# Patient Record
Sex: MALE | Race: WHITE | NOT HISPANIC OR LATINO | Employment: OTHER | URBAN - METROPOLITAN AREA
[De-identification: names, ages, dates, MRNs, and addresses within clinical notes are randomized per-mention and may not be internally consistent; named-entity substitution may affect disease eponyms.]

---

## 2020-12-01 ENCOUNTER — APPOINTMENT (OUTPATIENT)
Dept: MRI IMAGING | Facility: HOSPITAL | Age: 80
End: 2020-12-01

## 2020-12-01 ENCOUNTER — APPOINTMENT (OUTPATIENT)
Dept: GENERAL RADIOLOGY | Facility: HOSPITAL | Age: 80
End: 2020-12-01

## 2020-12-01 ENCOUNTER — APPOINTMENT (OUTPATIENT)
Dept: CT IMAGING | Facility: HOSPITAL | Age: 80
End: 2020-12-01

## 2020-12-01 ENCOUNTER — HOSPITAL ENCOUNTER (INPATIENT)
Facility: HOSPITAL | Age: 80
LOS: 8 days | Discharge: NURSING, REHAB OR LONG-TERM CARE | End: 2020-12-09
Attending: EMERGENCY MEDICINE | Admitting: INTERNAL MEDICINE

## 2020-12-01 DIAGNOSIS — R41.841 COGNITIVE COMMUNICATION DEFICIT: Primary | ICD-10-CM

## 2020-12-01 DIAGNOSIS — I63.9 ACUTE CVA (CEREBROVASCULAR ACCIDENT) (HCC): ICD-10-CM

## 2020-12-01 LAB
BASE EXCESS BLDA CALC-SCNC: -2 MMOL/L (ref -5–5)
BASOPHILS # BLD AUTO: 0.11 10*3/MM3 (ref 0–0.2)
BASOPHILS NFR BLD AUTO: 1.2 % (ref 0–1.5)
CA-I BLDA-SCNC: 1.24 MMOL/L (ref 1.2–1.32)
CO2 BLDA-SCNC: 25 MMOL/L (ref 24–29)
DEPRECATED RDW RBC AUTO: 49.3 FL (ref 37–54)
EOSINOPHIL # BLD AUTO: 0.47 10*3/MM3 (ref 0–0.4)
EOSINOPHIL NFR BLD AUTO: 5.2 % (ref 0.3–6.2)
ERYTHROCYTE [DISTWIDTH] IN BLOOD BY AUTOMATED COUNT: 12.9 % (ref 12.3–15.4)
GLUCOSE BLDC GLUCOMTR-MCNC: 146 MG/DL (ref 70–130)
HCO3 BLDA-SCNC: 23.9 MMOL/L (ref 22–26)
HCT VFR BLD AUTO: 38.2 % (ref 37.5–51)
HCT VFR BLDA CALC: 37 % (ref 38–51)
HGB BLD-MCNC: 11.9 G/DL (ref 13–17.7)
HGB BLDA-MCNC: 12.6 G/DL (ref 12–17)
HOLD SPECIMEN: NORMAL
HOLD SPECIMEN: NORMAL
IMM GRANULOCYTES # BLD AUTO: 0.03 10*3/MM3 (ref 0–0.05)
IMM GRANULOCYTES NFR BLD AUTO: 0.3 % (ref 0–0.5)
INR PPP: 1 (ref 0.8–1.2)
IRON 24H UR-MRATE: 53 MCG/DL (ref 59–158)
IRON SATN MFR SERPL: 13 % (ref 20–50)
LYMPHOCYTES # BLD AUTO: 1.21 10*3/MM3 (ref 0.7–3.1)
LYMPHOCYTES NFR BLD AUTO: 13.4 % (ref 19.6–45.3)
MCH RBC QN AUTO: 32.2 PG (ref 26.6–33)
MCHC RBC AUTO-ENTMCNC: 31.2 G/DL (ref 31.5–35.7)
MCV RBC AUTO: 103.5 FL (ref 79–97)
MONOCYTES # BLD AUTO: 0.83 10*3/MM3 (ref 0.1–0.9)
MONOCYTES NFR BLD AUTO: 9.2 % (ref 5–12)
NEUTROPHILS NFR BLD AUTO: 6.35 10*3/MM3 (ref 1.7–7)
NEUTROPHILS NFR BLD AUTO: 70.7 % (ref 42.7–76)
NRBC BLD AUTO-RTO: 0 /100 WBC (ref 0–0.2)
PCO2 BLDA: 46.9 MM HG (ref 35–45)
PH BLDA: 7.32 PH UNITS (ref 7.35–7.6)
PLATELET # BLD AUTO: 183 10*3/MM3 (ref 140–450)
PMV BLD AUTO: 10.3 FL (ref 6–12)
PO2 BLDA: 25 MMHG (ref 80–105)
POTASSIUM BLDA-SCNC: 5 MMOL/L (ref 3.5–4.9)
PROTHROMBIN TIME: 11.8 SECONDS (ref 12.8–15.2)
QT INTERVAL: 466 MS
QTC INTERVAL: 445 MS
RBC # BLD AUTO: 3.69 10*6/MM3 (ref 4.14–5.8)
SAO2 % BLDA: 40 % (ref 95–98)
SARS-COV-2 RNA RESP QL NAA+PROBE: NOT DETECTED
SODIUM BLD-SCNC: 138 MMOL/L (ref 138–146)
TIBC SERPL-MCNC: 417 MCG/DL (ref 298–536)
TRANSFERRIN SERPL-MCNC: 280 MG/DL (ref 200–360)
WBC # BLD AUTO: 9 10*3/MM3 (ref 3.4–10.8)
WHOLE BLOOD HOLD SPECIMEN: NORMAL
WHOLE BLOOD HOLD SPECIMEN: NORMAL

## 2020-12-01 PROCEDURE — 83540 ASSAY OF IRON: CPT | Performed by: INTERNAL MEDICINE

## 2020-12-01 PROCEDURE — 84295 ASSAY OF SERUM SODIUM: CPT

## 2020-12-01 PROCEDURE — 85025 COMPLETE CBC W/AUTO DIFF WBC: CPT | Performed by: EMERGENCY MEDICINE

## 2020-12-01 PROCEDURE — 84466 ASSAY OF TRANSFERRIN: CPT | Performed by: INTERNAL MEDICINE

## 2020-12-01 PROCEDURE — 87635 SARS-COV-2 COVID-19 AMP PRB: CPT | Performed by: EMERGENCY MEDICINE

## 2020-12-01 PROCEDURE — 71045 X-RAY EXAM CHEST 1 VIEW: CPT

## 2020-12-01 PROCEDURE — 70496 CT ANGIOGRAPHY HEAD: CPT

## 2020-12-01 PROCEDURE — 99223 1ST HOSP IP/OBS HIGH 75: CPT | Performed by: CLINICAL NURSE SPECIALIST

## 2020-12-01 PROCEDURE — 99223 1ST HOSP IP/OBS HIGH 75: CPT | Performed by: INTERNAL MEDICINE

## 2020-12-01 PROCEDURE — 0042T HC CT CEREBRAL PERFUSION W/WO CONTRAST: CPT

## 2020-12-01 PROCEDURE — 82947 ASSAY GLUCOSE BLOOD QUANT: CPT

## 2020-12-01 PROCEDURE — 70498 CT ANGIOGRAPHY NECK: CPT

## 2020-12-01 PROCEDURE — 70450 CT HEAD/BRAIN W/O DYE: CPT

## 2020-12-01 PROCEDURE — 85610 PROTHROMBIN TIME: CPT

## 2020-12-01 PROCEDURE — 82330 ASSAY OF CALCIUM: CPT

## 2020-12-01 PROCEDURE — 99285 EMERGENCY DEPT VISIT HI MDM: CPT

## 2020-12-01 PROCEDURE — 70551 MRI BRAIN STEM W/O DYE: CPT

## 2020-12-01 PROCEDURE — 85014 HEMATOCRIT: CPT

## 2020-12-01 PROCEDURE — 82803 BLOOD GASES ANY COMBINATION: CPT

## 2020-12-01 PROCEDURE — 84132 ASSAY OF SERUM POTASSIUM: CPT

## 2020-12-01 PROCEDURE — 0 IOPAMIDOL PER 1 ML: Performed by: EMERGENCY MEDICINE

## 2020-12-01 PROCEDURE — 82565 ASSAY OF CREATININE: CPT

## 2020-12-01 PROCEDURE — 93005 ELECTROCARDIOGRAM TRACING: CPT | Performed by: EMERGENCY MEDICINE

## 2020-12-01 RX ORDER — ATORVASTATIN CALCIUM 40 MG/1
80 TABLET, FILM COATED ORAL NIGHTLY
Status: DISCONTINUED | OUTPATIENT
Start: 2020-12-01 | End: 2020-12-09 | Stop reason: HOSPADM

## 2020-12-01 RX ORDER — SODIUM CHLORIDE 0.9 % (FLUSH) 0.9 %
10 SYRINGE (ML) INJECTION AS NEEDED
Status: DISCONTINUED | OUTPATIENT
Start: 2020-12-01 | End: 2020-12-09 | Stop reason: HOSPADM

## 2020-12-01 RX ORDER — ASPIRIN 81 MG/1
81 TABLET ORAL DAILY
COMMUNITY
End: 2021-09-29

## 2020-12-01 RX ORDER — DEXTROSE MONOHYDRATE 25 G/50ML
25 INJECTION, SOLUTION INTRAVENOUS
Status: DISCONTINUED | OUTPATIENT
Start: 2020-12-01 | End: 2020-12-08

## 2020-12-01 RX ORDER — METOPROLOL TARTRATE 50 MG/1
50 TABLET, FILM COATED ORAL EVERY 12 HOURS SCHEDULED
Status: DISCONTINUED | OUTPATIENT
Start: 2020-12-02 | End: 2020-12-09 | Stop reason: HOSPADM

## 2020-12-01 RX ORDER — ASPIRIN 300 MG/1
300 SUPPOSITORY RECTAL DAILY
Status: DISCONTINUED | OUTPATIENT
Start: 2020-12-01 | End: 2020-12-04

## 2020-12-01 RX ORDER — ATORVASTATIN CALCIUM 10 MG/1
10 TABLET, FILM COATED ORAL DAILY
COMMUNITY
End: 2020-12-09 | Stop reason: HOSPADM

## 2020-12-01 RX ORDER — NICOTINE POLACRILEX 4 MG
15 LOZENGE BUCCAL
Status: DISCONTINUED | OUTPATIENT
Start: 2020-12-01 | End: 2020-12-08

## 2020-12-01 RX ORDER — ACETAMINOPHEN 325 MG/1
650 TABLET ORAL EVERY 6 HOURS PRN
Status: DISCONTINUED | OUTPATIENT
Start: 2020-12-01 | End: 2020-12-09 | Stop reason: HOSPADM

## 2020-12-01 RX ORDER — SODIUM CHLORIDE 0.9 % (FLUSH) 0.9 %
10 SYRINGE (ML) INJECTION EVERY 12 HOURS SCHEDULED
Status: DISCONTINUED | OUTPATIENT
Start: 2020-12-01 | End: 2020-12-09 | Stop reason: HOSPADM

## 2020-12-01 RX ORDER — ACETAMINOPHEN 325 MG/1
650 TABLET ORAL ONCE
Status: COMPLETED | OUTPATIENT
Start: 2020-12-01 | End: 2020-12-01

## 2020-12-01 RX ORDER — ASPIRIN 81 MG/1
81 TABLET, CHEWABLE ORAL DAILY
Status: DISCONTINUED | OUTPATIENT
Start: 2020-12-01 | End: 2020-12-07

## 2020-12-01 RX ORDER — SODIUM CHLORIDE 0.9 % (FLUSH) 0.9 %
10 SYRINGE (ML) INJECTION AS NEEDED
Status: DISCONTINUED | OUTPATIENT
Start: 2020-12-01 | End: 2020-12-08

## 2020-12-01 RX ORDER — ASPIRIN 81 MG/1
81 TABLET ORAL DAILY
Status: DISCONTINUED | OUTPATIENT
Start: 2020-12-01 | End: 2020-12-01 | Stop reason: SDUPTHER

## 2020-12-01 RX ORDER — FUROSEMIDE 20 MG/1
20 TABLET ORAL 2 TIMES WEEKLY
Status: ON HOLD | COMMUNITY
End: 2021-03-02 | Stop reason: SDUPTHER

## 2020-12-01 RX ADMIN — ACETAMINOPHEN 650 MG: 325 TABLET, FILM COATED ORAL at 14:59

## 2020-12-01 RX ADMIN — ACETAMINOPHEN 650 MG: 325 TABLET, FILM COATED ORAL at 21:30

## 2020-12-01 RX ADMIN — SODIUM CHLORIDE, PRESERVATIVE FREE 10 ML: 5 INJECTION INTRAVENOUS at 21:31

## 2020-12-01 RX ADMIN — ASPIRIN 81 MG CHEWABLE TABLET 81 MG: 81 TABLET CHEWABLE at 21:16

## 2020-12-01 RX ADMIN — ATORVASTATIN CALCIUM 80 MG: 40 TABLET, FILM COATED ORAL at 21:16

## 2020-12-01 RX ADMIN — IOPAMIDOL 120 ML: 755 INJECTION, SOLUTION INTRAVENOUS at 11:47

## 2020-12-01 NOTE — ED PROVIDER NOTES
EMERGENCY DEPARTMENT ENCOUNTER    Room Number:  29/29  Date of encounter:  12/1/2020  PCP: Neema Jimenes MD  Historian: Patient, spouse, paramedics      HPI:  Chief Complaint: Left-sided weakness        Context: Dirk Miles is a 80 y.o. male who presents to the ED c/o left-sided weakness which is felt to start between 730 and 8 AM today.  The patient's wife states that the patient crumpled toward the floor between 730 and 8 AM.  At that point she realized that he was quite weak and called 911.  She believes that he was normal prior to that time but cannot completely guarantee this.  Patient has a difficult time describing when his symptoms start.  He knows he was able to take the trash out this morning sometime around 730.  No prior CVAs noted.  Patient takes an aspirin only as far as anticoagulation.      PAST MEDICAL HISTORY  Active Ambulatory Problems     Diagnosis Date Noted   • No Active Ambulatory Problems     Resolved Ambulatory Problems     Diagnosis Date Noted   • No Resolved Ambulatory Problems     Past Medical History:   Diagnosis Date   • A-fib (CMS/[facility])    • Diabetes mellitus (CMS/[facility])    • Hypertension          PAST SURGICAL HISTORY  Past Surgical History:   Procedure Laterality Date   • CATARACT EXTRACTION, BILATERAL     • HERNIA REPAIR           FAMILY HISTORY  History reviewed. No pertinent family history.      SOCIAL HISTORY  Social History     Socioeconomic History   • Marital status:      Spouse name: Not on file   • Number of children: Not on file   • Years of education: Not on file   • Highest education level: Not on file   Tobacco Use   • Smoking status: Former Smoker   • Smokeless tobacco: Never Used   • Tobacco comment: QUIT 25YRS AGO    Substance and Sexual Activity   • Alcohol use: Never     Frequency: Never   • Drug use: Never   • Sexual activity: Defer         ALLERGIES  Patient has no known allergies.        REVIEW OF SYSTEMS  Review of Systems     All systems  reviewed and negative except for those discussed in HPI.       PHYSICAL EXAM    I have reviewed the triage vital signs and nursing notes.    ED Triage Vitals [12/01/20 1118]   Temp Heart Rate Resp BP SpO2   98.7 °F (37.1 °C) 65 16 152/74 --      Temp src Heart Rate Source Patient Position BP Location FiO2 (%)   Oral Monitor Lying Right arm --       Physical Exam  GENERAL:   Appears somewhat dazed and confused but nontoxic.  HENT: Nares patent.  EYES: No scleral icterus.  Complete left hemianopsia  CV: Regular rhythm, regular rate.  No murmurs gallops rubs.  No audible carotid bruits.  RESPIRATORY: Normal effort.  No audible wheezes, rales or rhonchi.  Clear to auscultation.  ABDOMEN: Soft, nontender.  MUSCULOSKELETAL: No deformities.   NEURO: Left hemianopsia.  Left arm drift, left leg drift, decreased fine touch sensation throughout left arm and leg.  See stroke navigator [facility] for details..  SKIN: Warm, dry, no rash visualized.        LAB RESULTS  Recent Results (from the past 24 hour(s))   POC Surgery Labs    Collection Time: 12/01/20 11:25 AM    Specimen: Blood   Result Value Ref Range    Ionized Calcium 1.24 1.20 - 1.32 mmol/L    POC Potassium 5.0 (H) 3.5 - 4.9 mmol/L    Sodium 138 138 - 146 mmol/L    Total CO2 25 24 - 29 mmol/L    Hemoglobin 12.6 12.0 - 17.0 g/dL    Hematocrit 37 (L) 38 - 51 %    pCO2, Arterial 46.9 (H) 35 - 45 mm Hg    pO2, Arterial 25 (L) 80 - 105 mmHg    Base Excess -2.0000 -5 - 5 mmol/L    O2 Saturation, Arterial 40 (L) 95 - 98 %    pH, Arterial 7.32 (L) 7.35 - 7.6 pH units    HCO3, Arterial 23.9 22 - 26 mmol/L    Glucose 146 (H) 70 - 130 mg/dL   POC Protime / INR    Collection Time: 12/01/20 11:29 AM    Specimen: Blood   Result Value Ref Range    Protime 11.8 (L) 12.8 - 15.2 seconds    INR 1.0 0.8 - 1.2   CBC Auto Differential    Collection Time: 12/01/20 11:39 AM    Specimen: Blood   Result Value Ref Range    WBC 9.00 3.40 - 10.80 10*3/mm3    RBC 3.69 (L) 4.14 - 5.80 10*6/mm3    Hemoglobin  11.9 (L) 13.0 - 17.7 g/dL    Hematocrit 38.2 37.5 - 51.0 %    .5 (H) 79.0 - 97.0 fL    MCH 32.2 26.6 - 33.0 pg    MCHC 31.2 (L) 31.5 - 35.7 g/dL    RDW 12.9 12.3 - 15.4 %    RDW-SD 49.3 37.0 - 54.0 fl    MPV 10.3 6.0 - 12.0 fL    Platelets 183 140 - 450 10*3/mm3    Neutrophil % 70.7 42.7 - 76.0 %    Lymphocyte % 13.4 (L) 19.6 - 45.3 %    Monocyte % 9.2 5.0 - 12.0 %    Eosinophil % 5.2 0.3 - 6.2 %    Basophil % 1.2 0.0 - 1.5 %    Immature Grans % 0.3 0.0 - 0.5 %    Neutrophils, Absolute 6.35 1.70 - 7.00 10*3/mm3    Lymphocytes, Absolute 1.21 0.70 - 3.10 10*3/mm3    Monocytes, Absolute 0.83 0.10 - 0.90 10*3/mm3    Eosinophils, Absolute 0.47 (H) 0.00 - 0.40 10*3/mm3    Basophils, Absolute 0.11 0.00 - 0.20 10*3/mm3    Immature Grans, Absolute 0.03 0.00 - 0.05 10*3/mm3    nRBC 0.0 0.0 - 0.2 /100 WBC   ECG 12 Lead    Collection Time: 12/01/20 12:22 PM   Result Value Ref Range    QT Interval 466 ms    QTC Interval 445 ms       If labs were ordered, I independently reviewed the results.        RADIOLOGY  Ct Angiogram Head    Result Date: 12/1/2020  EXAMINATION: CT ANGIOGRAM HEAD-, CT ANGIOGRAM NECK-  INDICATION: stroke  TECHNIQUE: Axial IV arterial phase contrast-enhanced CT angiogram of the head and neck. Three-dimensional reconstructions were postprocessed.  The radiation dose reduction device was turned on for each scan per the ALARA (As Low as Reasonably Achievable) protocol.  COMPARISON: NONE  FINDINGS: The lung apices are clear. Evaluation of the neck soft tissues demonstrates no pathologic cervical adenopathy or unexpected soft tissue neck mass. The parotid and submandibular glands are unremarkable and there is no obvious aerodigestive tract lesion. Evaluation of osseous structures demonstrates no evidence of acute fracture or malalignment.  Patent atherosclerotic aortic arch with patent great vessel origins. On the right, there is mild calcific atherosclerotic disease, not producing significant luminal  narrowing, 0% by NASA criteria. On the left, there is focal severe narrowing of the left ICA just distal to its origin with a prominent component of noncalcified, soft atherosclerotic plaque. Narrowing on the left is around 80% by NASCET criteria. Bilateral vertebral arteries are normal in course and caliber.  Intracranially, there is atherosclerosis of bilateral cavernous siphons, with mild atherosclerotic narrowing of the supraclinoid segments. Patent bilateral anterior cerebral arteries. The right middle cerebral artery is patent, normal in course and caliber. The left middle cerebral artery is also unremarkable without evidence of flow-limiting stenosis or aneurysmal dilatation. The vertebrobasilar system is unremarkable. There is abrupt nonopacification of the right P2 segment PCA compatible with thrombotic occlusion. The left posterior cerebral artery is patent, normal in course and caliber.      Abrupt nonopacification compatible with likely acute thrombotic occlusion of the right P2 segment PCA. There is otherwise intracranial atherosclerotic disease with mild narrowing of the supraclinoid segment internal carotid arteries bilaterally. No further evidence of high-grade flow limiting stenosis, large vessel occlusion or aneurysmal dilatation.  Focal severe, 80-90% narrowing of the left proximal ICA, with prominent component of soft plaque noted. No significant atherosclerotic narrowing of the right ICA origin.  Findings discussed with the stroke team by Jose Narayanan via phone at 11:52 AM 12/1/2020   This report was finalized on 12/1/2020 12:06 PM by Jose Narayanan.      Ct Angiogram Neck    Result Date: 12/1/2020  EXAMINATION: CT ANGIOGRAM HEAD-, CT ANGIOGRAM NECK-  INDICATION: stroke  TECHNIQUE: Axial IV arterial phase contrast-enhanced CT angiogram of the head and neck. Three-dimensional reconstructions were postprocessed.  The radiation dose reduction device was turned on for each scan per the ALARA  (As Low as Reasonably Achievable) protocol.  COMPARISON: NONE  FINDINGS: The lung apices are clear. Evaluation of the neck soft tissues demonstrates no pathologic cervical adenopathy or unexpected soft tissue neck mass. The parotid and submandibular glands are unremarkable and there is no obvious aerodigestive tract lesion. Evaluation of osseous structures demonstrates no evidence of acute fracture or malalignment.  Patent atherosclerotic aortic arch with patent great vessel origins. On the right, there is mild calcific atherosclerotic disease, not producing significant luminal narrowing, 0% by NASA criteria. On the left, there is focal severe narrowing of the left ICA just distal to its origin with a prominent component of noncalcified, soft atherosclerotic plaque. Narrowing on the left is around 80% by NASCET criteria. Bilateral vertebral arteries are normal in course and caliber.  Intracranially, there is atherosclerosis of bilateral cavernous siphons, with mild atherosclerotic narrowing of the supraclinoid segments. Patent bilateral anterior cerebral arteries. The right middle cerebral artery is patent, normal in course and caliber. The left middle cerebral artery is also unremarkable without evidence of flow-limiting stenosis or aneurysmal dilatation. The vertebrobasilar system is unremarkable. There is abrupt nonopacification of the right P2 segment PCA compatible with thrombotic occlusion. The left posterior cerebral artery is patent, normal in course and caliber.      Abrupt nonopacification compatible with likely acute thrombotic occlusion of the right P2 segment PCA. There is otherwise intracranial atherosclerotic disease with mild narrowing of the supraclinoid segment internal carotid arteries bilaterally. No further evidence of high-grade flow limiting stenosis, large vessel occlusion or aneurysmal dilatation.  Focal severe, 80-90% narrowing of the left proximal ICA, with prominent component of soft  plaque noted. No significant atherosclerotic narrowing of the right ICA origin.  Findings discussed with the stroke team by Jose Narayanan via phone at 11:52 AM 12/1/2020   This report was finalized on 12/1/2020 12:06 PM by Jose Narayanan.      Ct Head Without Contrast Stroke Protocol    Result Date: 12/1/2020  EXAMINATION: CT HEAD WO CONTRAST STROKE PROTOCOL-  INDICATION: Stroke, follow up  TECHNIQUE: Axial noncontrast CT of the head with multiplanar reconstruction  The radiation dose reduction device was turned on for each scan per the ALARA (As Low as Reasonably Achievable) protocol.  COMPARISON: NONE  FINDINGS: There is no evidence of intracranial hemorrhage. Advanced periventricular white matter changes of chronic small vessel ischemia are evident in addition to some left frontal lobe encephalomalacia, likely from chronic infarct. There is transcortical hypoattenuation involving the right occipital lobe, extending towards the posterior temporal lobe, concerning for PCA territory acute ischemia. There is otherwise no evidence of mass or mass effect. The ventricles are normal in size and configuration. The orbits are normal and the paranasal sinuses are grossly clear.      No acute intracranial hemorrhage. Transcortical hypoattenuation in the right PCA territory concerning for area of acute infarct.  Scan performed at 11:17 AM 12/1/2020. Findings discussed with the stroke team in person by Jose Narayanan at time of scan acquisition.   This report was finalized on 12/1/2020 11:53 AM by Jose Narayanan.      Ct Cerebral Perfusion With & Without Contrast    Result Date: 12/1/2020  EXAMINATION: CT CEREBRAL PERFUSION W WO CONTRAST-  INDICATION: stroke  TECHNIQUE: Cerebral perfusion analysis was performed using computed tomography with contrast administration, including post processing of parametric maps with determination of cerebral blood flow, cerebral blood volume, and mean transit time.  The radiation dose  reduction device was turned on for each scan per the ALARA (As Low as Reasonably Achievable) protocol.  COMPARISON: NONE.  FINDINGS: There is an area of relative diminished cerebral blood volume in the right PCA territory, encompassing occipital and posterior temporal regions, concerning for area of core infarct. There is a fairly matched area of corresponding prolonged mean transit time in a similar distribution, without evidence of larger surrounding area of ischemic tissue at risk.      Right PCA territory core infarct with fairly matched area of diminished cerebral blood flow, without significant surrounding ischemic tissue at risk.   This report was finalized on 12/1/2020 11:59 AM by Jose Narayanan.        I ordered and reviewed the above noted radiographic studies.    I viewed images of head CT which showed no acute hemorrhage per my independent interpretation.  See radiologist's dictation for official interpretation.        PROCEDURES    Critical Care  Performed by: Erick Hood MD  Authorized by: Erick Hood MD     Critical care provider statement:     Critical care time (minutes):  35    Critical care time was exclusive of:  Separately billable procedures and treating other patients    Critical care was necessary to treat or prevent imminent or life-threatening deterioration of the following conditions:  CNS failure or compromise    Critical care was time spent personally by me on the following activities:  Ordering and performing treatments and interventions, ordering and review of laboratory studies, ordering and review of radiographic studies, pulse oximetry, re-evaluation of patient's condition, review of old charts, obtaining history from patient or surrogate, examination of patient, evaluation of patient's response to treatment, discussions with consultants and development of treatment plan with patient or surrogate  Comments:      I attended to the patient immediately upon arrival under  stroke protocol.  I examined him in the CT scanner.  Initially the patient appeared that he would be within the window for IV TPA however changes on plain CT scan indicated the time of onset was likely greater than 4.5 hours.  CTA head and neck as well as CT perfusion help confirm that IV TPA would be of greater risk than benefit.  The patient did not meet criteria for Cath Lab intervention.        ECG 12 Lead   Final Result   Test Reason : Stroke Protocol (Onset > 12 Hrs)   Blood Pressure : **/** mmHG   Vent. Rate : 055 BPM     Atrial Rate : 082 BPM      P-R Int : 000 ms          QRS Dur : 102 ms       QT Int : 466 ms       P-R-T Axes : 000 060 051 degrees      QTc Int : 445 ms      Junctional rhythm   Otherwise normal ECG   No previous ECGs available   Confirmed by DANDRE MELÉNDEZ MD ([id number]) on 12/1/2020 12:25:18 PM      Referred By:             Confirmed By:DANDRE MELÉNDEZ MD          MEDICATIONS GIVEN IN ER    Medications   sodium chloride 0.9 % flush 10 mL (has no administration in time range)   iopamidol (ISOVUE-370) 76 % injection 150 mL (120 mL Intravenous Given 12/1/20 1147)         PROGRESS, DATA ANALYSIS, CONSULTS, AND MEDICAL DECISION MAKING    All labs have been independently reviewed by me.  All radiology studies have been reviewed by me and the radiologist dictating the report.   EKG's have been independently viewed and interpreted by me.      Differential diagnoses: CVA acute versus subacute      ED Course as of Dec 01 1230   Tue Dec 01, 2020   1225 I have paged the hospitalist for admission.    [MS]   1228 I spoke with Dr. Ramos who will admit.    [MS]      ED Course User Index  [MS] Dandre Meléndez MD             AS OF 12:30 EST VITALS:    BP - 149/78  HR - 60  TEMP - 98.7 °F (37.1 °C) (Oral)  O2 SATS - 94%        DIAGNOSIS  Final diagnoses:   Acute CVA (cerebrovascular accident) (CMS/[facility])         DISPOSITION  Admission           Dandre Meléndez MD  12/02/20 5414

## 2020-12-01 NOTE — CONSULTS
Stroke Consult Note    Patient Name: Dirk Miles   MRN: [id number]  Age: 80 y.o.  Sex: male  : 1940    Primary Care Physician: Neema Jimenes MD  Referring Physician:  No ref. provider found        Handedness: Right  Race:     Chief Complaint/Reason for Consultation: Left-sided weakness, neglect, right gaze preference, left hemianopsia    Subjective .  HPI: 80-year-old right-handed white male with known medical diagnoses of diabetes and hypertension who presents to the emergency department via EMS after wife found him to have problems with gait, complaint of headache, and high blood pressure.  Per EMS patient's last known well was approximately 0800 when the patient was having a hard time walking.  Wife checked his blood pressure and noticed his systolic blood pressure was 212 and gave him a dose of lisinopril.  She then called her son who came to check on him and when they could not ambulate him they called EMS.  Upon arrival to the ED patient's blood pressure is 152/74 in the right arm.  He has obvious left-sided neglect, right-sided gaze preference, left sided drift, decreased sensation and a complete left hemianopsia.  His head CT did not show any acute intracranial hemorrhage but does show a hypoattenuation in the right PCA territory.  ED physician, Dr. Hood, spoke to the wife on the phone to get a better idea of last known well.  It appears it was between 730 and 8 this a.m.,  after further discussion with vascular neurologist, Dr. Jerry, it was decided IV TPA not be given due to the risk of hemorrhage in the posterior circulation.    CT perfusion and CTA head and neck were performed that did showe a right PCA infarct with fairly matched area of diminished cerebral blood flow, as well as a distal right P2 segment occlusion.        Last Known Normal Date/Time: ~0730 2020 EST     Review of Systems   Respiratory: Positive for shortness of breath.    Cardiovascular: Negative.     Gastrointestinal: Negative.    Genitourinary: Negative.    Musculoskeletal: Positive for arthralgias.   Skin: Negative.    Neurological: Positive for weakness, numbness and headaches.   Psychiatric/Behavioral: Negative.       Past Medical History:   Diagnosis Date   • A-fib (CMS/[facility])    • Diabetes mellitus (CMS/[facility])    • Hypertension      Past Surgical History:   Procedure Laterality Date   • CATARACT EXTRACTION, BILATERAL     • HERNIA REPAIR       History reviewed. No pertinent family history.  Social History     Socioeconomic History   • Marital status:      Spouse name: Not on file   • Number of children: Not on file   • Years of education: Not on file   • Highest education level: Not on file   Tobacco Use   • Smoking status: Former Smoker   • Smokeless tobacco: Never Used   • Tobacco comment: QUIT 25YRS AGO    Substance and Sexual Activity   • Alcohol use: Never     Frequency: Never   • Drug use: Never   • Sexual activity: Defer     No Known Allergies  Prior to Admission medications    Medication Sig Start Date End Date Taking? Authorizing Provider   aspirin 81 MG EC tablet Take 81 mg by mouth Daily.   Yes Contreras Stein MD   atorvastatin (LIPITOR) 10 MG tablet Take 10 mg by mouth Daily.   Yes Contreras Stein MD   furosemide (LASIX) 20 MG tablet Take 20 mg by mouth 2 (Two) Times a Week. ON TUES AND SAT   Yes Contreras Stein MD   metFORMIN (GLUCOPHAGE) 500 MG tablet Take 500 mg by mouth 2 (Two) Times a Day With Meals.   Yes Contreras Stein MD   METOPROLOL TARTRATE PO Take 100 mg by mouth Daily.   Yes Contreras Stein MD             Objective     Temp:  [98.7 °F (37.1 °C)] 98.7 °F (37.1 °C)  Heart Rate:  [54-65] 54  Resp:  [16] 16  BP: (144-152)/(74) 144/74  Neurological Exam  Mental Status  Awake and alert. Oriented only to place and situation. Orientation: Thinks the month is February. Speech is normal. Language is fluent with no aphasia.    Cranial Nerves  CN II: Left  homonymous hemianopsia.  CN III, IV, VI: Extraocular movements intact bilaterally.  CN V:  Left: Abnormal facial sensation:  CN VII: Full and symmetric facial movement.  CN VIII: Hearing appears intact.  CN XI: Shoulder shrug strength is normal.  CN XII: Tongue midline without atrophy or fasciculations.    Motor    Drift noted in left upper and lower extremity.    Sensory  Left hemisensory loss.     Coordination  Finger-to-nose, rapid alternating movements and heel-to-shin normal bilaterally without dysmetria.    Gait  Did not observe.      Physical Exam  Vitals signs reviewed.   Constitutional:       General: He is awake.      Appearance: Normal appearance.   HENT:      Head: Normocephalic.      Mouth/Throat:      Mouth: Mucous membranes are moist.   Eyes:      Extraocular Movements: Extraocular movements intact.   Cardiovascular:      Rate and Rhythm: Normal rate.   Pulmonary:      Effort: Pulmonary effort is normal.   Skin:     General: Skin is warm and dry.   Neurological:      Mental Status: He is alert.      Sensory: Sensory deficit present.      Motor: Weakness present.      Coordination: Coordination is intact.   Psychiatric:         Mood and Affect: Mood normal.         Speech: Speech normal.         Acute Stroke Data    Alteplase (tPA) Inclusion / Exclusion Criteria    Time: 12:15 EST  Person Administering Scale: MARGARITA Blanchard    Inclusion Criteria  [x]   18 years of age or greater   [x]   Onset of symptoms < 4.5 hours before beginning treatment (stroke onset = time patient was last seen well or without symptoms).   []   Diagnosis of acute ischemic stroke causing measurable disabling deficit (Complete Hemianopia, Any Aphasia, Visual or Sensory Extinction, Any weakness limiting sustained effort against gravity)   []   Any remaining deficit considered potentially disabling in view of patient and practitioner   Exclusion criteria (Do not proceed with Alteplase if any are checked under exclusion  criteria)  []   Onset unknown or GREATER than 4.5 hours   []   ICH on CT/MRI   []   CT demonstrates hypodensity representing acute or subacute infarct   []   Significant head trauma or prior stroke in the previous 3 months   []   Symptoms suggestive of subarachnoid hemorrhage   []   History of un-ruptured intracranial aneurysm GREATER than 10 mm   []   Recent intracranial or intraspinal surgery within the last 3 months   []   Arterial puncture at a non-compressible site in the previous 7 days   []   Active internal bleeding   []   Acute bleeding tendency   []   Platelet count LESS than 100,000 for known hematological diseases such as leukemia, thrombocytopenia or chronic cirrhosis   []   Current use of anticoagulant with INR GREATER than 1.7 or PT GREATER than 15 seconds, aPTT GREATER than 40 seconds   []   Heparin received within 48 hours, resulting in abnormally elevated aPTT GREATER than upper limit of normal   []   Current use of direct thrombin inhibitors or direct factor Xa inhibitors in the past 48 hours   []   Elevated blood pressure refractory to treatment (systolic GREATER than 185 mm/Hg or diastolic  GREATER than 110 mm/Hg   []   Suspected infective endocarditis and aortic arch dissection   []   Current use of therapeutic treatment dose of low-molecular-weight heparin (LMWH) within the previous 24 hours   []   Structural GI malignancy or bleed   Relative exclusion for all patients  []   Only minor non-disabling symptoms   []   Pregnancy   []   Seizure at onset with postictal residual neurological impairments   []   Major surgery or previous trauma within past 14 days   []   History of previous spontaneous ICH, intracranial neoplasm, or AV malformation   []   Postpartum (within previous 14 days)   []   Recent GI or urinary tract hemorrhage (within previous 21 days)   []   Recent acute MI (within previous 3 months)   []   History of un-ruptured intracranial aneurysm LESS than 10 mm   []   History of  ruptured intracranial aneurysm   []   Blood glucose LESS than 50 mg/dL (2.7 mmol/L)   []   Dural puncture within the last 7 days   []   Known GREATER than 10 cerebral microbleeds   Additional exclusions for patients with symptoms onset between 3 and 4.5 hours.  []   Age > 80.   []   On any anticoagulants regardless of INR  >>> Warfarin (Coumadin), Heparin, Enoxaparin (Lovenox), fondaparinux (Arixtra), bivalirudin (Angiomax), Argatroban, dabigatran (Pradaxa), rivaroxaban (Xarelto), or apixaban (Eliquis)   []   Severe stroke (NIHSS > 25).   []   History of BOTH diabetes and previous ischemic stroke.   []   The risks and benefits have been discussed with the patient or family related to the administration of IV Alteplase for stroke symptoms.   []   I have discussed and reviewed the patient's case and imaging with the attending prior to IV Alteplase.   n/a Time Alteplase administered       Hospital Meds:  Scheduled-    Infusions-     PRNs- •  sodium chloride    Functional Status Prior to Current Stroke/H[place] Score: 0    NIH Stroke Scale  Time: 12:15 EST  Person Administering Scale: MARGARITA Blanchard    1a  Level of consciousness: 0=alert; keenly responsive   1b. LOC questions:  1=Performs one task correctly   1c. LOC commands: 0=Performs both tasks correctly   2.  Best Gaze: 1=partial gaze palsy   3.  Visual: 2=Complete hemianopia   4. Facial Palsy: 0=Normal symmetric movement   5a.  Motor left arm: 1=Drift, limb holds 90 (or 45) degrees but drifts down before full 10 seconds: does not hit bed   5b.  Motor right arm: 0=No drift, limb holds 90 (or 45) degrees for full 10 seconds   6a. motor left le=Drift, limb holds 90 (or 45) degrees but drifts down before full 10 seconds: does not hit bed   6b  Motor right le=No drift, limb holds 90 (or 45) degrees for full 10 seconds   7. Limb Ataxia: 0=Absent   8.  Sensory: 2=Severe to total sensory loss; patient is not aware of being touched in face, arm, leg   9. Best  Language:  0=No aphasia, normal   10. Dysarthria: 0=Normal   11. Extinction and Inattention: 2=Profound ben-inattention or ben-inattention to more than one modality. Does not recognize own hand or orients only to one side of space    Total:   10       Results Reviewed:  I have personally reviewed current lab, radiology, and data and agree with results.     Head CT no acute intracranial hemorrhage, transcortical hypoattenuation in the right PCA territory  CT perfusion right PCA core infarct with fairly matched area of diminished cerebral blood flow without significant surrounding ischemic tissue risk  CTA head and neck abrupt nonopacification compatible with likely acute thrombotic occlusion of the right P2 segment PCA.  Focal severe, 80 1090% narrowing of the left proximal ICA.  Significant left subclavian atherosclerotic disease.          Assessment/Plan:        80-year-old male with known stroke risk factors of hypertension, diabetes, and age who presents with acute onset of left-sided weakness, left-sided neglect, right gaze preference, left hemianopsia.  As patient's last known well was somewhat ambiguous that put him possibly right at 4 to 4-1/2 hours, the decision was made to not give TPA due to hypoattenuation already seen in the right PCA territory and could likely bleed.  Patient was not taken to Cath Lab as the occlusion appeared to be a distal P2 segment and there was no significant penumbra to save.      1.  Right PCA stroke-patient will be medically managed.  Will initiate stroke order set, TTE with bubble, will also get carotid duplex to take a better look at the carotid arteries.  Aspirin daily for now.  2.  Hypertension-may allow for permissive hypertension this evening, check blood pressures in the right arm due to significant left subclavian atherosclerosis that may cause inaccurate readings.  3.  Hyperlipidemia-we will start high-dose statin this evening, check fasting lipid profile in the a.m.  "and adjust dose as needed.  4.  Diabetes-patient claims to be \"borderline\" diabetic, he does take Metformin at home.  Will check hemoglobin A1c in the a.m..  Diabetes education is needed.  5.  Activity-patient to remain on bedrest this evening, PT/OT to evaluate and treat in the a.m.  6.  Diet-patient should remain n.p.o. until he is able to pass a swallow evaluation.  Then may have heart healthy diet.        Greater than 60 minutes was spent in evaluating the patient in the emergency room, obtaining and reviewing images, discussing the case with the ED physician, neuro interventionalist, vascular neurologist, nursing staff, patient and wife at the bedside.  Stroke neurology will continue to follow patient.  Thank you for allowing us to participate in this patient's care.    Huong Julian, APRN  December 1, 2020  12:15 EST    This is a patient stroke consultation note please refer to my full neurosurgical note for consultation        "

## 2020-12-01 NOTE — PLAN OF CARE
Goal Outcome Evaluation:  Plan of Care Reviewed With: patient  Progress: no change  Outcome Summary: rerceived to room, oriented to unit and POC. vss, SR on tele. denies pain. ordered strict bedrest, pt verbalized understanding

## 2020-12-01 NOTE — PROGRESS NOTES
Discharge Planning Assessment  [facility]     Patient Name: Dirk Miles  MRN: [id number]  Today's Date: 12/1/2020    Admit Date: 12/1/2020    Discharge Needs Assessment     Row Name 12/01/20 6400       Living Environment    Lives With  spouse    Name(s) of Who Lives With Patient  Sarai Miles - Relation: Spouse - Mobile: [phone]    Current Living Arrangements  home/apartment/condo    Primary Care Provided by  self    Provides Primary Care For  no one    Family Caregiver if Needed  spouse    Family Caregiver Names  Sarai Miles - Relation: Spouse - Mobile: [phone]    Quality of Family Relationships  helpful;involved;supportive       Resource/Environmental Concerns    Resource/Environmental Concerns  none    Transportation Concerns  car, none       Transition Planning    Patient/Family Anticipates Transition to  home with family    Patient/Family Anticipated Services at Transition  rehabilitation services;    Transportation Anticipated  family or friend will provide       Discharge Needs Assessment    Readmission Within the Last 30 Days  no previous admission in last 30 days    Equipment Currently Used at Home  none    Concerns to be Addressed  denies needs/concerns at this time    Anticipated Changes Related to Illness  none        Discharge Plan     Row Name 12/01/20 3273       Plan    Plan  Initial    Plan Comments  CM spoke with patient and spouse at bedside. Patient resides in [facility] with his spouse. Patient is independent with ADL's. Patient denies any DME. Patient denies any current HH or OP services. Patient denies any discharge planning needs at this time. CM following.    Final Discharge Disposition Code  30 - still a patient        Continued Care and Services - Admitted Since 12/1/2020    Coordination has not been started for this encounter.         Demographic Summary     Row Name 12/01/20 4263       General Information    Admission Type  inpatient    Arrived From  home     Referral Source  emergency department    Reason for Consult  discharge planning    Preferred Language  English     Used During This Interaction  no    General Information Comments  PCP: Tia Pratt       Contact Information    Contact Information Comments  JdSarai - Relation: Spouse - Mobile: [phone]        Functional Status     Row Name 12/01/20 1352       Functional Status    Usual Activity Tolerance  good    Current Activity Tolerance  moderate       Functional Status, IADL    Medications  independent    Meal Preparation  independent    Housekeeping  independent    Laundry  independent    Shopping  independent       Mental Status    General Appearance WDL  WDL       Mental Status Summary    Recent Changes in Mental Status/Cognitive Functioning  mental status       Employment/    Employment Status  retired                Brandy Vogel RN

## 2020-12-02 ENCOUNTER — APPOINTMENT (OUTPATIENT)
Dept: CARDIOLOGY | Facility: HOSPITAL | Age: 80
End: 2020-12-02

## 2020-12-02 ENCOUNTER — APPOINTMENT (OUTPATIENT)
Dept: CT IMAGING | Facility: HOSPITAL | Age: 80
End: 2020-12-02

## 2020-12-02 ENCOUNTER — APPOINTMENT (OUTPATIENT)
Dept: GENERAL RADIOLOGY | Facility: HOSPITAL | Age: 80
End: 2020-12-02

## 2020-12-02 LAB
ARTERIAL PATENCY WRIST A: POSITIVE
ASCENDING AORTA: 3.5 CM
ATMOSPHERIC PRESS: ABNORMAL MM[HG]
BASE EXCESS BLDA CALC-SCNC: -0.1 MMOL/L (ref 0–2)
BDY SITE: ABNORMAL
BH CV ECHO LEFT VENTRICLE MID CAVITARY GRADIENT: 8 MMHG
BH CV ECHO MEAS - AO MAX PG (FULL): 2.8 MMHG
BH CV ECHO MEAS - AO MAX PG: 6 MMHG
BH CV ECHO MEAS - AO MEAN PG (FULL): 2.5 MMHG
BH CV ECHO MEAS - AO MEAN PG: 4 MMHG
BH CV ECHO MEAS - AO ROOT AREA (BSA CORRECTED): 1.6
BH CV ECHO MEAS - AO ROOT AREA: 8 CM^2
BH CV ECHO MEAS - AO ROOT DIAM: 3.2 CM
BH CV ECHO MEAS - AO V2 MAX: 126 CM/SEC
BH CV ECHO MEAS - AO V2 MEAN: 88.5 CM/SEC
BH CV ECHO MEAS - AO V2 VTI: 26.1 CM
BH CV ECHO MEAS - ASC AORTA: 3.5 CM
BH CV ECHO MEAS - AVA(I,A): 2.1 CM^2
BH CV ECHO MEAS - AVA(I,D): 2.1 CM^2
BH CV ECHO MEAS - AVA(V,A): 2.2 CM^2
BH CV ECHO MEAS - AVA(V,D): 2.2 CM^2
BH CV ECHO MEAS - BSA(HAYCOCK): 2 M^2
BH CV ECHO MEAS - BSA(HAYCOCK): 2 M^2
BH CV ECHO MEAS - BSA: 2 M^2
BH CV ECHO MEAS - BSA: 2 M^2
BH CV ECHO MEAS - BZI_BMI: 27.3 KILOGRAMS/M^2
BH CV ECHO MEAS - BZI_BMI: 27.3 KILOGRAMS/M^2
BH CV ECHO MEAS - BZI_METRIC_HEIGHT: 175.3 CM
BH CV ECHO MEAS - BZI_METRIC_HEIGHT: 175.3 CM
BH CV ECHO MEAS - BZI_METRIC_WEIGHT: 83.9 KG
BH CV ECHO MEAS - BZI_METRIC_WEIGHT: 83.9 KG
BH CV ECHO MEAS - EDV(CUBED): 132.7 ML
BH CV ECHO MEAS - EDV(MOD-SP2): 49.4 ML
BH CV ECHO MEAS - EDV(MOD-SP4): 52.3 ML
BH CV ECHO MEAS - EDV(TEICH): 123.8 ML
BH CV ECHO MEAS - EF(CUBED): 70.4 %
BH CV ECHO MEAS - EF(MOD-BP): 62.5 %
BH CV ECHO MEAS - EF(MOD-SP2): 62.3 %
BH CV ECHO MEAS - EF(MOD-SP4): 60.4 %
BH CV ECHO MEAS - EF(TEICH): 61.7 %
BH CV ECHO MEAS - ESV(CUBED): 39.3 ML
BH CV ECHO MEAS - ESV(MOD-SP2): 18.6 ML
BH CV ECHO MEAS - ESV(MOD-SP4): 20.7 ML
BH CV ECHO MEAS - ESV(TEICH): 47.4 ML
BH CV ECHO MEAS - FS: 33.3 %
BH CV ECHO MEAS - IVS/LVPW: 1
BH CV ECHO MEAS - IVSD: 1.1 CM
BH CV ECHO MEAS - LA DIMENSION: 4.7 CM
BH CV ECHO MEAS - LA/AO: 1.5
BH CV ECHO MEAS - LAD MAJOR: 7.3 CM
BH CV ECHO MEAS - LAT PEAK E' VEL: 13.5 CM/SEC
BH CV ECHO MEAS - LATERAL E/E' RATIO: 7.2
BH CV ECHO MEAS - LV DIASTOLIC VOL/BSA (35-75): 26.2 ML/M^2
BH CV ECHO MEAS - LV IVRT: 0.14 SEC
BH CV ECHO MEAS - LV MASS(C)D: 213.9 GRAMS
BH CV ECHO MEAS - LV MASS(C)DI: 107 GRAMS/M^2
BH CV ECHO MEAS - LV MAX PG: 3.2 MMHG
BH CV ECHO MEAS - LV MEAN PG: 1.5 MMHG
BH CV ECHO MEAS - LV SYSTOLIC VOL/BSA (12-30): 10.4 ML/M^2
BH CV ECHO MEAS - LV V1 MAX: 88.1 CM/SEC
BH CV ECHO MEAS - LV V1 MEAN: 59 CM/SEC
BH CV ECHO MEAS - LV V1 VTI: 17.1 CM
BH CV ECHO MEAS - LVIDD: 5.1 CM
BH CV ECHO MEAS - LVIDS: 3.4 CM
BH CV ECHO MEAS - LVLD AP2: 6.4 CM
BH CV ECHO MEAS - LVLD AP4: 7.3 CM
BH CV ECHO MEAS - LVLS AP2: 5.8 CM
BH CV ECHO MEAS - LVLS AP4: 6.2 CM
BH CV ECHO MEAS - LVOT AREA (M): 3.1 CM^2
BH CV ECHO MEAS - LVOT AREA: 3.1 CM^2
BH CV ECHO MEAS - LVOT DIAM: 2 CM
BH CV ECHO MEAS - LVPWD: 1.1 CM
BH CV ECHO MEAS - MED PEAK E' VEL: 6.7 CM/SEC
BH CV ECHO MEAS - MEDIAL E/E' RATIO: 14.5
BH CV ECHO MEAS - MV DEC SLOPE: 372 CM/SEC^2
BH CV ECHO MEAS - MV DEC TIME: 0.27 SEC
BH CV ECHO MEAS - MV E MAX VEL: 97.7 CM/SEC
BH CV ECHO MEAS - MV MAX PG: 4.4 MMHG
BH CV ECHO MEAS - MV MEAN PG: 2 MMHG
BH CV ECHO MEAS - MV P1/2T MAX VEL: 110 CM/SEC
BH CV ECHO MEAS - MV P1/2T: 86.6 MSEC
BH CV ECHO MEAS - MV V2 MAX: 105 CM/SEC
BH CV ECHO MEAS - MV V2 MEAN: 55.3 CM/SEC
BH CV ECHO MEAS - MV V2 VTI: 21 CM
BH CV ECHO MEAS - MVA P1/2T LCG: 2 CM^2
BH CV ECHO MEAS - MVA(P1/2T): 2.5 CM^2
BH CV ECHO MEAS - MVA(VTI): 2.6 CM^2
BH CV ECHO MEAS - PA ACC TIME: 0.1 SEC
BH CV ECHO MEAS - PA MAX PG: 2.7 MMHG
BH CV ECHO MEAS - PA PR(ACCEL): 33.1 MMHG
BH CV ECHO MEAS - PA V2 MAX: 82.7 CM/SEC
BH CV ECHO MEAS - RAP SYSTOLE: 8 MMHG
BH CV ECHO MEAS - RVSP: 45.2 MMHG
BH CV ECHO MEAS - SI(AO): 105 ML/M^2
BH CV ECHO MEAS - SI(CUBED): 46.7 ML/M^2
BH CV ECHO MEAS - SI(LVOT): 26.9 ML/M^2
BH CV ECHO MEAS - SI(MOD-SP2): 15.4 ML/M^2
BH CV ECHO MEAS - SI(MOD-SP4): 15.8 ML/M^2
BH CV ECHO MEAS - SI(TEICH): 38.2 ML/M^2
BH CV ECHO MEAS - SV(AO): 209.9 ML
BH CV ECHO MEAS - SV(CUBED): 93.3 ML
BH CV ECHO MEAS - SV(LVOT): 53.7 ML
BH CV ECHO MEAS - SV(MOD-SP2): 30.8 ML
BH CV ECHO MEAS - SV(MOD-SP4): 31.6 ML
BH CV ECHO MEAS - SV(TEICH): 76.4 ML
BH CV ECHO MEAS - TAPSE (>1.6): 1.2 CM
BH CV ECHO MEAS - TR MAX PG: 37.2 MMHG
BH CV ECHO MEAS - TR MAX VEL: 305 CM/SEC
BH CV ECHO MEASUREMENTS AVERAGE E/E' RATIO: 9.67
BH CV VAS BP LEFT ARM: NORMAL MMHG
BH CV XLRA - RV BASE: 3.8 CM
BH CV XLRA - RV LENGTH: 6.9 CM
BH CV XLRA - RV MID: 2.8 CM
BH CV XLRA - TDI S': 11.2 CM/SEC
BH CV XLRA MEAS LEFT DIST CCA EDV: 25.2 CM/SEC
BH CV XLRA MEAS LEFT DIST CCA PSV: 79.9 CM/SEC
BH CV XLRA MEAS LEFT DIST ICA EDV: 11.7 CM/SEC
BH CV XLRA MEAS LEFT DIST ICA PSV: 62 CM/SEC
BH CV XLRA MEAS LEFT ICA/CCA RATIO: 3.41
BH CV XLRA MEAS LEFT MID CCA EDV: 16.8 CM/SEC
BH CV XLRA MEAS LEFT MID CCA PSV: 91.8 CM/SEC
BH CV XLRA MEAS LEFT MID ICA EDV: 14.6 CM/SEC
BH CV XLRA MEAS LEFT MID ICA PSV: 134 CM/SEC
BH CV XLRA MEAS LEFT PROX CCA EDV: 16.5 CM/SEC
BH CV XLRA MEAS LEFT PROX CCA PSV: 152 CM/SEC
BH CV XLRA MEAS LEFT PROX ECA EDV: 10.4 CM/SEC
BH CV XLRA MEAS LEFT PROX ECA PSV: 75.7 CM/SEC
BH CV XLRA MEAS LEFT PROX ICA EDV: 61.7 CM/SEC
BH CV XLRA MEAS LEFT PROX ICA PSV: 313 CM/SEC
BH CV XLRA MEAS LEFT PROX SCLA EDV: 15.7 CM/SEC
BH CV XLRA MEAS LEFT PROX SCLA PSV: 152 CM/SEC
BH CV XLRA MEAS LEFT VERTEBRAL A EDV: 5.4 CM/SEC
BH CV XLRA MEAS LEFT VERTEBRAL A PSV: 17.2 CM/SEC
BH CV XLRA MEAS RIGHT DIST CCA EDV: 16.5 CM/SEC
BH CV XLRA MEAS RIGHT DIST CCA PSV: 98 CM/SEC
BH CV XLRA MEAS RIGHT DIST ICA EDV: 29.1 CM/SEC
BH CV XLRA MEAS RIGHT DIST ICA PSV: 129 CM/SEC
BH CV XLRA MEAS RIGHT ICA/CCA RATIO: 1.24
BH CV XLRA MEAS RIGHT MID CCA EDV: 15.7 CM/SEC
BH CV XLRA MEAS RIGHT MID CCA PSV: 84.7 CM/SEC
BH CV XLRA MEAS RIGHT MID ICA EDV: 23.3 CM/SEC
BH CV XLRA MEAS RIGHT MID ICA PSV: 105 CM/SEC
BH CV XLRA MEAS RIGHT PROX CCA EDV: 17.4 CM/SEC
BH CV XLRA MEAS RIGHT PROX CCA PSV: 133 CM/SEC
BH CV XLRA MEAS RIGHT PROX ECA EDV: 15.5 CM/SEC
BH CV XLRA MEAS RIGHT PROX ECA PSV: 128 CM/SEC
BH CV XLRA MEAS RIGHT PROX ICA EDV: 13.7 CM/SEC
BH CV XLRA MEAS RIGHT PROX ICA PSV: 54.3 CM/SEC
BH CV XLRA MEAS RIGHT PROX SCLA EDV: 12.2 CM/SEC
BH CV XLRA MEAS RIGHT PROX SCLA PSV: 183 CM/SEC
BH CV XLRA MEAS RIGHT VERTEBRAL A EDV: 16.5 CM/SEC
BH CV XLRA MEAS RIGHT VERTEBRAL A PSV: 143 CM/SEC
BODY TEMPERATURE: 37 C
CHOLEST SERPL-MCNC: 146 MG/DL (ref 0–200)
CO2 BLDA-SCNC: 26.3 MMOL/L (ref 22–33)
COHGB MFR BLD: 0.4 % (ref 0–2)
EPAP: 0
FOLATE SERPL-MCNC: 10.7 NG/ML (ref 4.78–24.2)
GLUCOSE BLDC GLUCOMTR-MCNC: 128 MG/DL (ref 70–130)
GLUCOSE BLDC GLUCOMTR-MCNC: 128 MG/DL (ref 70–130)
GLUCOSE BLDC GLUCOMTR-MCNC: 136 MG/DL (ref 70–130)
GLUCOSE BLDC GLUCOMTR-MCNC: 139 MG/DL (ref 70–130)
GLUCOSE BLDC GLUCOMTR-MCNC: 141 MG/DL (ref 70–130)
HBA1C MFR BLD: 6.4 % (ref 4.8–5.6)
HCO3 BLDA-SCNC: 25.1 MMOL/L (ref 20–26)
HCT VFR BLD CALC: 36.1 %
HDLC SERPL-MCNC: 27 MG/DL (ref 40–60)
HGB BLDA-MCNC: 11.8 G/DL (ref 13.5–17.5)
INHALED O2 CONCENTRATION: 26 %
IPAP: 0
IVRT: 144 MSEC
LDLC SERPL CALC-MCNC: 75 MG/DL (ref 0–100)
LDLC/HDLC SERPL: 2.41 {RATIO}
LEFT ARM BP: NORMAL MMHG
LEFT ATRIUM VOLUME INDEX: 46.6 ML/M^2
LEFT ATRIUM VOLUME: 93.1 ML
MAXIMAL PREDICTED HEART RATE: 140 BPM
METHGB BLD QL: 0.2 % (ref 0–1.5)
MODALITY: ABNORMAL
NOTE: ABNORMAL
OXYHGB MFR BLDV: 94.7 % (ref 94–99)
PAW @ PEAK INSP FLOW SETTING VENT: 0 CMH2O
PCO2 BLDA: 41.9 MM HG (ref 35–45)
PCO2 TEMP ADJ BLD: 41.9 MM HG (ref 35–48)
PH BLDA: 7.38 PH UNITS (ref 7.35–7.45)
PH, TEMP CORRECTED: 7.38 PH UNITS
PO2 BLDA: 79.6 MM HG (ref 83–108)
PO2 TEMP ADJ BLD: 79.6 MM HG (ref 83–108)
STRESS TARGET HR: 119 BPM
TOTAL RATE: 0 BREATHS/MINUTE
TRIGL SERPL-MCNC: 269 MG/DL (ref 0–150)
VENTILATOR MODE: ABNORMAL
VIT B12 BLD-MCNC: 207 PG/ML (ref 211–946)
VLDLC SERPL-MCNC: 44 MG/DL (ref 5–40)

## 2020-12-02 PROCEDURE — 73090 X-RAY EXAM OF FOREARM: CPT

## 2020-12-02 PROCEDURE — 80061 LIPID PANEL: CPT | Performed by: CLINICAL NURSE SPECIALIST

## 2020-12-02 PROCEDURE — 36600 WITHDRAWAL OF ARTERIAL BLOOD: CPT

## 2020-12-02 PROCEDURE — 97530 THERAPEUTIC ACTIVITIES: CPT

## 2020-12-02 PROCEDURE — 97166 OT EVAL MOD COMPLEX 45 MIN: CPT

## 2020-12-02 PROCEDURE — 93880 EXTRACRANIAL BILAT STUDY: CPT

## 2020-12-02 PROCEDURE — 93005 ELECTROCARDIOGRAM TRACING: CPT | Performed by: INTERNAL MEDICINE

## 2020-12-02 PROCEDURE — 92523 SPEECH SOUND LANG COMPREHEN: CPT

## 2020-12-02 PROCEDURE — 83036 HEMOGLOBIN GLYCOSYLATED A1C: CPT | Performed by: CLINICAL NURSE SPECIALIST

## 2020-12-02 PROCEDURE — 82746 ASSAY OF FOLIC ACID SERUM: CPT | Performed by: INTERNAL MEDICINE

## 2020-12-02 PROCEDURE — 82607 VITAMIN B-12: CPT | Performed by: INTERNAL MEDICINE

## 2020-12-02 PROCEDURE — 99233 SBSQ HOSP IP/OBS HIGH 50: CPT | Performed by: PSYCHIATRY & NEUROLOGY

## 2020-12-02 PROCEDURE — 93880 EXTRACRANIAL BILAT STUDY: CPT | Performed by: INTERNAL MEDICINE

## 2020-12-02 PROCEDURE — 25010000002 CYANOCOBALAMIN PER 1000 MCG: Performed by: FAMILY MEDICINE

## 2020-12-02 PROCEDURE — 93010 ELECTROCARDIOGRAM REPORT: CPT | Performed by: INTERNAL MEDICINE

## 2020-12-02 PROCEDURE — 73070 X-RAY EXAM OF ELBOW: CPT

## 2020-12-02 PROCEDURE — G0108 DIAB MANAGE TRN  PER INDIV: HCPCS | Performed by: REGISTERED NURSE

## 2020-12-02 PROCEDURE — 93306 TTE W/DOPPLER COMPLETE: CPT

## 2020-12-02 PROCEDURE — 82805 BLOOD GASES W/O2 SATURATION: CPT

## 2020-12-02 PROCEDURE — 97161 PT EVAL LOW COMPLEX 20 MIN: CPT

## 2020-12-02 PROCEDURE — 93306 TTE W/DOPPLER COMPLETE: CPT | Performed by: INTERNAL MEDICINE

## 2020-12-02 PROCEDURE — 70450 CT HEAD/BRAIN W/O DYE: CPT

## 2020-12-02 PROCEDURE — 82962 GLUCOSE BLOOD TEST: CPT

## 2020-12-02 PROCEDURE — 99232 SBSQ HOSP IP/OBS MODERATE 35: CPT | Performed by: FAMILY MEDICINE

## 2020-12-02 RX ORDER — CYANOCOBALAMIN 1000 UG/ML
1000 INJECTION, SOLUTION INTRAMUSCULAR; SUBCUTANEOUS DAILY
Status: DISCONTINUED | OUTPATIENT
Start: 2020-12-02 | End: 2020-12-08

## 2020-12-02 RX ORDER — LISINOPRIL 20 MG/1
20 TABLET ORAL
Status: DISCONTINUED | OUTPATIENT
Start: 2020-12-02 | End: 2020-12-09 | Stop reason: HOSPADM

## 2020-12-02 RX ADMIN — SODIUM CHLORIDE, PRESERVATIVE FREE 10 ML: 5 INJECTION INTRAVENOUS at 09:22

## 2020-12-02 RX ADMIN — METOPROLOL TARTRATE 50 MG: 50 TABLET, FILM COATED ORAL at 09:22

## 2020-12-02 RX ADMIN — ASPIRIN 81 MG CHEWABLE TABLET 81 MG: 81 TABLET CHEWABLE at 09:22

## 2020-12-02 RX ADMIN — CYANOCOBALAMIN 1000 MCG: 1000 INJECTION, SOLUTION INTRAMUSCULAR; SUBCUTANEOUS at 15:34

## 2020-12-02 RX ADMIN — SODIUM CHLORIDE, PRESERVATIVE FREE 10 ML: 5 INJECTION INTRAVENOUS at 21:14

## 2020-12-02 RX ADMIN — ATORVASTATIN CALCIUM 80 MG: 40 TABLET, FILM COATED ORAL at 21:14

## 2020-12-02 RX ADMIN — METOPROLOL TARTRATE 50 MG: 50 TABLET, FILM COATED ORAL at 21:14

## 2020-12-02 RX ADMIN — ACETAMINOPHEN 650 MG: 325 TABLET, FILM COATED ORAL at 13:47

## 2020-12-02 RX ADMIN — LISINOPRIL 20 MG: 20 TABLET ORAL at 15:35

## 2020-12-02 NOTE — PLAN OF CARE
Goal Outcome Evaluation:  Plan of Care Reviewed With: patient, spouse  Progress: no change     SLP evaluation completed. Will address cognitive communication in tx. Please see note for further details and recommendations.

## 2020-12-02 NOTE — CONSULTS
Patient was seen for type 2 diabetes. Discussed and taught patient about type 2 diabetes self-management, risk factors, and importance of blood glucose control to reduce complications. Patient does not check blood sugars at home. Wife says it is only checked at the dr's office. A1c was 6.4%. He is taking Metformin BID without any problems. Target blood glucose readings and A1c goals per ADA were reviewed. Reviewed with patient current A1c and discussed its significance. Signs, symptoms and treatment of hyperglycemia and hypoglycemia were discussed. Lifestyle changes such as physical activity with MD approval and healthy eating were encouraged. Patient planning to go to [facility] at discharge.    Patient has been scheduled for outpatient diabetes education follow up visit on December 17, 2020 at 245 pm by phone. Outpatient staff will provide reminder call prior to appointment. Patient was given reminder card with date and time of appointment and our contact information.     Patient's wife wants to attend outpatient class if  is not able. She provided her phone number  or .

## 2020-12-02 NOTE — PLAN OF CARE
Problem: Adult Inpatient Plan of Care  Goal: Plan of Care Review  Recent Flowsheet Documentation  Taken 12/2/2020 1100 by Dirk Clifford OT  Plan of Care Reviewed With:   patient   spouse  Outcome Summary:   VSS   Pt presents with fxl decline from PLOF, deficits in ADL performance, fxl mobility, occupational endurance. Pt limited by left side weakness, left hemianopsia/neglect, decreased balance. Pt required Mod A to roll to left then Max A sidelying to sit at EOB, required Max A progressing to Min A for static sitting balance, Max A to don robe using left ben-technique, STS from EOB with Max A/gait belt/BUE support, Max A for stand pivot transfer to chair with gait belt/BUE support, able to wash face with set up and VCs to attend to left side of face. Spouse instructed to interact with pt from left side to promote increased attention to left side. Will benefit from skilled OT services to address deficits, facilitate increased fxl I. Recommend IRF at discharge.   Goal Outcome Evaluation:  Plan of Care Reviewed With: patient, spouse  Progress: no change  Outcome Summary: VSS; Pt presents with fxl decline from PLOF, deficits in ADL performance, fxl mobility, occupational endurance. Pt limited by left side weakness, left hemianopsia/neglect, decreased balance. Pt required Mod A to roll to left then Max A sidelying to sit at EOB, required Max A progressing to Min A for static sitting balance, Max A to don robe using left ben-technique, STS from EOB with Max A/gait belt/BUE support, Max A for stand pivot transfer to chair with gait belt/BUE support, able to wash face with set up and VCs to attend to left side of face. Spouse instructed to interact with pt from left side to promote increased attention to left side. Will benefit from skilled OT services to address deficits, facilitate increased fxl I. Recommend IRF at discharge.

## 2020-12-02 NOTE — THERAPY EVALUATION
Patient Name: Dirk Miles  : 1940    MRN: [id number]                              Today's Date: 2020       Admit Date: 2020    Visit Dx:     ICD-10-CM ICD-9-CM   1. Acute CVA (cerebrovascular accident) (CMS/HCC)  I63.9 434.91     Patient Active Problem List   Diagnosis   • Acute CVA (cerebrovascular accident) (CMS/HCC)     Past Medical History:   Diagnosis Date   • A-fib (CMS/[facility])    • Diabetes mellitus (CMS/[facility])    • Hypertension      Past Surgical History:   Procedure Laterality Date   • CATARACT EXTRACTION, BILATERAL     • HERNIA REPAIR       General Information     Row Name 20 1100          OT Time and Intention    Document Type  evaluation  -TA     Mode of Treatment  occupational therapy  -TA     Row Name 20 1100          General Information    Patient Profile Reviewed  yes  -TA     Prior Level of Function  independent:;all household mobility;community mobility;gait;transfer;bed mobility;ADL's;shopping;driving  -TA     Existing Precautions/Restrictions  fall Left neglect, hemianopsia; Left side weakness  -TA     Barriers to Rehab  medically complex  -TA     Row Name 20 1100          Occupational Profile    Reason for Services/Referral (Occupational Profile)  fxl decline from PLOF  -TA     Patient Goals (Occupational Profile)  Go to rehab  -TA     Row Name 20 1100          Living Environment    Lives With  spouse  -TA     Row Name 20 1100          Home Main Entrance    Number of Stairs, Main Entrance  none  -TA     Row Name 20 1100          Stairs Within Home, Primary    Number of Stairs, Within Home, Primary  none  -TA     Row Name 20 1100          Cognition    Orientation Status (Cognition)  oriented x 4  -TA     Row Name 20 1100          Safety Issues, Functional Mobility    Safety Issues Affecting Function (Mobility)  insight into deficits/self-awareness;safety precaution awareness;safety precautions follow-through/compliance;sequencing  abilities  -TA     Impairments Affecting Function (Mobility)  balance;endurance/activity tolerance;grasp;postural/trunk control;sensation/sensory awareness;strength;visual/perceptual  -TA       User Key  (r) = Recorded By, (t) = Taken By, (c) = Cosigned By    Initials Name Provider Type    TA Dirk Clifford OT Occupational Therapist        Mobility/ADL's     Row Name 12/02/20 1100          Bed Mobility    Bed Mobility  supine-sit  -TA     Supine-Sit [place] (Bed Mobility)  maximum assist (25% patient effort);verbal cues;nonverbal cues (demo/gesture);1 person assist  -TA     Bed Mobility, Safety Issues  decreased use of arms for pushing/pulling;decreased use of legs for bridging/pushing;impaired trunk control for bed mobility  -TA     Assistive Device (Bed Mobility)  head of bed elevated  -TA     Row Name 12/02/20 1100          Transfers    Transfers  bed-chair transfer  -TA     Bed-Chair [place] (Transfers)  maximum assist (25% patient effort);1 person assist;verbal cues;nonverbal cues (demo/gesture) stand pivot transfer  -TA     Assistive Device (Bed-Chair Transfers)  -- gait belt, BUE support  -TA     Row Name 12/02/20 1100          Functional Mobility    Functional Mobility- Comment  Defer to PT  -TA     Row Name 12/02/20 1100          Activities of Daily Living    BADL Assessment/Intervention  upper body dressing;grooming  -TA     Row Name 12/02/20 1100          Upper Body Dressing Assessment/Training    [place] Level (Upper Body Dressing)  don;pajama/robe;maximum assist (25% patient effort);verbal cues Left ben-technique, Max >Min A to maintain sitting balance  -TA     Position (Upper Body Dressing)  edge of bed sitting  -TA     Row Name 12/02/20 1100          Grooming Assessment/Training    [place] Level (Grooming)  wash face, hands;set up;verbal cues  -TA     Position (Grooming)  supported sitting  -TA       User Key  (r) = Recorded By, (t) = Taken By, (c) = Cosigned By    Initials  Name Provider Type    Dirk Benjamin OT Occupational Therapist        Obj/Interventions     Row Name 12/02/20 1100          Sensory Assessment (Somatosensory)    Sensory Assessment (Somatosensory)  right UE;sensation intact;left UE  -TA     Left UE Sensory Assessment  light touch awareness;impaired  -TA     Row Name 12/02/20 1100          Vision Assessment/Intervention    Visual Impairment/Limitations  corrective lenses full-time  -TA     Visual Field Deficit  homonymous hemianopsia, left  -TA     Visual Motor Impairment  visual tracking, left  -TA     Visual Processing Deficit  ben-inattention/neglect, left;visual attention, left  -TA     Row Name 12/02/20 1100          Range of Motion Comprehensive    General Range of Motion  bilateral upper extremity ROM WFL  -TA     Row Name 12/02/20 1100          Strength Comprehensive (MMT)    General Manual Muscle Testing (MMT) Assessment  upper extremity strength deficits identified  -TA     Comment, General Manual Muscle Testing (MMT) Assessment  RUE 5/5; LUE 4-/5  -TA     Row Name 12/02/20 1100          Balance    Balance Assessment  sitting static balance;standing static balance  -TA     Static Sitting Balance  moderate impairment;sitting, edge of bed  -TA     Static Standing Balance  severe impairment;supported  -TA     Balance Interventions  sit to stand;occupation based/functional task;supported;weight shifting activity  -TA       User Key  (r) = Recorded By, (t) = Taken By, (c) = Cosigned By    Initials Name Provider Type    Dirk Benjamin OT Occupational Therapist        Goals/Plan     Row Name 12/02/20 1100          Bed Mobility Goal 1 (OT)    Activity/Assistive Device (Bed Mobility Goal 1, OT)  supine to sit  -TA     [place] Level/Cues Needed (Bed Mobility Goal 1, OT)  minimum assist (75% or more patient effort)  -TA     Time Frame (Bed Mobility Goal 1, OT)  by discharge  -TA     Progress/Outcomes (Bed Mobility Goal 1, OT)  goal ongoing  -TA      Row Name 12/02/20 1100          Transfer Goal 1 (OT)    Activity/Assistive Device (Transfer Goal 1, OT)  sit-to-stand/stand-to-sit;bed-to-chair/chair-to-bed;toilet;commode, bedside without drop arms  -TA     [place] Level/Cues Needed (Transfer Goal 1, OT)  moderate assist (50-74% patient effort)  -TA     Time Frame (Transfer Goal 1, OT)  by discharge  -TA     Progress/Outcome (Transfer Goal 1, OT)  goal ongoing  -TA     Row Name 12/02/20 1100          Dressing Goal 1 (OT)    Activity/Device (Dressing Goal 1, OT)  upper body dressing  -TA     [place]/Cues Needed (Dressing Goal 1, OT)  minimum assist (75% or more patient effort);verbal cues required;tactile cues required  -TA     Time Frame (Dressing Goal 1, OT)  by discharge  -TA     Progress/Outcome (Dressing Goal 1, OT)  goal ongoing  -TA     Row Name 12/02/20 1100          Toileting Goal 1 (OT)    Activity/Device (Toileting Goal 1, OT)  adjust/manage clothing;perform perineal hygiene  -TA     [place] Level/Cues Needed (Toileting Goal 1, OT)  moderate assist (50-74% patient effort)  -TA     Time Frame (Toileting Goal 1, OT)  by discharge  -TA     Progress/Outcome (Toileting Goal 1, OT)  goal ongoing  -TA     Row Name 12/02/20 1100          Strength Goal 1 (OT)    Strength Goal 1 (OT)  Pt will increase LUE MMS by 1/2 MMG to support fxl I with ADLs  -TA     Time Frame (Strength Goal 1, OT)  by discharge  -TA     Progress/Outcome (Strength Goal 1, OT)  goal ongoing  -TA     Row Name 12/02/20 1100          Therapy Assessment/Plan (OT)    Planned Therapy Interventions (OT)  activity tolerance training;BADL retraining;functional balance retraining;occupation/activity based interventions;patient/caregiver education/training;ROM/therapeutic exercise;strengthening exercise;transfer/mobility retraining  -TA       User Key  (r) = Recorded By, (t) = Taken By, (c) = Cosigned By    Initials Name Provider Type    Dirk Benjamin, OT Occupational  Therapist        Clinical Impression     Row Name 12/02/20 1100          Pain Assessment    Additional Documentation  Pain Scale: Numbers Pre/Post-Treatment (Group)  -TA     Row Name 12/02/20 1100          Pain Scale: Numbers Pre/Post-Treatment    Pretreatment Pain Rating  0/10 - no pain  -TA     Posttreatment Pain Rating  0/10 - no pain  -TA     Pre/Posttreatment Pain Comment  Pt denied pain, tolerated  -TA     Pain Intervention(s)  Ambulation/increased activity;Repositioned  -TA     Row Name 12/02/20 1100          Plan of Care Review    Plan of Care Reviewed With  patient;spouse  -TA     Outcome Summary  VSS; Pt presents with fxl decline from PLOF, deficits in ADL performance, fxl mobility, occupational endurance. Pt limited by left side weakness, left hemianopsia/neglect, decreased balance. Pt required Mod A to roll to left then Max A sidelying to sit at EOB, required Max A progressing to Min A for static sitting balance, Max A to don robe using left ben-technique, STS from EOB with Max A/gait belt/BUE support, Max A for stand pivot transfer to chair with gait belt/BUE support, able to wash face with set up and VCs to attend to left side of face. Spouse instructed to interact with pt from left side to promote increased attention to left side. Will benefit from skilled OT services to address deficits, facilitate increased fxl I. Recommend IRF at discharge.  -TA     Row Name 12/02/20 1100          Therapy Assessment/Plan (OT)    Patient/Family Therapy Goal Statement (OT)  Go to rehab  -TA     Rehab Potential (OT)  good, to achieve stated therapy goals  -TA     Criteria for Skilled Therapeutic Interventions Met (OT)  yes;skilled treatment is necessary  -TA     Therapy Frequency (OT)  daily  -TA     Predicted Duration of Therapy Intervention (OT)  1 week  -TA     Row Name 12/02/20 1100          Therapy Plan Review/Discharge Plan (OT)    Anticipated Discharge Disposition (OT)  inpatient rehabilitation facility  -TA      Row Name 12/02/20 1100          Vital Signs    Pre Systolic BP Rehab  -- VSS; RN cleared pt for tx  -TA     O2 Delivery Pre Treatment  room air  -TA     O2 Delivery Post Treatment  room air  -TA     Pre Patient Position  Supine  -TA     Intra Patient Position  Standing  -TA     Post Patient Position  Sitting  -TA     Row Name 12/02/20 1100          Positioning and Restraints    Pre-Treatment Position  in bed  -TA     Post Treatment Position  chair  -TA     In Chair  notified nsg;reclined;call light within reach;encouraged to call for assist;exit alarm on;with family/caregiver;waffle cushion;on mechanical lift sling;legs elevated  -TA       User Key  (r) = Recorded By, (t) = Taken By, (c) = Cosigned By    Initials Name Provider Type    Dirk Benjamin OT Occupational Therapist        Outcome Measures     Row Name 12/02/20 1100          How much help from another is currently needed...    Putting on and taking off regular lower body clothing?  1  -TA     Bathing (including washing, rinsing, and drying)  2  -TA     Toileting (which includes using toilet bed pan or urinal)  2  -TA     Putting on and taking off regular upper body clothing  2  -TA     Taking care of personal grooming (such as brushing teeth)  3  -TA     Eating meals  3  -TA     AM-PAC 6 Clicks Score (OT)  13  -TA     Row Name 12/02/20 1100          Modified [place] Scale    Pre-Stroke Modified [place] Scale  0 - No Symptoms at all.  -TA     Modified [place] Scale  4 - Moderately severe disability.  Unable to walk without assistance, and unable to attend to own bodily needs without assistance.  -TA     Row Name 12/02/20 1100          Functional Assessment    Outcome Measure Options  AM-PAC 6 Clicks Daily Activity (OT);Modified [place]  -TA       User Key  (r) = Recorded By, (t) = Taken By, (c) = Cosigned By    Initials Name Provider Type    Dirk Benjamin OT Occupational Therapist        Occupational Therapy Education                 Title:  PT OT SLP Therapies (Done)     Topic: Occupational Therapy (Done)     Point: ADL training (Done)     Description:   Instruct learner(s) on proper safety adaptation and remediation techniques during self care or transfers.   Instruct in proper use of assistive devices.              Learning Progress Summary           Patient Acceptance, E,D, VU,NR by TA at 12/2/2020 1142   Significant Other Acceptance, E,D, VU,NR by TA at 12/2/2020 1142                   Point: Home exercise program (Done)     Description:   Instruct learner(s) on appropriate technique for monitoring, assisting and/or progressing therapeutic exercises/activities.              Learning Progress Summary           Patient Acceptance, E,D, VU,NR by TA at 12/2/2020 1142   Significant Other Acceptance, E,D, VU,NR by TA at 12/2/2020 1142                   Point: Precautions (Done)     Description:   Instruct learner(s) on prescribed precautions during self-care and functional transfers.              Learning Progress Summary           Patient Acceptance, E,D, VU,NR by TA at 12/2/2020 1142   Significant Other Acceptance, E,D, VU,NR by TA at 12/2/2020 1142                   Point: Body mechanics (Done)     Description:   Instruct learner(s) on proper positioning and spine alignment during self-care, functional mobility activities and/or exercises.              Learning Progress Summary           Patient Acceptance, E,D, VU,NR by TA at 12/2/2020 1142   Significant Other Acceptance, E,D, VU,NR by TA at 12/2/2020 1142                               User Key     Initials Effective Dates Name Provider Type Discipline    TA 03/14/16 -  Dirk Clifford OT Occupational Therapist OT              OT Recommendation and Plan  Planned Therapy Interventions (OT): activity tolerance training, BADL retraining, functional balance retraining, occupation/activity based interventions, patient/caregiver education/training, ROM/therapeutic exercise, strengthening exercise,  transfer/mobility retraining  Therapy Frequency (OT): daily  Plan of Care Review  Plan of Care Reviewed With: patient, spouse  Outcome Summary: VSS; Pt presents with fxl decline from PLOF, deficits in ADL performance, fxl mobility, occupational endurance. Pt limited by left side weakness, left hemianopsia/neglect, decreased balance. Pt required Mod A to roll to left then Max A sidelying to sit at EOB, required Max A progressing to Min A for static sitting balance, Max A to don robe using left ben-technique, STS from EOB with Max A/gait belt/BUE support, Max A for stand pivot transfer to chair with gait belt/BUE support, able to wash face with set up and VCs to attend to left side of face. Spouse instructed to interact with pt from left side to promote increased attention to left side. Will benefit from skilled OT services to address deficits, facilitate increased fxl I. Recommend IRF at discharge.     Time Calculation:   Time Calculation- OT     Row Name 12/02/20 1100             Time Calculation- OT    OT Start Time  1100 ttc 8 minutes  -TA      Total Timed Code Minutes- OT  8 minute(s)  -TA      OT Received On  12/02/20  -TA      OT Goal Re-Cert Due Date  12/12/20  -TA        User Key  (r) = Recorded By, (t) = Taken By, (c) = Cosigned By    Initials Name Provider Type    Dirk Benjamin OT Occupational Therapist        Therapy Charges for Today     Code Description Service Date Service Provider Modifiers Qty    [id number]  OT EVAL MOD COMPLEXITY 4 12/2/2020 Dirk Clifford OT GO 1    [id number]  OT THERAPEUTIC ACT EA 15 MIN 12/2/2020 Dirk Clifford OT GO 1               Dirk Clifford OT  12/2/2020

## 2020-12-02 NOTE — PROGRESS NOTES
[facility] Medicine Services  PROGRESS NOTE    Patient Name: Dirk Miles  : 1940  MRN: [id number]    Date of Admission: 2020  Primary Care Physician: Tia Pratt MD    Subjective   Subjective     CC:  F/U CVA    HPI:  Pt seen and examined. RN notes reviewed. No acute events overnight. Pt doing well. Still with LUE weakness. Still with visual field deficits.     Review of Systems  Gen- No fevers, chills  CV- No chest pain, palpitations  Resp- No cough, dyspnea  GI- No N/V/D, abd pain    Objective   Objective     Vital Signs:   Temp:  [98.2 °F (36.8 °C)-98.6 °F (37 °C)] 98.5 °F (36.9 °C)  Heart Rate:  [51-74] 63  Resp:  [16-18] 16  BP: (104-162)/(68-98) 161/98  Total (NIH Stroke Scale): 9     Physical Exam:  Constitutional: No acute distress, awake, alert  HENT: NCAT, mucous membranes moist  Respiratory: Clear to auscultation bilaterally, respiratory effort normal   Cardiovascular: RRR, no murmurs, rubs, or gallops, palpable pedal pulses bilaterally  Gastrointestinal: Positive bowel sounds, soft, nontender, nondistended  Musculoskeletal: No bilateral ankle edema  Psychiatric: Appropriate affect, cooperative  Neurologic: Oriented x 3, +LUE weakness, able to lift but immediately falls to bed, +visual field deficit   Skin: No rashes    Results Reviewed:  Results from last 7 days   Lab Units 20  1139 20  1129 20  1125   WBC 10*3/mm3 9.00  --   --    HEMOGLOBIN g/dL 11.9*  --   --    HEMOGLOBIN, POC g/dL  --   --  12.6   HEMATOCRIT % 38.2  --   --    HEMATOCRIT POC %  --   --  37*   PLATELETS 10*3/mm3 183  --   --    INR   --  1.0  --            Invalid input(s):  ALKPHOS, BILI  CrCl cannot be calculated (No successful lab value found.).    Microbiology Results Abnormal     Procedure Component Value - Date/Time    COVID PRE-OP / PRE-PROCEDURE SCREENING ORDER (NO ISOLATION) - Swab, Nasopharynx [[id number]]  (Normal) Collected: 20 1408    Lab  Status: Final result Specimen: Swab from Nasopharynx Updated: 12/01/20 1409    Narrative:      The following orders were created for panel order COVID PRE-OP / PRE-PROCEDURE SCREENING ORDER (NO ISOLATION) - Swab, Nasopharynx.  Procedure                               Abnormality         Status                     ---------                               -----------         ------                     COVID-19,CEPHEID,MICHALE IN-...[[id number]]  Normal              Final result                 Please view results for these tests on the individual orders.    COVID-19,CEPHEID,MICHAEL IN-HOUSE(OR EMERGENT/ADD-ON),NP SWAB IN TRANSPORT MEDIA 3-4 HR TAT - Swab, Nasopharynx [[id number]]  (Normal) Collected: 12/01/20 1408    Lab Status: Final result Specimen: Swab from Nasopharynx Updated: 12/01/20 1409     COVID19 Not Detected    Narrative:      Fact sheet for providers: https://www.fda.gov/media/018490/download     Fact sheet for patients: https://www.fda.gov/media/670578/download          Imaging Results (Last 24 Hours)     Procedure Component Value Units Date/Time    MRI Brain Without Contrast [[id number]] Collected: 12/01/20 1617     Updated: 12/01/20 1624    Narrative:      EXAMINATION: MRI BRAIN WO CONTRAST-      INDICATION: Stroke, follow up; I63.9-Cerebral infarction, unspecified     TECHNIQUE: Multiplanar multisequence MRI of the brain performed without  IV contrast     COMPARISON: CT earlier same day     FINDINGS: Areas of restricted diffusion are present throughout the right  PCA territory including the right occipital, posterior temporal lobes  and small portion of the right thalamus compatible with areas of acute  infarct. There is no associated hemorrhage. There is mild local edema,  sulcal effacement, without global mass effect. There is superimposed  relatively severe T2 hyperintense periventricular white matter changes  which are most likely related to chronic small vessel ischemia. There is  otherwise no evidence of  intracranial mass or mass effect. The  ventricles are normal in size and configuration. The orbits are normal  and the paranasal sinuses are grossly clear, with a small right  maxillary sinus mucus retention cyst noted.       Impression:      Right PCA territory acute infarct encompassing the  occipital, posterior temporal lobes and right thalamus, without evidence  of significant hemorrhage, global mass effect or midline shift.        This report was finalized on 12/1/2020 4:21 PM by Jose Narayanan.                  I have reviewed the medications:  Scheduled Meds:aspirin, 81 mg, Oral, Daily    Or  aspirin, 300 mg, Rectal, Daily  atorvastatin, 80 mg, Oral, Nightly  insulin lispro, 0-7 Units, Subcutaneous, TID AC  metoprolol tartrate, 50 mg, Oral, Q12H  sodium chloride, 10 mL, Intravenous, Q12H      Continuous Infusions:niCARdipine, 5-15 mg/hr      PRN Meds:.•  acetaminophen  •  dextrose  •  dextrose  •  glucagon (human recombinant)  •  sodium chloride  •  sodium chloride    Assessment/Plan   Assessment & Plan     Active Hospital Problems    Diagnosis  POA   • Acute CVA (cerebrovascular accident) (CMS/[facility]) [I63.9]  Yes      Resolved Hospital Problems   No resolved problems to display.        Brief Hospital Course to date:  Dirk Miles is a 80 y.o. male with history of atrial fibrillation on ASA 81mg, also mild diabetes and HTN who presented to ED after developing sudden onset dizziness and left side weakness at approx 0730 PTA.  His wife was with him and called EMS.        R PCA Stroke  -Likely cardioembolic.   -Pt with history of PAF but not on AC  -Continue ASA/Statin  -Neuro plans to repeat CT head on Friday, if no hemorrhage, plan to start on AC  -Echo report pending  -PT/OT to see, patient and wife planning on [facility]     LICA 80-90% stenotic by CTA  - asa, statin  - Carotid Duplex pending to look at velocity  - Pt need treatment as outpatient     Afib, bradycardic on metoprolol   - Metoprolol decreased to  50mg BID   - CHADS-Vasc 7, plan for AC as above     HTN  -Normalize BP per Neuro  -Continue BB  -Add Lisinopril 20mg daily     DM2  -Hga1c 6.4  -LDSSI     Anemia, macrocytic  -B12 low, will start daily IM injections x 1 week, weekly x 1 month, then monthly     DVT Prophylaxis:  Mechanical for now, planning to start AC on Friday per Neuro      Disposition: I expect the patient to be discharged TBD. Will need rehab.     CODE STATUS:   Code Status and Medical Interventions:   Ordered at: 12/01/20 1805     Level Of Support Discussed With:    Patient     Code Status:    CPR     Medical Interventions (Level of Support Prior to Arrest):    Full       Maryse Rebolledo, DO  12/02/20

## 2020-12-02 NOTE — PROGRESS NOTES
Continued Stay Note  [facility]     Patient Name: Dirk Miles  MRN: [id number]  Today's Date: 12/2/2020    Admit Date: 12/1/2020    Discharge Plan     Row Name 12/02/20 1616       Plan    Plan  [facility]    Plan Comments  I spoke with patient's wife who is at his bedside. She expressed a desire for [facility] referral which I will make on his behalf.    Final Discharge Disposition Code  62 - inpatient rehab facility        Discharge Codes    No documentation.       Expected Discharge Date and Time     Expected Discharge Date Expected Discharge Time    Dec 5, 2020             Shilpi Zarate RN

## 2020-12-02 NOTE — SIGNIFICANT NOTE
"This RN was doing an IV for another nurse and student RN had this RN's phone. Patient's bed exit alarm sounded and patient was found by student RN with his face on the linen cart and legs still in the bed, hanging by SCDs. Student nurse placed pillow  Under patient's head and called for help. Charge nurse responded with PCT. Patient's skin on the left arm was open and bleeding. Patient was safely put back to bed, patient was oriented x3, and denied c/o pain. Vitals were taken, MD notified, STAT CT and left arm X-ray ordered. Patient said \"I was trying to go to the bathroom.\" Patient educated on call light safety and importance of asking for help for toileting. Patient stated, \"I'm 80 years old. I don't care.\" Patient moved to room closer to nurse's station. All fall-risk related information and precautions in place. Bed alarms x2, 17 on fall risk score, call light and personal items in reach along with urinal. Patient further educated, will monitor for complications.   "

## 2020-12-02 NOTE — PROGRESS NOTES
Stroke Progress Note       Chief Complaint: Left-sided weakness    Subjective    Subjective     Subjective:  No acute issues overnight.  Patient is doing very better compared to yesterday when he was admitted to hospital.    Review of Systems   HENT: Negative.    Eyes: Positive for visual disturbance.   Respiratory: Negative.    Cardiovascular: Negative.    Gastrointestinal: Negative.    Endocrine: Negative.    Genitourinary: Negative.    Musculoskeletal: Negative.    Skin: Negative.    Neurological: Positive for facial asymmetry, speech difficulty and weakness.   Hematological: Negative.    Psychiatric/Behavioral: Negative.             Objective    Objective      Temp:  [98.2 °F (36.8 °C)-98.6 °F (37 °C)] 98.5 °F (36.9 °C)  Heart Rate:  [51-74] 63  Resp:  [16-18] 16  BP: (104-162)/(68-98) 161/98        Neurological Exam  Mental Status  Awake, alert and oriented to person, place and time.Alert. Recent and remote memory are intact. Mild dysarthria present. Language is fluent with no aphasia. Attention and concentration are normal. Fund of knowledge is appropriate for level of education.  He did not have any neglect today, which is improved compared to yesterday.    Cranial Nerves  CN II: Right normal visual field. Left homonymous hemianopsia.  CN III, IV, VI: Extraocular movements intact bilaterally. Pupils equal round and reactive to light bilaterally.  CN V: Facial sensation is normal.  CN VII:  Right: There is no facial weakness.  Left: There is central facial weakness.  CN VIII: Equal hearing bilaterally.  CN IX, X: Palate elevates symmetrically  CN XI: Shoulder shrug strength is normal.  CN XII: Tongue midline without atrophy or fasciculations.    Motor  Normal muscle bulk throughout. No fasciculations present. Normal muscle tone.  Left upper/left lower extremities 4/5, right upper/right lower extremities 5/5.    Sensory  Light touch is normal in upper and lower extremities.     Reflexes  Deep tendon reflexes  are 2+ and symmetric in all four extremities with downgoing toes bilaterally.    Coordination  Finger-to-nose, rapid alternating movements and heel-to-shin normal bilaterally without dysmetria.    Gait  Not assessed.      Physical Exam  Vitals signs and nursing note reviewed.   Constitutional:       Appearance: Normal appearance.   HENT:      Head: Normocephalic and atraumatic.      Mouth/Throat:      Mouth: Mucous membranes are moist.      Pharynx: Oropharynx is clear.   Eyes:      Extraocular Movements: Extraocular movements intact.      Conjunctiva/sclera: Conjunctivae normal.      Pupils: Pupils are equal, round, and reactive to light.   Neck:      Musculoskeletal: Normal range of motion and neck supple.   Cardiovascular:      Rate and Rhythm: Normal rate. Rhythm irregular.   Pulmonary:      Effort: Pulmonary effort is normal. No respiratory distress.   Neurological:      Mental Status: He is alert.      Cranial Nerves: Dysarthria present.      Coordination: Coordination is intact.      Deep Tendon Reflexes: Reflexes are normal and symmetric.   Psychiatric:         Mood and Affect: Mood normal.         Behavior: Behavior normal.         Results Review:    I reviewed the patient's new clinical results.  MRI brain shows large right PCA stroke, old left MCA stroke in frontal lobe, moderate to severe white matter disease, no hemorrhage.  GRE does show thrombus in right PCA region.  CT angiogram shows left ICA stenosis with thrombosis, asymptomatic, and right P2 occlusion.  Rest of the vessels are okay.  CT head shows early right PCA stroke  CT perfusion shows moderate size core with a small penumbra  A1c is 6.4,LDL 75, total cholesterol 146, triglyceride 269               Assessment/Plan     Assessment/Plan:  80-year-old right-handed white male with known diagnosis of hypertension, diabetes, paroxysmal atrial fibrillation not on anticoagulation, who was brought by the family after they noted him to have gait  imbalance, and his blood pressure to be over 200s.  On arrival to the emergency room, patient was noted to have left-sided weakness, left homonymous hemianopia and right gaze preference.  TPA was being considered to be given, but CT head showed early changes of ischemic stroke in right PCA territory, for which TPA was not given.      1. 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  With right P2 occlusion.  Patient has history of paroxysmal atrial fibrillation, but has never been placed on anticoagulation.  We have started him on aspirin and statins for now, and plan to repeat CT head on Friday, and if no hemorrhage is noted, will start him on anticoagulation.  Will get 2D echocardiogram.  2. Left ICA stenosis.  Patient has been noted to have moderate to severe left ICA stenosis, which is asymptomatic.  Will get carotid ultrasound to look at the velocity.  He may need treatment as outpatient.  3. Essential hypertension.  Okay to start normalizing his blood pressure, no need to have permissive hypertension.  His blood pressures have been ranging in 120s to 140s while he is in hospital.  4. Diabetes mellitus type 2 controlled with out hyperglycemia.  Continue his home medications.  His A1c is 6.4.  5. Paroxysmal atrial fibrillation.  Patient has diagnosis of A. fib, but has never been placed on anticoagulation.  He will likely need to be on anticoagulation lifelong.  Rate and rhythm controlled as per the hospitalist.  6. Activity.  PT/OT can work with him.  He will likely need acute rehabilitation.  7. Healthy heart/diabetic diet, once cleared swallow evaluation.    Case was discussed with patient, his wife, nursing and the hospitalist.  Thank you for the consult.          Surjit Jerry MD  12/02/20  13:06 EST

## 2020-12-02 NOTE — THERAPY EVALUATION
Patient Name: Dirk Miles  : 1940    MRN: [id number]                              Today's Date: 2020       Admit Date: 2020    Visit Dx:     ICD-10-CM ICD-9-CM   1. Acute CVA (cerebrovascular accident) (CMS/[facility])  I63.9 434.91   2. Cognitive communication deficit  R41.841 799.52     Patient Active Problem List   Diagnosis   • Acute CVA (cerebrovascular accident) (CMS/[facility])     Past Medical History:   Diagnosis Date   • A-fib (CMS/[facility])    • Diabetes mellitus (CMS/[facility])    • Hypertension      Past Surgical History:   Procedure Laterality Date   • CATARACT EXTRACTION, BILATERAL     • HERNIA REPAIR       General Information     Row Name 20          Physical Therapy Time and Intention    Document Type  evaluation  -LR     Mode of Treatment  physical therapy;individual therapy  -LR     Row Name 20 140          General Information    Patient Profile Reviewed  yes  -LR     Prior Level of Function  independent:;all household mobility;community mobility;gait;transfer;bed mobility;ADL's;home management;cooking;cleaning;driving;shopping;using stairs  -LR     Existing Precautions/Restrictions  fall;other (see comments) impulsive, L neglect, R gaze preference, L hemianopsia, Signifcantly impaired coordination on L side  -LR     Barriers to Rehab  medically complex  -LR     Row Name 20 140          Living Environment    Lives With  spouse can assist at all times upon d/c home  -LR     Row Name 20 140          Home Main Entrance    Number of Stairs, Main Entrance  one  -LR     Stair Railings, Main Entrance  none  -LR     Row Name 20 140          Stairs Within Home, Primary    Number of Stairs, Within Home, Primary  none  -LR     Stairs Comment, Within Home, Primary  lives main floor of home  -LR     Row Name 20 140          Cognition    Orientation Status (Cognition)  oriented x 4  -LR     Row Name 20 140          Safety Issues, Functional Mobility    Safety  Issues Affecting Function (Mobility)  (S) ability to follow commands;at risk behavior observed;awareness of need for assistance;impulsivity;insight into deficits/self-awareness;judgment;safety precaution awareness;safety precautions follow-through/compliance;sequencing abilities;problem-solving  -LR     Impairments Affecting Function (Mobility)  balance;coordination;endurance/activity tolerance;motor control;motor planning;postural/trunk control;strength  -LR       User Key  (r) = Recorded By, (t) = Taken By, (c) = Cosigned By    Initials Name Provider Type    LR Marizol Vargas, PT Physical Therapist        Mobility     Row Name 12/02/20 7525          Bed Mobility    Bed Mobility  supine-sit;sit-supine  -LR     Supine-Sit [place] (Bed Mobility)  verbal cues;minimum assist (75% patient effort)  -LR     Sit-Supine [place] (Bed Mobility)  verbal cues;minimum assist (75% patient effort)  -LR     Assistive Device (Bed Mobility)  head of bed elevated;bed rails  -LR     Comment (Bed Mobility)  Verbal cues to move LEs towards EOB and to push up from bed to raise trunk into sitting. Patient unsteady at trunk upon sitting up, losing balance posteriorly and to the left. Required repeated cues and assist to maintain upright sitting. Verbal cues to lay back down into supine. Min assist at trunk to raise into sitting, min assist to lift LEs into bed. Denied dizziness upon sitting up.  -     Row Name 12/02/20 0435          Transfers    Comment (Transfers)  Verbal cues to push up from bed to stand and to reach back for bed to sit. Patient attempted to impulsively stand from EOB multiple times. Cues for taking side steps to the left to get higher up in bed. Patient scissored R foot over left foot and had to return to sitting d/t significantly impaired balance. Stood once more to get higher up and further back into bed.  -     Row Name 12/02/20 3525          Bed-Chair Transfer    Bed-Chair [place]  (Transfers)  not tested patient had just returned to supine in bed.  -     Row Name 12/02/20 1405          Sit-Stand Transfer    Sit-Stand [place] (Transfers)  verbal cues;moderate assist (50% patient effort);2 person assist  -LR     Assistive Device (Sit-Stand Transfers)  other (see comments) B UE support, support at gait belt  -     Row Name 12/02/20 1405          Gait/Stairs (Locomotion)    [place] Level (Gait)  unable to assess  -LR     Comment (Gait/Stairs)  Attempted to take steps but patient scissored R foot over L foot and had to return to sitting.  -       User Key  (r) = Recorded By, (t) = Taken By, (c) = Cosigned By    Initials Name Provider Type    LR Marizol Vargas, PT Physical Therapist        Obj/Interventions     Row Name 12/02/20 1405          Range of Motion Comprehensive    General Range of Motion  no range of motion deficits identified  -     Row Name 12/02/20 1405          Strength Comprehensive (MMT)    General Manual Muscle Testing (MMT) Assessment  lower extremity strength deficits identified  -     Row Name 12/02/20 1405          Motor Skills    Motor Skills  coordination;motor control/coordination interventions  -     Coordination  gross motor deficit;left;upper extremity;lower extremity;severe impairment  -     Row Name 12/02/20 1405          Balance    Balance Assessment  sitting static balance;sitting dynamic balance;standing static balance;standing dynamic balance  -     Static Sitting Balance  moderate impairment;unsupported;sitting, edge of bed  -     Dynamic Sitting Balance  moderate impairment;unsupported;sitting, edge of bed  -     Static Standing Balance  severe impairment;supported;standing  -LR     Dynamic Standing Balance  severe impairment;supported;standing  -     Row Name 12/02/20 1405          Sensory Assessment (Somatosensory)    Sensory Assessment (Somatosensory)  sensation intact;other (see comments) patient denies  numbness/tingling;light touch equal and intact per patient report upon assessment  -LR     Row Name 12/02/20 1405          Lower Extremity (Manual Muscle Testing)    Comment, MMT: Lower Extremity  R hip flexors 5/5, L hip flexors: 4+/5, B knee flexors: 5/5, B knee extensors: 5/5, B ankle DFs: 5/5  -LR       User Key  (r) = Recorded By, (t) = Taken By, (c) = Cosigned By    Initials Name Provider Type    LR Marizol Vargas, PT Physical Therapist        Goals/Plan     Row Name 12/02/20 1408          Bed Mobility Goal 1 (PT)    Activity/Assistive Device (Bed Mobility Goal 1, PT)  sit to supine/supine to sit  -LR     [place] Level/Cues Needed (Bed Mobility Goal 1, PT)  modified independence  -LR     Time Frame (Bed Mobility Goal 1, PT)  long term goal (LTG);5 days  -LR     Progress/Outcomes (Bed Mobility Goal 1, PT)  goal ongoing  -LR     Row Name 12/02/20 5625          Transfer Goal 1 (PT)    Activity/Assistive Device (Transfer Goal 1, PT)  sit-to-stand/stand-to-sit;walker, rolling  -LR     [place] Level/Cues Needed (Transfer Goal 1, PT)  contact guard assist;2 person assist  -LR     Time Frame (Transfer Goal 1, PT)  long term goal (LTG);5 days  -LR     Progress/Outcome (Transfer Goal 1, PT)  goal ongoing  -LR     Row Name 12/02/20 1405          Gait Training Goal 1 (PT)    Activity/Assistive Device (Gait Training Goal 1, PT)  gait (walking locomotion);walker, rolling  -LR     [place] Level (Gait Training Goal 1, PT)  minimum assist (75% or more patient effort);2 person assist  -LR     Distance (Gait Training Goal 1, PT)  50 feet  -LR     Time Frame (Gait Training Goal 1, PT)  long term goal (LTG);5 days  -LR     Progress/Outcome (Gait Training Goal 1, PT)  goal ongoing  -LR       User Key  (r) = Recorded By, (t) = Taken By, (c) = Cosigned By    Initials Name Provider Type    Marizol Sanford, PT Physical Therapist        Clinical Impression     Row Name 12/02/20 1407          Pain     Additional Documentation  Pain Scale: Numbers Pre/Post-Treatment (Group)  -LR     Row Name 12/02/20 2969          Pain Scale: Numbers Pre/Post-Treatment    Pretreatment Pain Rating  0/10 - no pain  -LR     Posttreatment Pain Rating  0/10 - no pain  -LR     Pain Intervention(s)  Ambulation/increased activity;Repositioned  -LR     Row Name 12/02/20 1401          Plan of Care Review    Plan of Care Reviewed With  patient;spouse  -LR     Progress  improving  -LR     Outcome Summary  Patient able to stand at EOB with mod assist x2, unable to take steps at EOB d/t scissoring and significantly impaired coordination on L side. Min assist for supine<->sit t/f. Demonstrates impaired awareness of L side, L neglect but did look to the left a few times during eval. Strong LEs, primary impairment is impaired coordination on L side. Recommend IRF at d/c. Will continue to progress as able.  -LR     Row Name 12/02/20 140          Therapy Assessment/Plan (PT)    Patient/Family Therapy Goals Statement (PT)  improve mobility  -LR     Rehab Potential (PT)  fair, will monitor progress closely  -LR     Criteria for Skilled Interventions Met (PT)  yes;meets criteria;skilled treatment is necessary  -LR     Row Name 12/02/20 1406          Vital Signs    Pre Systolic BP Rehab  136  -LR     Pre Treatment Diastolic BP  79  -LR     Post Systolic BP Rehab  140  -LR     Post Treatment Diastolic BP  80  -LR     Pretreatment Heart Rate (beats/min)  79  -LR     Posttreatment Heart Rate (beats/min)  66  -LR     Pre Patient Position  Supine  -LR     Post Patient Position  Supine  -LR     Row Name 12/02/20 1406          Positioning and Restraints    Pre-Treatment Position  in bed  -LR     Post Treatment Position  bed  -LR     In Bed  notified nsg;supine;call light within reach;encouraged to call for assist;exit alarm on;with family/caregiver;side rails up x2;SCD pump applied  -LR       User Key  (r) = Recorded By, (t) = Taken By, (c) = Cosigned By     Initials Name Provider Type    Marizol Sanford, PT Physical Therapist        Outcome Measures     Row Name 12/02/20 1405          How much help from another person do you currently need...    Turning from your back to your side while in flat bed without using bedrails?  3  -LR     Moving from lying on back to sitting on the side of a flat bed without bedrails?  3  -LR     Moving to and from a bed to a chair (including a wheelchair)?  2  -LR     Standing up from a chair using your arms (e.g., wheelchair, bedside chair)?  2  -LR     Climbing 3-5 steps with a railing?  1  -LR     To walk in hospital room?  1  -LR     AM-PAC 6 Clicks Score (PT)  12  -LR     Row Name 12/02/20 1404          Modified Peter Scale    Pre-Stroke Modified Peter Scale  0 - No Symptoms at all.  -LR     Modified [place] Scale  4 - Moderately severe disability.  Unable to walk without assistance, and unable to attend to own bodily needs without assistance.  -LR     Row Name 12/02/20 1402          Functional Assessment    Outcome Measure Options  AM-PAC 6 Clicks Basic Mobility (PT);Modified [place]  -LR       User Key  (r) = Recorded By, (t) = Taken By, (c) = Cosigned By    Initials Name Provider Type    Marizol Sanford, PT Physical Therapist        Physical Therapy Education                 Title: PT OT SLP Therapies (Done)     Topic: Physical Therapy (Done)     Point: Mobility training (Done)     Learning Progress Summary           Patient Acceptance, E,D, VU,NR by LR at 12/2/2020 1405    Comment: Educated on precautions, benefits of mobility, safety with mobility, correct supine<->sit t/f technique, correct sit<->stand t/f technique, and progression of POC.   Significant Other Acceptance, E,D, VU,NR by  at 12/2/2020 1405    Comment: Educated on precautions, benefits of mobility, safety with mobility, correct supine<->sit t/f technique, correct sit<->stand t/f technique, and progression of POC.                   Point: Home  exercise program (Done)     Learning Progress Summary           Patient Acceptance, E,D, VU,NR by LR at 12/2/2020 1405    Comment: Educated on precautions, benefits of mobility, safety with mobility, correct supine<->sit t/f technique, correct sit<->stand t/f technique, and progression of POC.   Significant Other Acceptance, E,D, VU,NR by LR at 12/2/2020 1405    Comment: Educated on precautions, benefits of mobility, safety with mobility, correct supine<->sit t/f technique, correct sit<->stand t/f technique, and progression of POC.                   Point: Body mechanics (Done)     Learning Progress Summary           Patient Acceptance, E,D, VU,NR by LR at 12/2/2020 1405    Comment: Educated on precautions, benefits of mobility, safety with mobility, correct supine<->sit t/f technique, correct sit<->stand t/f technique, and progression of POC.   Significant Other Acceptance, E,D, VU,NR by LR at 12/2/2020 1405    Comment: Educated on precautions, benefits of mobility, safety with mobility, correct supine<->sit t/f technique, correct sit<->stand t/f technique, and progression of POC.                   Point: Precautions (Done)     Learning Progress Summary           Patient Acceptance, E,D, VU,NR by LR at 12/2/2020 1405    Comment: Educated on precautions, benefits of mobility, safety with mobility, correct supine<->sit t/f technique, correct sit<->stand t/f technique, and progression of POC.   Significant Other Acceptance, E,D, VU,NR by LR at 12/2/2020 1405    Comment: Educated on precautions, benefits of mobility, safety with mobility, correct supine<->sit t/f technique, correct sit<->stand t/f technique, and progression of POC.                               User Key     Initials Effective Dates Name Provider Type Discipline    LR 06/19/15 -  Marizol Vargas, PT Physical Therapist PT              PT Recommendation and Plan  Planned Therapy Interventions (PT): balance training, bed mobility training, gait  training, home exercise program, patient/family education, ROM (range of motion), strengthening, transfer training  Plan of Care Reviewed With: patient, spouse  Progress: improving  Outcome Summary: Patient able to stand at EOB with mod assist x2, unable to take steps at EOB d/t scissoring and significantly impaired coordination on L side. Min assist for supine<->sit t/f. Demonstrates impaired awareness of L side, L neglect but did look to the left a few times during eval. Strong LEs, primary impairment is impaired coordination on L side. Recommend IRF at d/c. Will continue to progress as able.     Time Calculation:   PT Charges     Row Name 12/02/20 1405             Time Calculation    Start Time  1405  -LR      PT Received On  12/02/20  -LR      PT Goal Re-Cert Due Date  12/12/20  -LR        User Key  (r) = Recorded By, (t) = Taken By, (c) = Cosigned By    Initials Name Provider Type    Marizol Sanford, PT Physical Therapist        Therapy Charges for Today     Code Description Service Date Service Provider Modifiers Qty    69430545289 HC PT EVAL LOW COMPLEXITY 4 12/2/2020 Marizol Vargas, PT GP 1    27812830218 HC PT THER SUPP EA 15 MIN 12/2/2020 Marizol Vargas, PT GP 2          PT G-Codes  Outcome Measure Options: AM-PAC 6 Clicks Basic Mobility (PT), Modified [place]  AM-PAC 6 Clicks Score (PT): 12  AM-PAC 6 Clicks Score (OT): 13  Modified [place] Scale: 4 - Moderately severe disability.  Unable to walk without assistance, and unable to attend to own bodily needs without assistance.    Marizol Vargas, SIRENA  12/2/2020

## 2020-12-02 NOTE — PLAN OF CARE
Goal Outcome Evaluation:  Plan of Care Reviewed With: patient, spouse  Progress: improving  Outcome Summary: Patient able to stand at EOB with mod assist x2, unable to take steps at EOB d/t scissoring and significantly impaired coordination on L side. Min assist for supine<->sit t/f. Demonstrates impaired awareness of L side, L neglect but did look to the left a few times during eval. Strong LEs, primary impairment is impaired coordination on L side. Recommend IRF at d/c. Will continue to progress as able.

## 2020-12-02 NOTE — THERAPY EVALUATION
Acute Care - Speech Language Pathology Initial Evaluation  [facility]   Cognitive-Communication Evaluation       Patient Name: Dirk Miles  : 1940  MRN: [id number]  Today's Date: 2020               Admit Date: 2020     Visit Dx:    ICD-10-CM ICD-9-CM   1. Acute CVA (cerebrovascular accident) (CMS/[facility])  I63.9 434.91   2. Cognitive communication deficit  R41.841 799.52     Patient Active Problem List   Diagnosis   • Acute CVA (cerebrovascular accident) (CMS/[facility])     Past Medical History:   Diagnosis Date   • A-fib (CMS/[facility])    • Diabetes mellitus (CMS/[facility])    • Hypertension      Past Surgical History:   Procedure Laterality Date   • CATARACT EXTRACTION, BILATERAL     • HERNIA REPAIR          SLP EVALUATION (last 72 hours)      SLP SLC Evaluation     Row Name 20 1140                   Communication Assessment/Intervention    Document Type  evaluation  -        Subjective Information  no complaints  -        Patient Observations  alert;cooperative;agree to therapy  -        Patient/Family/Caregiver Comments/Observations  Spouse present  -        Care Plan Review  evaluation/treatment results reviewed;care plan/treatment goals reviewed;risks/benefits reviewed;current/potential barriers reviewed;patient/other agree to care plan  -        Care Plan Review, Other Participant(s)  spouse  -        Patient Effort  excellent  -           General Information    Patient Profile Reviewed  yes  -        Pertinent History Of Current Problem  R PCA occlusion, L weakness, neglect, heminopsia, R gaze. SLC evaluation completed per stroke protocol.   -        Precautions/Limitations, Vision  vision impairment, left  -        Precautions/Limitations, Hearing  WFL;for purposes of eval  -        Prior Level of Function-Communication  WFL  -        Plans/Goals Discussed with  patient;spouse/S.O.;agreed upon  -        Barriers to Rehab  none identified  -        Patient's Goals for  Discharge  return to home;return to all previous roles/activities  -        Family Goals for Discharge  patient able to return to previous activities/roles  -           Pain    Additional Documentation  Pain Scale: FACES Pre/Post-Treatment (Group);Pain Scale: Numbers Pre/Post-Treatment (Group)  -           Pain Scale: Numbers Pre/Post-Treatment    Pretreatment Pain Rating  0/10 - no pain  -CH        Posttreatment Pain Rating  0/10 - no pain  -           Pain Scale: FACES Pre/Post-Treatment    Pain: FACES Scale, Pretreatment  0-->no hurt  -CH        Posttreatment Pain Rating  0-->no hurt  -CH           Comprehension Assessment/Intervention    Comprehension Assessment/Intervention  Auditory Comprehension;Reading Comprehension  -           Auditory Comprehension Assessment/Intervention    Auditory Comprehension (Communication)  mild impairment  -        Able to Identify Objects/Pictures (Communication)  familiar objects;WFL  -        Answers Questions (Communication)  yes/no;wh questions;personal;simple;concrete;WFL;abstract;mild impairment  -CH        Able to Follow Commands (Communication)  2-step;3-step;mild impairment  -CH        Narrative Discourse  conversational level;mild impairment  -CH           Reading Comprehension Assessment/Intervention    Reading Comprehension (Communication)  moderate impairment  -CH        Single Word Level  moderate impairment  -CH           Expression Assessment/Intervention    Expression Assessment/Intervention  verbal expression  -           Verbal Expression Assessment/Intervention    Verbal Expression  mild impairment  -CH        Automatic Speech (Communication)  response to greeting;counting 1-20;WNL  -        Repetition  sentences;mild impairment  -CH        Phrase Completion  unpredictable;mild impairment  -CH        Responsive Naming  complex;mild impairment  -CH        Confrontational Naming  WFL  -        Spontaneous/Functional Words  simple;WFL  -CH         Sentence Formulation  complex;mild impairment  -CH           Oral Motor Structure and Function    Oral Motor Structure and Function  WNL  -CH           Motor Speech Assessment/Intervention    Motor Speech Function  WNL  -           Cursory Voice Assessment/Intervention    Quality and Resonance (Voice)  WNL  -           Cognitive Assessment Intervention- SLP    Cognitive Function (Cognition)  mild impairment  -CH        Orientation Status (Cognition)  awareness of basic personal information;person;place;time;situation;WFL  -CH        Memory (Cognitive)  simple;immediate;long-term;WFL;short-term;delayed;mild impairment  -CH        Attention (Cognitive)  selective;sustained;attention to detail;mild impairment  -CH        Thought Organization (Cognitive)  concrete divergent;abstract divergent;concrete convergent;abstract convergent;mental manipulation;mild impairment  -CH        Reasoning (Cognitive)  simple;WFL;deductive;mild impairment  -CH        Problem Solving (Cognitive)  simple;temporal;mild impairment  -CH        Functional Math (Cognitive)  simple;word problems;mild impairment  -CH        Right Hemisphere Function  left neglect;visuo-spatial;moderate impairment  -CH           SLP Clinical Impressions    SLP Diagnosis  Patient presents with a mild cognitive linguistic impairment as evidenced by difficulty with recall, attention, problem solving, word finding and thought organization  -        Rehab Potential/Prognosis  good  -        SLC Criteria for Skilled Therapy Interventions Met  yes  -CH        Functional Impact  functional impact in social situations;functional impact in ADLs;restrictions in personal and social life;difficulty in expressing complex messages;difficulty completing home management task  -           Recommendations    Therapy Frequency (SLP SLC)  5 days per week  -        Anticipated Discharge Disposition (SLP)  unknown;anticipate therapy at next level of care  -            SLP Discharge Summary    Discharge Destination  unknown;anticipated therapy at next level of care  -          User Key  (r) = Recorded By, (t) = Taken By, (c) = Cosigned By    Initials Name Effective Dates    Tami Apodaca, MS CCC-SLP 08/09/20 -              EDUCATION  The patient has been educated in the following areas:     Cognitive Impairment Communication Impairment.    SLP Recommendation and Plan  SLP Diagnosis: Patient presents with a mild cognitive linguistic impairment as evidenced by difficulty with recall, attention, problem solving, word finding and thought organization        SLC Criteria for Skilled Therapy Interventions Met: yes  Anticipated Discharge Disposition (SLP): unknown, anticipate therapy at next level of care                                   SLP GOALS     Row Name 12/02/20 1140             Follow Directions Goal 2 (SLP)    Improve Ability to Follow Directions Goal 1 (SLP)  3 step commands;80%;with minimal cues (75-90%)  -      Time Frame (Follow Directions Goal 1, SLP)  by discharge  -CH         Reading Comprehension at Word Level Goal 1 (SLP)    Improve Reading Comprehension at Word Level Goal 1 (SLP)  matching picture to word;90%;with minimal cues (75-90%)  -      Time Frame (Reading Comprehension at Word Level Goal 1, SLP)  by discharge  -CH         Comprehension at Phrase and Sentence Level Goal 1 (SLP)    Improve Reading Comprehension at Phrase and Sentence Level Goal 1 (SLP)  phrase;80%;with minimal cues (75-90%)  -      Time Frame (Reading Comprehension at Phrase and Sentence Level Goal 1, SLP)  by discharge  -         Word Retrieval Skills Goal 1 (SLP)    Improve Word Retrieval Skills By Goal 1 (SLP)  low frequency;supplying an antonym;supplying a synonym;completing a divergent task;completing a convergent task;80%;with minimal cues (75-90%)  -      Time Frame (Word Retrieval Goal 1, SLP)  by discharge  -         Memory Skills Goal 1 (SLP)    Improve Memory  Skills Through Goal 1 (SLP)  recalling related word lists immediately;recalling related word lists with an imposed delay;recalling unrelated word lists immediately;recalling unrelated word lists with an imposed delay;80%;with minimal cues (75-90%)  -      Time Frame (Memory Skills Goal 1, SLP)  by discharge  -         Organizational Skills Goal 1 (SLP)    Improve Thought Organization Through Goal 1 (SLP)  completing a divergent naming task;completing a convergent naming task;80%;with minimal cues (75-90%)  -      Time Frame (Thought Organization Skills Goal 1, SLP)  by discharge  -         Reasoning Goal 1 (SLP)    Improve Reasoning Through Goal 1 (SLP)  complete deductive reasoning task;complete mental flexibility task;80%;with minimal cues (75-90%)  -      Time Frame (Reasoning Goal 1, SLP)  by discharge  -         Functional Problem Solving Skills Goal 1 (SLP)    Improve Problem Solving Through Goal 1 (SLP)  determine multiple solutions to problems;tell similarity between items;80%;with minimal cues (75-90%)  -      Time Frame (Problem Solving Goal 1, SLP)  by discharge  -         Right Hemisphere Function Goal 1 (SLP)    Improve Right Hemisphere Function Through Goal 1 (SLP)  complete visuo-spatial activities (visual closure, trail making, mazes;80%;with minimal cues (75-90%)  -      Time Frame (Right Hemisphere Function Goal 1, SLP)  by discharge  -         Additional Goal 1 (SLP)    Additional Goal 1, SLP  LTG: Patient will improve cognitive linguistic skills to WFL without cues to improve safety and independence at the next level of care  -      Time Frame (Additional Goal 1, SLP)  by discharge  -        User Key  (r) = Recorded By, (t) = Taken By, (c) = Cosigned By    Initials Name Provider Type     Tami Calderon MS CCC-SLP Speech and Language Pathologist                  Time Calculation:     Time Calculation- SLP     Row Name 12/02/20 1807             Time Calculation- SLP     SLP Start Time  1140  -      SLP Received On  12/02/20  -        User Key  (r) = Recorded By, (t) = Taken By, (c) = Cosigned By    Initials Name Provider Type    Tami Apodaca MS CCC-SLP Speech and Language Pathologist          Therapy Charges for Today     Code Description Service Date Service Provider Modifiers Qty    20123573760 HC ST EVAL SPEECH AND PROD W LANG  4 12/2/2020 Tami Calderon MS CCC-SLP GN 1                     MS RUSSELL Rene  12/2/2020     Patient was not wearing a face mask and did not exhibit coughing during this therapy encounter.  Procedure performed was aerosolizing, involved close contact (within 6 feet for at least 15 minutes or longer), and did not involve contact with infectious secretions or specimens.  Therapist used appropriate personal protective equipment including gloves, standard procedure mask and eye protection.  Appropriate PPE was worn during the entire therapy session.  Hand hygiene was completed before and after therapy session.

## 2020-12-02 NOTE — PLAN OF CARE
Goal Outcome Evaluation:  Plan of Care Reviewed With: patient, spouse  Progress: no change  Outcome Summary: Pt VSS this shift. A&O to all but time. Wife at bedside this shift. AFib per monitor. NIH- 9. C/o pain x1 with relief from Tylenol. Will continue to monitor.

## 2020-12-03 LAB
ALBUMIN SERPL-MCNC: 4.2 G/DL (ref 3.5–5.2)
ALBUMIN/GLOB SERPL: 1.3 G/DL
ALP SERPL-CCNC: 79 U/L (ref 39–117)
ALT SERPL W P-5'-P-CCNC: 14 U/L (ref 1–41)
ANION GAP SERPL CALCULATED.3IONS-SCNC: 11 MMOL/L (ref 5–15)
AST SERPL-CCNC: 22 U/L (ref 1–40)
BILIRUB SERPL-MCNC: 0.6 MG/DL (ref 0–1.2)
BILIRUB UR QL STRIP: NEGATIVE
BUN SERPL-MCNC: 18 MG/DL (ref 8–23)
BUN/CREAT SERPL: 14.4 (ref 7–25)
CALCIUM SPEC-SCNC: 9.9 MG/DL (ref 8.6–10.5)
CHLORIDE SERPL-SCNC: 103 MMOL/L (ref 98–107)
CLARITY UR: CLEAR
CO2 SERPL-SCNC: 26 MMOL/L (ref 22–29)
COLOR UR: YELLOW
CREAT SERPL-MCNC: 1.25 MG/DL (ref 0.76–1.27)
DEPRECATED RDW RBC AUTO: 47.6 FL (ref 37–54)
ERYTHROCYTE [DISTWIDTH] IN BLOOD BY AUTOMATED COUNT: 12.9 % (ref 12.3–15.4)
GFR SERPL CREATININE-BSD FRML MDRD: 56 ML/MIN/1.73
GLOBULIN UR ELPH-MCNC: 3.2 GM/DL
GLUCOSE BLDC GLUCOMTR-MCNC: 123 MG/DL (ref 70–130)
GLUCOSE BLDC GLUCOMTR-MCNC: 125 MG/DL (ref 70–130)
GLUCOSE BLDC GLUCOMTR-MCNC: 136 MG/DL (ref 70–130)
GLUCOSE BLDC GLUCOMTR-MCNC: 140 MG/DL (ref 70–130)
GLUCOSE SERPL-MCNC: 130 MG/DL (ref 65–99)
GLUCOSE UR STRIP-MCNC: NEGATIVE MG/DL
HCT VFR BLD AUTO: 37.3 % (ref 37.5–51)
HGB BLD-MCNC: 12 G/DL (ref 13–17.7)
HGB UR QL STRIP.AUTO: NEGATIVE
KETONES UR QL STRIP: NEGATIVE
LEUKOCYTE ESTERASE UR QL STRIP.AUTO: NEGATIVE
MAGNESIUM SERPL-MCNC: 1.9 MG/DL (ref 1.6–2.4)
MCH RBC QN AUTO: 32.6 PG (ref 26.6–33)
MCHC RBC AUTO-ENTMCNC: 32.2 G/DL (ref 31.5–35.7)
MCV RBC AUTO: 101.4 FL (ref 79–97)
NITRITE UR QL STRIP: NEGATIVE
PH UR STRIP.AUTO: <=5 [PH] (ref 5–8)
PLATELET # BLD AUTO: 184 10*3/MM3 (ref 140–450)
PMV BLD AUTO: 10.4 FL (ref 6–12)
POTASSIUM SERPL-SCNC: 4.2 MMOL/L (ref 3.5–5.2)
PROT SERPL-MCNC: 7.4 G/DL (ref 6–8.5)
PROT UR QL STRIP: NEGATIVE
QT INTERVAL: 416 MS
QTC INTERVAL: 449 MS
RBC # BLD AUTO: 3.68 10*6/MM3 (ref 4.14–5.8)
SODIUM SERPL-SCNC: 140 MMOL/L (ref 136–145)
SP GR UR STRIP: 1.01 (ref 1–1.03)
UROBILINOGEN UR QL STRIP: NORMAL
WBC # BLD AUTO: 7.35 10*3/MM3 (ref 3.4–10.8)

## 2020-12-03 PROCEDURE — 82962 GLUCOSE BLOOD TEST: CPT

## 2020-12-03 PROCEDURE — 80053 COMPREHEN METABOLIC PANEL: CPT | Performed by: INTERNAL MEDICINE

## 2020-12-03 PROCEDURE — 92507 TX SP LANG VOICE COMM INDIV: CPT

## 2020-12-03 PROCEDURE — 97110 THERAPEUTIC EXERCISES: CPT

## 2020-12-03 PROCEDURE — 85027 COMPLETE CBC AUTOMATED: CPT | Performed by: INTERNAL MEDICINE

## 2020-12-03 PROCEDURE — 83735 ASSAY OF MAGNESIUM: CPT | Performed by: INTERNAL MEDICINE

## 2020-12-03 PROCEDURE — 97530 THERAPEUTIC ACTIVITIES: CPT

## 2020-12-03 PROCEDURE — 99233 SBSQ HOSP IP/OBS HIGH 50: CPT | Performed by: INTERNAL MEDICINE

## 2020-12-03 PROCEDURE — 99233 SBSQ HOSP IP/OBS HIGH 50: CPT | Performed by: NURSE PRACTITIONER

## 2020-12-03 PROCEDURE — 25010000002 CYANOCOBALAMIN PER 1000 MCG: Performed by: FAMILY MEDICINE

## 2020-12-03 PROCEDURE — 81003 URINALYSIS AUTO W/O SCOPE: CPT | Performed by: INTERNAL MEDICINE

## 2020-12-03 RX ORDER — MAGNESIUM SULFATE HEPTAHYDRATE 40 MG/ML
2 INJECTION, SOLUTION INTRAVENOUS AS NEEDED
Status: DISCONTINUED | OUTPATIENT
Start: 2020-12-03 | End: 2020-12-08

## 2020-12-03 RX ORDER — BISACODYL 10 MG
10 SUPPOSITORY, RECTAL RECTAL DAILY PRN
Status: DISCONTINUED | OUTPATIENT
Start: 2020-12-03 | End: 2020-12-08

## 2020-12-03 RX ORDER — MAGNESIUM SULFATE HEPTAHYDRATE 40 MG/ML
4 INJECTION, SOLUTION INTRAVENOUS AS NEEDED
Status: DISCONTINUED | OUTPATIENT
Start: 2020-12-03 | End: 2020-12-08

## 2020-12-03 RX ORDER — AMOXICILLIN 250 MG
2 CAPSULE ORAL 2 TIMES DAILY
Status: DISCONTINUED | OUTPATIENT
Start: 2020-12-03 | End: 2020-12-09 | Stop reason: HOSPADM

## 2020-12-03 RX ORDER — LANOLIN ALCOHOL/MO/W.PET/CERES
1000 CREAM (GRAM) TOPICAL DAILY
Status: DISCONTINUED | OUTPATIENT
Start: 2020-12-03 | End: 2020-12-03

## 2020-12-03 RX ORDER — MAGNESIUM SULFATE 1 G/100ML
1 INJECTION INTRAVENOUS AS NEEDED
Status: DISCONTINUED | OUTPATIENT
Start: 2020-12-03 | End: 2020-12-08

## 2020-12-03 RX ORDER — POLYETHYLENE GLYCOL 3350 17 G/17G
17 POWDER, FOR SOLUTION ORAL DAILY PRN
Status: DISCONTINUED | OUTPATIENT
Start: 2020-12-03 | End: 2020-12-08

## 2020-12-03 RX ADMIN — LISINOPRIL 20 MG: 20 TABLET ORAL at 09:09

## 2020-12-03 RX ADMIN — ACETAMINOPHEN 650 MG: 325 TABLET, FILM COATED ORAL at 15:57

## 2020-12-03 RX ADMIN — DOCUSATE SODIUM 50 MG AND SENNOSIDES 8.6 MG 2 TABLET: 8.6; 5 TABLET, FILM COATED ORAL at 21:54

## 2020-12-03 RX ADMIN — CYANOCOBALAMIN 1000 MCG: 1000 INJECTION, SOLUTION INTRAMUSCULAR; SUBCUTANEOUS at 09:09

## 2020-12-03 RX ADMIN — ATORVASTATIN CALCIUM 80 MG: 40 TABLET, FILM COATED ORAL at 21:54

## 2020-12-03 RX ADMIN — SODIUM CHLORIDE, PRESERVATIVE FREE 10 ML: 5 INJECTION INTRAVENOUS at 09:09

## 2020-12-03 RX ADMIN — ACETAMINOPHEN 650 MG: 325 TABLET, FILM COATED ORAL at 10:01

## 2020-12-03 RX ADMIN — METOPROLOL TARTRATE 50 MG: 50 TABLET, FILM COATED ORAL at 21:54

## 2020-12-03 RX ADMIN — SODIUM CHLORIDE, PRESERVATIVE FREE 10 ML: 5 INJECTION INTRAVENOUS at 21:54

## 2020-12-03 RX ADMIN — METOPROLOL TARTRATE 50 MG: 50 TABLET, FILM COATED ORAL at 09:09

## 2020-12-03 RX ADMIN — ASPIRIN 81 MG CHEWABLE TABLET 81 MG: 81 TABLET CHEWABLE at 09:08

## 2020-12-03 RX ADMIN — DOCUSATE SODIUM 50 MG AND SENNOSIDES 8.6 MG 2 TABLET: 8.6; 5 TABLET, FILM COATED ORAL at 13:40

## 2020-12-03 NOTE — THERAPY TREATMENT NOTE
Acute Care - Speech Language Pathology Treatment Note  [facility]     Patient Name: Dirk Miles  : 1940  MRN: [id number]  Today's Date: 12/3/2020               Admit Date: 2020     Visit Dx:    ICD-10-CM ICD-9-CM   1. Cognitive communication deficit  R41.841 799.52   2. Acute CVA (cerebrovascular accident) (CMS/Formerly [facility])  I63.9 434.91     Patient Active Problem List   Diagnosis   • Acute CVA (cerebrovascular accident) (CMS/Formerly [facility])     Past Medical History:   Diagnosis Date   • A-fib (CMS/Formerly [facility])    • Diabetes mellitus (CMS/Formerly [facility])    • Hypertension      Past Surgical History:   Procedure Laterality Date   • CATARACT EXTRACTION, BILATERAL     • HERNIA REPAIR          SLP EVALUATION (last 72 hours)      SLP SLC Evaluation     Row Name 20 1115 20 1140                Communication Assessment/Intervention    Document Type  therapy note (daily note)  -  evaluation  -       Subjective Information  no complaints  -  no complaints  -       Patient Observations  lethargic RN reported not much sleep last night  -  alert;cooperative;agree to therapy  -       Patient/Family/Caregiver Comments/Observations  Spouse present  -  Spouse present  -       Care Plan Review  care plan/treatment goals reviewed  -  evaluation/treatment results reviewed;care plan/treatment goals reviewed;risks/benefits reviewed;current/potential barriers reviewed;patient/other agree to care plan  -       Care Plan Review, Other Participant(s)  spouse  -  spouse  -       Patient Effort  adequate  -HG  excellent  -          General Information    Patient Profile Reviewed  --  yes  -CH       Pertinent History Of Current Problem  --  R PCA occlusion, L weakness, neglect, heminopsia, R gaze. SLC evaluation completed per stroke protocol.   -       Precautions/Limitations, Vision  --  vision impairment, left  -CH       Precautions/Limitations, Hearing  --  WFL;for purposes of eval  -       Prior Level of  Function-Communication  --  WFL  -       Plans/Goals Discussed with  --  patient;spouse/S.O.;agreed upon  -       Barriers to Rehab  --  none identified  -       Patient's Goals for Discharge  --  return to home;return to all previous roles/activities  -       Family Goals for Discharge  --  patient able to return to previous activities/roles  -          Pain    Additional Documentation  --  Pain Scale: FACES Pre/Post-Treatment (Group);Pain Scale: Numbers Pre/Post-Treatment (Group)  -          Pain Scale: Numbers Pre/Post-Treatment    Pretreatment Pain Rating  0/10 - no pain  -HG  0/10 - no pain  -CH       Posttreatment Pain Rating  --  0/10 - no pain  -          Pain Scale: FACES Pre/Post-Treatment    Pain: FACES Scale, Pretreatment  --  0-->no hurt  -CH       Posttreatment Pain Rating  --  0-->no hurt  -CH          Comprehension Assessment/Intervention    Comprehension Assessment/Intervention  --  Auditory Comprehension;Reading Comprehension  -          Auditory Comprehension Assessment/Intervention    Auditory Comprehension (Communication)  --  mild impairment  -       Able to Identify Objects/Pictures (Communication)  --  familiar objects;L  -       Answers Questions (Communication)  --  yes/no;wh questions;personal;simple;concrete;WFL;abstract;mild impairment  -       Able to Follow Commands (Communication)  --  2-step;3-step;mild impairment  -       Narrative Discourse  --  conversational level;mild impairment  -          Reading Comprehension Assessment/Intervention    Reading Comprehension (Communication)  --  moderate impairment  -       Single Word Level  --  moderate impairment  -          Expression Assessment/Intervention    Expression Assessment/Intervention  --  verbal expression  -          Verbal Expression Assessment/Intervention    Verbal Expression  --  mild impairment  -       Automatic Speech (Communication)  --  response to greeting;counting 1-20;L  -        Repetition  --  sentences;mild impairment  -CH       Phrase Completion  --  unpredictable;mild impairment  -CH       Responsive Naming  --  complex;mild impairment  -CH       Confrontational Naming  --  WFL  -       Spontaneous/Functional Words  --  simple;L  -       Sentence Formulation  --  complex;mild impairment  -CH          Oral Motor Structure and Function    Oral Motor Structure and Function  --  WNL  -          Motor Speech Assessment/Intervention    Motor Speech Function  --  WNL  -          Cursory Voice Assessment/Intervention    Quality and Resonance (Voice)  --  WN  -          Cognitive Assessment Intervention- SLP    Cognitive Function (Cognition)  --  mild impairment  -       Orientation Status (Cognition)  --  awareness of basic personal information;person;place;time;situation;L  -       Memory (Cognitive)  --  simple;immediate;long-term;WFL;short-term;delayed;mild impairment  -       Attention (Cognitive)  --  selective;sustained;attention to detail;mild impairment  -CH       Thought Organization (Cognitive)  --  concrete divergent;abstract divergent;concrete convergent;abstract convergent;mental manipulation;mild impairment  -       Reasoning (Cognitive)  --  simple;WFL;deductive;mild impairment  -CH       Problem Solving (Cognitive)  --  simple;temporal;mild impairment  -CH       Functional Math (Cognitive)  --  simple;word problems;mild impairment  -CH       Right Hemisphere Function  --  left neglect;visuo-spatial;moderate impairment  -CH          SLP Clinical Impressions    SLP Diagnosis  --  Patient presents with a mild cognitive linguistic impairment as evidenced by difficulty with recall, attention, problem solving, word finding and thought organization  -       Rehab Potential/Prognosis  --  good  -[facility] Criteria for Skilled Therapy Interventions Met  --  yes  -       Functional Impact  --  functional impact in social situations;functional impact in  ADLs;restrictions in personal and social life;difficulty in expressing complex messages;difficulty completing home management task  -       Plan for Continued Treatment (SLP)  Cont IP SLP services. Anticipate therapy at next level of care for cog/comm deficits.   -HG  --          Recommendations    Therapy Frequency (SLP SLC)  --  5 days per week  -CH       Anticipated Discharge Disposition (SLP)  --  unknown;anticipate therapy at next level of care  -          SLP Discharge Summary    Discharge Destination  --  unknown;anticipated therapy at next level of care  -         User Key  (r) = Recorded By, (t) = Taken By, (c) = Cosigned By    Initials Name Effective Dates     Faiza Ocasio, MS CCC-SLP 08/09/20 -     CH Tami Calderon, MS CCC-SLP 08/09/20 -              EDUCATION  The patient has been educated in the following areas:     Cognitive Impairment Communication Impairment.    SLP Recommendation and Plan                             Plan for Continued Treatment (SLP): Cont IP SLP services. Anticipate therapy at next level of care for cog/comm deficits.              Plan of Care Reviewed With: patient, spouse      SLP GOALS     Row Name 12/03/20 1115 12/02/20 1140          Follow Directions Goal 2 (SLP)    Improve Ability to Follow Directions Goal 1 (SLP)  3 step commands;80%;with minimal cues (75-90%)  -  3 step commands;80%;with minimal cues (75-90%)  -     Time Frame (Follow Directions Goal 1, SLP)  by discharge  -HG  by discharge  -     Progress (Ability to Follow Directions Goal 1, SLP)  80%;with minimal cues (75-90%)  -HG  --     Progress/Outcomes (Follow Directions Goal 1, SLP)  continuing progress toward goal  -HG  --     Comment (Follow Directions Goal 1, SLP)  repetition needed.   -HG  --        Reading Comprehension at Word Level Goal 1 (SLP)    Improve Reading Comprehension at Word Level Goal 1 (SLP)  matching picture to word;90%;with minimal cues (75-90%)  -  matching picture to  word;90%;with minimal cues (75-90%)  -CH     Time Frame (Reading Comprehension at Word Level Goal 1, SLP)  by discharge  -HG  by discharge  -CH     Progress (Reading Comprehension at Word Level Goal 1, SLP)  20%;with 1:1 supervision/constant cues  -HG  --     Progress/Outcomes (Reading Comprehension at Word Level Goal 1, SLP)  continuing progress toward goal  -HG  --     Comment (Reading Comprehension at Word Level Goal 1, SLP)  Unable to read words and match pics to words consistently. Pt struggled with reading short sentences due to L visual neglect/deficit.   -HG  --        Comprehension at Phrase and Sentence Level Goal 1 (SLP)    Improve Reading Comprehension at Phrase and Sentence Level Goal 1 (SLP)  phrase;80%;with minimal cues (75-90%)  -HG  phrase;80%;with minimal cues (75-90%)  -CH     Time Frame (Reading Comprehension at Phrase and Sentence Level Goal 1, SLP)  by discharge  -HG  by discharge  -CH     Progress (Reading Comprehension at Phrase and Sentence Level Goal 1, SLP)  90%;with minimal cues (75-90%)  -HG  --     Progress/Outcomes (Reading Comprehension at Phrase and Sentence Level Goal 1, SLP)  continuing progress toward goal  -HG  --        Word Retrieval Skills Goal 1 (SLP)    Improve Word Retrieval Skills By Goal 1 (SLP)  low frequency;supplying an antonym;supplying a synonym;completing a divergent task;completing a convergent task;80%;with minimal cues (75-90%)  -HG  low frequency;supplying an antonym;supplying a synonym;completing a divergent task;completing a convergent task;80%;with minimal cues (75-90%)  -CH     Time Frame (Word Retrieval Goal 1, SLP)  by discharge  -HG  by discharge  -CH     Progress (Word Retrieval Skills Goal 1, SLP)  70%;with moderate cues (50-74%)  -HG  --     Progress/Outcomes (Word Retrieval Goal 1, SLP)  continuing progress toward goal  -HG  --     Comment (Word Retrieval Goal 1, SLP)  Divergent naming  -  --        Memory Skills Goal 1 (SLP)    Improve Memory  Skills Through Goal 1 (SLP)  recalling related word lists immediately;recalling related word lists with an imposed delay;recalling unrelated word lists immediately;recalling unrelated word lists with an imposed delay;80%;with minimal cues (75-90%)  -  recalling related word lists immediately;recalling related word lists with an imposed delay;recalling unrelated word lists immediately;recalling unrelated word lists with an imposed delay;80%;with minimal cues (75-90%)  -     Time Frame (Memory Skills Goal 1, SLP)  by discharge  -  by discharge  -     Progress/Outcomes (Memory Skills Goal 1, SLP)  goal ongoing  -  --        Organizational Skills Goal 1 (SLP)    Improve Thought Organization Through Goal 1 (SLP)  completing a divergent naming task;completing a convergent naming task;80%;with minimal cues (75-90%)  -  completing a divergent naming task;completing a convergent naming task;80%;with minimal cues (75-90%)  -     Time Frame (Thought Organization Skills Goal 1, SLP)  by discharge  -HG  by discharge  -     Progress (Thought Organization Skills Goal 1, SLP)  70%;with moderate cues (50-74%)  -  --     Progress/Outcomes (Thought Organization Skills Goal 1, SLP)  continuing progress toward goal  -  --        Reasoning Goal 1 (SLP)    Improve Reasoning Through Goal 1 (SLP)  complete deductive reasoning task;complete mental flexibility task;80%;with minimal cues (75-90%)  -  complete deductive reasoning task;complete mental flexibility task;80%;with minimal cues (75-90%)  -     Time Frame (Reasoning Goal 1, SLP)  by discharge  -HG  by discharge  -     Progress/Outcomes (Reasoning Goal 1, SLP)  goal ongoing  -  --        Functional Problem Solving Skills Goal 1 (SLP)    Improve Problem Solving Through Goal 1 (SLP)  determine multiple solutions to problems;tell similarity between items;80%;with minimal cues (75-90%)  -HG  determine multiple solutions to problems;tell similarity between  items;80%;with minimal cues (75-90%)  -     Time Frame (Problem Solving Goal 1, SLP)  by discharge  -  by discharge  -CH     Progress/Outcomes (Problem Solving Goal 1, SLP)  goal ongoing  -  --        Right Hemisphere Function Goal 1 (SLP)    Improve Right Hemisphere Function Through Goal 1 (SLP)  complete visuo-spatial activities (visual closure, trail making, mazes;80%;with minimal cues (75-90%)  -HG  complete visuo-spatial activities (visual closure, trail making, mazes;80%;with minimal cues (75-90%)  -     Time Frame (Right Hemisphere Function Goal 1, SLP)  by discharge  -  by discharge  -CH     Progress/Outcomes (Right Hemisphere Function Goal 1, SLP)  goal ongoing  -  --        Additional Goal 1 (SLP)    Additional Goal 1, SLP  LTG: Patient will improve cognitive linguistic skills to WFL without cues to improve safety and independence at the next level of care  -  LTG: Patient will improve cognitive linguistic skills to WFL without cues to improve safety and independence at the next level of care  -     Time Frame (Additional Goal 1, SLP)  by discharge  -  by discharge  -       User Key  (r) = Recorded By, (t) = Taken By, (c) = Cosigned By    Initials Name Provider Type    Faiza Fournier MS CCC-SLP Speech and Language Pathologist     Tami Calderon MS CCC-SLP Speech and Language Pathologist                  Time Calculation:     Time Calculation- SLP     Row Name 12/03/20 1213             Time Calculation- SLP    SLP Start Time  1115  -HG      SLP Received On  12/03/20  -        User Key  (r) = Recorded By, (t) = Taken By, (c) = Cosigned By    Initials Name Provider Type    Faiza Fournier MS CCC-SLP Speech and Language Pathologist          Therapy Charges for Today     Code Description Service Date Service Provider Modifiers Qty    [id number]  ST TREATMENT SPEECH 4 12/3/2020 Faiza Ocasio MS CCC-SLP GN 1          Patient was not wearing a face mask and did not  exhibit coughing during this therapy encounter.  Procedure performed was not aerosolizing, involved close contact (within 6 feet for at least 15 minutes or longer), and did not involve contact with infectious secretions or specimens.  Therapist used appropriate personal protective equipment including gloves and standard procedure mask.  Appropriate PPE was worn during the entire therapy session.  Hand hygiene was completed before and after therapy session.           Faiza Ocasio, MS LO-SLP  12/3/2020

## 2020-12-03 NOTE — PROGRESS NOTES
Stroke Progress Note       Chief Complaint: Left-sided weakness    Subjective    Subjective     Subjective:  No acute issues overnight.  Patient continues to make improvement. Wife is at bedside and reports that he is able to focus on objects and participate in activities better today. He is able to drink his coffee on his own.     Review of Systems   HENT: Negative.    Eyes: Positive for visual disturbance.   Respiratory: Negative.    Cardiovascular: Negative.    Gastrointestinal: Negative.    Endocrine: Negative.    Genitourinary: Negative.    Musculoskeletal: Negative.    Skin: Negative.    Neurological: Positive for facial asymmetry, speech difficulty and weakness.   Hematological: Negative.    Psychiatric/Behavioral: Negative.             Objective    Objective      Temp:  [98 °F (36.7 °C)-98.5 °F (36.9 °C)] 98 °F (36.7 °C)  Heart Rate:  [51-81] 71  Resp:  [16-18] 16  BP: (135-161)/(73-98) 152/73        Neurological Exam  Mental Status  Alert. Oriented to person, place, time and situation. Recent and remote memory are intact. Mild dysarthria present. Language is fluent with no aphasia. Attention and concentration are normal. Fund of knowledge is appropriate for level of education.  His neglect is improved but not resolved.    Cranial Nerves  CN II: Right normal visual field. Left homonymous hemianopsia.  CN III, IV, VI: Pupils equal round and reactive to light bilaterally.  CN V: Facial sensation is normal.  CN VII:  Right: There is no facial weakness.  Left: There is central facial weakness.  CN VIII: Equal hearing bilaterally.  CN XI: Shoulder shrug strength is normal.  CN XII: Tongue midline without atrophy or fasciculations.    Motor  Normal muscle bulk throughout. No fasciculations present. Normal muscle tone.  Left upper/left lower extremities 4+/5, right upper/right lower extremities 5/5.    Sensory  Light touch is normal in upper and lower extremities.     Coordination  No obvious dysmetria  .    Gait  Not assessed.      Physical Exam  Vitals signs and nursing note reviewed.   Constitutional:       Appearance: Normal appearance.   HENT:      Head: Normocephalic and atraumatic.      Mouth/Throat:      Mouth: Mucous membranes are moist.      Pharynx: Oropharynx is clear.   Eyes:      Conjunctiva/sclera: Conjunctivae normal.      Pupils: Pupils are equal, round, and reactive to light.   Neck:      Musculoskeletal: Normal range of motion and neck supple.   Cardiovascular:      Rate and Rhythm: Normal rate. Rhythm irregular.   Pulmonary:      Effort: Pulmonary effort is normal.   Skin:     General: Skin is warm and dry.   Neurological:      Mental Status: He is alert.      Cranial Nerves: Dysarthria present.   Psychiatric:         Mood and Affect: Mood normal.         Behavior: Behavior normal.         Results Review:    I reviewed the patient's new clinical results.  MRI brain shows large right PCA stroke, old left MCA stroke in frontal lobe, moderate to severe white matter disease, no hemorrhage.  GRE does show thrombus in right PCA region.  CT angiogram shows left ICA stenosis with thrombosis, asymptomatic, and right P2 occlusion.  Rest of the vessels are okay.  CT head shows early right PCA stroke  CT perfusion shows moderate size core with a small penumbra  A1c is 6.4,LDL 75, total cholesterol 146, triglyceride 269  Echo: saline test negative, EF 61%.    Results for orders placed during the hospital encounter of 12/01/20   Adult Transthoracic Echo Complete W/ Cont if Necessary Per Protocol (With Agitated Saline)    Narrative · Left ventricular ejection fraction appears to be 61 - 65%. Left   ventricular systolic function is normal.  · Left atrial volume is moderately increased.  · Saline test results are negative.  · The right atrial cavity is mildly dilated.  · Moderate tricuspid valve regurgitation is present.  · Estimated right ventricular systolic pressure from tricuspid   regurgitation is  moderately elevated (45-55 mmHg).                Assessment/Plan     Assessment/Plan:  80-year-old right-handed white male with known diagnosis of hypertension, diabetes, paroxysmal atrial fibrillation not on anticoagulation, who was brought by the family after they noted him to have gait imbalance, and his blood pressure to be over 200s.  On arrival to the emergency room, patient was noted to have left-sided weakness, left homonymous hemianopia and right gaze preference.  TPA was being considered to be given, but CT head showed early changes of ischemic stroke in right PCA territory, for which TPA was not given.      1. 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  With right P2 occlusion.  Patient has history of paroxysmal atrial fibrillation, but has never been placed on anticoagulation.  We have started him on aspirin and statins for now. We plan to repeat CT head on Friday, and if no hemorrhage is noted, will start him on anticoagulation. Echo saline results are negative. EF 61%  2. Left ICA stenosis.  Patient has been noted to have moderate to severe left ICA stenosis, which is asymptomatic.  Ordered carotid ultrasound. He may need treatment as outpatient.  3. Essential hypertension.  Okay to start normalizing his blood pressure, no need to have permissive hypertension.  His blood pressures have been ranging in 120s to 140s while he is in hospital.  4. Diabetes mellitus type 2 controlled with out hyperglycemia.  Continue his home medications.  His A1c is 6.4.  5. Paroxysmal atrial fibrillation.  Patient has diagnosis of A. fib, but has never been placed on anticoagulation.  He will likely need to be on anticoagulation lifelong after repeat CT.  Rate and rhythm controlled as per the hospitalist.  6. Activity.  PT/OT can work with him.  He will likely need acute rehabilitation.  7. Healthy heart/diabetic diet    Case was discussed with patient, his wife, nursing and the hospitalist.  Stroke neurology will continue  to follow along.           Manda Richmond, APRN  12/03/20  11:18 EST

## 2020-12-03 NOTE — PLAN OF CARE
Goal Outcome Evaluation:  Plan of Care Reviewed With: patient, spouse  Progress: no change  Outcome Summary: Patient performed stand pivot t/f from bed to chair with max assistx2, limited by impaired coordination, balance, and ability to follow commands. Attempted to take steps with RW but patient exhibited significant lean to the left that he was unable to correct. Will continue to progress mobility, balance, and coordination training as able.

## 2020-12-03 NOTE — PLAN OF CARE
Goal Outcome Evaluation:  Plan of Care Reviewed With: patient, spouse  Progress: no change   SLP treatment completed. Will continue to address for cog/comm deficits. Please see note for further details and recommendations.

## 2020-12-03 NOTE — THERAPY TREATMENT NOTE
Acute Care - Speech Language Pathology Treatment Note  [facility]     Patient Name: Dirk Miles  : 1940  MRN: [id number]  Today's Date: 12/3/2020               Admit Date: 2020     Visit Dx:    ICD-10-CM ICD-9-CM   1. Cognitive communication deficit  R41.841 799.52   2. Acute CVA (cerebrovascular accident) (CMS/[facility])  I63.9 434.91     Patient Active Problem List   Diagnosis   • Acute CVA (cerebrovascular accident) (CMS/[facility])     Past Medical History:   Diagnosis Date   • A-fib (CMS/[facility])    • Diabetes mellitus (CMS/[facility])    • Hypertension      Past Surgical History:   Procedure Laterality Date   • CATARACT EXTRACTION, BILATERAL     • HERNIA REPAIR          SLP EVALUATION (last 72 hours)      SLP SLC Evaluation     Row Name 20 1115 20 1140                Communication Assessment/Intervention    Document Type  therapy note (daily note)  -  evaluation  -       Subjective Information  no complaints  -  no complaints  -       Patient Observations  lethargic RN reported not much sleep last night  -  alert;cooperative;agree to therapy  -       Patient/Family/Caregiver Comments/Observations  Spouse present  -  Spouse present  -       Care Plan Review  care plan/treatment goals reviewed  -  evaluation/treatment results reviewed;care plan/treatment goals reviewed;risks/benefits reviewed;current/potential barriers reviewed;patient/other agree to care plan  -       Care Plan Review, Other Participant(s)  spouse  -  spouse  -       Patient Effort  adequate  -HG  excellent  -          General Information    Patient Profile Reviewed  --  yes  -CH       Pertinent History Of Current Problem  --  R PCA occlusion, L weakness, neglect, heminopsia, R gaze. SLC evaluation completed per stroke protocol.   -       Precautions/Limitations, Vision  --  vision impairment, left  -CH       Precautions/Limitations, Hearing  --  WFL;for purposes of eval  -       Prior Level of  Function-Communication  --  WFL  -       Plans/Goals Discussed with  --  patient;spouse/S.O.;agreed upon  -       Barriers to Rehab  --  none identified  -       Patient's Goals for Discharge  --  return to home;return to all previous roles/activities  -       Family Goals for Discharge  --  patient able to return to previous activities/roles  -          Pain    Additional Documentation  --  Pain Scale: FACES Pre/Post-Treatment (Group);Pain Scale: Numbers Pre/Post-Treatment (Group)  -          Pain Scale: Numbers Pre/Post-Treatment    Pretreatment Pain Rating  0/10 - no pain  -HG  0/10 - no pain  -CH       Posttreatment Pain Rating  --  0/10 - no pain  -          Pain Scale: FACES Pre/Post-Treatment    Pain: FACES Scale, Pretreatment  --  0-->no hurt  -CH       Posttreatment Pain Rating  --  0-->no hurt  -CH          Comprehension Assessment/Intervention    Comprehension Assessment/Intervention  --  Auditory Comprehension;Reading Comprehension  -          Auditory Comprehension Assessment/Intervention    Auditory Comprehension (Communication)  --  mild impairment  -       Able to Identify Objects/Pictures (Communication)  --  familiar objects;L  -       Answers Questions (Communication)  --  yes/no;wh questions;personal;simple;concrete;WFL;abstract;mild impairment  -       Able to Follow Commands (Communication)  --  2-step;3-step;mild impairment  -       Narrative Discourse  --  conversational level;mild impairment  -          Reading Comprehension Assessment/Intervention    Reading Comprehension (Communication)  --  moderate impairment  -       Single Word Level  --  moderate impairment  -          Expression Assessment/Intervention    Expression Assessment/Intervention  --  verbal expression  -          Verbal Expression Assessment/Intervention    Verbal Expression  --  mild impairment  -       Automatic Speech (Communication)  --  response to greeting;counting 1-20;L  -        Repetition  --  sentences;mild impairment  -CH       Phrase Completion  --  unpredictable;mild impairment  -CH       Responsive Naming  --  complex;mild impairment  -CH       Confrontational Naming  --  WFL  -       Spontaneous/Functional Words  --  simple;L  -       Sentence Formulation  --  complex;mild impairment  -CH          Oral Motor Structure and Function    Oral Motor Structure and Function  --  WNL  -          Motor Speech Assessment/Intervention    Motor Speech Function  --  WNL  -          Cursory Voice Assessment/Intervention    Quality and Resonance (Voice)  --  WN  -          Cognitive Assessment Intervention- SLP    Cognitive Function (Cognition)  --  mild impairment  -       Orientation Status (Cognition)  --  awareness of basic personal information;person;place;time;situation;L  -       Memory (Cognitive)  --  simple;immediate;long-term;WFL;short-term;delayed;mild impairment  -       Attention (Cognitive)  --  selective;sustained;attention to detail;mild impairment  -CH       Thought Organization (Cognitive)  --  concrete divergent;abstract divergent;concrete convergent;abstract convergent;mental manipulation;mild impairment  -       Reasoning (Cognitive)  --  simple;WFL;deductive;mild impairment  -CH       Problem Solving (Cognitive)  --  simple;temporal;mild impairment  -CH       Functional Math (Cognitive)  --  simple;word problems;mild impairment  -CH       Right Hemisphere Function  --  left neglect;visuo-spatial;moderate impairment  -CH          SLP Clinical Impressions    SLP Diagnosis  --  Patient presents with a mild cognitive linguistic impairment as evidenced by difficulty with recall, attention, problem solving, word finding and thought organization  -       Rehab Potential/Prognosis  --  good  -[facility] Criteria for Skilled Therapy Interventions Met  --  yes  -       Functional Impact  --  functional impact in social situations;functional impact in  ADLs;restrictions in personal and social life;difficulty in expressing complex messages;difficulty completing home management task  -       Plan for Continued Treatment (SLP)  Cont IP SLP services. Anticipate therapy at next level of care for cog/comm deficits.   -HG  --          Recommendations    Therapy Frequency (SLP SLC)  --  5 days per week  -CH       Anticipated Discharge Disposition (SLP)  --  unknown;anticipate therapy at next level of care  -          SLP Discharge Summary    Discharge Destination  --  unknown;anticipated therapy at next level of care  -         User Key  (r) = Recorded By, (t) = Taken By, (c) = Cosigned By    Initials Name Effective Dates     Faiza Ocasio, MS CCC-SLP 08/09/20 -     CH Tami Calderon, MS CCC-SLP 08/09/20 -              EDUCATION  The patient has been educated in the following areas:     Cognitive Impairment Communication Impairment.    SLP Recommendation and Plan                             Plan for Continued Treatment (SLP): Cont IP SLP services. Anticipate therapy at next level of care for cog/comm deficits.              Plan of Care Reviewed With: patient, spouse      SLP GOALS     Row Name 12/03/20 1115 12/02/20 1140          Follow Directions Goal 2 (SLP)    Improve Ability to Follow Directions Goal 1 (SLP)  3 step commands;80%;with minimal cues (75-90%)  -  3 step commands;80%;with minimal cues (75-90%)  -     Time Frame (Follow Directions Goal 1, SLP)  by discharge  -HG  by discharge  -     Progress (Ability to Follow Directions Goal 1, SLP)  80%;with minimal cues (75-90%)  -HG  --     Progress/Outcomes (Follow Directions Goal 1, SLP)  continuing progress toward goal  -HG  --     Comment (Follow Directions Goal 1, SLP)  repetition needed.   -HG  --        Reading Comprehension at Word Level Goal 1 (SLP)    Improve Reading Comprehension at Word Level Goal 1 (SLP)  matching picture to word;90%;with minimal cues (75-90%)  -  matching picture to  word;90%;with minimal cues (75-90%)  -CH     Time Frame (Reading Comprehension at Word Level Goal 1, SLP)  by discharge  -HG  by discharge  -CH     Progress (Reading Comprehension at Word Level Goal 1, SLP)  20%;with 1:1 supervision/constant cues  -HG  --     Progress/Outcomes (Reading Comprehension at Word Level Goal 1, SLP)  continuing progress toward goal  -HG  --     Comment (Reading Comprehension at Word Level Goal 1, SLP)  Unable to read words and match pics to words consistently. Pt struggled with reading short sentences due to R visual neglect.   -HG  --        Comprehension at Phrase and Sentence Level Goal 1 (SLP)    Improve Reading Comprehension at Phrase and Sentence Level Goal 1 (SLP)  phrase;80%;with minimal cues (75-90%)  -HG  phrase;80%;with minimal cues (75-90%)  -CH     Time Frame (Reading Comprehension at Phrase and Sentence Level Goal 1, SLP)  by discharge  -HG  by discharge  -CH     Progress (Reading Comprehension at Phrase and Sentence Level Goal 1, SLP)  90%;with minimal cues (75-90%)  -HG  --     Progress/Outcomes (Reading Comprehension at Phrase and Sentence Level Goal 1, SLP)  continuing progress toward goal  -HG  --        Word Retrieval Skills Goal 1 (SLP)    Improve Word Retrieval Skills By Goal 1 (SLP)  low frequency;supplying an antonym;supplying a synonym;completing a divergent task;completing a convergent task;80%;with minimal cues (75-90%)  -  low frequency;supplying an antonym;supplying a synonym;completing a divergent task;completing a convergent task;80%;with minimal cues (75-90%)  -CH     Time Frame (Word Retrieval Goal 1, SLP)  by discharge  -HG  by discharge  -CH     Progress (Word Retrieval Skills Goal 1, SLP)  70%;with moderate cues (50-74%)  -HG  --     Progress/Outcomes (Word Retrieval Goal 1, SLP)  continuing progress toward goal  -HG  --     Comment (Word Retrieval Goal 1, SLP)  Divergent naming  -  --        Memory Skills Goal 1 (SLP)    Improve Memory Skills  Through Goal 1 (SLP)  recalling related word lists immediately;recalling related word lists with an imposed delay;recalling unrelated word lists immediately;recalling unrelated word lists with an imposed delay;80%;with minimal cues (75-90%)  -  recalling related word lists immediately;recalling related word lists with an imposed delay;recalling unrelated word lists immediately;recalling unrelated word lists with an imposed delay;80%;with minimal cues (75-90%)  -     Time Frame (Memory Skills Goal 1, SLP)  by discharge  -  by discharge  -     Progress/Outcomes (Memory Skills Goal 1, SLP)  goal ongoing  -  --        Organizational Skills Goal 1 (SLP)    Improve Thought Organization Through Goal 1 (SLP)  completing a divergent naming task;completing a convergent naming task;80%;with minimal cues (75-90%)  -  completing a divergent naming task;completing a convergent naming task;80%;with minimal cues (75-90%)  -     Time Frame (Thought Organization Skills Goal 1, SLP)  by discharge  -HG  by discharge  -     Progress (Thought Organization Skills Goal 1, SLP)  70%;with moderate cues (50-74%)  -  --     Progress/Outcomes (Thought Organization Skills Goal 1, SLP)  continuing progress toward goal  -  --        Reasoning Goal 1 (SLP)    Improve Reasoning Through Goal 1 (SLP)  complete deductive reasoning task;complete mental flexibility task;80%;with minimal cues (75-90%)  -  complete deductive reasoning task;complete mental flexibility task;80%;with minimal cues (75-90%)  -     Time Frame (Reasoning Goal 1, SLP)  by discharge  -HG  by discharge  -     Progress/Outcomes (Reasoning Goal 1, SLP)  goal ongoing  -  --        Functional Problem Solving Skills Goal 1 (SLP)    Improve Problem Solving Through Goal 1 (SLP)  determine multiple solutions to problems;tell similarity between items;80%;with minimal cues (75-90%)  -HG  determine multiple solutions to problems;tell similarity between  items;80%;with minimal cues (75-90%)  -     Time Frame (Problem Solving Goal 1, SLP)  by discharge  -  by discharge  -CH     Progress/Outcomes (Problem Solving Goal 1, SLP)  goal ongoing  -  --        Right Hemisphere Function Goal 1 (SLP)    Improve Right Hemisphere Function Through Goal 1 (SLP)  complete visuo-spatial activities (visual closure, trail making, mazes;80%;with minimal cues (75-90%)  -HG  complete visuo-spatial activities (visual closure, trail making, mazes;80%;with minimal cues (75-90%)  -     Time Frame (Right Hemisphere Function Goal 1, SLP)  by discharge  -  by discharge  -CH     Progress/Outcomes (Right Hemisphere Function Goal 1, SLP)  goal ongoing  -  --        Additional Goal 1 (SLP)    Additional Goal 1, SLP  LTG: Patient will improve cognitive linguistic skills to WFL without cues to improve safety and independence at the next level of care  -  LTG: Patient will improve cognitive linguistic skills to WFL without cues to improve safety and independence at the next level of care  -     Time Frame (Additional Goal 1, SLP)  by discharge  -  by discharge  -       User Key  (r) = Recorded By, (t) = Taken By, (c) = Cosigned By    Initials Name Provider Type    Faiza Fournier MS CCC-SLP Speech and Language Pathologist    Tami Apodaca MS CCC-SLP Speech and Language Pathologist                  Time Calculation:     Time Calculation- SLP     Row Name 12/03/20 1213             Time Calculation- SLP    SLP Start Time  1115  -HG      SLP Received On  12/03/20  -        User Key  (r) = Recorded By, (t) = Taken By, (c) = Cosigned By    Initials Name Provider Type    Faiza Fournier MS CCC-SLP Speech and Language Pathologist          Therapy Charges for Today     Code Description Service Date Service Provider Modifiers Qty    [id number]  ST TREATMENT SPEECH 4 12/3/2020 Faiza Ocasio MS CCC-SLP GN 1            Patient was not wearing a face mask and did  not exhibit coughing during this therapy encounter.  Procedure performed was not aerosolizing, involved close contact (within 6 feet for at least 15 minutes or longer), and did not involve contact with infectious secretions or specimens.  Therapist used appropriate personal protective equipment including gloves and standard procedure mask.  Appropriate PPE was worn during the entire therapy session.  Hand hygiene was completed before and after therapy session.           Faiza Ocasio, MS LO-SLP  12/3/2020

## 2020-12-03 NOTE — PROGRESS NOTES
[facility] Medicine Services  PROGRESS NOTE    Patient Name: Dirk Miles  : 1940  MRN: [id number]    Date of Admission: 2020  Primary Care Physician: Tia Pratt MD    Subjective   Subjective     CC:  F/U CVA    HPI:  Denies chest pain or dyspnea. Still some left sided weakness and vision defects    Review of Systems  Gen- No fevers, chills  CV- No chest pain, palpitations  Resp- No cough, dyspnea  GI- No N/V/D, abd pain    Objective   Objective     Vital Signs:   Temp:  [97.4 °F (36.3 °C)-98.3 °F (36.8 °C)] 98.3 °F (36.8 °C)  Heart Rate:  [54-81] 73  Resp:  [16-18] 16  BP: (133-152)/(73-98) 150/76  Total (NIH Stroke Scale): 6     Physical Exam:  Constitutional: No acute distress, awake, alert  HENT: NCAT, mucous membranes moist  Respiratory: Clear to auscultation bilaterally, respiratory effort normal   Cardiovascular: irr irr, regular rate, no murmurs, rubs, or gallops, palpable pedal pulses bilaterally  Gastrointestinal: Positive bowel sounds, soft, nontender, nondistended  Musculoskeletal: No bilateral ankle edema  Psychiatric: Appropriate affect, cooperative  Neurologic: Oriented x 3, +LUE weakness, able to lift but immediately falls to bed, +visual field deficit w left hemianopsia  Skin: No rashes    Results Reviewed:  Results from last 7 days   Lab Units 20  0706 20  1139 20  1129 20  1125   WBC 10*3/mm3 7.35 9.00  --   --    HEMOGLOBIN g/dL 12.0* 11.9*  --   --    HEMOGLOBIN, POC g/dL  --   --   --  12.6   HEMATOCRIT % 37.3* 38.2  --   --    HEMATOCRIT POC %  --   --   --  37*   PLATELETS 10*3/mm3 184 183  --   --    INR   --   --  1.0  --      Results from last 7 days   Lab Units 20  0706   SODIUM mmol/L 140   POTASSIUM mmol/L 4.2   CHLORIDE mmol/L 103   CO2 mmol/L 26.0   BUN mg/dL 18   CREATININE mg/dL 1.25   GLUCOSE mg/dL 130*   CALCIUM mg/dL 9.9   ALT (SGPT) U/L 14   AST (SGOT) U/L 22     Estimated Creatinine Clearance:  55.9 mL/min (by C-G formula based on SCr of 1.25 mg/dL).    Microbiology Results Abnormal     Procedure Component Value - Date/Time    COVID PRE-OP / PRE-PROCEDURE SCREENING ORDER (NO ISOLATION) - Swab, Nasopharynx [[id number]]  (Normal) Collected: 12/01/20 1408    Lab Status: Final result Specimen: Swab from Nasopharynx Updated: 12/01/20 1409    Narrative:      The following orders were created for panel order COVID PRE-OP / PRE-PROCEDURE SCREENING ORDER (NO ISOLATION) - Swab, Nasopharynx.  Procedure                               Abnormality         Status                     ---------                               -----------         ------                     COVID-19,CEPHEID,MICHAEL IN-...[[id number]]  Normal              Final result                 Please view results for these tests on the individual orders.    COVID-19,CEPHEID,MICHAEL IN-HOUSE(OR EMERGENT/ADD-ON),NP SWAB IN TRANSPORT MEDIA 3-4 HR TAT - Swab, Nasopharynx [[id number]]  (Normal) Collected: 12/01/20 1408    Lab Status: Final result Specimen: Swab from Nasopharynx Updated: 12/01/20 1409     COVID19 Not Detected    Narrative:      Fact sheet for providers: https://www.fda.gov/media/019187/download     Fact sheet for patients: https://www.fda.gov/media/381398/download          Imaging Results (Last 24 Hours)     Procedure Component Value Units Date/Time    XR Forearm 2 View Left [[id number]] Collected: 12/02/20 1943     Updated: 12/02/20 1946    Narrative:      LEFT FOREARM, 12/2/2020      HISTORY:    80-year-old male in the ED with left arm pain after a fall today.        TECHNIQUE:  Two-view left forearm series.      FINDINGS:  No fracture, dislocation or other acute osseous abnormality is demonstrated. No visible radiopaque foreign body.      Impression:      Negative left forearm series.    Signer Name: Quang Silva MD   Signed: 12/2/2020 7:43 PM   Workstation Name: [email or web address]    [facility]    XR Elbow 2 View Left  [[id number]] Collected: 12/02/20 1943     Updated: 12/02/20 1945    Narrative:      LEFT ELBOW, 12/2/2020      HISTORY:    80-year-old male in the ED with left arm pain after a fall today.      TECHNIQUE:  Two-view left elbow series.      FINDINGS:  No fracture, dislocation or other acute osseous abnormality is visible. Mild degenerative arthropathy. No evidence of joint effusion. No visible foreign body. Small chronic olecranon bone spur.      Impression:      1. No acute osseous abnormality.  2. Degenerative arthropathy.    Signer Name: Quang Silva MD   Signed: 12/2/2020 7:43 PM   Workstation Name: [facility]    [facility]    CT Head Without Contrast [[id number]] Collected: 12/02/20 1828     Updated: 12/02/20 1834    Narrative:      EXAMINATION: CT HEAD WO CONTRAST-      INDICATION: Head trauma, minor (Age >= 65y); I63.9-Cerebral infarction,  unspecified; R41.841-Cognitive communication deficit     TECHNIQUE: Axial noncontrast CT of the head with multiplanar  reconstruction     The radiation dose reduction device was turned on for each scan per the  ALARA (As Low as Reasonably Achievable) protocol.     COMPARISON: One day prior     FINDINGS: Increased hypoattenuation in the right PCA territory  compatible with evolving acute infarct, without evidence of significant  associated hemorrhage. There is mild local edema, without global mass  effect or midline shift. Old left frontal infarct also noted. Ventricles  are unchanged in caliber. The orbits are normal and the paranasal  sinuses are grossly clear.       Impression:      Evolving right PCA territory infarct without evidence of  hemorrhage, significant global mass effect or new territorial ischemia.        This report was finalized on 12/2/2020 6:30 PM by Jose Narayanan.             Results for orders placed during the hospital encounter of 12/01/20   Adult Transthoracic Echo Complete W/ Cont if Necessary Per Protocol (With  Agitated Saline)    Narrative · Left ventricular ejection fraction appears to be 61 - 65%. Left   ventricular systolic function is normal.  · Left atrial volume is moderately increased.  · Saline test results are negative.  · The right atrial cavity is mildly dilated.  · Moderate tricuspid valve regurgitation is present.  · Estimated right ventricular systolic pressure from tricuspid   regurgitation is moderately elevated (45-55 mmHg).          I have reviewed the medications:  Scheduled Meds:aspirin, 81 mg, Oral, Daily    Or  aspirin, 300 mg, Rectal, Daily  atorvastatin, 80 mg, Oral, Nightly  cyanocobalamin, 1,000 mcg, Intramuscular, Daily  insulin lispro, 0-7 Units, Subcutaneous, TID AC  lisinopril, 20 mg, Oral, Q24H  metoprolol tartrate, 50 mg, Oral, Q12H  senna-docusate sodium, 2 tablet, Oral, BID  sodium chloride, 10 mL, Intravenous, Q12H  vitamin B-12, 1,000 mcg, Oral, Daily      Continuous Infusions:niCARdipine, 5-15 mg/hr      PRN Meds:.•  acetaminophen  •  bisacodyl  •  dextrose  •  dextrose  •  glucagon (human recombinant)  •  magnesium sulfate **OR** magnesium sulfate in D5W 1g/100mL (PREMIX) **OR** magnesium sulfate  •  polyethylene glycol  •  sodium chloride  •  sodium chloride    Assessment/Plan   Assessment & Plan     Active Hospital Problems    Diagnosis  POA   • Acute CVA (cerebrovascular accident) (CMS/[facility]) [I63.9]  Yes      Resolved Hospital Problems   No resolved problems to display.        Brief Hospital Course to date:  Dirk Miles is a 80 y.o. male with history of atrial fibrillation on ASA 81mg, also mild diabetes and HTN who presented to ED after developing sudden onset dizziness and left side weakness at approx 0730 PTA.  His wife was with him and called EMS.        R PCA Stroke  -Pt with history of PAF but not on AC  -Continue ASA/Statin  -Neuro plans to repeat CT head on Friday, if no hemorrhage, plan to start on AC  -Echo ok  -PT/OT to see, patient and wife planning on [facility]     LICA  80-90% stenotic by CTA, asymptomatic  - asa, statin  - future outpatient f/u per Neurostroke recs     Hx Afib, bradycardic on metoprolol   - Metoprolol decreased to 50mg BID   - CHADS-Vasc 7, plan for AC as above   -not previously on anticoagulation prior to this admission    HTN  -Normalize BP per Neuro  -Continue BB  -Added Lisinopril 20mg daily , titrate prn    DM2  -Hga1c 6.4  -LDSSI     Anemia, macrocytic w/ low B12  -B12 low, will start daily IM injections x 1 week, weekly x 1 month, then monthly     DVT Prophylaxis:  Mechanical for now, planning to start AC on Friday per Neuro  ------------------------------------------------  **summary of today's plan**:    -continue asa for now, continue high dose statin  -continue b-blocker  -Neuro-stroke plans repeat CT head tomorrow 12/4/20 a.m., if no bleeding then likely start anticoagulation w/ NOAC (per d/w Dr. Layton on 12/3/20). Likely here through weekend monitoring clinically   -sliding scale humalog, titrate prn  -likely need rehab at discharge  -continue daily im b12 injections to complete 1 week, then weekly x 1 month, then monthly  -check u/a    -I attempted to call wife & son on 12/3/20 but no answer. Will try back tomorrow    Am labs: cbc,bmp,mag      Disposition: I expect the patient to be discharged TBD. Will need rehab.     CODE STATUS:   Code Status and Medical Interventions:   Ordered at: 12/01/20 1803     Level Of Support Discussed With:    Patient     Code Status:    CPR     Medical Interventions (Level of Support Prior to Arrest):    Full       Keven Saab MD  12/03/20

## 2020-12-03 NOTE — THERAPY TREATMENT NOTE
Patient Name: Dirk Miles  : 1940    MRN: [id number]                              Today's Date: 12/3/2020       Admit Date: 2020    Visit Dx:     ICD-10-CM ICD-9-CM   1. Cognitive communication deficit  R41.841 799.52   2. Acute CVA (cerebrovascular accident) (CMS/[facility])  I63.9 434.91     Patient Active Problem List   Diagnosis   • Acute CVA (cerebrovascular accident) (CMS/[facility])     Past Medical History:   Diagnosis Date   • A-fib (CMS/[facility])    • Diabetes mellitus (CMS/[facility])    • Hypertension      Past Surgical History:   Procedure Laterality Date   • CATARACT EXTRACTION, BILATERAL     • HERNIA REPAIR       General Information     Row Name 20 9830          Physical Therapy Time and Intention    Document Type  therapy note (daily note)  -LR     Mode of Treatment  physical therapy;individual therapy  -LR     Row Name 20 987          General Information    Patient Profile Reviewed  yes  -LR     Existing Precautions/Restrictions  fall;other (see comments) impulsive, L neglect, R gaze preference, L hemianopsia, significantly impaired coordination on L  -LR     Barriers to Rehab  medically complex  -LR     Row Name 20          Cognition    Orientation Status (Cognition)  oriented x 4  -LR     Row Name 20          Safety Issues, Functional Mobility    Safety Issues Affecting Function (Mobility)  (S) ability to follow commands;at risk behavior observed;awareness of need for assistance;impulsivity;insight into deficits/self-awareness;judgment;positioning of assistive device;safety precaution awareness;sequencing abilities;safety precautions follow-through/compliance  -LR     Impairments Affecting Function (Mobility)  balance;coordination;endurance/activity tolerance;motor control;motor planning;postural/trunk control;strength;range of motion (ROM);pain;sensation/sensory awareness  -LR       User Key  (r) = Recorded By, (t) = Taken By, (c) = Cosigned By    Initials Name Provider  Type    LR Marizol Vargas, PT Physical Therapist        Mobility     Row Name 12/03/20 1330          Bed Mobility    Bed Mobility  supine-sit  -LR     Supine-Sit [place] (Bed Mobility)  verbal cues;minimum assist (75% patient effort)  -LR     Sit-Supine [place] (Bed Mobility)  not tested  -LR     Assistive Device (Bed Mobility)  head of bed elevated;bed rails  -LR     Comment (Bed Mobility)  Verbal cues to move LEs towards EOB and to push up from bed to raise trunk into sitting and to scoot hips out to get feet on floor. Min assist to pull trunk into sitting. Denied dizziness upon sitting up. Patient continues to demonstrate impaired trunk control, able to correct with repeated cues.  -LR     Row Name 12/03/20 0960          Transfers    Comment (Transfers)  Verbal cues to push up from bed to stand and to reach back for chair to sit. Initially stood with RW. Patient demonstrated significant lean to the left and was unable to correct despite repeated cues for correction. Attempted to take steps from bed to chair with RW but patient could not move feet, only moved RW further out in front of him. Returned to sitting EOB and performed stand pivot t/f from bed to chair, cues for pivoting/sequencing to get to chair.  -LR     Row Name 12/03/20 1330          Bed-Chair Transfer    Bed-Chair [place] (Transfers)  verbal cues;maximum assist (25% patient effort);2 person assist  -LR     Assistive Device (Bed-Chair Transfers)  other (see comments) B UE support, support at gait belt  -LR     Row Name 12/03/20 1330          Sit-Stand Transfer    Sit-Stand [place] (Transfers)  verbal cues;2 person assist;minimum assist (75% patient effort)  -LR     Assistive Device (Sit-Stand Transfers)  other (see comments) B UE support, support at gait belt  -LR     Row Name 12/03/20 1330          Gait/Stairs (Locomotion)    [place] Level (Gait)  unable to assess  -LR     Comment (Gait/Stairs)  Patient unable to  follow commands appropriately to take steps.  -LR       User Key  (r) = Recorded By, (t) = Taken By, (c) = Cosigned By    Initials Name Provider Type    Marizol Sanford PT Physical Therapist        Obj/Interventions     Row Name 12/03/20 1330          Balance    Static Sitting Balance  mild impairment;unsupported;sitting, edge of bed  -LR     Dynamic Sitting Balance  mild impairment;unsupported;sitting, edge of bed  -LR     Static Standing Balance  severe impairment;supported;standing  -LR     Dynamic Standing Balance  severe impairment;supported;standing  -LR       User Key  (r) = Recorded By, (t) = Taken By, (c) = Cosigned By    Initials Name Provider Type    Marizol Sanford PT Physical Therapist        Goals/Plan     Row Name 12/03/20 1330          Bed Mobility Goal 1 (PT)    Activity/Assistive Device (Bed Mobility Goal 1, PT)  sit to supine/supine to sit  -LR     [place] Level/Cues Needed (Bed Mobility Goal 1, PT)  modified independence  -LR     Time Frame (Bed Mobility Goal 1, PT)  long term goal (LTG);5 days  -LR     Progress/Outcomes (Bed Mobility Goal 1, PT)  continuing progress toward goal;goal ongoing  -LR     Row Name 12/03/20 1330          Transfer Goal 1 (PT)    Activity/Assistive Device (Transfer Goal 1, PT)  sit-to-stand/stand-to-sit;walker, rolling;bed-to-chair/chair-to-bed  -LR     [place] Level/Cues Needed (Transfer Goal 1, PT)  contact guard assist;2 person assist  -LR     Time Frame (Transfer Goal 1, PT)  long term goal (LTG);5 days  -LR     Progress/Outcome (Transfer Goal 1, PT)  continuing progress toward goal;goal ongoing  -LR     Row Name 12/03/20 1330          Gait Training Goal 1 (PT)    Activity/Assistive Device (Gait Training Goal 1, PT)  gait (walking locomotion);walker, rolling  -LR     [place] Level (Gait Training Goal 1, PT)  minimum assist (75% or more patient effort);2 person assist  -LR     Distance (Gait Training Goal 1, PT)  50 feet  -LR      Time Frame (Gait Training Goal 1, PT)  long term goal (LTG);5 days  -LR     Progress/Outcome (Gait Training Goal 1, PT)  goal ongoing;continuing progress toward goal  -LR       User Key  (r) = Recorded By, (t) = Taken By, (c) = Cosigned By    Initials Name Provider Type    LR Marizol Vargas, PT Physical Therapist        Clinical Impression     Row Name 12/03/20 1330          Pain    Additional Documentation  Pain Scale: Numbers Pre/Post-Treatment (Group)  -LR     Row Name 12/03/20 1490          Pain Scale: Numbers Pre/Post-Treatment    Pretreatment Pain Rating  0/10 - no pain  -LR     Posttreatment Pain Rating  0/10 - no pain  -LR     Pain Intervention(s)  Ambulation/increased activity;Repositioned  -LR     Row Name 12/03/20 4020          Plan of Care Review    Plan of Care Reviewed With  patient;spouse  -LR     Progress  no change  -LR     Outcome Summary  Patient performed stand pivot t/f from bed to chair with max assistx2, limited by impaired coordination, balance, and ability to follow commands. Attempted to take steps with RW but patient exhibited significant lean to the left that he was unable to correct. Will continue to progress mobility, balance, and coordination training as able.  -     Row Name 12/03/20 1330          Therapy Assessment/Plan (PT)    Rehab Potential (PT)  fair, will monitor progress closely  -LR     Criteria for Skilled Interventions Met (PT)  yes;meets criteria;skilled treatment is necessary  -LR     Row Name 12/03/20 1330          Vital Signs    Pre Systolic BP Rehab  123  -LR     Pre Treatment Diastolic BP  90  -LR     Post Systolic BP Rehab  125  -LR     Post Treatment Diastolic BP  73  -LR     Pretreatment Heart Rate (beats/min)  64  -LR     Posttreatment Heart Rate (beats/min)  64  -LR     Pre Patient Position  Supine  -LR     Post Patient Position  Sitting  -LR     Row Name 12/03/20 1330          Positioning and Restraints    Pre-Treatment Position  in bed  -LR     Post  Treatment Position  chair  -LR     In Chair  notified nsg;reclined;sitting;call light within reach;encouraged to call for assist;exit alarm on;with family/caregiver;legs elevated  -LR       User Key  (r) = Recorded By, (t) = Taken By, (c) = Cosigned By    Initials Name Provider Type    Marizol Sanford, PT Physical Therapist        Outcome Measures     Row Name 12/03/20 1330          How much help from another person do you currently need...    Turning from your back to your side while in flat bed without using bedrails?  3  -LR     Moving from lying on back to sitting on the side of a flat bed without bedrails?  3  -LR     Moving to and from a bed to a chair (including a wheelchair)?  2  -LR     Standing up from a chair using your arms (e.g., wheelchair, bedside chair)?  2  -LR     Climbing 3-5 steps with a railing?  1  -LR     To walk in hospital room?  1  -LR     AM-PAC 6 Clicks Score (PT)  12  -     Row Name 12/03/20 1330          Modified [place] Scale    Pre-Stroke Modified [place] Scale  0 - No Symptoms at all.  -LR     Modified [place] Scale  4 - Moderately severe disability.  Unable to walk without assistance, and unable to attend to own bodily needs without assistance.  -     Row Name 12/03/20 1330          Functional Assessment    Outcome Measure Options  AM-PAC 6 Clicks Basic Mobility (PT);Modified [place]  -LR       User Key  (r) = Recorded By, (t) = Taken By, (c) = Cosigned By    Initials Name Provider Type    Marizol Sanford, PT Physical Therapist        Physical Therapy Education                 Title: PT OT SLP Therapies (Done)     Topic: Physical Therapy (Done)     Point: Mobility training (Done)     Learning Progress Summary           Patient Acceptance, E,D, VU,NR by LEO at 12/3/2020 1330    Comment: Educated on benefits of mobility and being OOB. Educated on correct supine to sit t/f technique, correct sit<->stand t/f technique, correct bed to chair t/f technique, safety with  mobility, LE HEP, and progression of POC.    Acceptance, E,D, VU,NR by LR at 12/2/2020 1405    Comment: Educated on precautions, benefits of mobility, safety with mobility, correct supine<->sit t/f technique, correct sit<->stand t/f technique, and progression of POC.   Significant Other Acceptance, E,D, VU,NR by LR at 12/3/2020 1330    Comment: Educated on benefits of mobility and being OOB. Educated on correct supine to sit t/f technique, correct sit<->stand t/f technique, correct bed to chair t/f technique, safety with mobility, LE HEP, and progression of POC.    Acceptance, E,D, VU,NR by LR at 12/2/2020 1405    Comment: Educated on precautions, benefits of mobility, safety with mobility, correct supine<->sit t/f technique, correct sit<->stand t/f technique, and progression of POC.                   Point: Home exercise program (Done)     Learning Progress Summary           Patient Acceptance, E,D, VU,NR by LR at 12/3/2020 1330    Comment: Educated on benefits of mobility and being OOB. Educated on correct supine to sit t/f technique, correct sit<->stand t/f technique, correct bed to chair t/f technique, safety with mobility, LE HEP, and progression of POC.    Acceptance, E,D, VU,NR by LR at 12/2/2020 1405    Comment: Educated on precautions, benefits of mobility, safety with mobility, correct supine<->sit t/f technique, correct sit<->stand t/f technique, and progression of POC.   Significant Other Acceptance, E,D, VU,NR by LR at 12/3/2020 1330    Comment: Educated on benefits of mobility and being OOB. Educated on correct supine to sit t/f technique, correct sit<->stand t/f technique, correct bed to chair t/f technique, safety with mobility, LE HEP, and progression of POC.    Acceptance, E,D, VU,NR by LR at 12/2/2020 1405    Comment: Educated on precautions, benefits of mobility, safety with mobility, correct supine<->sit t/f technique, correct sit<->stand t/f technique, and progression of POC.                    Point: Body mechanics (Done)     Learning Progress Summary           Patient Acceptance, E,D, VU,NR by LR at 12/3/2020 1330    Comment: Educated on benefits of mobility and being OOB. Educated on correct supine to sit t/f technique, correct sit<->stand t/f technique, correct bed to chair t/f technique, safety with mobility, LE HEP, and progression of POC.    Acceptance, E,D, VU,NR by LR at 12/2/2020 1405    Comment: Educated on precautions, benefits of mobility, safety with mobility, correct supine<->sit t/f technique, correct sit<->stand t/f technique, and progression of POC.   Significant Other Acceptance, E,D, VU,NR by LR at 12/3/2020 1330    Comment: Educated on benefits of mobility and being OOB. Educated on correct supine to sit t/f technique, correct sit<->stand t/f technique, correct bed to chair t/f technique, safety with mobility, LE HEP, and progression of POC.    Acceptance, E,D, VU,NR by LR at 12/2/2020 1405    Comment: Educated on precautions, benefits of mobility, safety with mobility, correct supine<->sit t/f technique, correct sit<->stand t/f technique, and progression of POC.                   Point: Precautions (Done)     Learning Progress Summary           Patient Acceptance, E,D, VU,NR by LR at 12/3/2020 1330    Comment: Educated on benefits of mobility and being OOB. Educated on correct supine to sit t/f technique, correct sit<->stand t/f technique, correct bed to chair t/f technique, safety with mobility, LE HEP, and progression of POC.    Acceptance, E,D, VU,NR by LR at 12/2/2020 1405    Comment: Educated on precautions, benefits of mobility, safety with mobility, correct supine<->sit t/f technique, correct sit<->stand t/f technique, and progression of POC.   Significant Other Acceptance, E,D, VU,NR by LR at 12/3/2020 1330    Comment: Educated on benefits of mobility and being OOB. Educated on correct supine to sit t/f technique, correct sit<->stand t/f technique, correct bed to chair t/f  technique, safety with mobility, LE HEP, and progression of POC.    Acceptance, E,D, VU,NR by LR at 12/2/2020 1405    Comment: Educated on precautions, benefits of mobility, safety with mobility, correct supine<->sit t/f technique, correct sit<->stand t/f technique, and progression of POC.                               User Key     Initials Effective Dates Name Provider Type Discipline     06/19/15 -  Marizol Vargas, PT Physical Therapist PT              PT Recommendation and Plan  Planned Therapy Interventions (PT): balance training, bed mobility training, gait training, home exercise program, patient/family education, ROM (range of motion), strengthening, transfer training  Plan of Care Reviewed With: patient, spouse  Progress: no change  Outcome Summary: Patient performed stand pivot t/f from bed to chair with max assistx2, limited by impaired coordination, balance, and ability to follow commands. Attempted to take steps with RW but patient exhibited significant lean to the left that he was unable to correct. Will continue to progress mobility, balance, and coordination training as able.     Time Calculation:   PT Charges     Row Name 12/03/20 1330             Time Calculation    Start Time  1330  -LR      PT Received On  12/03/20  -      PT Goal Re-Cert Due Date  12/12/20  -         Time Calculation- PT    Total Timed Code Minutes- PT  23 minute(s)  -LR         Timed Charges    96665 - PT Therapeutic Exercise Minutes  8  -LR      18986 - PT Therapeutic Activity Minutes  15  -LR        User Key  (r) = Recorded By, (t) = Taken By, (c) = Cosigned By    Initials Name Provider Type    LR Marizol Vargas, PT Physical Therapist        Therapy Charges for Today     Code Description Service Date Service Provider Modifiers Qty    21104712998 HC PT EVAL LOW COMPLEXITY 4 12/2/2020 Marizol Vargas, PT GP 1    99269580071 HC PT THER SUPP EA 15 MIN 12/2/2020 Marizol Vargas, PT GP 2     [id number]  PT THER PROC EA 15 MIN 12/3/2020 Marizol Vargas, PT GP 1    [id number]  PT THERAPEUTIC ACT EA 15 MIN 12/3/2020 Marizol Vargas, PT GP 1    [id number]  PT THER SUPP EA 15 MIN 12/3/2020 Marizol Vargas, PT GP 2          PT G-Codes  Outcome Measure Options: AM-PAC 6 Clicks Basic Mobility (PT), Modified [place]  AM-PAC 6 Clicks Score (PT): 12  AM-PAC 6 Clicks Score (OT): 13  Modified [place] Scale: 4 - Moderately severe disability.  Unable to walk without assistance, and unable to attend to own bodily needs without assistance.    Marizol Vargas, PT  12/3/2020

## 2020-12-04 ENCOUNTER — APPOINTMENT (OUTPATIENT)
Dept: GENERAL RADIOLOGY | Facility: HOSPITAL | Age: 80
End: 2020-12-04

## 2020-12-04 ENCOUNTER — APPOINTMENT (OUTPATIENT)
Dept: CT IMAGING | Facility: HOSPITAL | Age: 80
End: 2020-12-04

## 2020-12-04 LAB
ANION GAP SERPL CALCULATED.3IONS-SCNC: 13 MMOL/L (ref 5–15)
BUN SERPL-MCNC: 18 MG/DL (ref 8–23)
BUN/CREAT SERPL: 15 (ref 7–25)
CALCIUM SPEC-SCNC: 9.9 MG/DL (ref 8.6–10.5)
CHLORIDE SERPL-SCNC: 99 MMOL/L (ref 98–107)
CO2 SERPL-SCNC: 24 MMOL/L (ref 22–29)
CREAT SERPL-MCNC: 1.2 MG/DL (ref 0.76–1.27)
DEPRECATED RDW RBC AUTO: 48.6 FL (ref 37–54)
ERYTHROCYTE [DISTWIDTH] IN BLOOD BY AUTOMATED COUNT: 13 % (ref 12.3–15.4)
GFR SERPL CREATININE-BSD FRML MDRD: 58 ML/MIN/1.73
GLUCOSE BLDC GLUCOMTR-MCNC: 116 MG/DL (ref 70–130)
GLUCOSE BLDC GLUCOMTR-MCNC: 119 MG/DL (ref 70–130)
GLUCOSE BLDC GLUCOMTR-MCNC: 127 MG/DL (ref 70–130)
GLUCOSE BLDC GLUCOMTR-MCNC: 130 MG/DL (ref 70–130)
GLUCOSE SERPL-MCNC: 160 MG/DL (ref 65–99)
HCT VFR BLD AUTO: 38.5 % (ref 37.5–51)
HGB BLD-MCNC: 12.4 G/DL (ref 13–17.7)
MAGNESIUM SERPL-MCNC: 2 MG/DL (ref 1.6–2.4)
MCH RBC QN AUTO: 32.7 PG (ref 26.6–33)
MCHC RBC AUTO-ENTMCNC: 32.2 G/DL (ref 31.5–35.7)
MCV RBC AUTO: 101.6 FL (ref 79–97)
PLATELET # BLD AUTO: 185 10*3/MM3 (ref 140–450)
PMV BLD AUTO: 10.2 FL (ref 6–12)
POTASSIUM SERPL-SCNC: 4.4 MMOL/L (ref 3.5–5.2)
RBC # BLD AUTO: 3.79 10*6/MM3 (ref 4.14–5.8)
SODIUM SERPL-SCNC: 136 MMOL/L (ref 136–145)
WBC # BLD AUTO: 7.7 10*3/MM3 (ref 3.4–10.8)

## 2020-12-04 PROCEDURE — 71045 X-RAY EXAM CHEST 1 VIEW: CPT

## 2020-12-04 PROCEDURE — 82962 GLUCOSE BLOOD TEST: CPT

## 2020-12-04 PROCEDURE — 80048 BASIC METABOLIC PNL TOTAL CA: CPT | Performed by: INTERNAL MEDICINE

## 2020-12-04 PROCEDURE — 99232 SBSQ HOSP IP/OBS MODERATE 35: CPT | Performed by: INTERNAL MEDICINE

## 2020-12-04 PROCEDURE — 25010000002 CYANOCOBALAMIN PER 1000 MCG: Performed by: FAMILY MEDICINE

## 2020-12-04 PROCEDURE — 97530 THERAPEUTIC ACTIVITIES: CPT | Performed by: OCCUPATIONAL THERAPIST

## 2020-12-04 PROCEDURE — 70450 CT HEAD/BRAIN W/O DYE: CPT

## 2020-12-04 PROCEDURE — 83735 ASSAY OF MAGNESIUM: CPT | Performed by: INTERNAL MEDICINE

## 2020-12-04 PROCEDURE — 97535 SELF CARE MNGMENT TRAINING: CPT | Performed by: OCCUPATIONAL THERAPIST

## 2020-12-04 PROCEDURE — 85027 COMPLETE CBC AUTOMATED: CPT | Performed by: INTERNAL MEDICINE

## 2020-12-04 PROCEDURE — 99233 SBSQ HOSP IP/OBS HIGH 50: CPT | Performed by: PSYCHIATRY & NEUROLOGY

## 2020-12-04 RX ORDER — FUROSEMIDE 20 MG/1
20 TABLET ORAL 2 TIMES WEEKLY
Status: DISCONTINUED | OUTPATIENT
Start: 2020-12-04 | End: 2020-12-09 | Stop reason: HOSPADM

## 2020-12-04 RX ADMIN — SODIUM CHLORIDE, PRESERVATIVE FREE 10 ML: 5 INJECTION INTRAVENOUS at 20:56

## 2020-12-04 RX ADMIN — DOCUSATE SODIUM 50 MG AND SENNOSIDES 8.6 MG 2 TABLET: 8.6; 5 TABLET, FILM COATED ORAL at 08:55

## 2020-12-04 RX ADMIN — ASPIRIN 81 MG CHEWABLE TABLET 81 MG: 81 TABLET CHEWABLE at 08:55

## 2020-12-04 RX ADMIN — ACETAMINOPHEN 650 MG: 325 TABLET, FILM COATED ORAL at 08:55

## 2020-12-04 RX ADMIN — APIXABAN 5 MG: 5 TABLET, FILM COATED ORAL at 20:55

## 2020-12-04 RX ADMIN — ATORVASTATIN CALCIUM 80 MG: 40 TABLET, FILM COATED ORAL at 20:55

## 2020-12-04 RX ADMIN — APIXABAN 5 MG: 5 TABLET, FILM COATED ORAL at 08:55

## 2020-12-04 RX ADMIN — CYANOCOBALAMIN 1000 MCG: 1000 INJECTION, SOLUTION INTRAMUSCULAR; SUBCUTANEOUS at 08:55

## 2020-12-04 RX ADMIN — FUROSEMIDE 20 MG: 20 TABLET ORAL at 18:21

## 2020-12-04 RX ADMIN — METOPROLOL TARTRATE 50 MG: 50 TABLET, FILM COATED ORAL at 20:55

## 2020-12-04 RX ADMIN — DOCUSATE SODIUM 50 MG AND SENNOSIDES 8.6 MG 2 TABLET: 8.6; 5 TABLET, FILM COATED ORAL at 20:55

## 2020-12-04 RX ADMIN — LISINOPRIL 20 MG: 20 TABLET ORAL at 08:55

## 2020-12-04 RX ADMIN — METOPROLOL TARTRATE 50 MG: 50 TABLET, FILM COATED ORAL at 08:55

## 2020-12-04 RX ADMIN — POLYETHYLENE GLYCOL 3350 17 G: 17 POWDER, FOR SOLUTION ORAL at 08:55

## 2020-12-04 RX ADMIN — SODIUM CHLORIDE, PRESERVATIVE FREE 10 ML: 5 INJECTION INTRAVENOUS at 08:57

## 2020-12-04 NOTE — PROGRESS NOTES
[facility] Medicine Services  PROGRESS NOTE    Patient Name: Dirk Miles  : 1940  MRN: [id number]    Date of Admission: 2020  Primary Care Physician: Tia Pratt MD    Subjective   Subjective     CC:  F/U CVA    HPI:  No chest pain or dyspnea    Review of Systems  Gen- No fevers, chills  CV- No chest pain, palpitations  Resp- No cough, dyspnea  GI- No N/V/D, abd pain    Objective   Objective     Vital Signs:   Temp:  [98.1 °F (36.7 °C)-100.1 °F (37.8 °C)] 98.4 °F (36.9 °C)  Heart Rate:  [64-80] 64  Resp:  [16-18] 18  BP: (112-178)/(70-96) 121/70  Total (NIH Stroke Scale): 6     Physical Exam:  Constitutional: no distress. Follows commands, eyes open  HENT: NCAT, mucous membranes moist  Respiratory: Clear to auscultation bilaterally, respiratory effort normal   Cardiovascular: irr irr, regular rate, no murmurs, rubs, or gallops, palpable pedal pulses bilaterally  Gastrointestinal: Positive bowel sounds, soft, nontender, nondistended  Musculoskeletal: No bilateral ankle edema  Psychiatric: Appropriate affect, cooperative  Neurologic: Oriented x 3, +LUE weakness, able to lift but immediately falls to bed, +visual field deficit w left hemianopsia  Skin: No rashes    Results Reviewed:  Results from last 7 days   Lab Units 20  1003 20  0706 20  1139 20  1129   WBC 10*3/mm3 7.70 7.35 9.00  --    HEMOGLOBIN g/dL 12.4* 12.0* 11.9*  --    HEMATOCRIT % 38.5 37.3* 38.2  --    PLATELETS 10*3/mm3 185 184 183  --    INR   --   --   --  1.0     Results from last 7 days   Lab Units 20  1003 20  0706   SODIUM mmol/L 136 140   POTASSIUM mmol/L 4.4 4.2   CHLORIDE mmol/L 99 103   CO2 mmol/L 24.0 26.0   BUN mg/dL 18 18   CREATININE mg/dL 1.20 1.25   GLUCOSE mg/dL 160* 130*   CALCIUM mg/dL 9.9 9.9   ALT (SGPT) U/L  --  14   AST (SGOT) U/L  --  22     Estimated Creatinine Clearance: 58.3 mL/min (by C-G formula based on SCr of 1.2  mg/dL).    Microbiology Results Abnormal     Procedure Component Value - Date/Time    COVID PRE-OP / PRE-PROCEDURE SCREENING ORDER (NO ISOLATION) - Swab, Nasopharynx [[id number]]  (Normal) Collected: 12/01/20 1408    Lab Status: Final result Specimen: Swab from Nasopharynx Updated: 12/01/20 1409    Narrative:      The following orders were created for panel order COVID PRE-OP / PRE-PROCEDURE SCREENING ORDER (NO ISOLATION) - Swab, Nasopharynx.  Procedure                               Abnormality         Status                     ---------                               -----------         ------                     COVID-19,CEPHEID,MICHAEL IN-...[[id number]]  Normal              Final result                 Please view results for these tests on the individual orders.    COVID-19,CEPHEID,MICHAEL IN-HOUSE(OR EMERGENT/ADD-ON),NP SWAB IN TRANSPORT MEDIA 3-4 HR TAT - Swab, Nasopharynx [[id number]]  (Normal) Collected: 12/01/20 1408    Lab Status: Final result Specimen: Swab from Nasopharynx Updated: 12/01/20 1409     COVID19 Not Detected    Narrative:      Fact sheet for providers: https://www.fda.gov/media/722981/download     Fact sheet for patients: https://www.fda.gov/media/826826/download          Imaging Results (Last 24 Hours)     Procedure Component Value Units Date/Time    XR Chest 1 View [[id number]] Collected: 12/04/20 1144     Updated: 12/04/20 1154    Narrative:      EXAMINATION: XR CHEST 1 VW- 12/04/2020      INDICATION: fever; R41.841-Cognitive communication deficit;  I63.9-Cerebral infarction, unspecified      COMPARISON: 12/01/2020     FINDINGS: Portable chest reveals heart to be enlarged. Underlying  chronic and emphysematous changes seen within the lung fields laterally.  Vascular calcifications seen within the thoracic aorta. No pleural  effusion or pneumothorax. No focal parenchymal opacification present.  Pulmonary vascularity is within normal limits.          Impression:      No acute cardiopulmonary disease.  The heart is enlarged.     D:  12/04/2020  E:  12/04/2020          CT Head Without Contrast [[id number]] Collected: 12/04/20 0853     Updated: 12/04/20 0925    Narrative:      EXAMINATION: CT HEAD WO CONTRAST- 12/04/2020      INDICATION: Stroke, follow up; R41.841-Cognitive communication deficit;  I63.9-Cerebral infarction, unspecified     TECHNIQUE: Multiple axial CT imaging was obtained of the head from skull  base to skull vertex without the administration of intravenous contrast.     The radiation dose reduction device was turned on for each scan per the  ALARA (As Low as Reasonably Achievable) protocol.     COMPARISON: 12/02/2020     FINDINGS: There is an evolving large right occipital infarction. There  is motion artifact degrading some image quality. There is some  low-density seen in the left frontal parietal region from prior area of  infarction. There is no evidence of hemorrhage. No hydrocephalus.  Findings have not significantly changed in the interval with chronic  small vessel ischemic changes seen throughout the periventricular and  subcortical white matter.       Impression:      Large evolving right occipital infarction. There is no  hemorrhagic conversion. The remainder of the brain is stable. Chronic  changes present bilaterally.     D:  12/04/2020  E:  12/04/2020             Results for orders placed during the hospital encounter of 12/01/20   Adult Transthoracic Echo Complete W/ Cont if Necessary Per Protocol (With Agitated Saline)    Narrative · Left ventricular ejection fraction appears to be 61 - 65%. Left   ventricular systolic function is normal.  · Left atrial volume is moderately increased.  · Saline test results are negative.  · The right atrial cavity is mildly dilated.  · Moderate tricuspid valve regurgitation is present.  · Estimated right ventricular systolic pressure from tricuspid   regurgitation is moderately elevated (45-55 mmHg).          I have reviewed the  medications:  Scheduled Meds:apixaban, 5 mg, Oral, Q12H  aspirin, 81 mg, Oral, Daily  atorvastatin, 80 mg, Oral, Nightly  cyanocobalamin, 1,000 mcg, Intramuscular, Daily  furosemide, 20 mg, Oral, Once per day on Mon Thu  insulin lispro, 0-7 Units, Subcutaneous, TID AC  lisinopril, 20 mg, Oral, Q24H  metoprolol tartrate, 50 mg, Oral, Q12H  senna-docusate sodium, 2 tablet, Oral, BID  sodium chloride, 10 mL, Intravenous, Q12H      Continuous Infusions:niCARdipine, 5-15 mg/hr      PRN Meds:.•  acetaminophen  •  bisacodyl  •  dextrose  •  dextrose  •  glucagon (human recombinant)  •  magnesium sulfate **OR** magnesium sulfate in D5W 1g/100mL (PREMIX) **OR** magnesium sulfate  •  polyethylene glycol  •  sodium chloride  •  sodium chloride    Assessment/Plan   Assessment & Plan     Active Hospital Problems    Diagnosis  POA   • Acute CVA (cerebrovascular accident) (CMS/[facility]) [I63.9]  Yes      Resolved Hospital Problems   No resolved problems to display.        Brief Hospital Course to date:  Dirk Miles is a 80 y.o. male with history of atrial fibrillation on ASA 81mg, also mild diabetes and HTN who presented to ED after developing sudden onset dizziness and left side weakness at approx 0730 PTA.  His wife was with him and called EMS.        R PCA Stroke  -Pt with history of PAF but not on AC  -Continue ASA/Statin; ct head 12/4/20 no bleeding & elqiuis initiated  -Echo ok  -PT/OT to see, patient and wife planning on [facility]     LICA 80-90% stenotic by CTA, asymptomatic  - asa, statin  - future outpatient f/u per Neurostroke recs     Hx Afib, bradycardic on metoprolol   - Metoprolol decreased to 50mg BID; continue  - CHADS-Vasc 7, plan for AC as above   -not previously on anticoagulation prior to this admission    HTN  -Normalize BP per Neuro  -Continue BB  -Added Lisinopril 20mg daily , titrate prn    DM2  -Hga1c 6.4  -SSI     Anemia, macrocytic w/ low B12  -B12 low, will start daily IM injections x 1 week, weekly x 1  month, then monthly     DVT Prophylaxis:  Mechanical for now, planning to start AC on Friday per Neuro  ------------------------------------------------  **summary of today's plan**:    -continue asa for now, continue high dose statin; eliquis added today (per Dr. Jerry; ct head no blood today)  -add back home lasix twice weekly (today then twice weekly); continue b-blocker & ace-inhibitor; titrate bp meds prn    -titrate insulins prn    -continue daily im b12 injections to complete 1 week, then weekly x 1 month, then monthly    -had single temp 100.1 today (u/a yesterday 12/3 ok, cxr ok 12/4); monitor & encourage incentive spirometer. monitor    -I attempted to call wife & son on 12/3/20 but no answer. Will try back tomorrow    Am labs: cbc,bmp,mag      Disposition: I expect the patient to be discharged to [facility] pending bed availability, likely early next week     CODE STATUS:   Code Status and Medical Interventions:   Ordered at: 12/01/20 1804     Level Of Support Discussed With:    Patient     Code Status:    CPR     Medical Interventions (Level of Support Prior to Arrest):    Full       Keven Saab MD  12/04/20

## 2020-12-04 NOTE — PROGRESS NOTES
Continued Stay Note  [facility]     Patient Name: Dirk Miles  MRN: [id number]  Today's Date: 12/4/2020    Admit Date: 12/1/2020    Discharge Plan     Row Name 12/04/20 1540       Plan    Plan  [facility]    Plan Comments  [facility] and will work with us for bed probably next week. I have discussed with patient and his wife.    Final Discharge Disposition Code  62 - inpatient rehab facility        Discharge Codes    No documentation.       Expected Discharge Date and Time     Expected Discharge Date Expected Discharge Time    Dec 7, 2020             Shilpi Zarate RN

## 2020-12-04 NOTE — PLAN OF CARE
Problem: Adult Inpatient Plan of Care  Goal: Plan of Care Review  Recent Flowsheet Documentation  Taken 12/4/2020 1536 by Ary Colmenares OT  Plan of Care Reviewed With:   patient   spouse  Outcome Summary: Pt. sitting on BSC upon therapist's arrival with wife standing behind for safety. STS from BSC with mod A, then immediately required increased assist d/t strong L lean. Pt. required max A x 2 for SPT from BSC to chair, dep for toileting, CGA for grooming skills, verbal & tactile cues for B UE coordination exercises. Will cont to progress as able per POC. Recommend IPRF upon d/c for best outcome.   Goal Outcome Evaluation:  Plan of Care Reviewed With: patient, spouse  Progress: no change

## 2020-12-04 NOTE — PROGRESS NOTES
Stroke Progress Note       Chief Complaint: Left-sided weakness    Subjective    Subjective     Subjective:  Pt was more confused this am. Disoriented and decreased attentive. Wife at bedside.      Review of Systems   HENT: Negative.    Eyes: Positive for visual disturbance.   Respiratory: Negative.    Cardiovascular: Negative.    Gastrointestinal: Negative.    Endocrine: Negative.    Genitourinary: Negative.    Musculoskeletal: Negative.    Skin: Negative.    Neurological: Positive for facial asymmetry, speech difficulty and weakness.   Hematological: Negative.    Psychiatric/Behavioral: Positive for confusion and decreased concentration.            Objective    Objective      Temp:  [98.1 °F (36.7 °C)-100.1 °F (37.8 °C)] 99 °F (37.2 °C)  Heart Rate:  [65-80] 67  Resp:  [16-18] 18  BP: (112-178)/(75-96) 124/77        Neurological Exam  Mental Status  Awake and alert. Oriented only to person. Orientation: Disoriented to time, place. Memory: Not assessed. Mild dysarthria present. Language is fluent with no aphasia. Attention and concentration: Decreased attention. Fund of knowledge is appropriate for level of education.  His neglect is improved but not resolved.    Cranial Nerves  CN II: Right normal visual field. Left homonymous hemianopsia.  CN III, IV, VI: Extraocular movements intact bilaterally. Pupils equal round and reactive to light bilaterally.  CN V: Facial sensation is normal.  CN VII:  Right: There is no facial weakness.  Left: There is central facial weakness.  CN VIII: Equal hearing bilaterally.  CN XI: Shoulder shrug strength is normal.  CN XII: Tongue midline without atrophy or fasciculations.    Motor  Normal muscle bulk throughout. No fasciculations present. Normal muscle tone.  Left upper- 3/5 (2/2 neglect, otherwise it is 4/5), left lower extremities 4+/5, right upper/right lower extremities 5/5.    Sensory  Light touch is normal in upper and lower extremities.     Reflexes  Deep tendon reflexes  are 2+ and symmetric in all four extremities with downgoing toes bilaterally.    Coordination  No obvious dysmetria .    Gait  Not assessed.      Physical Exam  Vitals signs and nursing note reviewed.   Constitutional:       General: He is awake.      Appearance: Normal appearance.   HENT:      Head: Normocephalic and atraumatic.      Mouth/Throat:      Mouth: Mucous membranes are moist.      Pharynx: Oropharynx is clear.   Eyes:      Extraocular Movements: Extraocular movements intact.      Conjunctiva/sclera: Conjunctivae normal.      Pupils: Pupils are equal, round, and reactive to light.   Neck:      Musculoskeletal: Normal range of motion and neck supple.   Cardiovascular:      Rate and Rhythm: Normal rate. Rhythm irregular.   Pulmonary:      Effort: Pulmonary effort is normal. No respiratory distress.   Neurological:      Mental Status: He is alert.      Cranial Nerves: Dysarthria present.      Deep Tendon Reflexes: Reflexes are normal and symmetric.   Psychiatric:      Comments: Seems disoreitned, and confused.   Delirious         Results Review:    I reviewed the patient's new clinical results.  CTH this am shows evolving Rt PCA stroke, with no significant edema. No hemorrhage.   BP-110-150s, HR-60-80s, Tmax-100.1        Assessment/Plan     Assessment/Plan:  80-year-old right-handed white male with known diagnosis of hypertension, diabetes, paroxysmal atrial fibrillation not on anticoagulation, who was brought by the family after they noted him to have gait imbalance, and his blood pressure to be over 200s.  On arrival to the emergency room, patient was noted to have left-sided weakness, left homonymous hemianopia and right gaze preference.  TPA was being considered to be given, but CT head showed early changes of ischemic stroke in right PCA territory, for which TPA was not given.      1. 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  With right P2 occlusion.  Patient has history of paroxysmal atrial  fibrillation, but was never on anticoagulation.  We have started him on aspirin and statins for now, and CTH this morning shows evolving Rt PCA stroke with no hemorrhage. Starting eliquis 5mg bid, and will cont to monitor.   2.  Paroxysmal atrial fibrillation.  Patient has diagnosis of A. fib, but was never on anticoagulation.Starting eliquis 5mg bid today.  Rate and rhythm controlled as per the hospitalist.  3. Delirium- Pt is developing delirium 2/2 hospital stay. Informed nursing and PT to make him sit on chair, lighten up the room. He should be discharged to [facility] if clinically stable for next 1-2 days.   4. Left ICA stenosis.  Patient has been noted to have moderate to severe left ICA stenosis, which is asymptomatic. He may need treatment as outpatient.  5. Essential hypertension. Normal BP goals. Cont current treatment.   6. Diabetes mellitus type 2 controlled with out hyperglycemia.  Continue his home medications.  His A1c is 6.4.  7. Activity.  PT/OT can work with him.  Awaitng acute rehabilitation.  8. Healthy heart/diabetic diet    Case was discussed with patient, his wife, nursing and the hospitalist.  Stroke neurology will continue to follow along.     Surjit Jerry MD  12/04/20  14:29 EST

## 2020-12-04 NOTE — THERAPY TREATMENT NOTE
Patient Name: Dirk Miles  : 1940    MRN: [id number]                              Today's Date: 2020       Admit Date: 2020    Visit Dx:     ICD-10-CM ICD-9-CM   1. Cognitive communication deficit  R41.841 799.52   2. Acute CVA (cerebrovascular accident) (CMS/[facility])  I63.9 434.91     Patient Active Problem List   Diagnosis   • Acute CVA (cerebrovascular accident) (CMS/[facility])     Past Medical History:   Diagnosis Date   • A-fib (CMS/[facility])    • Diabetes mellitus (CMS/[facility])    • Hypertension      Past Surgical History:   Procedure Laterality Date   • CATARACT EXTRACTION, BILATERAL     • HERNIA REPAIR       General Information     Row Name 20 1500          OT Time and Intention    Document Type  therapy note (daily note)  -SD     Mode of Treatment  occupational therapy  -SD     Row Name 20 1500          General Information    Patient Profile Reviewed  yes  -SD     Existing Precautions/Restrictions  fall;other (see comments) impulsive, L neglect, impaired coordination  -SD     Barriers to Rehab  medically complex  -SD     Row Name 20 1500          Cognition    Orientation Status (Cognition)  oriented to;person;place;situation  -SD     Row Name 20 1500          Safety Issues, Functional Mobility    Safety Issues Affecting Function (Mobility)  (S) ability to follow commands;at risk behavior observed;awareness of need for assistance;impulsivity;insight into deficits/self-awareness;judgment;positioning of assistive device;safety precaution awareness;safety precautions follow-through/compliance;sequencing abilities  -SD     Impairments Affecting Function (Mobility)  balance;coordination;endurance/activity tolerance;motor control;motor planning;postural/trunk control;sensation/sensory awareness;range of motion (ROM);visual/perceptual;strength  -SD       User Key  (r) = Recorded By, (t) = Taken By, (c) = Cosigned By    Initials Name Provider Type    Ary Morejon, OT  Occupational Therapist        Mobility/ADL's     Row Name 12/04/20 1530          Bed Mobility    Comment (Bed Mobility)  pt. sitting on [facility] with wife standing behind him for safety, upon therapist's arrival  -SD     Row Name 12/04/20 1530          Transfers    Transfers  sit-stand transfer;toilet transfer  -SD     Comment (Transfers)  STS from [facility] with mod A, but then required max A to maintain midline in standing d/t strong L lean  -SD     Bed-Chair [place] (Transfers)  maximum assist (25% patient effort);2 person assist;verbal cues  -SD     Assistive Device (Bed-Chair Transfers)  other (see comments) B UE Support  -SD     Sit-Stand [place] (Transfers)  moderate assist (50% patient effort)  -SD     [place] Level (Toilet Transfer)  maximum assist (25% patient effort);2 person assist  -SD     Assistive Device (Toilet Transfer)  commode, bedside without drop arms  -SD     Row Name 12/04/20 1530          Sit-Stand Transfer    Assistive Device (Sit-Stand Transfers)  other (see comments) B UE support  -SD     Row Name 12/04/20 1530          Toilet Transfer    Type (Toilet Transfer)  stand pivot/stand step  -SD     Row Name 12/04/20 1530          Activities of Daily Living    BADL Assessment/Intervention  toileting;grooming  -SD     Row Name 12/04/20 1530          Grooming Assessment/Training    [place] Level (Grooming)  wash face, hands;oral care regimen;set up;verbal cues;contact guard assist  -SD     Position (Grooming)  supported sitting  -SD     Row Name 12/04/20 1530          Toileting Assessment/Training    [place] Level (Toileting)  adjust/manage clothing;perform perineal hygiene;dependent (less than 25% patient effort)  -SD     Position (Toileting)  supported standing  -SD       User Key  (r) = Recorded By, (t) = Taken By, (c) = Cosigned By    Initials Name Provider Type    Ary Morejon OT Occupational Therapist        Obj/Interventions     Row Name 12/04/20 1376           Vision Assessment/Intervention    Visual Field Deficit  homonymous hemianopsia, left  -SD     Visual Motor Impairment  visual tracking, left  -SD     Visual Processing Deficit  ben-inattention/neglect, left;visual attention, left  -SD     Row Name 12/04/20 1533          Shoulder (Therapeutic Exercise)    Shoulder (Therapeutic Exercise)  AAROM (active assistive range of motion)  -SD     Shoulder AAROM (Therapeutic Exercise)  right assist left;flexion;extension;horizontal aBduction/aDduction;sitting;5 repetitions  -SD     Row Name 12/04/20 1533          Balance    Static Sitting Balance  mild impairment;other (see comments) sitting on BSC  -SD     Dynamic Sitting Balance  mild impairment;other (see comments) sitting on BS  -SD     Static Standing Balance  severe impairment;supported;standing  -SD     Dynamic Standing Balance  severe impairment;supported;standing  -SD     Row Name 12/04/20 1533          Therapeutic Exercise    Therapeutic Exercise  shoulder  -SD       User Key  (r) = Recorded By, (t) = Taken By, (c) = Cosigned By    Initials Name Provider Type    Ary Morejon, OT Occupational Therapist        Goals/Plan    No documentation.       Clinical Impression     Row Name 12/04/20 1536          Pain Assessment    Additional Documentation  Pain Scale: Numbers Pre/Post-Treatment (Group)  -SD     Row Name 12/04/20 1536          Pain Scale: Numbers Pre/Post-Treatment    Pretreatment Pain Rating  0/10 - no pain  -SD     Posttreatment Pain Rating  0/10 - no pain  -SD     Row Name 12/04/20 1536          Plan of Care Review    Plan of Care Reviewed With  patient;spouse  -SD     Outcome Summary  Pt. sitting on BS upon therapist's arrival with wife standing behind for safety. STS from BS with mod A, then immediately required increased assist d/t strong L lean. Pt. required max A x 2 for SPT from BSC to chair, dep for toileting, CGA for grooming skills, verbal & tactile cues for B UE coordination  exercises. Will cont to progress as able per POC. Recommend IPRF upon d/c for best outcome.  -SD     Row Name 12/04/20 1536          Therapy Assessment/Plan (OT)    Rehab Potential (OT)  good, to achieve stated therapy goals  -SD     Criteria for Skilled Therapeutic Interventions Met (OT)  yes;skilled treatment is necessary  -SD     Therapy Frequency (OT)  daily  -SD     Row Name 12/04/20 1536          Therapy Plan Review/Discharge Plan (OT)    Anticipated Discharge Disposition (OT)  inpatient rehabilitation facility  -SD     Row Name 12/04/20 1536          Vital Signs    Pre Systolic BP Rehab  121  -SD     Pre Treatment Diastolic BP  70  -SD     Posttreatment Heart Rate (beats/min)  70  -SD     O2 Delivery Pre Treatment  room air  -SD     O2 Delivery Intra Treatment  room air  -SD     O2 Delivery Post Treatment  room air  -SD     Pre Patient Position  Sitting  -SD     Intra Patient Position  Standing  -SD     Post Patient Position  Sitting  -SD     Row Name 12/04/20 1536          Positioning and Restraints    Pre-Treatment Position  bedside commode  -SD     Post Treatment Position  chair  -SD     In Chair  notified nsg;reclined;call light within reach;encouraged to call for assist;exit alarm on;with family/caregiver;legs elevated  -SD       User Key  (r) = Recorded By, (t) = Taken By, (c) = Cosigned By    Initials Name Provider Type    Ary Morejon, OT Occupational Therapist        Outcome Measures     Row Name 12/04/20 1500          How much help from another is currently needed...    Putting on and taking off regular lower body clothing?  1  -SD     Bathing (including washing, rinsing, and drying)  2  -SD     Toileting (which includes using toilet bed pan or urinal)  1  -SD     Putting on and taking off regular upper body clothing  2  -SD     Taking care of personal grooming (such as brushing teeth)  3  -SD     Eating meals  3  -SD     AM-PAC 6 Clicks Score (OT)  12  -SD     Row Name 12/04/20 1500           Functional Assessment    Outcome Measure Options  AM-PAC 6 Clicks Daily Activity (OT)  -SD       User Key  (r) = Recorded By, (t) = Taken By, (c) = Cosigned By    Initials Name Provider Type    Ary Morejon OT Occupational Therapist        Occupational Therapy Education                 Title: PT OT SLP Therapies (Done)     Topic: Occupational Therapy (Done)     Point: ADL training (Done)     Description:   Instruct learner(s) on proper safety adaptation and remediation techniques during self care or transfers.   Instruct in proper use of assistive devices.              Learning Progress Summary           Patient Acceptance, E,D, VU,NR by TA at 12/2/2020 1142   Significant Other Acceptance, E,D, VU,NR by TA at 12/2/2020 1142                   Point: Home exercise program (Done)     Description:   Instruct learner(s) on appropriate technique for monitoring, assisting and/or progressing therapeutic exercises/activities.              Learning Progress Summary           Patient Acceptance, E,D, VU,NR by TA at 12/2/2020 1142   Significant Other Acceptance, E,D, VU,NR by TA at 12/2/2020 1142                   Point: Precautions (Done)     Description:   Instruct learner(s) on prescribed precautions during self-care and functional transfers.              Learning Progress Summary           Patient Acceptance, E,D, VU,NR by TA at 12/2/2020 1142   Significant Other Acceptance, E,D, VU,NR by TA at 12/2/2020 1142                   Point: Body mechanics (Done)     Description:   Instruct learner(s) on proper positioning and spine alignment during self-care, functional mobility activities and/or exercises.              Learning Progress Summary           Patient Acceptance, E,D, VU,NR by TA at 12/2/2020 1142   Significant Other Acceptance, E,D, VU,NR by TA at 12/2/2020 1142                               User Key     Initials Effective Dates Name Provider Type Discipline    RUBEN 03/14/16 -  Dirk Clifford  OT Occupational Therapist OT              OT Recommendation and Plan  Therapy Frequency (OT): daily  Plan of Care Review  Plan of Care Reviewed With: patient, spouse  Outcome Summary: Pt. sitting on BSC upon therapist's arrival with wife standing behind for safety. STS from BSC with mod A, then immediately required increased assist d/t strong L lean. Pt. required max A x 2 for SPT from BSC to chair, dep for toileting, CGA for grooming skills, verbal & tactile cues for B UE coordination exercises. Will cont to progress as able per POC. Recommend IPRF upon d/c for best outcome.     Time Calculation:   Time Calculation- OT     Row Name 12/04/20 1541             Time Calculation- OT    OT Received On  12/04/20  -SD      OT Goal Re-Cert Due Date  12/12/20  -SD        User Key  (r) = Recorded By, (t) = Taken By, (c) = Cosigned By    Initials Name Provider Type    Ary Morejon OT Occupational Therapist        Therapy Charges for Today     Code Description Service Date Service Provider Modifiers Qty    [id number]  OT THERAPEUTIC ACT EA 15 MIN 12/4/2020 Ary Colmenares OT GO 1    [id number]  OT SELF CARE/MGMT/TRAIN EA 15 MIN 12/4/2020 Ary Colmenares OT GO 1               Ary Colmenares OT  12/4/2020

## 2020-12-05 LAB
ANION GAP SERPL CALCULATED.3IONS-SCNC: 13 MMOL/L (ref 5–15)
BUN SERPL-MCNC: 21 MG/DL (ref 8–23)
BUN/CREAT SERPL: 15.8 (ref 7–25)
CALCIUM SPEC-SCNC: 9.7 MG/DL (ref 8.6–10.5)
CHLORIDE SERPL-SCNC: 98 MMOL/L (ref 98–107)
CO2 SERPL-SCNC: 23 MMOL/L (ref 22–29)
CREAT SERPL-MCNC: 1.33 MG/DL (ref 0.76–1.27)
DEPRECATED RDW RBC AUTO: 47.8 FL (ref 37–54)
ERYTHROCYTE [DISTWIDTH] IN BLOOD BY AUTOMATED COUNT: 12.7 % (ref 12.3–15.4)
GFR SERPL CREATININE-BSD FRML MDRD: 52 ML/MIN/1.73
GLUCOSE BLDC GLUCOMTR-MCNC: 115 MG/DL (ref 70–130)
GLUCOSE BLDC GLUCOMTR-MCNC: 115 MG/DL (ref 70–130)
GLUCOSE BLDC GLUCOMTR-MCNC: 145 MG/DL (ref 70–130)
GLUCOSE SERPL-MCNC: 162 MG/DL (ref 65–99)
HCT VFR BLD AUTO: 40.7 % (ref 37.5–51)
HGB BLD-MCNC: 13.2 G/DL (ref 13–17.7)
MCH RBC QN AUTO: 33.4 PG (ref 26.6–33)
MCHC RBC AUTO-ENTMCNC: 32.4 G/DL (ref 31.5–35.7)
MCV RBC AUTO: 103 FL (ref 79–97)
PLATELET # BLD AUTO: 191 10*3/MM3 (ref 140–450)
PMV BLD AUTO: 10.4 FL (ref 6–12)
POTASSIUM SERPL-SCNC: 4.4 MMOL/L (ref 3.5–5.2)
RBC # BLD AUTO: 3.95 10*6/MM3 (ref 4.14–5.8)
SODIUM SERPL-SCNC: 134 MMOL/L (ref 136–145)
WBC # BLD AUTO: 7.86 10*3/MM3 (ref 3.4–10.8)

## 2020-12-05 PROCEDURE — 99231 SBSQ HOSP IP/OBS SF/LOW 25: CPT | Performed by: PSYCHIATRY & NEUROLOGY

## 2020-12-05 PROCEDURE — 99233 SBSQ HOSP IP/OBS HIGH 50: CPT | Performed by: NURSE PRACTITIONER

## 2020-12-05 PROCEDURE — 97110 THERAPEUTIC EXERCISES: CPT

## 2020-12-05 PROCEDURE — 80048 BASIC METABOLIC PNL TOTAL CA: CPT | Performed by: INTERNAL MEDICINE

## 2020-12-05 PROCEDURE — 82962 GLUCOSE BLOOD TEST: CPT

## 2020-12-05 PROCEDURE — 85027 COMPLETE CBC AUTOMATED: CPT | Performed by: INTERNAL MEDICINE

## 2020-12-05 PROCEDURE — 97530 THERAPEUTIC ACTIVITIES: CPT

## 2020-12-05 PROCEDURE — 25010000002 CYANOCOBALAMIN PER 1000 MCG: Performed by: FAMILY MEDICINE

## 2020-12-05 RX ADMIN — ATORVASTATIN CALCIUM 80 MG: 40 TABLET, FILM COATED ORAL at 20:59

## 2020-12-05 RX ADMIN — ASPIRIN 81 MG CHEWABLE TABLET 81 MG: 81 TABLET CHEWABLE at 08:53

## 2020-12-05 RX ADMIN — LISINOPRIL 20 MG: 20 TABLET ORAL at 08:53

## 2020-12-05 RX ADMIN — SODIUM CHLORIDE, PRESERVATIVE FREE 10 ML: 5 INJECTION INTRAVENOUS at 08:55

## 2020-12-05 RX ADMIN — APIXABAN 5 MG: 5 TABLET, FILM COATED ORAL at 20:59

## 2020-12-05 RX ADMIN — CYANOCOBALAMIN 1000 MCG: 1000 INJECTION, SOLUTION INTRAMUSCULAR; SUBCUTANEOUS at 08:53

## 2020-12-05 RX ADMIN — APIXABAN 5 MG: 5 TABLET, FILM COATED ORAL at 08:53

## 2020-12-05 RX ADMIN — SODIUM CHLORIDE, PRESERVATIVE FREE 10 ML: 5 INJECTION INTRAVENOUS at 20:59

## 2020-12-05 RX ADMIN — METOPROLOL TARTRATE 50 MG: 50 TABLET, FILM COATED ORAL at 20:59

## 2020-12-05 RX ADMIN — ACETAMINOPHEN 650 MG: 325 TABLET, FILM COATED ORAL at 08:53

## 2020-12-05 RX ADMIN — METOPROLOL TARTRATE 50 MG: 50 TABLET, FILM COATED ORAL at 08:53

## 2020-12-05 NOTE — PROGRESS NOTES
[facility] Medicine Services  PROGRESS NOTE    Patient Name: Dirk Miles  : 1940  MRN: [id number]    Date of Admission: 2020  Primary Care Physician: Tia Pratt MD    Subjective   Subjective     CC:  F/U CVA    HPI:  No complaints  No recurrence of fever    Review of Systems  Gen- No fevers, chills  CV- No chest pain, palpitations  Resp- No cough, dyspnea  GI- No N/V/D, abd pain      Objective   Objective     Vital Signs:   Temp:  [98 °F (36.7 °C)-98.7 °F (37.1 °C)] 98.5 °F (36.9 °C)  Heart Rate:  [62-80] 71  Resp:  [16-20] 16  BP: (112-152)/(72-94) 112/72  Total (NIH Stroke Scale): 7     Physical Exam:  Constitutional: No acute distress, awake, alert, wife at bedside  HENT: NCAT, mucous membranes moist  Respiratory: Clear to auscultation bilaterally, respiratory effort normal   Cardiovascular: RRR, no murmurs, rubs, or gallops  Gastrointestinal: Positive bowel sounds, soft, nontender, nondistended  Musculoskeletal: No bilateral ankle edema  Psychiatric: Appropriate affect, cooperative  Neurologic: Oriented x 3, left side weakness, left visual field deficit, speech clear  Skin: No rashes      Results Reviewed:  Results from last 7 days   Lab Units 20  0850 20  1003 20  0706  20  1129   WBC 10*3/mm3 7.86 7.70 7.35   < >  --    HEMOGLOBIN g/dL 13.2 12.4* 12.0*   < >  --    HEMATOCRIT % 40.7 38.5 37.3*   < >  --    PLATELETS 10*3/mm3 191 185 184   < >  --    INR   --   --   --   --  1.0    < > = values in this interval not displayed.     Results from last 7 days   Lab Units 20  0850 20  1003 20  0706   SODIUM mmol/L 134* 136 140   POTASSIUM mmol/L 4.4 4.4 4.2   CHLORIDE mmol/L 98 99 103   CO2 mmol/L 23.0 24.0 26.0   BUN mg/dL 21 18 18   CREATININE mg/dL 1.33* 1.20 1.25   GLUCOSE mg/dL 162* 160* 130*   CALCIUM mg/dL 9.7 9.9 9.9   ALT (SGPT) U/L  --   --  14   AST (SGOT) U/L  --   --  22     Estimated Creatinine Clearance:  52.6 mL/min (A) (by C-G formula based on SCr of 1.33 mg/dL (H)).    Microbiology Results Abnormal     Procedure Component Value - Date/Time    COVID PRE-OP / PRE-PROCEDURE SCREENING ORDER (NO ISOLATION) - Swab, Nasopharynx [[id number]]  (Normal) Collected: 12/01/20 1408    Lab Status: Final result Specimen: Swab from Nasopharynx Updated: 12/01/20 1409    Narrative:      The following orders were created for panel order COVID PRE-OP / PRE-PROCEDURE SCREENING ORDER (NO ISOLATION) - Swab, Nasopharynx.  Procedure                               Abnormality         Status                     ---------                               -----------         ------                     COVID-19,CEPHEID,MICHAEL IN-...[[id number]]  Normal              Final result                 Please view results for these tests on the individual orders.    COVID-19,CEPHEID,MICHAEL IN-HOUSE(OR EMERGENT/ADD-ON),NP SWAB IN TRANSPORT MEDIA 3-4 HR TAT - Swab, Nasopharynx [[id number]]  (Normal) Collected: 12/01/20 1408    Lab Status: Final result Specimen: Swab from Nasopharynx Updated: 12/01/20 1409     COVID19 Not Detected    Narrative:      Fact sheet for providers: https://www.fda.gov/media/294634/download     Fact sheet for patients: https://www.fda.gov/media/831512/download          Imaging Results (Last 24 Hours)     Procedure Component Value Units Date/Time    XR Chest 1 View [[id number]] Collected: 12/04/20 1144     Updated: 12/04/20 1848    Narrative:      EXAMINATION: XR CHEST 1 VW- 12/04/2020      INDICATION: fever; R41.841-Cognitive communication deficit;  I63.9-Cerebral infarction, unspecified      COMPARISON: 12/01/2020     FINDINGS: Portable chest reveals heart to be enlarged. Underlying  chronic and emphysematous changes seen within the lung fields laterally.  Vascular calcifications seen within the thoracic aorta. No pleural  effusion or pneumothorax. No focal parenchymal opacification present.  Pulmonary vascularity is within normal limits.           Impression:      No acute cardiopulmonary disease. The heart is enlarged.     D:  12/04/2020  E:  12/04/2020     This report was finalized on 12/4/2020 6:45 PM by Dr. Merlene Howard MD.       CT Head Without Contrast [[id number]] Collected: 12/04/20 0853     Updated: 12/04/20 1848    Narrative:      EXAMINATION: CT HEAD WO CONTRAST- 12/04/2020      INDICATION: Stroke, follow up; R41.841-Cognitive communication deficit;  I63.9-Cerebral infarction, unspecified     TECHNIQUE: Multiple axial CT imaging was obtained of the head from skull  base to skull vertex without the administration of intravenous contrast.     The radiation dose reduction device was turned on for each scan per the  ALARA (As Low as Reasonably Achievable) protocol.     COMPARISON: 12/02/2020     FINDINGS: There is an evolving large right occipital infarction. There  is motion artifact degrading some image quality. There is some  low-density seen in the left frontal parietal region from prior area of  infarction. There is no evidence of hemorrhage. No hydrocephalus.  Findings have not significantly changed in the interval with chronic  small vessel ischemic changes seen throughout the periventricular and  subcortical white matter.       Impression:      Large evolving right occipital infarction. There is no  hemorrhagic conversion. The remainder of the brain is stable. Chronic  changes present bilaterally.     D:  12/04/2020  E:  12/04/2020     This report was finalized on 12/4/2020 6:45 PM by Dr. Merlene Howard MD.             Results for orders placed during the hospital encounter of 12/01/20   Adult Transthoracic Echo Complete W/ Cont if Necessary Per Protocol (With Agitated Saline)    Narrative · Left ventricular ejection fraction appears to be 61 - 65%. Left   ventricular systolic function is normal.  · Left atrial volume is moderately increased.  · Saline test results are negative.  · The right atrial cavity is mildly dilated.  ·  Moderate tricuspid valve regurgitation is present.  · Estimated right ventricular systolic pressure from tricuspid   regurgitation is moderately elevated (45-55 mmHg).          I have reviewed the medications:  Scheduled Meds:apixaban, 5 mg, Oral, Q12H  aspirin, 81 mg, Oral, Daily  atorvastatin, 80 mg, Oral, Nightly  cyanocobalamin, 1,000 mcg, Intramuscular, Daily  furosemide, 20 mg, Oral, Once per day on Mon Thu  insulin lispro, 0-7 Units, Subcutaneous, TID AC  lisinopril, 20 mg, Oral, Q24H  metoprolol tartrate, 50 mg, Oral, Q12H  senna-docusate sodium, 2 tablet, Oral, BID  sodium chloride, 10 mL, Intravenous, Q12H      Continuous Infusions:niCARdipine, 5-15 mg/hr      PRN Meds:.•  acetaminophen  •  bisacodyl  •  dextrose  •  dextrose  •  glucagon (human recombinant)  •  magnesium sulfate **OR** magnesium sulfate in D5W 1g/100mL (PREMIX) **OR** magnesium sulfate  •  polyethylene glycol  •  sodium chloride  •  sodium chloride    Assessment/Plan   Assessment & Plan     Active Hospital Problems    Diagnosis  POA   • Acute CVA (cerebrovascular accident) (CMS/[facility]) [I63.9]  Yes      Resolved Hospital Problems   No resolved problems to display.        Brief Hospital Course to date:  Dirk Miles is a 80 y.o. male with history of atrial fibrillation on ASA 81mg, also mild diabetes and HTN who presented to ED after developing sudden onset dizziness and left side weakness at approx 0730 PTA.  His wife was with him and called EMS.        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 by CTA, asymptomatic  - asa, statin, eliquis  - future outpatient  f/u per Neurostroke recs     Hx Afib, bradycardic on metoprolol   - continue BB  - CHADS-Vasc 7  - not previously on anticoagulation prior to this admission    HTN  - Normalize BP per Neuro  - Continue BB  - continue Lisinopril 20mg daily, titrate prn  - lasix previously restarted    DM2  - Hga1c 6.4  - SSI  Component      Latest Ref Rng & Units 12/5/2020 12/5/2020 12/5/2020 12/5/2020           8:05 AM  8:50 AM 11:37 AM  4:17 PM   Glucose      70 - 130 mg/dL 115 162 (H) 115 145 (H)      Anemia, macrocytic w/ low B12  - B12 low, will start daily IM injections x 1 week, weekly x 1 month, then monthly     DVT Prophylaxis:  Eliquis    Disposition: I expect the patient to be discharged to [facility] pending bed availability, likely early next week     CODE STATUS:   Code Status and Medical Interventions:   Ordered at: 12/01/20 8723     Level Of Support Discussed With:    Patient     Code Status:    CPR     Medical Interventions (Level of Support Prior to Arrest):    Full       Carmelina King, APRN  12/05/20

## 2020-12-05 NOTE — PROGRESS NOTES
Patient is on Apixaban.  Education provided on 12/05/20 verbally and in writing.  Information provided includes effects of medication, drug-drug and drug-food interactions, and signs/symptoms of bleeding and clotting.  Patient's wife verbalized understanding through teach back.  Patients wife was counseled on Eliquis and all pertinent questions were answered.     Guru Gama, Pharm D  Pharmacy Resident   12/5/2020  12:17 EST

## 2020-12-05 NOTE — PROGRESS NOTES
"Neurology       Patient Care Team:  Tia Pratt MD as PCP - General (Family Medicine)    Chief complaint stroke      Subjective .     History: 80-year-old man with HTN, DM2, paroxysmal A. fib not on anticoagulation admitted 12/1/2020 after being brought by family to the ED for gait impairment and elevated blood pressure.  He was noted to have left-sided weakness, left homonymous hemianopia and right gaze preference and was found to have large right PCA territory infarct.  Followed by my partner Dr. Jerry.  Stroke noted likely to be cardioembolic with right P2 occlusion, in patient with paroxysmal A. fib not on anticoagulation.  Echo negative, EF 61%.  He was initially started on aspirin and statin, repeat head CT yesterday showed no hemorrhage and anticoagulation initiated.  Additionally found to have LICA stneosis, moderate to severe, asymptomatic.   Also found to have B12 deficiency and started on B12 injections.  Per PT today, patient able to sit to stand from the edge of the bed with minimal assist but requiring moderate assist to to transfer to chair.  Patient reports he has had a headache every day until today, feeling better.  Reports vision seems okay, able to follow the football game on TV without trouble.  Eager to go to [place] [facility].    ROS: No fever or chest pain    Objective     Vital Signs   Blood pressure 112/72, pulse 71, temperature 98.5 °F (36.9 °C), temperature source Oral, resp. rate 16, height 175.3 cm (69\"), weight 83.9 kg (185 lb), SpO2 92 %.    Physical Exam:              Neuro: Well-developed elderly white man in no acute distress, alert, fluent and appropriate.  No dysarthria.  Pupils 2.5 mm and reactive, gaze conjugate, left homonymous hemianopia unchanged  Mild left lower facial weakness, hearing intact to voice  Motor: Left upper extremity mild weakness up to 4/5 but also some neglect.      Results Review:              CBC today stable with white count 7.86 H&H 13 and 41 " platelets 191  BMP with glucose 162 creatinine 1.33 GFR 52 and sodium 134    Assessment/Plan     80-year-old man with new right PCA territory infarct, mild left hemiparesis and left HH, clinically stable after starting Eliquis yesterday.  Transfer to [facility] pending, patient accepted.  No new recommendations today.    I discussed the patients findings and my recommendations with patient    Danette Haas MD  12/05/20  15:38 EST

## 2020-12-05 NOTE — PLAN OF CARE
Goal Outcome Evaluation:  Plan of Care Reviewed With: patient, spouse  Progress: improving  Outcome Summary: Pt performed bed mobility with mod A x2, sit to stand from EOB with min A, and transferred to the chair with mod A x 2 with B UE support.

## 2020-12-05 NOTE — THERAPY TREATMENT NOTE
Patient Name: Dirk Miles  : 1940    MRN: [id number]                              Today's Date: 2020       Admit Date: 2020    Visit Dx:     ICD-10-CM ICD-9-CM   1. Cognitive communication deficit  R41.841 799.52   2. Acute CVA (cerebrovascular accident) (CMS/[facility])  I63.9 434.91     Patient Active Problem List   Diagnosis   • Acute CVA (cerebrovascular accident) (CMS/[facility])     Past Medical History:   Diagnosis Date   • A-fib (CMS/[facility])    • Diabetes mellitus (CMS/[facility])    • Hypertension      Past Surgical History:   Procedure Laterality Date   • CATARACT EXTRACTION, BILATERAL     • HERNIA REPAIR       General Information     Row Name 20 1400          Physical Therapy Time and Intention    Document Type  therapy note (daily note)  -     Mode of Treatment  individual therapy;physical therapy  -     Row Name 20 1400          General Information    Patient Profile Reviewed  yes  -     Existing Precautions/Restrictions  fall L neglect  -     Barriers to Rehab  medically complex  -     Row Name 20 1400          Cognition    Orientation Status (Cognition)  oriented to;person  -     Row Name 20 1400          Safety Issues, Functional Mobility    Safety Issues Affecting Function (Mobility)  ability to follow commands;at risk behavior observed;awareness of need for assistance;impulsivity;insight into deficits/self-awareness;judgment;safety precaution awareness;safety precautions follow-through/compliance  -     Impairments Affecting Function (Mobility)  balance;coordination;endurance/activity tolerance;grasp;motor control;postural/trunk control  -       User Key  (r) = Recorded By, (t) = Taken By, (c) = Cosigned By    Initials Name Provider Type     Danette Mann, PT Physical Therapist        Mobility     Row Name 20 1400          Bed Mobility    Bed Mobility  supine-sit;scooting/bridging  -     Scooting/Bridging [place] (Bed Mobility)  verbal  cues;moderate assist (50% patient effort)  -     Supine-Sit [place] (Bed Mobility)  verbal cues;nonverbal cues (demo/gesture);moderate assist (50% patient effort)  -     Assistive Device (Bed Mobility)  bed rails;draw sheet;head of bed elevated  -     Comment (Bed Mobility)  Pt required max vc for sequencing to perform supine to sit. Pt required frequent redirection back to task at hand. Pt able to move B LE to edge of bed, unable to use LUE to grasp bed rail to roll to right side. Assist to roll and assist to scoot to EOB.  -     Row Name 12/05/20 1400          Transfers    Comment (Transfers)  Pt impulsive and reached to hug PT then proceeded to attempt to pull to stand. Pt edu in safety, sequencing, and safe transfer technique. Pt performed sit to stand at EOB with PT and tech, pt able to bear weight thru B LE, cues for safe placement of LLE. Pt attempted to stand with RW, unable to maintain grasp with LLE, RW removed. Pt performed transfer to chair with mod A x2 HHA. Pt had strong lateral lean to left in standing.  -     Row Name 12/05/20 1400          Bed-Chair Transfer    Bed-Chair [place] (Transfers)  moderate assist (50% patient effort);2 person assist;verbal cues;nonverbal cues (demo/gesture)  -     Assistive Device (Bed-Chair Transfers)  -- B UE support  -     Row Name 12/05/20 1400          Sit-Stand Transfer    Sit-Stand [place] (Transfers)  verbal cues;nonverbal cues (demo/gesture);minimum assist (75% patient effort);2 person assist  -     Assistive Device (Sit-Stand Transfers)  -- B UE support  -     Row Name 12/05/20 1400          Gait/Stairs (Locomotion)    [place] Level (Gait)  moderate assist (50% patient effort);2 person assist;verbal cues;nonverbal cues (demo/gesture)  -     Assistive Device (Gait)  -- B UE support  -     Distance in Feet (Gait)  2'  -     Bilateral Gait Deviations  forward flexed posture;heel strike decreased  -     Left Sided Gait  Deviations  leans left;weight shift ability decreased  -     Comment (Gait/Stairs)  Pt impulsive and quick. Pt took steps to chair with B UE support with mod A x 2. Pt able to bear  weight thru LLE, pt required assist to correct strong left lateral lean and to safely position LLE.  -       User Key  (r) = Recorded By, (t) = Taken By, (c) = Cosigned By    Initials Name Provider Type    Danette Welch PT Physical Therapist        Obj/Interventions     Row Name 12/05/20 1400          Motor Skills    Therapeutic Exercise  hip;knee;ankle  -     Row Name 12/05/20 1400          Hip (Therapeutic Exercise)    Hip (Therapeutic Exercise)  AROM (active range of motion)  -     Hip AROM (Therapeutic Exercise)  bilateral;flexion;sitting;10 repetitions  -     Row Name 12/05/20 1400          Knee (Therapeutic Exercise)    Knee (Therapeutic Exercise)  AROM (active range of motion)  -     Knee AROM (Therapeutic Exercise)  bilateral;SAQ (short arc quad);sitting;10 repetitions  -     Row Name 12/05/20 1400          Ankle (Therapeutic Exercise)    Ankle (Therapeutic Exercise)  AROM (active range of motion)  -     Ankle AROM (Therapeutic Exercise)  bilateral;dorsiflexion;plantarflexion;10 repetitions;sitting  -       User Key  (r) = Recorded By, (t) = Taken By, (c) = Cosigned By    Initials Name Provider Type     Danette Mann PT Physical Therapist        Goals/Plan     Row Name 12/05/20 1400          Bed Mobility Goal 1 (PT)    Activity/Assistive Device (Bed Mobility Goal 1, PT)  sit to supine/supine to sit  -     [place] Level/Cues Needed (Bed Mobility Goal 1, PT)  modified independence  -     Time Frame (Bed Mobility Goal 1, PT)  long term goal (LTG);5 days  -     Progress/Outcomes (Bed Mobility Goal 1, PT)  goal ongoing  -     Row Name 12/05/20 1400          Transfer Goal 1 (PT)    Activity/Assistive Device (Transfer Goal 1, PT)  sit-to-stand/stand-to-sit;walker, rolling  -      [place] Level/Cues Needed (Transfer Goal 1, PT)  contact guard assist;2 person assist  -     Time Frame (Transfer Goal 1, PT)  long term goal (LTG);5 days  -     Progress/Outcome (Transfer Goal 1, PT)  goal ongoing  -     Row Name 12/05/20 1400          Gait Training Goal 1 (PT)    Activity/Assistive Device (Gait Training Goal 1, PT)  gait (walking locomotion);walker, rolling  -     [place] Level (Gait Training Goal 1, PT)  minimum assist (75% or more patient effort);2 person assist  -     Distance (Gait Training Goal 1, PT)  50 feet  -SH     Time Frame (Gait Training Goal 1, PT)  long term goal (LTG);5 days  -SH     Progress/Outcome (Gait Training Goal 1, PT)  goal ongoing  -       User Key  (r) = Recorded By, (t) = Taken By, (c) = Cosigned By    Initials Name Provider Type     Danette Mann, PT Physical Therapist        Clinical Impression     Row Name 12/05/20 1400          Pain    Additional Documentation  Pain Scale: Numbers Pre/Post-Treatment (Group)  -     Row Name 12/05/20 1400          Pain Scale: FACES Pre/Post-Treatment    Pain: FACES Scale, Pretreatment  0-->no hurt  -     Posttreatment Pain Rating  0-->no hurt  -     Row Name 12/05/20 1400          Plan of Care Review    Plan of Care Reviewed With  patient;spouse  -     Progress  improving  -     Outcome Summary  Pt performed bed mobility with mod A x2, sit to stand from EOB with min A, and transferred to the chair with mod A x 2 with B UE support.  -     Row Name 12/05/20 1400          Vital Signs    O2 Delivery Pre Treatment  room air  -     O2 Delivery Intra Treatment  room air  -     O2 Delivery Post Treatment  room air  -     Pre Patient Position  Supine  -     Intra Patient Position  Standing  -     Post Patient Position  Sitting  -     Row Name 12/05/20 1400          Positioning and Restraints    Pre-Treatment Position  in bed  -     Post Treatment Position  chair  -     In Chair  with  family/caregiver;reclined;call light within reach;encouraged to call for assist;exit alarm on;on mechanical lift sling  -       User Key  (r) = Recorded By, (t) = Taken By, (c) = Cosigned By    Initials Name Provider Type    Danette Welhc PT Physical Therapist        Outcome Measures     Row Name 12/05/20 1400          How much help from another person do you currently need...    Turning from your back to your side while in flat bed without using bedrails?  2  -SH     Moving from lying on back to sitting on the side of a flat bed without bedrails?  2  -SH     Moving to and from a bed to a chair (including a wheelchair)?  2  -SH     Standing up from a chair using your arms (e.g., wheelchair, bedside chair)?  2  -SH     Climbing 3-5 steps with a railing?  1  -SH     To walk in hospital room?  2  -SH     AM-PAC 6 Clicks Score (PT)  11  -     Row Name 12/05/20 1400          Functional Assessment    Outcome Measure Options  AM-PAC 6 Clicks Basic Mobility (PT)  -       User Key  (r) = Recorded By, (t) = Taken By, (c) = Cosigned By    Initials Name Provider Type    Danette Welch PT Physical Therapist        Physical Therapy Education                 Title: PT OT SLP Therapies (Done)     Topic: Physical Therapy (Done)     Point: Mobility training (Done)     Learning Progress Summary           Patient Acceptance, E,D, NR,VU by  at 12/5/2020 1400    Comment: HEP, safety, transfers, bed mobility, gait    Acceptance, E,D, VU,NR by  at 12/3/2020 1330    Comment: Educated on benefits of mobility and being OOB. Educated on correct supine to sit t/f technique, correct sit<->stand t/f technique, correct bed to chair t/f technique, safety with mobility, LE HEP, and progression of POC.    Acceptance, E,D, VU,NR by  at 12/2/2020 1405    Comment: Educated on precautions, benefits of mobility, safety with mobility, correct supine<->sit t/f technique, correct sit<->stand t/f technique, and progression of POC.    Family Acceptance, E,D, NR,VU by  at 12/5/2020 1400    Comment: HEP, safety, transfers, bed mobility, gait   Significant Other Acceptance, E,D, VU,NR by LR at 12/3/2020 1330    Comment: Educated on benefits of mobility and being OOB. Educated on correct supine to sit t/f technique, correct sit<->stand t/f technique, correct bed to chair t/f technique, safety with mobility, LE HEP, and progression of POC.    Acceptance, E,D, VU,NR by LR at 12/2/2020 1405    Comment: Educated on precautions, benefits of mobility, safety with mobility, correct supine<->sit t/f technique, correct sit<->stand t/f technique, and progression of POC.                   Point: Home exercise program (Done)     Learning Progress Summary           Patient Acceptance, E,D, NR,VU by  at 12/5/2020 1400    Comment: HEP, safety, transfers, bed mobility, gait    Acceptance, E,D, VU,NR by LR at 12/3/2020 1330    Comment: Educated on benefits of mobility and being OOB. Educated on correct supine to sit t/f technique, correct sit<->stand t/f technique, correct bed to chair t/f technique, safety with mobility, LE HEP, and progression of POC.    Acceptance, E,D, VU,NR by LR at 12/2/2020 1405    Comment: Educated on precautions, benefits of mobility, safety with mobility, correct supine<->sit t/f technique, correct sit<->stand t/f technique, and progression of POC.   Family Acceptance, E,D, NR,VU by  at 12/5/2020 1400    Comment: HEP, safety, transfers, bed mobility, gait   Significant Other Acceptance, E,D, VU,NR by LR at 12/3/2020 1330    Comment: Educated on benefits of mobility and being OOB. Educated on correct supine to sit t/f technique, correct sit<->stand t/f technique, correct bed to chair t/f technique, safety with mobility, LE HEP, and progression of POC.    Acceptance, E,D, VU,NR by LR at 12/2/2020 1405    Comment: Educated on precautions, benefits of mobility, safety with mobility, correct supine<->sit t/f technique, correct  sit<->stand t/f technique, and progression of POC.                   Point: Body mechanics (Done)     Learning Progress Summary           Patient Acceptance, E,D, VU,NR by LR at 12/3/2020 1330    Comment: Educated on benefits of mobility and being OOB. Educated on correct supine to sit t/f technique, correct sit<->stand t/f technique, correct bed to chair t/f technique, safety with mobility, LE HEP, and progression of POC.    Acceptance, E,D, VU,NR by LR at 12/2/2020 1405    Comment: Educated on precautions, benefits of mobility, safety with mobility, correct supine<->sit t/f technique, correct sit<->stand t/f technique, and progression of POC.   Significant Other Acceptance, E,D, VU,NR by LR at 12/3/2020 1330    Comment: Educated on benefits of mobility and being OOB. Educated on correct supine to sit t/f technique, correct sit<->stand t/f technique, correct bed to chair t/f technique, safety with mobility, LE HEP, and progression of POC.    Acceptance, E,D, VU,NR by LR at 12/2/2020 1405    Comment: Educated on precautions, benefits of mobility, safety with mobility, correct supine<->sit t/f technique, correct sit<->stand t/f technique, and progression of POC.                   Point: Precautions (Done)     Learning Progress Summary           Patient Acceptance, E,D, VU,NR by LR at 12/3/2020 1330    Comment: Educated on benefits of mobility and being OOB. Educated on correct supine to sit t/f technique, correct sit<->stand t/f technique, correct bed to chair t/f technique, safety with mobility, LE HEP, and progression of POC.    Acceptance, E,D, VU,NR by LR at 12/2/2020 1405    Comment: Educated on precautions, benefits of mobility, safety with mobility, correct supine<->sit t/f technique, correct sit<->stand t/f technique, and progression of POC.   Significant Other Acceptance, E,D, VU,NR by LR at 12/3/2020 1330    Comment: Educated on benefits of mobility and being OOB. Educated on correct supine to sit t/f  technique, correct sit<->stand t/f technique, correct bed to chair t/f technique, safety with mobility, LE HEP, and progression of POC.    Acceptance, E,D, VU,NR by  at 12/2/2020 1405    Comment: Educated on precautions, benefits of mobility, safety with mobility, correct supine<->sit t/f technique, correct sit<->stand t/f technique, and progression of POC.                               User Key     Initials Effective Dates Name Provider Type Discipline     06/19/15 -  Danette Mann, PT Physical Therapist PT    LR 06/19/15 -  Marizol Vargas, PT Physical Therapist PT              PT Recommendation and Plan     Plan of Care Reviewed With: patient, spouse  Progress: improving  Outcome Summary: Pt performed bed mobility with mod A x2, sit to stand from EOB with min A, and transferred to the chair with mod A x 2 with B UE support.     Time Calculation:   PT Charges     Row Name 12/05/20 1400             Time Calculation    Start Time  1400  -      PT Received On  12/05/20  -      PT Goal Re-Cert Due Date  12/12/20  -         Time Calculation- PT    Total Timed Code Minutes- PT  30 minute(s)  -         Timed Charges    43398 - PT Therapeutic Exercise Minutes  15  -SH      01314 - PT Therapeutic Activity Minutes  15  -SH        User Key  (r) = Recorded By, (t) = Taken By, (c) = Cosigned By    Initials Name Provider Type     Danette Mann, PT Physical Therapist        Therapy Charges for Today     Code Description Service Date Service Provider Modifiers Qty    04092855449 HC PT THER PROC EA 15 MIN 12/5/2020 Danette Mann, PT GP 1    67257061621 HC PT THERAPEUTIC ACT EA 15 MIN 12/5/2020 Danette Mann, PT GP 1    90446555625 HC PT THER SUPP EA 15 MIN 12/5/2020 Danette Mann, PT GP 1          PT G-Codes  Outcome Measure Options: AM-PAC 6 Clicks Basic Mobility (PT)  AM-PAC 6 Clicks Score (PT): 11  AM-PAC 6 Clicks Score (OT): 12  Modified [place] Scale: 4 - Moderately severe disability.   Unable to walk without assistance, and unable to attend to own bodily needs without assistance.    Danette Mann, PT  12/5/2020

## 2020-12-06 LAB
ANION GAP SERPL CALCULATED.3IONS-SCNC: 11 MMOL/L (ref 5–15)
BUN SERPL-MCNC: 23 MG/DL (ref 8–23)
BUN/CREAT SERPL: 16.9 (ref 7–25)
CALCIUM SPEC-SCNC: 9.5 MG/DL (ref 8.6–10.5)
CHLORIDE SERPL-SCNC: 97 MMOL/L (ref 98–107)
CO2 SERPL-SCNC: 27 MMOL/L (ref 22–29)
CREAT SERPL-MCNC: 1.36 MG/DL (ref 0.76–1.27)
GFR SERPL CREATININE-BSD FRML MDRD: 50 ML/MIN/1.73
GLUCOSE BLDC GLUCOMTR-MCNC: 133 MG/DL (ref 70–130)
GLUCOSE BLDC GLUCOMTR-MCNC: 141 MG/DL (ref 70–130)
GLUCOSE BLDC GLUCOMTR-MCNC: 158 MG/DL (ref 70–130)
GLUCOSE SERPL-MCNC: 168 MG/DL (ref 65–99)
POTASSIUM SERPL-SCNC: 4.6 MMOL/L (ref 3.5–5.2)
SODIUM SERPL-SCNC: 135 MMOL/L (ref 136–145)

## 2020-12-06 PROCEDURE — 25010000002 CYANOCOBALAMIN PER 1000 MCG: Performed by: FAMILY MEDICINE

## 2020-12-06 PROCEDURE — 97110 THERAPEUTIC EXERCISES: CPT

## 2020-12-06 PROCEDURE — 97535 SELF CARE MNGMENT TRAINING: CPT

## 2020-12-06 PROCEDURE — 80048 BASIC METABOLIC PNL TOTAL CA: CPT | Performed by: NURSE PRACTITIONER

## 2020-12-06 PROCEDURE — 82962 GLUCOSE BLOOD TEST: CPT

## 2020-12-06 PROCEDURE — 97530 THERAPEUTIC ACTIVITIES: CPT

## 2020-12-06 PROCEDURE — 99232 SBSQ HOSP IP/OBS MODERATE 35: CPT | Performed by: NURSE PRACTITIONER

## 2020-12-06 PROCEDURE — 99231 SBSQ HOSP IP/OBS SF/LOW 25: CPT | Performed by: PSYCHIATRY & NEUROLOGY

## 2020-12-06 RX ORDER — CHOLECALCIFEROL (VITAMIN D3) 125 MCG
5 CAPSULE ORAL NIGHTLY PRN
Status: DISCONTINUED | OUTPATIENT
Start: 2020-12-06 | End: 2020-12-08

## 2020-12-06 RX ADMIN — APIXABAN 5 MG: 5 TABLET, FILM COATED ORAL at 09:05

## 2020-12-06 RX ADMIN — ASPIRIN 81 MG CHEWABLE TABLET 81 MG: 81 TABLET CHEWABLE at 09:05

## 2020-12-06 RX ADMIN — Medication 5 MG: at 20:11

## 2020-12-06 RX ADMIN — APIXABAN 5 MG: 5 TABLET, FILM COATED ORAL at 20:11

## 2020-12-06 RX ADMIN — SODIUM CHLORIDE, PRESERVATIVE FREE 10 ML: 5 INJECTION INTRAVENOUS at 20:10

## 2020-12-06 RX ADMIN — ACETAMINOPHEN 650 MG: 325 TABLET, FILM COATED ORAL at 14:18

## 2020-12-06 RX ADMIN — METOPROLOL TARTRATE 50 MG: 50 TABLET, FILM COATED ORAL at 20:10

## 2020-12-06 RX ADMIN — ACETAMINOPHEN 650 MG: 325 TABLET, FILM COATED ORAL at 22:41

## 2020-12-06 RX ADMIN — LISINOPRIL 20 MG: 20 TABLET ORAL at 09:05

## 2020-12-06 RX ADMIN — DOCUSATE SODIUM 50 MG AND SENNOSIDES 8.6 MG 2 TABLET: 8.6; 5 TABLET, FILM COATED ORAL at 09:05

## 2020-12-06 RX ADMIN — SODIUM CHLORIDE, PRESERVATIVE FREE 10 ML: 5 INJECTION INTRAVENOUS at 09:05

## 2020-12-06 RX ADMIN — METOPROLOL TARTRATE 50 MG: 50 TABLET, FILM COATED ORAL at 09:05

## 2020-12-06 RX ADMIN — DOCUSATE SODIUM 50 MG AND SENNOSIDES 8.6 MG 2 TABLET: 8.6; 5 TABLET, FILM COATED ORAL at 20:11

## 2020-12-06 RX ADMIN — ATORVASTATIN CALCIUM 80 MG: 40 TABLET, FILM COATED ORAL at 20:11

## 2020-12-06 RX ADMIN — CYANOCOBALAMIN 1000 MCG: 1000 INJECTION, SOLUTION INTRAMUSCULAR; SUBCUTANEOUS at 09:10

## 2020-12-06 NOTE — PLAN OF CARE
Goal Outcome Evaluation:  Plan of Care Reviewed With: patient, spouse  Progress: improving  Outcome Summary: OT seen pt in room with wife present.  Pt demonstrates good outlook and motivated for therapy.  Proprioception and visual deficts, gross coordination deficits and L sided weakness noted.  OT had to provide redirection throughout treatment (very pleasant disposition but easily distracted).  OT provided AAROM to LUE and LE in prep for bed mobility and STS with pt requiring cues to slow down and have more focused movements.  Bed mobility with mod assist and verbal cues.  STS with manual cues and min assist multiple times.

## 2020-12-06 NOTE — THERAPY TREATMENT NOTE
Patient Name: Dirk Miles  : 1940    MRN: [id number]                              Today's Date: 2020       Admit Date: 2020    Visit Dx:     ICD-10-CM ICD-9-CM   1. Cognitive communication deficit  R41.841 799.52   2. Acute CVA (cerebrovascular accident) (CMS/HCC)  I63.9 434.91     Patient Active Problem List   Diagnosis   • Acute CVA (cerebrovascular accident) (CMS/HCC)   • A-fib (CMS/HCC)   • Hypertension   • Diabetes mellitus (CMS/HCC)     Past Medical History:   Diagnosis Date   • A-fib (CMS/HCC)    • Diabetes mellitus (CMS/HCC)    • Hypertension      Past Surgical History:   Procedure Laterality Date   • CATARACT EXTRACTION, BILATERAL     • HERNIA REPAIR       General Information     Row Name 20 1027          OT Time and Intention    Document Type  therapy note (daily note)  -     Mode of Treatment  occupational therapy  -     Row Name 20 1027          General Information    Patient Profile Reviewed  yes  -SW     Existing Precautions/Restrictions  fall;other (see comments) skin tears to left UE  -     Row Name 20 1027          Cognition    Orientation Status (Cognition)  oriented x 4  -SW       User Key  (r) = Recorded By, (t) = Taken By, (c) = Cosigned By    Initials Name Provider Type    Yudy Anaya OT Occupational Therapist        Mobility/ADL's     Row Name 20 1027          Bed Mobility    Bed Mobility  rolling left;rolling right;scooting/bridging;supine-sit-supine  -SW     Rolling Left [place] (Bed Mobility)  minimum assist (75% patient effort);verbal cues  -SW     Rolling Right [place] (Bed Mobility)  moderate assist (50% patient effort);verbal cues  -SW     Scooting/Bridging [place] (Bed Mobility)  verbal cues;moderate assist (50% patient effort)  -SW     Supine-Sit [place] (Bed Mobility)  moderate assist (50% patient effort);verbal cues  -SW     Sit-Supine [place] (Bed Mobility)  moderate assist (50% patient effort);verbal  cues;nonverbal cues (demo/gesture)  -     Assistive Device (Bed Mobility)  bed rails;head of bed elevated  -SW     Row Name 12/06/20 1027          Transfers    Transfers  sit-stand transfer  -     Sit-Stand [place] (Transfers)  verbal cues;minimum assist (75% patient effort);1 person assist  -SW     Row Name 12/06/20 1027          Sit-Stand Transfer    Assistive Device (Sit-Stand Transfers)  other (see comments) no device  -SW     Row Name 12/06/20 1027          Functional Mobility    Functional Mobility- Ind. Level  not tested  -SW     Row Name 12/06/20 1027          Activities of Daily Living    44203 - OT Self Care/Mgmt Minutes  15  -     BADL Assessment/Intervention  grooming;upper body dressing;bathing  -SW     Row Name 12/06/20 1027          Upper Body Dressing Assessment/Training    [place] Level (Upper Body Dressing)  don;pajama/robe;maximum assist (25% patient effort);verbal cues  -     Position (Upper Body Dressing)  edge of bed sitting;supported sitting  -SW     Row Name 12/06/20 1027          Grooming Assessment/Training    [place] Level (Grooming)  grooming skills;nonverbal cues (demo/gesture);verbal cues;minimum assist (75% patient effort)  -SW     Position (Grooming)  edge of bed sitting;supported sitting  -SW     Row Name 12/06/20 1027          Bathing Assessment/Intervention    [place] Level (Bathing)  bathing skills;upper body;moderate assist (50% patient effort)  -     Position (Bathing)  edge of bed sitting;supported sitting  -       User Key  (r) = Recorded By, (t) = Taken By, (c) = Cosigned By    Initials Name Provider Type     Yudy Ravi OT Occupational Therapist        Obj/Interventions     [facility] Name 12/06/20 1027          Shoulder (Therapeutic Exercise)    Shoulder AAROM (Therapeutic Exercise)  left;flexion;extension;aBduction;aDduction;supine;sitting;10 repetitions  -SW     Row Name 12/06/20 1027          Balance    Balance Assessment  sitting static  balance;sitting dynamic balance;sit to stand dynamic balance;standing static balance;standing dynamic balance  -SW     Static Sitting Balance  mild impairment;supported;unsupported;sitting, edge of bed  -SW     Dynamic Sitting Balance  mild impairment;supported;unsupported;sitting, edge of bed  -SW     Sit to Stand Dynamic Balance  mild impairment  -SW     Static Standing Balance  moderate impairment;supported;standing  -SW     Dynamic Standing Balance  severe impairment;supported;standing  -SW     Balance Interventions  sitting;standing;sit to stand;supported;static;dynamic;minimal challenge  -     Row Name 12/06/20 1027          Therapeutic Exercise    Therapeutic Exercise  shoulder;elbow/forearm;other (see comments) trunk exercises  -       User Key  (r) = Recorded By, (t) = Taken By, (c) = Cosigned By    Initials Name Provider Type    Yudy Anaya OT Occupational Therapist        Goals/Plan    No documentation.       Clinical Impression     Row Name 12/06/20 1027          Pain Assessment    Additional Documentation  Pain Scale: Numbers Pre/Post-Treatment (Group)  -     Row Name 12/06/20 1027          Pain Scale: Numbers Pre/Post-Treatment    Pretreatment Pain Rating  0/10 - no pain  -     Posttreatment Pain Rating  0/10 - no pain  -     Row Name 12/06/20 1027          Plan of Care Review    Plan of Care Reviewed With  patient;spouse  -     Progress  improving  -     Outcome Summary  OT seen pt in room with wife present.  Pt demonstrates good outlook and motivated for therapy.  Proprioception and visual deficts, gross coordination deficits and L sided weakness noted.  OT had to provide redirection throughout treatment (very pleasant disposition but easily distracted).  OT provided AAROM to EWA and YOKO in prep for bed mobility and STS with pt requiring cues to slow down and have more focused movements.  Bed mobility with mod assist and verbal cues.  STS with manual cues and min assist multiple  times.  -SW     Row Name 12/06/20 1027          Vital Signs    Pre Systolic BP Rehab  135  -SW     Pre Treatment Diastolic BP  79  -SW     Pretreatment Heart Rate (beats/min)  66  -SW     O2 Delivery Pre Treatment  room air  -SW     O2 Delivery Intra Treatment  room air  -SW     O2 Delivery Post Treatment  room air  -SW     Pre Patient Position  Supine  -SW     Intra Patient Position  Sitting  -SW     Post Patient Position  Supine  -SW     Row Name 12/06/20 1027          Positioning and Restraints    Pre-Treatment Position  in bed  -SW     Post Treatment Position  bed  -SW     In Bed  notified nsg;supine;call light within reach;encouraged to call for assist;exit alarm on;with family/caregiver;side rails up x3  -SW       User Key  (r) = Recorded By, (t) = Taken By, (c) = Cosigned By    Initials Name Provider Type    Yudy Anaya OT Occupational Therapist        Outcome Measures     Row Name 12/06/20 1027          How much help from another is currently needed...    Putting on and taking off regular lower body clothing?  2  -SW     Bathing (including washing, rinsing, and drying)  2  -SW     Toileting (which includes using toilet bed pan or urinal)  1  -SW     Putting on and taking off regular upper body clothing  2  -SW     Taking care of personal grooming (such as brushing teeth)  3  -SW     Eating meals  3  -SW     AM-PAC 6 Clicks Score (OT)  13  -SW     Row Name 12/06/20 1027          Functional Assessment    Outcome Measure Options  AM-PAC 6 Clicks Daily Activity (OT)  -SW       User Key  (r) = Recorded By, (t) = Taken By, (c) = Cosigned By    Initials Name Provider Type    Yudy Anaya OT Occupational Therapist        Occupational Therapy Education                 Title: PT OT SLP Therapies (Done)     Topic: Occupational Therapy (Done)     Point: ADL training (Done)     Description:   Instruct learner(s) on proper safety adaptation and remediation techniques during self care or transfers.   Instruct in  proper use of assistive devices.              Learning Progress Summary           Patient Acceptance, E, VU,NR by  at 12/6/2020 1027    Acceptance, E,D, VU,NR by TA at 12/2/2020 1142   Family Acceptance, E, VU,NR by  at 12/6/2020 1027   Significant Other Acceptance, E,D, VU,NR by TA at 12/2/2020 1142                   Point: Home exercise program (Done)     Description:   Instruct learner(s) on appropriate technique for monitoring, assisting and/or progressing therapeutic exercises/activities.              Learning Progress Summary           Patient Acceptance, E, VU,NR by  at 12/6/2020 1027    Acceptance, E,D, VU,NR by TA at 12/2/2020 1142   Family Acceptance, E, VU,NR by  at 12/6/2020 1027   Significant Other Acceptance, E,D, VU,NR by TA at 12/2/2020 1142                   Point: Precautions (Done)     Description:   Instruct learner(s) on prescribed precautions during self-care and functional transfers.              Learning Progress Summary           Patient Acceptance, E,D, VU,NR by TA at 12/2/2020 1142   Significant Other Acceptance, E,D, VU,NR by TA at 12/2/2020 1142                   Point: Body mechanics (Done)     Description:   Instruct learner(s) on proper positioning and spine alignment during self-care, functional mobility activities and/or exercises.              Learning Progress Summary           Patient Acceptance, E, VU,NR by  at 12/6/2020 1027    Acceptance, E,D, VU,NR by TA at 12/2/2020 1142   Family Acceptance, E, VU,NR by  at 12/6/2020 1027   Significant Other Acceptance, E,D, VU,NR by TA at 12/2/2020 1142                               User Key     Initials Effective Dates Name Provider Type Discipline    TA 03/14/16 -  Dirk Clifford OT Occupational Therapist OT     01/29/20 -  Yudy Ravi OT Occupational Therapist OT              OT Recommendation and Plan     Plan of Care Review  Plan of Care Reviewed With: patient, spouse  Progress: improving  Outcome Summary: OT seen  pt in room with wife present.  Pt demonstrates good outlook and motivated for therapy.  Proprioception and visual deficts, gross coordination deficits and L sided weakness noted.  OT had to provide redirection throughout treatment (very pleasant disposition but easily distracted).  OT provided AAROM to ALEXE and LE in prep for bed mobility and STS with pt requiring cues to slow down and have more focused movements.  Bed mobility with mod assist and verbal cues.  STS with manual cues and min assist multiple times.     Time Calculation:   Time Calculation- OT     Row Name 12/06/20 1027             Time Calculation- OT    OT Start Time  1027  -SW      OT Received On  12/06/20  -SW         Timed Charges    43016 - OT Therapeutic Exercise Minutes  15  -SW      08507 - OT Therapeutic Activity Minutes  15  -SW      35605 - OT Self Care/Mgmt Minutes  15  -SW        User Key  (r) = Recorded By, (t) = Taken By, (c) = Cosigned By    Initials Name Provider Type    SW Yudy Ravi OT Occupational Therapist        Therapy Charges for Today     Code Description Service Date Service Provider Modifiers Qty    65196808572  OT THER PROC EA 15 MIN 12/6/2020 Yudy Ravi OT GO 1    [id number]  OT THERAPEUTIC ACT EA 15 MIN 12/6/2020 Yudy Ravi OT GO 1    [id number]  OT SELF CARE/MGMT/TRAIN EA 15 MIN 12/6/2020 Yudy Ravi OT GO 1               Yudy Ravi OT  12/6/2020

## 2020-12-06 NOTE — PLAN OF CARE
Goal Outcome Evaluation:  Plan of Care Reviewed With: patient, spouse  Progress: improving  Outcome Summary: Pt. had a good day. Remains confused briefly after awakening. Pt. still showing significant left sided deficits. Possible discharge to [facility] inpatient rehab pending precert. Encouraging PO intake. Patient dinner intake was 100% with 360ml of fluid intake. Will continue to monitor.

## 2020-12-06 NOTE — PROGRESS NOTES
"Neurology       Patient Care Team:  Tia Pratt MD as PCP - General (Family Medicine)    Chief complaint stroke      Subjective .     History: Patient does notice that vision is not quite as good on the left.  Wife reports he was in good shape before this happened, e.g. going up and down their basement stairs multiple times a day (although basement also has access via walk out).  Patient reports he is eager for rehab.  Denies headache.      Objective     Vital Signs   Blood pressure 135/79, pulse 73, temperature 98.2 °F (36.8 °C), temperature source Oral, resp. rate 18, height 175.3 cm (69\"), weight 83.9 kg (185 lb), SpO2 96 %.    Physical Exam:              Neuro: Well-developed elderly white man in no acute distress, alert, fluent and appropriate.  Pupils equal reactive, left HH  Motor stable/unchanged from 12/5/2020.      Assessment/Plan     80-year-old man with previously diagnosed A. fib not on anticoagulation, asymptomatic severe left ICA stenosis, and new right PCA territory infarct, mild left hemiparesis and left HH, clinically stable after starting Eliquis 12/4.  Transfer to [facility] pending,  Has follow-up planned with Dr. Johnson.    I discussed the patients findings and my recommendations with patient and family    Danette Haas MD  12/06/20  14:09 EST    "

## 2020-12-06 NOTE — PROGRESS NOTES
[facility]  PROGRESS NOTE    Patient Name: Dirk Miles  : 1940  MRN: [id number]    Date of Admission: 2020  Primary Care Physician: Tia Pratt MD    Subjective   Subjective     CC:  F/u CVA    HPI:  Patient is resting in bed in NAD with wife at bedside. He states he did not sleep well. He is tolerating diet.     ROS:  Gen- No fevers, chills  CV- No chest pain, palpitations  Resp- No cough, dyspnea  GI- No N/V/D, abd pain         Objective   Objective     Vital Signs:   Temp:  [98.2 °F (36.8 °C)-98.4 °F (36.9 °C)] 98.2 °F (36.8 °C)  Heart Rate:  [64-75] 73  Resp:  [16-18] 18  BP: (119-139)/(73-84) 135/79  Total (NIH Stroke Scale): 8     Physical Exam:  Constitutional: No acute distress, awake, alert  HENT: NCAT, mucous membranes moist  Respiratory: Clear to auscultation bilaterally, respiratory effort normal room air 95%  Cardiovascular: irregular, Afib, no murmurs, rubs, or gallops  Gastrointestinal: Positive bowel sounds, soft, nontender, nondistended  Musculoskeletal: No bilateral ankle edema  Psychiatric: Appropriate affect, cooperative  Neurologic: Oriented x 3, left sided weakness, left visual field deficit, speech clear   Skin: No rashes      Results Reviewed:  Results from last 7 days   Lab Units 20  0850 20  1003 20  0706  20  1129   WBC 10*3/mm3 7.86 7.70 7.35   < >  --    HEMOGLOBIN g/dL 13.2 12.4* 12.0*   < >  --    HEMATOCRIT % 40.7 38.5 37.3*   < >  --    PLATELETS 10*3/mm3 191 185 184   < >  --    INR   --   --   --   --  1.0    < > = values in this interval not displayed.     Results from last 7 days   Lab Units 20  0850 20  1003 20  0706   SODIUM mmol/L 134* 136 140   POTASSIUM mmol/L 4.4 4.4 4.2   CHLORIDE mmol/L 98 99 103   CO2 mmol/L 23.0 24.0 26.0   BUN mg/dL 21 18 18   CREATININE mg/dL 1.33* 1.20 1.25   GLUCOSE mg/dL 162* 160* 130*   CALCIUM mg/dL 9.7 9.9 9.9   ALT (SGPT) U/L  --    --  14   AST (SGOT) U/L  --   --  22     Estimated Creatinine Clearance: 52.6 mL/min (A) (by C-G formula based on SCr of 1.33 mg/dL (H)).    Microbiology Results Abnormal     Procedure Component Value - Date/Time    COVID PRE-OP / PRE-PROCEDURE SCREENING ORDER (NO ISOLATION) - Swab, Nasopharynx [[id number]]  (Normal) Collected: 12/01/20 1408    Lab Status: Final result Specimen: Swab from Nasopharynx Updated: 12/01/20 1409    Narrative:      The following orders were created for panel order COVID PRE-OP / PRE-PROCEDURE SCREENING ORDER (NO ISOLATION) - Swab, Nasopharynx.  Procedure                               Abnormality         Status                     ---------                               -----------         ------                     COVID-19,CEPHEID,MICHAEL IN-...[[id number]]  Normal              Final result                 Please view results for these tests on the individual orders.    COVID-19,CEPHEID,[place]ALE IN-HOUSE(OR EMERGENT/ADD-ON),NP SWAB IN TRANSPORT MEDIA 3-4 HR TAT - Swab, Nasopharynx [[id number]]  (Normal) Collected: 12/01/20 1408    Lab Status: Final result Specimen: Swab from Nasopharynx Updated: 12/01/20 1409     COVID19 Not Detected    Narrative:      Fact sheet for providers: https://www.fda.gov/media/742749/download     Fact sheet for patients: https://www.fda.gov/media/095317/download          Imaging Results (Last 24 Hours)     ** No results found for the last 24 hours. **          Results for orders placed during the hospital encounter of 12/01/20   Adult Transthoracic Echo Complete W/ Cont if Necessary Per Protocol (With Agitated Saline)    Narrative · Left ventricular ejection fraction appears to be 61 - 65%. Left   ventricular systolic function is normal.  · Left atrial volume is moderately increased.  · Saline test results are negative.  · The right atrial cavity is mildly dilated.  · Moderate tricuspid valve regurgitation is present.  · Estimated right ventricular systolic pressure from  tricuspid   regurgitation is moderately elevated (45-55 mmHg).          I have reviewed the medications:  Scheduled Meds:apixaban, 5 mg, Oral, Q12H  aspirin, 81 mg, Oral, Daily  atorvastatin, 80 mg, Oral, Nightly  cyanocobalamin, 1,000 mcg, Intramuscular, Daily  furosemide, 20 mg, Oral, Once per day on Mon Thu  insulin lispro, 0-7 Units, Subcutaneous, TID AC  lisinopril, 20 mg, Oral, Q24H  metoprolol tartrate, 50 mg, Oral, Q12H  senna-docusate sodium, 2 tablet, Oral, BID  sodium chloride, 10 mL, Intravenous, Q12H      Continuous Infusions:niCARdipine, 5-15 mg/hr      PRN Meds:.•  acetaminophen  •  bisacodyl  •  dextrose  •  dextrose  •  glucagon (human recombinant)  •  magnesium sulfate **OR** magnesium sulfate in D5W 1g/100mL (PREMIX) **OR** magnesium sulfate  •  polyethylene glycol  •  sodium chloride  •  sodium chloride    Assessment/Plan   Assessment & Plan     Active Hospital Problems    Diagnosis  POA   • A-fib (CMS/HCC) [I48.91]  Yes   • Hypertension [I10]  Yes   • Diabetes mellitus (CMS/HCC) [E11.9]  Yes   • Acute CVA (cerebrovascular accident) (CMS/HCC) [I63.9]  Yes      Resolved Hospital Problems   No resolved problems to display.        Brief Hospital Course to date:  Dirk Miles is a 80 y.o. male  with history of atrial fibrillation on ASA 81mg, also mild diabetes and HTN who presented to ED after developing sudden onset dizziness and left side weakness at approx 0730 PTA.  His wife was with him and called EMS.      Plan was partially entered by my partner and I have updated as approrpate  on 12/6/20        R PCA Stroke  - Pt with history of PAF but not on AC  - Continue ASA/Eliquis/Statin; ct head 12/4/20 no bleeding   - Echo 61-65%, moderately elevated RVSP, saline test negative  - PT/OT to see, patient and wife planning on [facility]     Fever  - single temp 100.1 12/4/20 (u/a  12/3 ok, cxr ok 12/4);   - IS encouraged  - continue to monitor     GUILLERMINA  - lasix restarted 12/4  -- unknown baseline creat was  1.25 on admit  -- monitor   -- encourage po intake        LICA 80-90% stenotic by CTA, asymptomatic  - asa, statin, eliquis  - future outpatient f/u per Neurostroke recs     Hx Afib, bradycardic on metoprolol   - continue BB  - CHADS-Vasc 7  - not previously on anticoagulation prior to this admission  -- eliquis started      HTN  - Normalize BP per Neuro  - Continue BB  - continue Lisinopril 20mg daily, titrate prn  - lasix previously restarted only getting mon and Thursday      DM2  - Hga1c 6.4  - SSI  -- on Glucophage at home     Anemia, macrocytic w/ low B12  - B12 low, will start daily IM injections x 1 week, weekly x 1 month, then monthly      DVT Prophylaxis:  eliquis       Disposition: I expect the patient to be discharged to rehab [facility] pending bed availability     CODE STATUS:   Code Status and Medical Interventions:   Ordered at: 12/01/20 2776     Level Of Support Discussed With:    Patient     Code Status:    CPR     Medical Interventions (Level of Support Prior to Arrest):    Full       Vivian Wheeler, APRN  12/06/20

## 2020-12-07 ENCOUNTER — APPOINTMENT (OUTPATIENT)
Dept: CT IMAGING | Facility: HOSPITAL | Age: 80
End: 2020-12-07

## 2020-12-07 LAB
GLUCOSE BLDC GLUCOMTR-MCNC: 120 MG/DL (ref 70–130)
GLUCOSE BLDC GLUCOMTR-MCNC: 136 MG/DL (ref 70–130)
GLUCOSE BLDC GLUCOMTR-MCNC: 141 MG/DL (ref 70–130)
GLUCOSE BLDC GLUCOMTR-MCNC: 146 MG/DL (ref 70–130)

## 2020-12-07 PROCEDURE — 70450 CT HEAD/BRAIN W/O DYE: CPT

## 2020-12-07 PROCEDURE — 92507 TX SP LANG VOICE COMM INDIV: CPT

## 2020-12-07 PROCEDURE — 99233 SBSQ HOSP IP/OBS HIGH 50: CPT | Performed by: NURSE PRACTITIONER

## 2020-12-07 PROCEDURE — 25010000002 CYANOCOBALAMIN PER 1000 MCG: Performed by: FAMILY MEDICINE

## 2020-12-07 PROCEDURE — 97110 THERAPEUTIC EXERCISES: CPT

## 2020-12-07 PROCEDURE — 82962 GLUCOSE BLOOD TEST: CPT

## 2020-12-07 PROCEDURE — 97116 GAIT TRAINING THERAPY: CPT

## 2020-12-07 RX ORDER — BACITRACIN ZINC AND POLYMYXIN B SULFATE 500; 10000 [USP'U]/G; [USP'U]/G
OINTMENT TOPICAL EVERY 12 HOURS SCHEDULED
Status: DISCONTINUED | OUTPATIENT
Start: 2020-12-07 | End: 2020-12-09 | Stop reason: HOSPADM

## 2020-12-07 RX ADMIN — SODIUM CHLORIDE, PRESERVATIVE FREE 10 ML: 5 INJECTION INTRAVENOUS at 09:42

## 2020-12-07 RX ADMIN — ACETAMINOPHEN 650 MG: 325 TABLET, FILM COATED ORAL at 20:58

## 2020-12-07 RX ADMIN — ATORVASTATIN CALCIUM 80 MG: 40 TABLET, FILM COATED ORAL at 20:58

## 2020-12-07 RX ADMIN — NICARDIPINE HYDROCHLORIDE 5 MG/HR: 0.1 INJECTION, SOLUTION INTRAVENOUS at 20:36

## 2020-12-07 RX ADMIN — FUROSEMIDE 20 MG: 20 TABLET ORAL at 09:41

## 2020-12-07 RX ADMIN — ASPIRIN 81 MG CHEWABLE TABLET 81 MG: 81 TABLET CHEWABLE at 09:42

## 2020-12-07 RX ADMIN — APIXABAN 5 MG: 5 TABLET, FILM COATED ORAL at 09:42

## 2020-12-07 RX ADMIN — ACETAMINOPHEN 650 MG: 325 TABLET, FILM COATED ORAL at 09:41

## 2020-12-07 RX ADMIN — DOCUSATE SODIUM 50 MG AND SENNOSIDES 8.6 MG 2 TABLET: 8.6; 5 TABLET, FILM COATED ORAL at 20:58

## 2020-12-07 RX ADMIN — HYDROCORTISONE: 25 CREAM TOPICAL at 21:59

## 2020-12-07 RX ADMIN — DOCUSATE SODIUM 50 MG AND SENNOSIDES 8.6 MG 2 TABLET: 8.6; 5 TABLET, FILM COATED ORAL at 09:42

## 2020-12-07 RX ADMIN — METOPROLOL TARTRATE 50 MG: 50 TABLET, FILM COATED ORAL at 20:58

## 2020-12-07 RX ADMIN — BACITRACIN ZINC AND POLYMYXIN B SULFATE: at 20:59

## 2020-12-07 RX ADMIN — METOPROLOL TARTRATE 50 MG: 50 TABLET, FILM COATED ORAL at 09:41

## 2020-12-07 RX ADMIN — LISINOPRIL 20 MG: 20 TABLET ORAL at 09:41

## 2020-12-07 RX ADMIN — CYANOCOBALAMIN 1000 MCG: 1000 INJECTION, SOLUTION INTRAMUSCULAR; SUBCUTANEOUS at 09:42

## 2020-12-07 NOTE — PLAN OF CARE
Goal Outcome Evaluation:  Plan of Care Reviewed With: patient, spouse  Progress: improving  Outcome Summary: Patient demonstrates improving strength and independence w/ mobility; however, continues to be impulsive and requires max cues for safety.  Pt. completed bed mobility w/ min A, SPT to chair w/ mod A, STS transfers from chair w/ min A and RUE support.  He also was able to ambulate 5ft x 3 w/ RUE support, mod A, max cueing for safety, following closely w/ chair.  Pt. able to accept weight on LLE w/ no knee buckling observed.  PT continues to recommend IPR at D/C for best outcome.

## 2020-12-07 NOTE — PROGRESS NOTES
Continued Stay Note  [facility]     Patient Name: Dirk Miles  MRN: [id number]  Today's Date: 12/7/2020    Admit Date: 12/1/2020    Discharge Plan     Row Name 12/07/20 1616       Plan    Plan  [facility] acute re[facility]    Plan Comments  Patient with plans to [facility] tomorrow at 11:45 with [facility] to Acute rehab.    Final Discharge Disposition Code  62 - inpatient rehab facility        Discharge Codes    No documentation.       Expected Discharge Date and Time     Expected Discharge Date Expected Discharge Time    Dec 9, 2020             Shilpi Zarate RN

## 2020-12-07 NOTE — PLAN OF CARE
Goal Outcome Evaluation:  Plan of Care Reviewed With: patient, spouse  Progress: improving       SLP treatment completed. Will continue to address cognition and communication in tx. Please see note for further details and recommendations.

## 2020-12-07 NOTE — PROGRESS NOTES
[facility] Medicine Services  PROGRESS NOTE    Patient Name: Dirk Miles  : 1940  MRN: [id number]    Date of Admission: 2020  Primary Care Physician: Tia Pratt MD    Subjective   Subjective     CC:  F/u CVA    HPI:  No new complaints  Wife concerned about rash on back    ROS:  Gen- No fevers, chills  CV- No chest pain, palpitations  Resp- No cough, dyspnea  GI- No N/V/D, abd pain    Objective   Objective     Vital Signs:   Temp:  [97.8 °F (36.6 °C)-98.6 °F (37 °C)] 98.1 °F (36.7 °C)  Heart Rate:  [56-72] 68  Resp:  [16-20] 18  BP: (125-149)/(67-99) 138/76  Total (NIH Stroke Scale): 8     Physical Exam:  Constitutional: No acute distress, awake, alert, wife at bedside  HENT: NCAT, mucous membranes moist  Respiratory: Clear to auscultation bilaterally, respiratory effort normal   Cardiovascular: irregular, afib on tele, no murmurs, rubs, or gallops  Gastrointestinal: Positive bowel sounds, soft, nontender, nondistended  Musculoskeletal: No bilateral ankle edema  Psychiatric: Appropriate affect, cooperative, pleasant  Neurologic: Oriented x 3, left sided weakness, left visual field deficit, speech clear  Skin: Mild dermatitis to back, no open sores noted      Results Reviewed:  Results from last 7 days   Lab Units 20  0850 20  1003 20  0706  20  1129   WBC 10*3/mm3 7.86 7.70 7.35   < >  --    HEMOGLOBIN g/dL 13.2 12.4* 12.0*   < >  --    HEMATOCRIT % 40.7 38.5 37.3*   < >  --    PLATELETS 10*3/mm3 191 185 184   < >  --    INR   --   --   --   --  1.0    < > = values in this interval not displayed.     Results from last 7 days   Lab Units 20  1423 20  0850 20  1003 20  0706   SODIUM mmol/L 135* 134* 136 140   POTASSIUM mmol/L 4.6 4.4 4.4 4.2   CHLORIDE mmol/L 97* 98 99 103   CO2 mmol/L 27.0 23.0 24.0 26.0   BUN mg/dL 23 21 18 18   CREATININE mg/dL 1.36* 1.33* 1.20 1.25   GLUCOSE mg/dL 168* 162* 160* 130*   CALCIUM  mg/dL 9.5 9.7 9.9 9.9   ALT (SGPT) U/L  --   --   --  14   AST (SGOT) U/L  --   --   --  22     Estimated Creatinine Clearance: 51.4 mL/min (A) (by C-G formula based on SCr of 1.36 mg/dL (H)).    Microbiology Results Abnormal     Procedure Component Value - Date/Time    COVID PRE-OP / PRE-PROCEDURE SCREENING ORDER (NO ISOLATION) - Swab, Nasopharynx [[id number]]  (Normal) Collected: 12/01/20 1408    Lab Status: Final result Specimen: Swab from Nasopharynx Updated: 12/01/20 1409    Narrative:      The following orders were created for panel order COVID PRE-OP / PRE-PROCEDURE SCREENING ORDER (NO ISOLATION) - Swab, Nasopharynx.  Procedure                               Abnormality         Status                     ---------                               -----------         ------                     COVID-19,CEPHEID,MICHAEL IN-...[[id number]]  Normal              Final result                 Please view results for these tests on the individual orders.    COVID-19,CEPHEID,MICHAEL IN-HOUSE(OR EMERGENT/ADD-ON),NP SWAB IN TRANSPORT MEDIA 3-4 HR TAT - Swab, Nasopharynx [[id number]]  (Normal) Collected: 12/01/20 1408    Lab Status: Final result Specimen: Swab from Nasopharynx Updated: 12/01/20 1409     COVID19 Not Detected    Narrative:      Fact sheet for providers: https://www.fda.gov/media/280483/download     Fact sheet for patients: https://www.fda.gov/media/512438/download          Imaging Results (Last 24 Hours)     ** No results found for the last 24 hours. **          Results for orders placed during the hospital encounter of 12/01/20   Adult Transthoracic Echo Complete W/ Cont if Necessary Per Protocol (With Agitated Saline)    Narrative · Left ventricular ejection fraction appears to be 61 - 65%. Left   ventricular systolic function is normal.  · Left atrial volume is moderately increased.  · Saline test results are negative.  · The right atrial cavity is mildly dilated.  · Moderate tricuspid valve regurgitation is  present.  · Estimated right ventricular systolic pressure from tricuspid   regurgitation is moderately elevated (45-55 mmHg).          I have reviewed the medications:  Scheduled Meds:apixaban, 5 mg, Oral, Q12H  aspirin, 81 mg, Oral, Daily  atorvastatin, 80 mg, Oral, Nightly  cyanocobalamin, 1,000 mcg, Intramuscular, Daily  furosemide, 20 mg, Oral, Once per day on Mon Thu  hydrocortisone, , Topical, Q12H  insulin lispro, 0-7 Units, Subcutaneous, TID AC  lisinopril, 20 mg, Oral, Q24H  metoprolol tartrate, 50 mg, Oral, Q12H  senna-docusate sodium, 2 tablet, Oral, BID  sodium chloride, 10 mL, Intravenous, Q12H      Continuous Infusions:niCARdipine, 5-15 mg/hr      PRN Meds:.•  acetaminophen  •  bisacodyl  •  dextrose  •  dextrose  •  glucagon (human recombinant)  •  magnesium sulfate **OR** magnesium sulfate in D5W 1g/100mL (PREMIX) **OR** magnesium sulfate  •  melatonin  •  polyethylene glycol  •  sodium chloride  •  sodium chloride    Assessment/Plan   Assessment & Plan     Active Hospital Problems    Diagnosis  POA   • A-fib (CMS/[facility]) [I48.91]  Yes   • Hypertension [I10]  Yes   • Diabetes mellitus (CMS/HCC) [E11.9]  Yes   • Acute CVA (cerebrovascular accident) (CMS/[facility]) [I63.9]  Yes      Resolved Hospital Problems   No resolved problems to display.        Brief Hospital Course to date:  Dirk Miles is a 80 y.o. male  with history of atrial fibrillation on ASA 81mg, also mild diabetes and HTN who presented to ED after developing sudden onset dizziness and left side weakness at approx 0730 PTA.  His wife was with him and called EMS.      R PCA Stroke  - Pt with history of PAF but not on AC prior to admission  - Continue ASA/Eliquis/Statin; ct head 12/4/20 no bleeding   - Echo 61-65%, moderately elevated RVSP, saline test negative  - continue PT/OT      Fever  - single temp 100.1 12/4/20 (u/a 12/3 ok, cxr ok 12/4); no recurrence  - IS encouraged  - continue to monitor     GUILLERMINA  - lasix restarted 12/4  - unknown  baseline creat was 1.25 on ad  - check BMP in am  - encourage po intake      LICA 80-90% stenotic by CTA, asymptomatic  - asa, statin, eliquis  - future outpatient f/u per Neurostroke Alex day as needed     Hx Afib, bradycardic on metoprolol   - continue BB  - CHADS-Vasc 7, not previously on anticoagulation prior to this admission  - eliquis started      HTN, stable  - Continue BB  - continue Lisinopril 20mg daily, titrate prn  - lasix previously restarted only getting on Monday and Thursday      DM2  - Hga1c 6.4  - SSI  - on Glucophage at home   Component      Latest Ref Rng & Units 12/6/2020 12/6/2020 12/7/2020 12/7/2020           3:57 PM  8:06 PM  7:27 AM 11:41 AM   Glucose      70 - 130 mg/dL 158 (H) 141 (H) 120 146 (H)     Anemia, macrocytic w/ low B12  - B12 low, will start daily IM injections x 1 week, weekly x 1 month, then monthly      DVT Prophylaxis:  eliquis     Disposition: I expect the patient to be discharged to rehab [facility] 12/8/20    CODE STATUS:   Code Status and Medical Interventions:   Ordered at: 12/01/20 1809     Level Of Support Discussed With:    Patient     Code Status:    CPR     Medical Interventions (Level of Support Prior to Arrest):    Full       Carmelina King, MARGARITA  12/07/20

## 2020-12-07 NOTE — SIGNIFICANT NOTE
Called for patient fall, hit head on ground after trip and fall. Apparently very loud and staff could hear outside room. Denies neck pain    Patient alert, oriented to person, year, month  Left forehead abrasion superficial  Left sided hemiparesis and visual neglect stable (unchanged); good right arm and leg movement, pupils equal    *fall w/ closed head injury/trauma this evening (w/ superficial abrasion   -trip and fall  *anticoagulated (on eliquis and asa)  *right pca CVA   -w/ left hemiparesis & visual neglect   -on asa, eliquis  *asympomatic left ica stenosis  *hx afib   -not anticoagulated prior to this admission, now on eliquis    Plan:  -d/w dr. landaverde on call for neuro:  -stop eliquis & asa  -stat CT head  -q2h neuro checks  -cardene gtt prn keep sbp <160  -irrigate/cleanse abrasion (d/w nurse)    -I called and updated wife Sarai, there was no answer but left a message

## 2020-12-07 NOTE — THERAPY TREATMENT NOTE
Patient Name: Dirk Miles  : 1940    MRN: [id number]                              Today's Date: 2020       Admit Date: 2020    Visit Dx:     ICD-10-CM ICD-9-CM   1. Cognitive communication deficit  R41.841 799.52   2. Acute CVA (cerebrovascular accident) (CMS/HCC)  I63.9 434.91     Patient Active Problem List   Diagnosis   • Acute CVA (cerebrovascular accident) (CMS/HCC)   • A-fib (CMS/[facility])   • Hypertension   • Diabetes mellitus (CMS/HCC)     Past Medical History:   Diagnosis Date   • A-fib (CMS/[facility])    • Diabetes mellitus (CMS/HCC)    • Hypertension      Past Surgical History:   Procedure Laterality Date   • CATARACT EXTRACTION, BILATERAL     • HERNIA REPAIR       General Information     Row Name 20          Physical Therapy Time and Intention    Document Type  therapy note (daily note)  -MB     Mode of Treatment  physical therapy  -MB     Row Name 2050          General Information    Patient Profile Reviewed  yes  -MB     Existing Precautions/Restrictions  fall  -MB     Barriers to Rehab  medically complex;cognitive status  -MB     Row Name 2050          Cognition    Orientation Status (Cognition)  oriented x 4  -MB     Row Name 2050          Safety Issues, Functional Mobility    Safety Issues Affecting Function (Mobility)  ability to follow commands;at risk behavior observed;awareness of need for assistance;impulsivity;insight into deficits/self-awareness;judgment;safety precaution awareness;safety precautions follow-through/compliance  -MB     Impairments Affecting Function (Mobility)  balance;coordination;endurance/activity tolerance;grasp;motor control;postural/trunk control  -MB       User Key  (r) = Recorded By, (t) = Taken By, (c) = Cosigned By    Initials Name Provider Type    Ericka Grullon, PT Physical Therapist        Mobility     Row Name 20 0950          Bed Mobility    Supine-Sit [place] (Bed Mobility)  minimum assist (75%  patient effort);verbal cues  -MB     Sit-Supine [place] (Bed Mobility)  not tested  -MB     Assistive Device (Bed Mobility)  bed rails;head of bed elevated  -MB     Comment (Bed Mobility)  Pt. able to move BLEs towards EOB and required frequent VCs for sequencing and redirection and assist to raise trunk/shoulders.  -MB     Row Name 12/07/20 0971          Transfers    Comment (Transfers)  Pt. performed SPT to chair to pt's R side and STS from chair x 4 reps w/ assist for set up (placement of LLE) and VCs for safe hand placement w/ RUE.  Pt. w/ strong lateral lean to L.  -MB     Row Name 12/07/20 0950          Bed-Chair Transfer    Bed-Chair [place] (Transfers)  moderate assist (50% patient effort);verbal cues  -MB     Assistive Device (Bed-Chair Transfers)  -- BUE support  -MB     Row Name 12/07/20 0955          Sit-Stand Transfer    Sit-Stand [place] (Transfers)  minimum assist (75% patient effort);verbal cues  -MB     Assistive Device (Sit-Stand Transfers)  -- RUE support  -MB     Row Name 12/07/20 0950          Gait/Stairs (Locomotion)    [place] Level (Gait)  moderate assist (50% patient effort);1 person to manage equipment;verbal cues  -MB     Assistive Device (Gait)  -- RUE support on bedrail  -MB     Distance in Feet (Gait)  5ft x 3  -MB     Deviations/Abnormal Patterns (Gait)  left sided deviations;fredo decreased;gait speed decreased;steppage;weight shifting decreased  -MB     Bilateral Gait Deviations  forward flexed posture;heel strike decreased  -MB     Left Sided Gait Deviations  leans left;weight shift ability decreased  -MB     Comment (Gait/Stairs)  Pt. amb 5ft x 3 along elevated bedrail (on patient's R side) w/ impaired coordination/motor control LLE (no knee buckling).  Pt. impulsive, requires max cues for step sequence and safety.  Followed closely w/ chair.  -MB       User Key  (r) = Recorded By, (t) = Taken By, (c) = Cosigned By    Initials Name Provider Type    MB  Ericka Baxter, PT Physical Therapist        Obj/Interventions     Row Name 12/07/20 0950          Motor Skills    Therapeutic Exercise  hip;knee;ankle  -MB     Row Name 12/07/20 0950          Hip (Therapeutic Exercise)    Hip (Therapeutic Exercise)  strengthening exercise  -MB     Hip Strengthening (Therapeutic Exercise)  bilateral;aBduction;marching while seated x15  -MB     Row Name 12/07/20 0950          Knee (Therapeutic Exercise)    Knee (Therapeutic Exercise)  strengthening exercise decreased motor control LLE  -MB     Knee Strengthening (Therapeutic Exercise)  bilateral;LAQ (long arc quad) x15  -MB     Row Name 12/07/20 0950          Ankle (Therapeutic Exercise)    Ankle AROM (Therapeutic Exercise)  bilateral;dorsiflexion;plantarflexion;10 repetitions  -MB     Row Name 12/07/20 0950          Balance    Static Sitting Balance  mild impairment;unsupported;sitting, edge of bed  -MB     Dynamic Sitting Balance  mild impairment;unsupported;sitting, edge of bed  -MB     Static Standing Balance  moderate impairment;supported  -MB     Dynamic Standing Balance  moderate impairment;supported  -MB     Comment, Balance  Pt. leans/pushes L.  -MB       User Key  (r) = Recorded By, (t) = Taken By, (c) = Cosigned By    Initials Name Provider Type    Ericka Grullon, PT Physical Therapist        Goals/Plan    No documentation.       Clinical Impression     Row Name 12/07/20 0950          Pain Scale: Numbers Pre/Post-Treatment    Pretreatment Pain Rating  0/10 - no pain  -MB     Posttreatment Pain Rating  0/10 - no pain  -MB     Row Name 12/07/20 0950          Pain Scale: FACES Pre/Post-Treatment    Pain: FACES Scale, Pretreatment  0-->no hurt  -MB     Posttreatment Pain Rating  0-->no hurt  -MB     Row Name 12/07/20 0950          Plan of Care Review    Plan of Care Reviewed With  patient;spouse  -MB     Progress  improving  -MB     Outcome Summary  Patient demonstrates improving strength and independence w/  mobility; however, continues to be impulsive and requires max cues for safety.  Pt. completed bed mobility w/ min A, SPT to chair w/ mod A, STS transfers from chair w/ min A and RUE support.  He also was able to ambulate 5ft x 3 w/ RUE support, mod A, max cueing for safety, following closely w/ chair.  Pt. able to accept weight on LLE w/ no knee buckling observed.  PT continues to recommend IPR at D/C for best outcome.  -MB     Row Name 12/07/20 0950          Vital Signs    Pre Systolic BP Rehab  -- VSS.  RN cleared for PT.  -MB     Pre Patient Position  Supine  -MB     Intra Patient Position  Standing  -MB     Post Patient Position  Sitting  -MB     Row Name 12/07/20 0950          Positioning and Restraints    Pre-Treatment Position  in bed  -MB     Post Treatment Position  chair  -MB     In Chair  notified nsg;reclined;call light within reach;encouraged to call for assist;exit alarm on;with family/caregiver;legs elevated;LUE elevated  -MB       User Key  (r) = Recorded By, (t) = Taken By, (c) = Cosigned By    Initials Name Provider Type    Ericka Grullon, PT Physical Therapist        Outcome Measures     Row Name 12/07/20 0950          How much help from another person do you currently need...    Turning from your back to your side while in flat bed without using bedrails?  3  -MB     Moving from lying on back to sitting on the side of a flat bed without bedrails?  3  -MB     Moving to and from a bed to a chair (including a wheelchair)?  2  -MB     Standing up from a chair using your arms (e.g., wheelchair, bedside chair)?  2  -MB     Climbing 3-5 steps with a railing?  1  -MB     To walk in hospital room?  2  -MB     AM-PAC 6 Clicks Score (PT)  13  -MB     Row Name 12/07/20 0950          Functional Assessment    Outcome Measure Options  AM-PAC 6 Clicks Basic Mobility (PT)  -MB       User Key  (r) = Recorded By, (t) = Taken By, (c) = Cosigned By    Initials Name Provider Type    Ericka Grullon, PT  Physical Therapist        Physical Therapy Education                 Title: PT OT SLP Therapies (Done)     Topic: Physical Therapy (Done)     Point: Mobility training (Done)     Learning Progress Summary           Patient Acceptance, E,D, NR,VU by  at 12/5/2020 1400    Comment: HEP, safety, transfers, bed mobility, gait    Acceptance, E,D, VU,NR by LR at 12/3/2020 1330    Comment: Educated on benefits of mobility and being OOB. Educated on correct supine to sit t/f technique, correct sit<->stand t/f technique, correct bed to chair t/f technique, safety with mobility, LE HEP, and progression of POC.    Acceptance, E,D, VU,NR by LR at 12/2/2020 1405    Comment: Educated on precautions, benefits of mobility, safety with mobility, correct supine<->sit t/f technique, correct sit<->stand t/f technique, and progression of POC.   Family Acceptance, E,D, NR,VU by  at 12/5/2020 1400    Comment: HEP, safety, transfers, bed mobility, gait   Significant Other Acceptance, E,D, VU,NR by LR at 12/3/2020 1330    Comment: Educated on benefits of mobility and being OOB. Educated on correct supine to sit t/f technique, correct sit<->stand t/f technique, correct bed to chair t/f technique, safety with mobility, LE HEP, and progression of POC.    Acceptance, E,D, VU,NR by LR at 12/2/2020 1405    Comment: Educated on precautions, benefits of mobility, safety with mobility, correct supine<->sit t/f technique, correct sit<->stand t/f technique, and progression of POC.                   Point: Home exercise program (Done)     Learning Progress Summary           Patient Acceptance, E,D, NR,VU by  at 12/5/2020 1400    Comment: HEP, safety, transfers, bed mobility, gait    Acceptance, E,D, VU,NR by LR at 12/3/2020 1330    Comment: Educated on benefits of mobility and being OOB. Educated on correct supine to sit t/f technique, correct sit<->stand t/f technique, correct bed to chair t/f technique, safety with mobility, LE HEP, and  progression of POC.    Acceptance, E,D, VU,NR by LR at 12/2/2020 1405    Comment: Educated on precautions, benefits of mobility, safety with mobility, correct supine<->sit t/f technique, correct sit<->stand t/f technique, and progression of POC.   Family Acceptance, E,D, NR,VU by SH at 12/5/2020 1400    Comment: HEP, safety, transfers, bed mobility, gait   Significant Other Acceptance, E,D, VU,NR by LR at 12/3/2020 1330    Comment: Educated on benefits of mobility and being OOB. Educated on correct supine to sit t/f technique, correct sit<->stand t/f technique, correct bed to chair t/f technique, safety with mobility, LE HEP, and progression of POC.    Acceptance, E,D, VU,NR by LR at 12/2/2020 1405    Comment: Educated on precautions, benefits of mobility, safety with mobility, correct supine<->sit t/f technique, correct sit<->stand t/f technique, and progression of POC.                   Point: Body mechanics (Done)     Learning Progress Summary           Patient Acceptance, E,D, VU,NR by LR at 12/3/2020 1330    Comment: Educated on benefits of mobility and being OOB. Educated on correct supine to sit t/f technique, correct sit<->stand t/f technique, correct bed to chair t/f technique, safety with mobility, LE HEP, and progression of POC.    Acceptance, E,D, VU,NR by LR at 12/2/2020 1405    Comment: Educated on precautions, benefits of mobility, safety with mobility, correct supine<->sit t/f technique, correct sit<->stand t/f technique, and progression of POC.   Significant Other Acceptance, E,D, VU,NR by LR at 12/3/2020 1330    Comment: Educated on benefits of mobility and being OOB. Educated on correct supine to sit t/f technique, correct sit<->stand t/f technique, correct bed to chair t/f technique, safety with mobility, LE HEP, and progression of POC.    Acceptance, E,D, VU,NR by LR at 12/2/2020 1405    Comment: Educated on precautions, benefits of mobility, safety with mobility, correct supine<->sit t/f  technique, correct sit<->stand t/f technique, and progression of POC.                   Point: Precautions (Done)     Learning Progress Summary           Patient Acceptance, E,D, VU,NR by LR at 12/3/2020 1330    Comment: Educated on benefits of mobility and being OOB. Educated on correct supine to sit t/f technique, correct sit<->stand t/f technique, correct bed to chair t/f technique, safety with mobility, LE HEP, and progression of POC.    Acceptance, E,D, VU,NR by LR at 12/2/2020 1405    Comment: Educated on precautions, benefits of mobility, safety with mobility, correct supine<->sit t/f technique, correct sit<->stand t/f technique, and progression of POC.   Significant Other Acceptance, E,D, VU,NR by LR at 12/3/2020 1330    Comment: Educated on benefits of mobility and being OOB. Educated on correct supine to sit t/f technique, correct sit<->stand t/f technique, correct bed to chair t/f technique, safety with mobility, LE HEP, and progression of POC.    Acceptance, E,D, VU,NR by LR at 12/2/2020 1405    Comment: Educated on precautions, benefits of mobility, safety with mobility, correct supine<->sit t/f technique, correct sit<->stand t/f technique, and progression of POC.                               User Key     Initials Effective Dates Name Provider Type Discipline     06/19/15 -  Danette Mann, PT Physical Therapist PT    LR 06/19/15 -  Marizol Vargas, PT Physical Therapist PT              PT Recommendation and Plan     Plan of Care Reviewed With: patient, spouse  Progress: improving  Outcome Summary: Patient demonstrates improving strength and independence w/ mobility; however, continues to be impulsive and requires max cues for safety.  Pt. completed bed mobility w/ min A, SPT to chair w/ mod A, STS transfers from chair w/ min A and RUE support.  He also was able to ambulate 5ft x 3 w/ RUE support, mod A, max cueing for safety, following closely w/ chair.  Pt. able to accept weight on LLE w/  no knee buckling observed.  PT continues to recommend IPR at D/C for best outcome.     Time Calculation:   PT Charges     Row Name 12/07/20 1154             Time Calculation    Start Time  0950  -MB      PT Received On  12/07/20  -MB      PT Goal Re-Cert Due Date  12/12/20  -MB         Time Calculation- PT    Total Timed Code Minutes- PT  38 minute(s)  -MB         Timed Charges    82786 - PT Therapeutic Exercise Minutes  30  -MB      88975 - Gait Training Minutes   18  -MB        User Key  (r) = Recorded By, (t) = Taken By, (c) = Cosigned By    Initials Name Provider Type    Ericka Grullon, PT Physical Therapist        Therapy Charges for Today     Code Description Service Date Service Provider Modifiers Qty    09271662362 HC PT THER PROC EA 15 MIN 12/7/2020 Ericka Baxter, PT GP 2    [id number] HC GAIT TRAINING EA 15 MIN 12/7/2020 Ericka Baxter, PT GP 1          PT G-Codes  Outcome Measure Options: AM-PAC 6 Clicks Basic Mobility (PT)  AM-PAC 6 Clicks Score (PT): 13  AM-PAC 6 Clicks Score (OT): 13  Modified Peter Scale: 4 - Moderately severe disability.  Unable to walk without assistance, and unable to attend to own bodily needs without assistance.    Ericka Baxter, SIRENA  12/7/2020

## 2020-12-07 NOTE — THERAPY TREATMENT NOTE
Acute Care - Speech Language Pathology Treatment Note  [facility]     Patient Name: Dirk Miles  : 1940  MRN: [id number]  Today's Date: 2020               Admit Date: 2020     Visit Dx:    ICD-10-CM ICD-9-CM   1. Cognitive communication deficit  R41.841 799.52   2. Acute CVA (cerebrovascular accident) (CMS/HCC)  I63.9 434.91     Patient Active Problem List   Diagnosis   • Acute CVA (cerebrovascular accident) (CMS/HCC)   • A-fib (CMS/HCC)   • Hypertension   • Diabetes mellitus (CMS/HCC)     Past Medical History:   Diagnosis Date   • A-fib (CMS/HCC)    • Diabetes mellitus (CMS/HCC)    • Hypertension      Past Surgical History:   Procedure Laterality Date   • CATARACT EXTRACTION, BILATERAL     • HERNIA REPAIR          SLP EVALUATION (last 72 hours)      SLP SLC Evaluation     Row Name 20 3280                   Communication Assessment/Intervention    Document Type  therapy note (daily note)  -        Subjective Information  no complaints  -        Patient Observations  alert;cooperative;agree to therapy  -        Patient/Family/Caregiver Comments/Observations  Spouse present  -        Care Plan Review  care plan/treatment goals reviewed  -        Care Plan Review, Other Participant(s)  spouse  -        Patient Effort  good  -           Pain    Additional Documentation  Pain Scale: FACES Pre/Post-Treatment (Group);Pain Scale: Numbers Pre/Post-Treatment (Group)  -           Pain Scale: Numbers Pre/Post-Treatment    Pretreatment Pain Rating  0/10 - no pain  -        Posttreatment Pain Rating  0/10 - no pain  -           Pain Scale: FACES Pre/Post-Treatment    Pain: FACES Scale, Pretreatment  0-->no hurt  -        Posttreatment Pain Rating  0-->no hurt  -           SLP Clinical Impressions    Daily Summary of Progress (SLP)  progress toward functional goals is good  -        Plan for Continued Treatment (SLP)  Cont  SLP services. Anticipate therapy at next level of  "care for cog/comm deficits  -           Recommendations    Therapy Frequency (SLP SLC)  5 days per week  -CH        Anticipated Discharge Disposition (SLP)  unknown;anticipate therapy at next level of care  -          User Key  (r) = Recorded By, (t) = Taken By, (c) = Cosigned By    Initials Name Effective Dates    GERONIMO Calderon, Tami, MS CCC-SLP 12/04/20 -              EDUCATION  The patient has been educated in the following areas:     Cognitive Impairment Communication Impairment.    SLP Recommendation and Plan              Anticipated Discharge Disposition (SLP): unknown, anticipate therapy at next level of care           Daily Summary of Progress (SLP): progress toward functional goals is good  Plan for Continued Treatment (SLP): Cont IP SLP services. Anticipate therapy at next level of care for cog/comm deficits                    SLP GOALS     Row Name 12/07/20 1350             Follow Directions Goal 2 (SLP)    Improve Ability to Follow Directions Goal 1 (SLP)  3 step commands;80%;with minimal cues (75-90%)  -      Time Frame (Follow Directions Goal 1, SLP)  by discharge  -CH      Progress (Ability to Follow Directions Goal 1, SLP)  80%;with minimal cues (75-90%)  -CH      Progress/Outcomes (Follow Directions Goal 1, SLP)  continuing progress toward goal  -CH      Comment (Follow Directions Goal 1, SLP)  repetition needed.   -CH         Reading Comprehension at Word Level Goal 1 (SLP)    Improve Reading Comprehension at Word Level Goal 1 (SLP)  matching picture to word;90%;with minimal cues (75-90%)  -CH      Time Frame (Reading Comprehension at Word Level Goal 1, SLP)  by discharge  -CH      Progress (Reading Comprehension at Word Level Goal 1, SLP)  50%;with moderate cues (50-74%)  -CH      Progress/Outcomes (Reading Comprehension at Word Level Goal 1, SLP)  continuing progress toward goal  -CH      Comment (Reading Comprehension at Word Level Goal 1, SLP)  Requires large (approx 1\") print  -CH      "    Comprehension at Phrase and Sentence Level Goal 1 (SLP)    Improve Reading Comprehension at Phrase and Sentence Level Goal 1 (SLP)  phrase;80%;with minimal cues (75-90%)  -      Time Frame (Reading Comprehension at Phrase and Sentence Level Goal 1, SLP)  by discharge  -      Progress (Reading Comprehension at Phrase and Sentence Level Goal 1, SLP)  with minimal cues (75-90%);80%  -CH      Progress/Outcomes (Reading Comprehension at Phrase and Sentence Level Goal 1, SLP)  continuing progress toward goal  -CH         Word Retrieval Skills Goal 1 (SLP)    Improve Word Retrieval Skills By Goal 1 (SLP)  low frequency;supplying an antonym;supplying a synonym;completing a divergent task;completing a convergent task;80%;with minimal cues (75-90%)  -      Time Frame (Word Retrieval Goal 1, SLP)  by discharge  -      Progress (Word Retrieval Skills Goal 1, SLP)  70%;with minimal cues (75-90%)  -      Progress/Outcomes (Word Retrieval Goal 1, SLP)  continuing progress toward goal  -CH      Comment (Word Retrieval Goal 1, SLP)  Divergent naming  -         Memory Skills Goal 1 (SLP)    Improve Memory Skills Through Goal 1 (SLP)  recalling related word lists immediately;recalling related word lists with an imposed delay;recalling unrelated word lists immediately;recalling unrelated word lists with an imposed delay;80%;with minimal cues (75-90%)  -      Time Frame (Memory Skills Goal 1, SLP)  by discharge  -      Progress (Memory Skills Goal 1, SLP)  50%;with minimal cues (75-90%)  -      Progress/Outcomes (Memory Skills Goal 1, SLP)  continuing progress toward goal  -      Comment (Memory Skills Goal 1, SLP)  immediate recall of word list  -         Organizational Skills Goal 1 (SLP)    Improve Thought Organization Through Goal 1 (SLP)  completing a divergent naming task;completing a convergent naming task;80%;with minimal cues (75-90%)  -      Time Frame (Thought Organization Skills Goal 1, SLP)  by  discharge  -CH      Progress (Thought Organization Skills Goal 1, SLP)  70%;with moderate cues (50-74%)  -CH      Progress/Outcomes (Thought Organization Skills Goal 1, SLP)  continuing progress toward goal  -CH         Reasoning Goal 1 (SLP)    Improve Reasoning Through Goal 1 (SLP)  complete deductive reasoning task;complete mental flexibility task;80%;with minimal cues (75-90%)  -CH      Time Frame (Reasoning Goal 1, SLP)  by discharge  -CH      Progress (Reasoning Goal 1, SLP)  70%;with minimal cues (75-90%)  -CH      Progress/Outcomes (Reasoning Goal 1, SLP)  continuing progress toward goal  -CH         Functional Problem Solving Skills Goal 1 (SLP)    Improve Problem Solving Through Goal 1 (SLP)  determine multiple solutions to problems;tell similarity between items;80%;with minimal cues (75-90%)  -CH      Time Frame (Problem Solving Goal 1, SLP)  by discharge  -CH      Progress/Outcomes (Problem Solving Goal 1, SLP)  goal ongoing  -CH         Right Hemisphere Function Goal 1 (SLP)    Improve Right Hemisphere Function Through Goal 1 (SLP)  complete visuo-spatial activities (visual closure, trail making, mazes;80%;with minimal cues (75-90%)  -CH      Time Frame (Right Hemisphere Function Goal 1, SLP)  by discharge  -CH      Progress/Outcomes (Right Hemisphere Function Goal 1, SLP)  goal ongoing  -         Additional Goal 1 (SLP)    Additional Goal 1, SLP  LTG: Patient will improve cognitive linguistic skills to WFL without cues to improve safety and independence at the next level of care  -      Time Frame (Additional Goal 1, SLP)  by discharge  -CH      Progress/Outcomes (Additional Goal 1, SLP)  continuing progress toward goal  -CH        User Key  (r) = Recorded By, (t) = Taken By, (c) = Cosigned By    Initials Name Provider Type    Tami Apodaca MS CCC-SLP Speech and Language Pathologist                  Time Calculation:     Time Calculation- SLP     Row Name 12/07/20 7392             Time  Calculation- SLP    SLP Start Time  1350  -      SLP Received On  12/07/20  -        User Key  (r) = Recorded By, (t) = Taken By, (c) = Cosigned By    Initials Name Provider Type    Tami Apodaca MS CCC-SLP Speech and Language Pathologist          Therapy Charges for Today     Code Description Service Date Service Provider Modifiers Qty    [id number]  ST TREATMENT SPEECH 4 12/7/2020 Tami Calderon MS CCC-SLP GN 1                     MS RUSSELL Rene  12/7/2020       Patient was not wearing a face mask and did not exhibit coughing during this therapy encounter.  Procedure performed was aerosolizing, involved close contact (within 6 feet for at least 15 minutes or longer), and did not involve contact with infectious secretions or specimens.  Therapist used appropriate personal protective equipment including gloves, standard procedure mask and eye protection.  Appropriate PPE was worn during the entire therapy session.  Hand hygiene was completed before and after therapy session.

## 2020-12-07 NOTE — PLAN OF CARE
Goal Outcome Evaluation:  Plan of Care Reviewed With: patient  Progress: improving  Outcome Summary: VSS; afib per monitor. RA. A&Ox4 at beginning of shift, occasionally disoriented to situation especially after waking. Encouraged PO fluid intake. Pt. rested well during the night. C/o back once, PRN pain med given. Pt. pleasant and calm throughout shift. Will continue to monitor.

## 2020-12-08 ENCOUNTER — APPOINTMENT (OUTPATIENT)
Dept: CT IMAGING | Facility: HOSPITAL | Age: 80
End: 2020-12-08

## 2020-12-08 LAB
ANION GAP SERPL CALCULATED.3IONS-SCNC: 12 MMOL/L (ref 5–15)
BASOPHILS # BLD AUTO: 0.09 10*3/MM3 (ref 0–0.2)
BASOPHILS NFR BLD AUTO: 1 % (ref 0–1.5)
BUN SERPL-MCNC: 24 MG/DL (ref 8–23)
BUN/CREAT SERPL: 18.5 (ref 7–25)
CALCIUM SPEC-SCNC: 9.8 MG/DL (ref 8.6–10.5)
CHLORIDE SERPL-SCNC: 98 MMOL/L (ref 98–107)
CO2 SERPL-SCNC: 28 MMOL/L (ref 22–29)
CREAT SERPL-MCNC: 1.3 MG/DL (ref 0.76–1.27)
DEPRECATED RDW RBC AUTO: 47.7 FL (ref 37–54)
EOSINOPHIL # BLD AUTO: 0.29 10*3/MM3 (ref 0–0.4)
EOSINOPHIL NFR BLD AUTO: 3.2 % (ref 0.3–6.2)
ERYTHROCYTE [DISTWIDTH] IN BLOOD BY AUTOMATED COUNT: 12.8 % (ref 12.3–15.4)
GFR SERPL CREATININE-BSD FRML MDRD: 53 ML/MIN/1.73
GLUCOSE BLDC GLUCOMTR-MCNC: 117 MG/DL (ref 70–130)
GLUCOSE BLDC GLUCOMTR-MCNC: 121 MG/DL (ref 70–130)
GLUCOSE BLDC GLUCOMTR-MCNC: 123 MG/DL (ref 70–130)
GLUCOSE BLDC GLUCOMTR-MCNC: 124 MG/DL (ref 70–130)
GLUCOSE BLDC GLUCOMTR-MCNC: 132 MG/DL (ref 70–130)
GLUCOSE SERPL-MCNC: 127 MG/DL (ref 65–99)
HCT VFR BLD AUTO: 41.3 % (ref 37.5–51)
HGB BLD-MCNC: 13 G/DL (ref 13–17.7)
IMM GRANULOCYTES # BLD AUTO: 0.03 10*3/MM3 (ref 0–0.05)
IMM GRANULOCYTES NFR BLD AUTO: 0.3 % (ref 0–0.5)
LYMPHOCYTES # BLD AUTO: 1.14 10*3/MM3 (ref 0.7–3.1)
LYMPHOCYTES NFR BLD AUTO: 12.4 % (ref 19.6–45.3)
MAGNESIUM SERPL-MCNC: 2.2 MG/DL (ref 1.6–2.4)
MCH RBC QN AUTO: 31.7 PG (ref 26.6–33)
MCHC RBC AUTO-ENTMCNC: 31.5 G/DL (ref 31.5–35.7)
MCV RBC AUTO: 100.7 FL (ref 79–97)
MONOCYTES # BLD AUTO: 1.12 10*3/MM3 (ref 0.1–0.9)
MONOCYTES NFR BLD AUTO: 12.2 % (ref 5–12)
NEUTROPHILS NFR BLD AUTO: 6.5 10*3/MM3 (ref 1.7–7)
NEUTROPHILS NFR BLD AUTO: 70.9 % (ref 42.7–76)
NRBC BLD AUTO-RTO: 0 /100 WBC (ref 0–0.2)
PHOSPHATE SERPL-MCNC: 4 MG/DL (ref 2.5–4.5)
PLATELET # BLD AUTO: 214 10*3/MM3 (ref 140–450)
PMV BLD AUTO: 10.7 FL (ref 6–12)
POTASSIUM SERPL-SCNC: 4.7 MMOL/L (ref 3.5–5.2)
RBC # BLD AUTO: 4.1 10*6/MM3 (ref 4.14–5.8)
SARS-COV-2 RNA RESP QL NAA+PROBE: NOT DETECTED
SODIUM SERPL-SCNC: 138 MMOL/L (ref 136–145)
TSH SERPL DL<=0.05 MIU/L-ACNC: 2.59 UIU/ML (ref 0.27–4.2)
WBC # BLD AUTO: 9.17 10*3/MM3 (ref 3.4–10.8)

## 2020-12-08 PROCEDURE — 84100 ASSAY OF PHOSPHORUS: CPT | Performed by: INTERNAL MEDICINE

## 2020-12-08 PROCEDURE — U0003 INFECTIOUS AGENT DETECTION BY NUCLEIC ACID (DNA OR RNA); SEVERE ACUTE RESPIRATORY SYNDROME CORONAVIRUS 2 (SARS-COV-2) (CORONAVIRUS DISEASE [COVID-19]), AMPLIFIED PROBE TECHNIQUE, MAKING USE OF HIGH THROUGHPUT TECHNOLOGIES AS DESCRIBED BY CMS-2020-01-R: HCPCS | Performed by: NURSE PRACTITIONER

## 2020-12-08 PROCEDURE — 70450 CT HEAD/BRAIN W/O DYE: CPT

## 2020-12-08 PROCEDURE — 97168 OT RE-EVAL EST PLAN CARE: CPT

## 2020-12-08 PROCEDURE — 82962 GLUCOSE BLOOD TEST: CPT

## 2020-12-08 PROCEDURE — 83735 ASSAY OF MAGNESIUM: CPT | Performed by: INTERNAL MEDICINE

## 2020-12-08 PROCEDURE — 84443 ASSAY THYROID STIM HORMONE: CPT | Performed by: INTERNAL MEDICINE

## 2020-12-08 PROCEDURE — 80048 BASIC METABOLIC PNL TOTAL CA: CPT | Performed by: NURSE PRACTITIONER

## 2020-12-08 PROCEDURE — 25010000002 CYANOCOBALAMIN PER 1000 MCG: Performed by: CLINICAL NURSE SPECIALIST

## 2020-12-08 PROCEDURE — 92507 TX SP LANG VOICE COMM INDIV: CPT

## 2020-12-08 PROCEDURE — 99232 SBSQ HOSP IP/OBS MODERATE 35: CPT | Performed by: NEUROLOGICAL SURGERY

## 2020-12-08 PROCEDURE — 99233 SBSQ HOSP IP/OBS HIGH 50: CPT | Performed by: STUDENT IN AN ORGANIZED HEALTH CARE EDUCATION/TRAINING PROGRAM

## 2020-12-08 PROCEDURE — 99232 SBSQ HOSP IP/OBS MODERATE 35: CPT | Performed by: INTERNAL MEDICINE

## 2020-12-08 PROCEDURE — 97530 THERAPEUTIC ACTIVITIES: CPT

## 2020-12-08 PROCEDURE — 85025 COMPLETE CBC W/AUTO DIFF WBC: CPT | Performed by: INTERNAL MEDICINE

## 2020-12-08 PROCEDURE — 97164 PT RE-EVAL EST PLAN CARE: CPT

## 2020-12-08 RX ORDER — CYANOCOBALAMIN 1000 UG/ML
1000 INJECTION, SOLUTION INTRAMUSCULAR; SUBCUTANEOUS WEEKLY
Status: DISCONTINUED | OUTPATIENT
Start: 2020-12-08 | End: 2020-12-09 | Stop reason: HOSPADM

## 2020-12-08 RX ADMIN — METOPROLOL TARTRATE 50 MG: 50 TABLET, FILM COATED ORAL at 20:05

## 2020-12-08 RX ADMIN — ATORVASTATIN CALCIUM 80 MG: 40 TABLET, FILM COATED ORAL at 20:05

## 2020-12-08 RX ADMIN — CYANOCOBALAMIN 1000 MCG: 1000 INJECTION, SOLUTION INTRAMUSCULAR; SUBCUTANEOUS at 20:05

## 2020-12-08 RX ADMIN — HYDROCORTISONE: 25 CREAM TOPICAL at 20:05

## 2020-12-08 RX ADMIN — SODIUM CHLORIDE, PRESERVATIVE FREE 10 ML: 5 INJECTION INTRAVENOUS at 20:06

## 2020-12-08 RX ADMIN — HYDROCORTISONE: 25 CREAM TOPICAL at 08:11

## 2020-12-08 RX ADMIN — BACITRACIN ZINC AND POLYMYXIN B SULFATE: at 08:10

## 2020-12-08 RX ADMIN — ACETAMINOPHEN 650 MG: 325 TABLET, FILM COATED ORAL at 20:05

## 2020-12-08 RX ADMIN — DOCUSATE SODIUM 50 MG AND SENNOSIDES 8.6 MG 2 TABLET: 8.6; 5 TABLET, FILM COATED ORAL at 20:05

## 2020-12-08 RX ADMIN — ACETAMINOPHEN 650 MG: 325 TABLET, FILM COATED ORAL at 09:57

## 2020-12-08 RX ADMIN — DOCUSATE SODIUM 50 MG AND SENNOSIDES 8.6 MG 2 TABLET: 8.6; 5 TABLET, FILM COATED ORAL at 08:09

## 2020-12-08 RX ADMIN — METOPROLOL TARTRATE 50 MG: 50 TABLET, FILM COATED ORAL at 08:09

## 2020-12-08 RX ADMIN — BACITRACIN ZINC AND POLYMYXIN B SULFATE: at 20:05

## 2020-12-08 RX ADMIN — LISINOPRIL 20 MG: 20 TABLET ORAL at 08:09

## 2020-12-08 NOTE — THERAPY TREATMENT NOTE
Acute Care - Speech Language Pathology Treatment Note  [facility]     Patient Name: Dirk Miles  : 1940  MRN: [id number]  Today's Date: 2020               Admit Date: 2020     Visit Dx:    ICD-10-CM ICD-9-CM   1. Cognitive communication deficit  R41.841 799.52   2. Acute CVA (cerebrovascular accident) (CMS/HCC)  I63.9 434.91     Patient Active Problem List   Diagnosis   • Acute CVA (cerebrovascular accident) (CMS/HCC)   • A-fib (CMS/HCC)   • Hypertension   • Diabetes mellitus (CMS/HCC)     Past Medical History:   Diagnosis Date   • A-fib (CMS/HCC)    • Diabetes mellitus (CMS/HCC)    • Hypertension      Past Surgical History:   Procedure Laterality Date   • CATARACT EXTRACTION, BILATERAL     • HERNIA REPAIR          SLP EVALUATION (last 72 hours)      SLP SLC Evaluation     Row Name 20 1000 20 1350                Communication Assessment/Intervention    Document Type  therapy note (daily note)  -  therapy note (daily note)  -       Subjective Information  no complaints  -  no complaints  -       Patient Observations  alert;cooperative;agree to therapy  -  alert;cooperative;agree to therapy  -       Patient/Family/Caregiver Comments/Observations  No family present  -  Spouse present  -       Care Plan Review  evaluation/treatment results reviewed;care plan/treatment goals reviewed;risks/benefits reviewed;current/potential barriers reviewed;patient/other agree to care plan  -  care plan/treatment goals reviewed  -       Care Plan Review, Other Participant(s)  --  spouse  -       Patient Effort  good  -CH  good  -          Pain    Additional Documentation  Pain Scale: FACES Pre/Post-Treatment (Group);Pain Scale: Numbers Pre/Post-Treatment (Group)  -  Pain Scale: FACES Pre/Post-Treatment (Group);Pain Scale: Numbers Pre/Post-Treatment (Group)  -          Pain Scale: Numbers Pre/Post-Treatment    Pretreatment Pain Rating  0/10 - no pain  -  0/10 - no pain   -CH       Posttreatment Pain Rating  0/10 - no pain  -CH  0/10 - no pain  -CH          Pain Scale: FACES Pre/Post-Treatment    Pain: FACES Scale, Pretreatment  0-->no hurt  -CH  0-->no hurt  -CH       Posttreatment Pain Rating  0-->no hurt  -CH  0-->no hurt  -CH          SLP Clinical Impressions    Daily Summary of Progress (SLP)  progress toward functional goals as expected  -  progress toward functional goals is good  -       Plan for Continued Treatment (SLP)  Cont IP SLP services. Anticipate therapy at next level of care for cog/comm deficits  -  Cont IP SLP services. Anticipate therapy at next level of care for cog/comm deficits  -          Recommendations    Therapy Frequency (SLP SLC)  5 days per week  -CH  5 days per week  -       Anticipated Discharge Disposition (SLP)  inpatient rehabilitation facility;anticipate therapy at next level of care  -CH  unknown;anticipate therapy at next level of care  -         User Key  (r) = Recorded By, (t) = Taken By, (c) = Cosigned By    Initials Name Effective Dates    CH Tami Calderon, MS CCC-SLP 12/04/20 -              EDUCATION  The patient has been educated in the following areas:     Cognitive Impairment Communication Impairment.    SLP Recommendation and Plan              Anticipated Discharge Disposition (SLP): inpatient rehabilitation facility, anticipate therapy at next level of care           Daily Summary of Progress (SLP): progress toward functional goals as expected  Plan for Continued Treatment (SLP): Cont IP SLP services. Anticipate therapy at next level of care for cog/comm deficits                    SLP GOALS     Row Name 12/08/20 1000 12/07/20 1350          Follow Directions Goal 2 (SLP)    Improve Ability to Follow Directions Goal 1 (SLP)  3 step commands;80%;with minimal cues (75-90%)  -  3 step commands;80%;with minimal cues (75-90%)  -     Time Frame (Follow Directions Goal 1, SLP)  by discharge  -CH  by discharge  -      "Progress (Ability to Follow Directions Goal 1, SLP)  80%;with minimal cues (75-90%)  -CH  80%;with minimal cues (75-90%)  -CH     Progress/Outcomes (Follow Directions Goal 1, SLP)  continuing progress toward goal  -CH  continuing progress toward goal  -CH     Comment (Follow Directions Goal 1, SLP)  repetition needed.   -CH  repetition needed.   -CH        Reading Comprehension at Word Level Goal 1 (SLP)    Improve Reading Comprehension at Word Level Goal 1 (SLP)  matching picture to word;90%;with minimal cues (75-90%)  -CH  matching picture to word;90%;with minimal cues (75-90%)  -CH     Time Frame (Reading Comprehension at Word Level Goal 1, SLP)  by discharge  -CH  by discharge  -CH     Progress (Reading Comprehension at Word Level Goal 1, SLP)  50%;with moderate cues (50-74%)  -CH  50%;with moderate cues (50-74%)  -CH     Progress/Outcomes (Reading Comprehension at Word Level Goal 1, SLP)  continuing progress toward goal  -CH  continuing progress toward goal  -CH     Comment (Reading Comprehension at Word Level Goal 1, SLP)  Requires large (approx 1\") print  -CH  Requires large (approx 1\") print  -CH        Comprehension at Phrase and Sentence Level Goal 1 (SLP)    Improve Reading Comprehension at Phrase and Sentence Level Goal 1 (SLP)  phrase;80%;with minimal cues (75-90%)  -CH  phrase;80%;with minimal cues (75-90%)  -CH     Time Frame (Reading Comprehension at Phrase and Sentence Level Goal 1, SLP)  by discharge  -CH  by discharge  -CH     Progress (Reading Comprehension at Phrase and Sentence Level Goal 1, SLP)  with minimal cues (75-90%);80%  -CH  with minimal cues (75-90%);80%  -CH     Progress/Outcomes (Reading Comprehension at Phrase and Sentence Level Goal 1, SLP)  continuing progress toward goal  -CH  continuing progress toward goal  -CH        Word Retrieval Skills Goal 1 (SLP)    Improve Word Retrieval Skills By Goal 1 (SLP)  low frequency;supplying an antonym;supplying a synonym;completing a " divergent task;completing a convergent task;80%;with minimal cues (75-90%)  -  low frequency;supplying an antonym;supplying a synonym;completing a divergent task;completing a convergent task;80%;with minimal cues (75-90%)  -CH     Time Frame (Word Retrieval Goal 1, SLP)  by discharge  -CH  by discharge  -CH     Progress (Word Retrieval Skills Goal 1, SLP)  70%;with minimal cues (75-90%)  -CH  70%;with minimal cues (75-90%)  -CH     Progress/Outcomes (Word Retrieval Goal 1, SLP)  continuing progress toward goal  -CH  continuing progress toward goal  -CH     Comment (Word Retrieval Goal 1, SLP)  Divergent naming, antonym and synonym  -CH  Divergent naming  -CH        Memory Skills Goal 1 (SLP)    Improve Memory Skills Through Goal 1 (SLP)  recalling related word lists immediately;recalling related word lists with an imposed delay;recalling unrelated word lists immediately;recalling unrelated word lists with an imposed delay;80%;with minimal cues (75-90%)  -  recalling related word lists immediately;recalling related word lists with an imposed delay;recalling unrelated word lists immediately;recalling unrelated word lists with an imposed delay;80%;with minimal cues (75-90%)  -     Time Frame (Memory Skills Goal 1, SLP)  by discharge  -CH  by discharge  -CH     Progress (Memory Skills Goal 1, SLP)  60%;with minimal cues (75-90%)  -CH  50%;with minimal cues (75-90%)  -     Progress/Outcomes (Memory Skills Goal 1, SLP)  continuing progress toward goal  -CH  continuing progress toward goal  -CH     Comment (Memory Skills Goal 1, SLP)  immediate recall of word list  -CH  immediate recall of word list  -CH        Organizational Skills Goal 1 (SLP)    Improve Thought Organization Through Goal 1 (SLP)  completing a divergent naming task;completing a convergent naming task;80%;with minimal cues (75-90%)  -  completing a divergent naming task;completing a convergent naming task;80%;with minimal cues (75-90%)  -      Time Frame (Thought Organization Skills Goal 1, SLP)  by discharge  -  by discharge  -     Progress (Thought Organization Skills Goal 1, SLP)  70%;with moderate cues (50-74%)  -CH  70%;with moderate cues (50-74%)  -CH     Progress/Outcomes (Thought Organization Skills Goal 1, SLP)  continuing progress toward goal  -CH  continuing progress toward goal  -CH        Reasoning Goal 1 (SLP)    Improve Reasoning Through Goal 1 (SLP)  complete deductive reasoning task;complete mental flexibility task;80%;with minimal cues (75-90%)  -CH  complete deductive reasoning task;complete mental flexibility task;80%;with minimal cues (75-90%)  -CH     Time Frame (Reasoning Goal 1, SLP)  by discharge  -  by discharge  -CH     Progress (Reasoning Goal 1, SLP)  70%;with minimal cues (75-90%)  -CH  70%;with minimal cues (75-90%)  -CH     Progress/Outcomes (Reasoning Goal 1, SLP)  continuing progress toward goal  -CH  continuing progress toward goal  -CH        Functional Problem Solving Skills Goal 1 (SLP)    Improve Problem Solving Through Goal 1 (SLP)  determine multiple solutions to problems;tell similarity between items;80%;with minimal cues (75-90%)  -  determine multiple solutions to problems;tell similarity between items;80%;with minimal cues (75-90%)  -     Time Frame (Problem Solving Goal 1, SLP)  by discharge  -  by discharge  -     Progress/Outcomes (Problem Solving Goal 1, SLP)  goal ongoing  -  goal ongoing  -        Right Hemisphere Function Goal 1 (SLP)    Improve Right Hemisphere Function Through Goal 1 (SLP)  complete visuo-spatial activities (visual closure, trail making, mazes;80%;with minimal cues (75-90%)  -CH  complete visuo-spatial activities (visual closure, trail making, mazes;80%;with minimal cues (75-90%)  -     Time Frame (Right Hemisphere Function Goal 1, SLP)  by discharge  -  by discharge  -     Progress/Outcomes (Right Hemisphere Function Goal 1, SLP)  goal ongoing  -  goal  ongoing  -CH        Additional Goal 1 (SLP)    Additional Goal 1, SLP  LTG: Patient will improve cognitive linguistic skills to WFL without cues to improve safety and independence at the next level of care  -CH  LTG: Patient will improve cognitive linguistic skills to WFL without cues to improve safety and independence at the next level of care  -CH     Time Frame (Additional Goal 1, SLP)  by discharge  -CH  by discharge  -CH     Progress/Outcomes (Additional Goal 1, SLP)  continuing progress toward goal  -CH  continuing progress toward goal  -CH       User Key  (r) = Recorded By, (t) = Taken By, (c) = Cosigned By    Initials Name Provider Type    Tami Apodaca MS CCC-SLP Speech and Language Pathologist                  Time Calculation:     Time Calculation- SLP     Row Name 12/08/20 1255             Time Calculation- SLP    SLP Start Time  [facility]  -      SLP Received On  12/08/20  -        User Key  (r) = Recorded By, (t) = Taken By, (c) = Cosigned By    Initials Name Provider Type    Tami Apodaca MS CCC-SLP Speech and Language Pathologist          Therapy Charges for Today     Code Description Service Date Service Provider Modifiers Qty    [id number]  ST TREATMENT SPEECH 4 12/7/2020 Tami Calderon MS CCC-SLP GN 1    [id number]  ST TREATMENT SPEECH 4 12/8/2020 Tami Calderon MS CCC-SLP GN 1                     Tami Calderon MS CCC-SLP  12/8/2020       Patient was not wearing a face mask and did not exhibit coughing during this therapy encounter.  Procedure performed was aerosolizing, involved close contact (within 6 feet for at least 15 minutes or longer), and did not involve contact with infectious secretions or specimens.  Therapist used appropriate personal protective equipment including gloves, standard procedure mask and eye protection.  Appropriate PPE was worn during the entire therapy session.  Hand hygiene was completed before and after therapy session.

## 2020-12-08 NOTE — PLAN OF CARE
Problem: Adult Inpatient Plan of Care  Goal: Plan of Care Review  Recent Flowsheet Documentation  Taken 12/8/2020 1412 by Gerson Frederick OT  Progress: no change  Plan of Care Reviewed With: patient  Outcome Summary: OT re-eval completed. Pt presents w/ significant deficits in vision/perception, cognition, balance, strength, and activity tolerance impacting independence in ADL completion. Pt was dependent for LB dressing in supine, required ModA for all bed mobility and maintenence of sitting balance, ModA for STS tfs w/ BUE support, MaxA for stand-pivot tfs to chair w/ BUE support, and Epi w/ verbal cues for grooming task in supported sititng. PT completed BUE AAROM 1/10reps in chair. Pt is confused and required constant cueing for orientation to place. Therapeutic goals reviewed and revised on this date. Rec cont IPOT per POC. Rec d/c to IRF.

## 2020-12-08 NOTE — PROGRESS NOTES
"Clinical Nutrition Note      Patient Name: Dirk Miles  MRN: [id number]  Admission date: 12/1/2020      Multidisciplinary Rounds    Additional information obtained during MDR:  RN reports pt was transferred to ICU after fall; CT is fine if he remains stable for 24 hrs pt can transfer to [facility] per Dr. Murray.    Current diet: Diet Regular    Oral Nutrition Supplement:     Pertinent medical data reviewed:  No nutrition risk identified on nursing screen; MST score \"0\"    Intervention:  Plan of care and goals reviewed    Monitor:  RD to follow per protocol      Mary Hernadez MS,RD,LD  12/08/20 18:17 EST  Time: 15  mins       "

## 2020-12-08 NOTE — PLAN OF CARE
Goal Outcome Evaluation:  Plan of Care Reviewed With: patient  Progress: no change  Outcome Summary: PT re-evaluation completed for pt s/p transfer to ICU presenting with L sided weakness, impaired balance, and decreased functional mobility. Pt performed STS x3 from chair with maxA x2, progressing to modA x2 and B UE support. Pt's goals have been revised and pt would continue to benefit from PT skilled care. Recommend D/C to IP rehab facility.

## 2020-12-08 NOTE — SIGNIFICANT NOTE
"Neurology       Patient Care Team:  Tia Pratt MD as PCP - General (Family Medicine)    Chief complaint : Rt PCA stroke      Subjective .     History:  Called by Dr. Rincon and Dr. Murray regarding recent patient fall and CTH results. Pt admitted with large right PCA infarct has a newly noted, (from 12/4/2020), small hemorrhage either from hemorrhagic transformation or fall per Dr. Murray. Pt reportedly fell in the bathroom earlier this evening and hit the left lateral side of his head.     ROS: +right neck pain, +headache    Objective     Vital Signs   Blood pressure 142/83, pulse 70, temperature 98.2 °F (36.8 °C), temperature source Oral, resp. rate 18, height 175.3 cm (69\"), weight 83.9 kg (185 lb), SpO2 95 %.    Physical Exam:              Neuro: On exam, the patient is alert and oriented. Speech is clear. NIH 9. GCS 15. Pt reports a headache that began around 11am today prior to his fall. Exam at this time is consistent with previous documentation. He states he feels as though he strained the muscles in the right side of his neck.     Interval: baseline  1a. Level of Consciousness: 0-->Alert, keenly responsive  1b. LOC Questions: 0-->Answers both questions correctly  1c. LOC Commands: 0-->Performs both tasks correctly  2. Best Gaze: 1-->Partial gaze palsy, gaze is abnormal in one or both eyes, but forced deviation or total gaze paresis is not present  3. Visual: 1-->Partial hemianopia  4. Facial Palsy: 0-->Normal symmetrical movements  5a. Motor Arm, Left: 2-->Some effort against gravity, limb cannot get to or maintain (if cued) 90 (or 45) degrees, drifts down to bed, but has some effort against gravity  5b. Motor Arm, Right: 0-->No drift, limb holds 90 (or 45) degrees for full 10 secs  6a. Motor Leg, Left: 1-->Drift, leg falls by the end of the 5-sec period but does not hit bed  6b. Motor Leg, Right: 0-->No drift, leg holds 30 degree position for full 5 secs  7. Limb Ataxia: 1-->Present in one " limb  8. Sensory: 2-->Severe to total sensory loss, patient is not aware of being touched in the face, arm, and leg  9. Best Language: 0-->No aphasia, normal  10. Dysarthria: 0-->Normal  11. Extinction and Inattention (formerly Neglect): 1-->Visual, tactile, auditory, spatial, or personal inattention or extinction to bilateral simultaneous stimulation in one of the sensory modalities    Total (NIH Stroke Scale): 9    Results Review:             Ct Head Without Contrast    Result Date: 12/7/2020  There is mixed subacute and chronic hemorrhage in the infarcted tissue bed centered in the right occipital lobe. This is new since the previous scan. The volume of acute hemorrhage is less than 30 cc. Overall the thickness of the area of encephalomalacia has increased by about 4 mm since the previous examination. Mass effect shows a slight increase. NOTIFICATION: Critical Value/emergent results were called by telephone at the time of interpretation on 12/7/2020 7:40 PM to the patient's nurse, who verbally acknowledged these results. She will be conveying the results to the nurse practitioner. Signer Name: Ray Perkins MD  Signed: 12/7/2020 7:40 PM  Workstation Name: [id number]  [facility]        Assessment/Plan     Mr. Miles is an 80 yr old male with a PMH of a fib (not on anticoagulation), asymptomatic severe LICA stenosis, and new RPCA territory infarct that was recently started on anticoagulation with Eliquis on 12/4/2020. Earlier this evening, he fell in the bathroom and hit the left lateral side of his head. Repeat CTH with new small hemorrhage from previous CTH performed on 12/4/2020.    Transfer to the ICU  -ICH score 1 d/t age 80  -D/C Eliquis  -SBP <140, Cardene prn   -CTH in the am  -CTH with any neurologic decline  -Nursing to repeat Bedside dysphagia. Make patient NPO if failed and notify navigator.  -Stroke Neurology to follow    I discussed the patients findings and my  recommendations with Dr. Rincon, Dr. Murray, Dr. Jerry and nursing staff.     MARGARITA Delacruz, [facility], Stroke Navigator  12/07/20  23:06 EST

## 2020-12-08 NOTE — PLAN OF CARE
Goal Outcome Evaluation:  Plan of Care Reviewed With: patient  Progress: no change  Outcome Summary: VSS, AM head CT completed and repeat AM head CT for tomorrow ordered. Up in chair this shift with 2 assist. NIHSS 7 this AM. Will continue to monitor patient closely. Ambulance scheduled for 0900 to take patient to [facility] as previously planned.

## 2020-12-08 NOTE — SIGNIFICANT NOTE
"Staff and this nurse heard a loud crash at the nurses station. Staff immediately got up and assessed where the sound came from. As staff got up and moving the exit alarms started ringing. Upon arrival to room pt observed on the floor at the side of the bed. States \" I was going to the bathroom\". This nurse had assisted pt off bedpan 20 minutes prior to this. Urinal had been placed on R side to assure pt could see it and reach it. Call light was also placed by his right hand at that time.  Pt's head was at the foot of the bed with laceration on L forehead area noted. Small amount of blood, area cleansed with sterile normal saline, patted dry with gauze. Laceration superficial and approximated. No need for further interventions. Rapid response called d/t head injury. Assessed pt neuro status as well as for injuries. Used lift to assist pt back to his bed.  Pt alert and oriented x 4. Pupils equal and reactive. NIH completed and result is 7. BP elevated initially but trending down. Otherwise pt stable. Vitals as charted. STAT CT ordered, blood thinners d/c'd and pt placed in vest for safety. New orders placed by Dr. Saab. Wife notified of fall, injury and restraint order. cardene gtt ordered and will initiate once parameters are met. Goal is for SBP <160.   1945- pt returned to room. Vitals taken and SBP doesn't meet parameters for cardene gtt at this time. Vitals to be cycled Q 30 for now until gtt started. Neuros Q 2. No acute change at this time.   2005- radiologist called with CT results. Informed nurse prac on for nightshift and informed her. Called wife and updated her as well.   "

## 2020-12-08 NOTE — THERAPY RE-EVALUATION
Patient Name: Dirk Miles  : 1940    MRN: [id number]                              Today's Date: 2020       Admit Date: 2020    Visit Dx:     ICD-10-CM ICD-9-CM   1. Cognitive communication deficit  R41.841 799.52   2. Acute CVA (cerebrovascular accident) (CMS/HCC)  I63.9 434.91     Patient Active Problem List   Diagnosis   • Acute CVA (cerebrovascular accident) (CMS/HCC)   • A-fib (CMS/HCC)   • Hypertension   • Diabetes mellitus (CMS/HCC)     Past Medical History:   Diagnosis Date   • A-fib (CMS/HCC)    • Diabetes mellitus (CMS/HCC)    • Hypertension      Past Surgical History:   Procedure Laterality Date   • CATARACT EXTRACTION, BILATERAL     • HERNIA REPAIR       General Information     Row Name 20 [facility]          OT Time and Intention    Document Type  re-evaluation  -CS     Mode of Treatment  occupational therapy  -CS     Row Name 20 [facility]          General Information    Patient Profile Reviewed  yes  -CS     Prior Level of Function  independent:;ADL's;community mobility;all household mobility  -CS     Existing Precautions/Restrictions  fall;other (see comments) L sided weakness/neglect  -CS     Barriers to Rehab  medically complex;cognitive status  -CS     Row Name 20 [facility]          Living Environment    Lives With  other (see comments) Please refer to IE  -CS     Row Name 20 [facility]          Cognition    Orientation Status (Cognition)  oriented to;person;time;disoriented to;place;situation  -CS     Row Name 20 1355          Safety Issues, Functional Mobility    Safety Issues Affecting Function (Mobility)  ability to follow commands;awareness of need for assistance;at risk behavior observed;impulsivity;insight into deficits/self-awareness;safety precaution awareness;safety precautions follow-through/compliance  -CS     Impairments Affecting Function (Mobility)  balance;coordination;endurance/activity tolerance;grasp;motor control;postural/trunk  control;cognition;strength;visual/perceptual  -CS     Cognitive Impairments, Mobility Safety/Performance  attention;awareness, need for assistance;impulsivity;insight into deficits/self-awareness;judgment;safety precaution awareness;safety precaution follow-through  -CS       User Key  (r) = Recorded By, (t) = Taken By, (c) = Cosigned By    Initials Name Provider Type    CS Gerson Frederick OT Occupational Therapist        Mobility/ADL's     Row Name 12/08/20 1359          Bed Mobility    Bed Mobility  rolling left;supine-sit  -CS     Rolling Left [place] (Bed Mobility)  moderate assist (50% patient effort);verbal cues  -CS     Supine-Sit [place] (Bed Mobility)  moderate assist (50% patient effort);verbal cues  -CS     Bed Mobility, Safety Issues  decreased use of arms for pushing/pulling;decreased use of legs for bridging/pushing;impaired trunk control for bed mobility  -CS     Assistive Device (Bed Mobility)  head of bed elevated;draw sheet;bed rails  -CS     Comment (Bed Mobility)  Pt required assistance for both trunk and LE during supine-sit - significant left lean upon sitting  -CS     Row Name 12/08/20 1353          Transfers    Transfers  sit-stand transfer;bed-chair transfer  -CS     Comment (Transfers)  All tfs w/ BUe support and L knee blocking  -CS     Bed-Chair [place] (Transfers)  maximum assist (25% patient effort);verbal cues  -CS     Assistive Device (Bed-Chair Transfers)  other (see comments) BUE support  -CS     Sit-Stand [place] (Transfers)  moderate assist (50% patient effort);verbal cues;other (see comments) BUE support  -CS     Row Name 12/08/20 1357          Functional Mobility    Functional Mobility- Ind. Level  not tested  -CS     Row Name 12/08/20 1350          Activities of Daily Living    BADL Assessment/Intervention  grooming;lower body dressing  -CS     Row Name 12/08/20 1352          Grooming Assessment/Training    [place] Level (Grooming)  hair care,  combing/brushing;set up;minimum assist (75% patient effort)  -CS     Position (Grooming)  supported sitting  -CS     Comment (Grooming)  hair care - executed by unaffected RUE - required cueing for attention to L side of head  -CS     Row Name 12/08/20 1359          Lower Body Dressing Assessment/Training    [place] Level (Lower Body Dressing)  don;socks;dependent (less than 25% patient effort)  -CS     Position (Lower Body Dressing)  supine  -CS       User Key  (r) = Recorded By, (t) = Taken By, (c) = Cosigned By    Initials Name Provider Type    CS Gerson Frederick, OT Occupational Therapist        Obj/Interventions     Row Name 12/08/20 1406          Sensory Assessment (Somatosensory)    Sensory Assessment (Somatosensory)  right-sided sensation intact  -CS     Left UE Sensory Assessment  impaired  -CS     Right UE Sensory Assessment  intact  -CS     Row Name 12/08/20 1406          Vision Assessment/Intervention    Visual Impairment/Limitations  corrective lenses full-time  -CS     Visual Motor Impairment  visual tracking, left  -CS     Visual Processing Deficit  ben-inattention/neglect, left  -CS     Row Name 12/08/20 1406          Range of Motion Comprehensive    Comment, General Range of Motion  BUE PROM grossly WFL, LUE no purposeful AROM observed  -CS     Row Name 12/08/20 1406          Strength Comprehensive (MMT)    General Manual Muscle Testing (MMT) Assessment  upper extremity strength deficits identified  -CS     Comment, General Manual Muscle Testing (MMT) Assessment  RUE grossly 4/5, LUE grossly 2/5  -CS     Row Name 12/08/20 1406          Shoulder (Therapeutic Exercise)    Shoulder AAROM (Therapeutic Exercise)  right assist left;bilateral;flexion;extension;horizontal aBduction/aDduction;10 repetitions  -CS     Row Name 12/08/20 1406          Elbow/Forearm (Therapeutic Exercise)    Elbow/Forearm (Therapeutic Exercise)  AAROM (active assistive range of motion)  -CS     Elbow/Forearm AAROM  (Therapeutic Exercise)  right assist left;bilateral;flexion;extension;10 repetitions  -     Row Name 12/08/20 1406          Balance    Balance Assessment  sitting static balance;sitting dynamic balance;standing static balance  -     Static Sitting Balance  supported;sitting, edge of bed;mild impairment  -CS     Dynamic Sitting Balance  moderate impairment;supported;sitting, edge of bed  -CS     Static Standing Balance  severe impairment;supported;standing  -CS     Dynamic Standing Balance  not tested  -CS     Comment, Balance  Lateral lean to the L  -     Row Name 12/08/20 1406          Therapeutic Exercise    Therapeutic Exercise  shoulder;elbow/forearm  -       User Key  (r) = Recorded By, (t) = Taken By, (c) = Cosigned By    Initials Name Provider Type     Gerson Frederick, OT Occupational Therapist        Goals/Plan     Row Name 12/08/20 1420          Bed Mobility Goal 1 (OT)    Progress/Outcomes (Bed Mobility Goal 1, OT)  goal ongoing  -     Row Name 12/08/20 1420          Transfer Goal 1 (OT)    Activity/Assistive Device (Transfer Goal 1, OT)  sit-to-stand/stand-to-sit;bed-to-chair/chair-to-bed;toilet  -CS     [place] Level/Cues Needed (Transfer Goal 1, OT)  minimum assist (75% or more patient effort)  -CS     Time Frame (Transfer Goal 1, OT)  long term goal (LTG);10 days  -CS     Progress/Outcome (Transfer Goal 1, OT)  goal ongoing  -     Row Name 12/08/20 1420          Dressing Goal 1 (OT)    Activity/Device (Dressing Goal 1, OT)  lower body dressing  -CS     [place]/Cues Needed (Dressing Goal 1, OT)  moderate assist (50-74% patient effort)  -CS     Time Frame (Dressing Goal 1, OT)  long term goal (LTG);10 days  -CS     Progress/Outcome (Dressing Goal 1, OT)  goal ongoing  -     Row Name 12/08/20 1420          Strength Goal 1 (OT)    Progress/Outcome (Strength Goal 1, OT)  goal ongoing  -       User Key  (r) = Recorded By, (t) = Taken By, (c) = Cosigned By    Initials Name  Provider Type     Gerson Frederick, COSMO Occupational Therapist        Clinical Impression     Row Name 12/08/20 1412          Pain Assessment    Additional Documentation  Pain Scale: Numbers Pre/Post-Treatment (Group)  -     Row Name 12/08/20 1412          Pain Scale: Numbers Pre/Post-Treatment    Pretreatment Pain Rating  0/10 - no pain  -CS     Posttreatment Pain Rating  0/10 - no pain  -CS     Pre/Posttreatment Pain Comment  tolertated  -CS     Pain Intervention(s)  Repositioned;Ambulation/increased activity  -CS     Row Name 12/08/20 1412          Plan of Care Review    Plan of Care Reviewed With  patient  -CS     Progress  no change  -CS     Outcome Summary  OT re-eval completed. Pt presents w/ significant deficits in vision/perception, cognition, balance, strength, and activity tolerance impacting indepenence in ADL completion. Pt was dependent for LB dressing in supine, required ModA for all bed mobility and maintenence of sitting balance, ModA for STS tfs w/ BUE support, MaxA for stand-pivot tfs to chair w/ BUE support, and Epi w/ verbal cues for grooming task in supported sititng. Pt is confused and required constant cueing for orientation to place. Therapeutic goals reviewed and revised on this date. Rec cont IPOT per POC. Rec d/c to IRF.  -CS     Row Name 12/08/20 1412          Therapy Assessment/Plan (OT)    Patient/Family Therapy Goal Statement (OT)  Return to PLOF  -CS     Rehab Potential (OT)  good, to achieve stated therapy goals  -     Criteria for Skilled Therapeutic Interventions Met (OT)  yes;meets criteria;skilled treatment is necessary  -     Therapy Frequency (OT)  daily  -     Row Name 12/08/20 1412          Therapy Plan Review/Discharge Plan (OT)    Anticipated Discharge Disposition (OT)  inpatient rehabilitation facility  -     Row Name 12/08/20 1412          Vital Signs    Pre Systolic BP Rehab  116 RN cleared for re-eval, VSS  -CS     Pre Treatment Diastolic BP  73  -CS      Post Systolic BP Rehab  125  -CS     Post Treatment Diastolic BP  80  -CS     Pretreatment Heart Rate (beats/min)  65  -CS     Posttreatment Heart Rate (beats/min)  63  -CS     Pre SpO2 (%)  94  -CS     O2 Delivery Pre Treatment  room air  -CS     O2 Delivery Intra Treatment  room air  -CS     Post SpO2 (%)  94  -CS     O2 Delivery Post Treatment  room air  -CS     Pre Patient Position  Supine  -CS     Intra Patient Position  Standing  -CS     Post Patient Position  Sitting  -CS     Row Name 12/08/20 1412          Positioning and Restraints    Pre-Treatment Position  in bed  -CS     Post Treatment Position  chair  -CS     In Chair  notified nsg;reclined;sitting;call light within reach;encouraged to call for assist;exit alarm on;RUE elevated;LUE elevated;legs elevated;heels elevated;waffle cushion  -CS       User Key  (r) = Recorded By, (t) = Taken By, (c) = Cosigned By    Initials Name Provider Type    CS Gerson Frederick, OT Occupational Therapist        Outcome Measures     Row Name 12/08/20 1421          How much help from another is currently needed...    Putting on and taking off regular lower body clothing?  1  -CS     Bathing (including washing, rinsing, and drying)  2  -CS     Toileting (which includes using toilet bed pan or urinal)  2  -CS     Putting on and taking off regular upper body clothing  2  -CS     Taking care of personal grooming (such as brushing teeth)  2  -CS     Eating meals  3  -CS     AM-PAC 6 Clicks Score (OT)  12  -CS     Row Name 12/08/20 1421          Modified Peter Scale    Pre-Stroke Modified Holman Scale  0 - No Symptoms at all.  -CS     Modified Holman Scale  4 - Moderately severe disability.  Unable to walk without assistance, and unable to attend to own bodily needs without assistance.  -     Row Name 12/08/20 1421          Functional Assessment    Outcome Measure Options  AM-PAC 6 Clicks Daily Activity (OT);Modified Holman  -CS       User Key  (r) = Recorded By, (t) = Taken  By, (c) = Cosigned By    Initials Name Provider Type    Gerson Turpin OT Occupational Therapist        Occupational Therapy Education                 Title: PT OT SLP Therapies (Done)     Topic: Occupational Therapy (Done)     Point: ADL training (Done)     Description:   Instruct learner(s) on proper safety adaptation and remediation techniques during self care or transfers.   Instruct in proper use of assistive devices.              Learning Progress Summary           Patient Acceptance, E, NR,VU by  at 12/8/2020 1422    Acceptance, E, VU,NR by  at 12/6/2020 1027    Acceptance, E,D, VU,NR by TA at 12/2/2020 1142   Family Acceptance, E, VU,NR by  at 12/6/2020 1027   Significant Other Acceptance, E,D, VU,NR by TA at 12/2/2020 1142                   Point: Home exercise program (Done)     Description:   Instruct learner(s) on appropriate technique for monitoring, assisting and/or progressing therapeutic exercises/activities.              Learning Progress Summary           Patient Acceptance, E, NR,VU by  at 12/8/2020 1422    Acceptance, E, VU,NR by  at 12/6/2020 1027    Acceptance, E,D, VU,NR by TA at 12/2/2020 1142   Family Acceptance, E, VU,NR by  at 12/6/2020 1027   Significant Other Acceptance, E,D, VU,NR by TA at 12/2/2020 1142                   Point: Precautions (Done)     Description:   Instruct learner(s) on prescribed precautions during self-care and functional transfers.              Learning Progress Summary           Patient Acceptance, E, NR,VU by  at 12/8/2020 1422    Acceptance, E,D, VU,NR by TA at 12/2/2020 1142   Significant Other Acceptance, E,D, VU,NR by TA at 12/2/2020 1142                   Point: Body mechanics (Done)     Description:   Instruct learner(s) on proper positioning and spine alignment during self-care, functional mobility activities and/or exercises.              Learning Progress Summary           Patient Acceptance, E, NR,VU by  at 12/8/2020 1422     Acceptance, E, VU,NR by  at 12/6/2020 1027    Acceptance, E,D, VU,NR by TA at 12/2/2020 1142   Family Acceptance, E, VU,NR by  at 12/6/2020 1027   Significant Other Acceptance, E,D, VU,NR by TA at 12/2/2020 1142                               User Key     Initials Effective Dates Name Provider Type Discipline    TA 03/14/16 -  Dirk Clifford OT Occupational Therapist OT     01/29/20 -  Yudy Ravi OT Occupational Therapist OT     10/21/20 -  Gerson Frederick OT Occupational Therapist OT              OT Recommendation and Plan  Therapy Frequency (OT): daily  Plan of Care Review  Plan of Care Reviewed With: patient  Progress: no change  Outcome Summary: OT re-eval completed. Pt presents w/ significant deficits in vision/perception, cognition, balance, strength, and activity tolerance impacting indepenence in ADL completion. Pt was dependent for LB dressing in supine, required ModA for all bed mobility and maintenence of sitting balance, ModA for STS tfs w/ BUE support, MaxA for stand-pivot tfs to chair w/ BUE support, and Epi w/ verbal cues for grooming task in supported sititng. Pt is confused and required constant cueing for orientation to place. Therapeutic goals reviewed and revised on this date. Rec cont IPOT per POC. Rec d/c to IRF.     Time Calculation:   Time Calculation- OT     Row Name 12/08/20 1422             Time Calculation- OT    OT Start Time  1110  -CS      OT Received On  12/08/20  -      OT Goal Re-Cert Due Date  12/18/20  -         Timed Charges    83158 - OT Therapeutic Exercise Minutes  4  -CS      06897 - OT Therapeutic Activity Minutes  24  -CS        User Key  (r) = Recorded By, (t) = Taken By, (c) = Cosigned By    Initials Name Provider Type    CS Gerson Frederick OT Occupational Therapist        Therapy Charges for Today     Code Description Service Date Service Provider Modifiers Qty    80877027782  OT THERAPEUTIC ACT EA 15 MIN 12/8/2020 Gerson Frederick OT GO 2     [id number]  OT RE-EVAL 2 12/8/2020 Gerson Frederick, OT GO 1               Gerson Frederick, OT  12/8/2020

## 2020-12-08 NOTE — PROGRESS NOTES
Case Management Discharge Note      Final Note: Per Desiree with [facility], patient has a confirmed bed on the Stroke Unit tomorrow if medically ready. Spoke with Mark with [facility], and patient is scheduled for transfer on Wednesday, 12/9/20, at 0900. PCS is on the chart. Updated patient in the room and patient's wife, Sarai, by phone. [facility] will pull the DC summary from [facility]. RN, please call report to [phone], and please send a copy of the DC summary/AVS in the discharge packet. Thank you.         Selected Continued Care - Admitted Since 12/1/2020     Destination Coordination complete    Service Provider Selected Services Address Phone Fax Patient Preferred    [facility]  Inpatient Rehabilitation [address] [address] [phone] --          Durable Medical Equipment    No services have been selected for the patient.              Dialysis/Infusion    No services have been selected for the patient.              Home Medical Care    No services have been selected for the patient.              Therapy    No services have been selected for the patient.              Community Resources    No services have been selected for the patient.                  Transportation Services  Ambulance: [facility]    Final Discharge Disposition Code: 62 - inpatient rehab facility

## 2020-12-08 NOTE — PLAN OF CARE
Goal Outcome Evaluation:  Plan of Care Reviewed With: patient  Progress: improving       SLP treatment completed. Will continue to address cognition/communication in tx. Please see note for further details and recommendations.

## 2020-12-08 NOTE — PLAN OF CARE
Asked to review scans patient with high grade left ica stenosis and large right pca infarct from afib    Started on Eliquis asa    Reported fall earlier, I was called to review scans from Dr Rincon on call.     Patient with large right PCA infarct , small amount of hemorrhage either from hemorrhagic transformation or fall. No areas represented on MRI of this and CT from earlier not seen.     Explained plan .    This is non surgical at present and even if enlargement due to deep seated location unless debora herniation evoloves    Reversal is an option but in light of recent stroke/ afib and high grade left ICA stenosis , reversal carries a risk of thrombotic complications, but needs to be administered briskly if changes in exam occur after stat repeat CT    I would recc monitor in ICU.. Would repeat ct scan in AND /OR if neuro changes and prepare for administration of platelets and Kcentra if scan is showing worsened bleed.    Discussed with Fernanda AVILA on stroke team in house to examine given baseline deficits with plan for reversal and scans if px status changes.

## 2020-12-08 NOTE — CONSULTS
NEUROSURGERY CONSULTATION    Referring Provider: Erick Hood MD    Patient Care Team:  Tia Pratt MD as PCP - General (Family Medicine)    Chief Complaint: Small ICH    History of Present Illness:  This is a gentleman who presented with a history of stroke this week atrial fibrillation left-sided high-grade carotid stenosis as well as completed right PCA infarct he was appropriately started on Eliquis and a baby aspirin for treatment of his right PCA stroke after CT scans demonstrated no evidence of hemorrhagic transformation    The patient had a fall yesterday he denied any ictus to suggest recurrence of his stroke symptoms hit his head and a CT scan was repeated that demonstrated a small amount of hemorrhage in the PCA distribution deep is not consistent with a traumatic type of hemorrhage, and it seems a bit more typical of a hemorrhagic transformation of a ischemic infarct he has had no new symptoms other than his baseline confusion please refer to my plan a clear note regarding events last night    Overnight he has no headache family is present      Review of Systems:  Musculoskeletal and Neurological systems were reviewed and are negative except for:  Musculoskeletal: positive for See HPI.  Neurological: positive for See HPI.    History:  Past Medical History:   Diagnosis Date   • A-fib (CMS/[facility])    • Diabetes mellitus (CMS/[facility])    • Hypertension    ,   Past Surgical History:   Procedure Laterality Date   • CATARACT EXTRACTION, BILATERAL     • HERNIA REPAIR     , History reviewed. No pertinent family history.,   Social History     Tobacco Use   • Smoking status: Former Smoker   • Smokeless tobacco: Never Used   • Tobacco comment: QUIT 25YRS AGO    Substance Use Topics   • Alcohol use: Never     Frequency: Never   • Drug use: Never   ,   Medications Prior to Admission   Medication Sig Dispense Refill Last Dose   • aspirin 81 MG EC tablet Take 81 mg by mouth Daily.      • atorvastatin (LIPITOR)  "10 MG tablet Take 10 mg by mouth Daily.      • furosemide (LASIX) 20 MG tablet Take 20 mg by mouth 2 (Two) Times a Week. ON TUES AND SAT      • metFORMIN (GLUCOPHAGE) 500 MG tablet Take 500 mg by mouth 2 (Two) Times a Day With Meals.      • METOPROLOL TARTRATE PO Take 100 mg by mouth Daily.       Allergies:  Patient has no known allergies.      Physical Exam:  Vital Signs: Blood pressure 151/100, pulse 73, temperature 99.1 °F (37.3 °C), temperature source Oral, resp. rate 18, height 175.3 cm (69\"), weight 80.2 kg (176 lb 12.9 oz), SpO2 96 %.  He is alert and oriented to person place and thing but clearly has some confusion and disfluent speech    His pupils are symmetric    He has a gaze neglect as expected from his right PCA infarct    He is weak in his left upper extremity but has a reasonable  strength his face is mostly symmetric    His left lower extremity moves in a delayed manner but is intact    No signs of herniation neck stiffness he does have some ecchymosis over his left eye left temporal area    Data Review:  Reviewed CT scans there is a small amount of hemorrhagic transformation of the deep stroke bed    Diagnosis:  1.  Hemorrhagic transformation right PCA infarct history of trauma on Eliquis aspirin these have been withheld    Treatment Recommendations:  1.  Repeat CT today we will review findings if no evidence of evolution would withhold anticoagulation other than baby aspirin for 5 to 7 days repeat CT scan outpatient clinic, patient was scheduled to see Dr. Johnson for high-grade left ICA stenosis this visit could be a telephone visit with scan review only    Patient with clinical fluctuations would monitor for 24 to document stability if CT scans are stable can discharge to rehab as scheduled    No family needed this is nonsurgical nature    Please call with questions    Robert Murray MD  12/08/20  08:06 EST            "

## 2020-12-08 NOTE — THERAPY RE-EVALUATION
Patient Name: Dirk Miles  : 1940    MRN: [id number]                              Today's Date: 2020       Admit Date: 2020    Visit Dx:     ICD-10-CM ICD-9-CM   1. Cognitive communication deficit  R41.841 799.52   2. Acute CVA (cerebrovascular accident) (CMS/[facility])  I63.9 434.91     Patient Active Problem List   Diagnosis   • Acute CVA (cerebrovascular accident) (CMS/[facility])   • A-fib (CMS/[facility])   • Hypertension   • Diabetes mellitus (CMS/[facility])     Past Medical History:   Diagnosis Date   • A-fib (CMS/[facility])    • Diabetes mellitus (CMS/[facility])    • Hypertension      Past Surgical History:   Procedure Laterality Date   • CATARACT EXTRACTION, BILATERAL     • HERNIA REPAIR       General Information     Row Name 20 1547          Physical Therapy Time and Intention    Document Type  re-evaluation  -KG     Mode of Treatment  physical therapy  -KG     Row Name 20 1547          General Information    Patient Profile Reviewed  yes  -KG     Prior Level of Function  -- please see IE  -KG     Existing Precautions/Restrictions  fall;other (see comments) L sided weakness (UE>LE); increased confusion  -KG     Barriers to Rehab  medically complex;cognitive status  -KG     Row Name 20 1547          Living Environment    Lives With  -- please see IE  -KG     Row Name 20 1547          Cognition    Orientation Status (Cognition)  oriented to;person  -KG     Row Name 20 1547          Safety Issues, Functional Mobility    Safety Issues Affecting Function (Mobility)  ability to follow commands;awareness of need for assistance;insight into deficits/self-awareness;safety precaution awareness;safety precautions follow-through/compliance;sequencing abilities  -KG     Impairments Affecting Function (Mobility)  balance;cognition;coordination;endurance/activity tolerance;postural/trunk control;range of motion (ROM);strength  -KG     Cognitive Impairments, Mobility Safety/Performance  attention;awareness,  need for assistance;insight into deficits/self-awareness;safety precaution awareness;safety precaution follow-through;sequencing abilities  -KG       User Key  (r) = Recorded By, (t) = Taken By, (c) = Cosigned By    Initials Name Provider Type    Carly Rosen PT Physical Therapist        Mobility     Row Name 12/08/20 1548          Bed Mobility    Comment (Bed Mobility)  UIC  -KG     Row Name 12/08/20 1548          Transfers    Comment (Transfers)  STS x3 from chair with PT/tech in front on either side to block mounika knees and provide B UE support. Pt able to maintain weight bearing through L LE without knee buckling. Required max cueing for upright posture; demonstrated significant anterior and lateral lean to the L. Pt unable to weight shift L to R to take any forward steps.  -KG     Row Name 12/08/20 1548          Sit-Stand Transfer    Sit-Stand [place] (Transfers)  maximum assist (25% patient effort);2 person assist;verbal cues progressed to modA x2  -KG     Assistive Device (Sit-Stand Transfers)  other (see comments) B UE support  -KG     Row Name 12/08/20 1548          Gait/Stairs (Locomotion)    [place] Level (Gait)  unable to assess  -KG     Comment (Gait/Stairs)  Ambulation deferred. Not appropriate to assess due to poor standing balance and inability to weight shift.  -KG       User Key  (r) = Recorded By, (t) = Taken By, (c) = Cosigned By    Initials Name Provider Type    Carly Rosen PT Physical Therapist        Obj/Interventions     Row Name 12/08/20 0819          Balance    Balance Assessment  sitting static balance;sitting dynamic balance;standing static balance  -KG     Static Sitting Balance  mild impairment;supported;sitting in chair  -KG     Dynamic Sitting Balance  moderate impairment;supported;sitting in chair  -KG     Static Standing Balance  moderate impairment;supported;standing  -KG     Balance Interventions  sitting;standing;sit to  stand;supported;static;dynamic;minimal challenge;trunk training exercise;weight shifting activity  -KG       User Key  (r) = Recorded By, (t) = Taken By, (c) = Cosigned By    Initials Name Provider Type    DAISY Carly Wood PT Physical Therapist        Goals/Plan     Row Name 12/08/20 1553          Bed Mobility Goal 1 (PT)    Activity/Assistive Device (Bed Mobility Goal 1, PT)  sit to supine;supine to sit  -KG     [place] Level/Cues Needed (Bed Mobility Goal 1, PT)  moderate assist (50-74% patient effort)  -KG     Time Frame (Bed Mobility Goal 1, PT)  2 weeks  -KG     Progress/Outcomes (Bed Mobility Goal 1, PT)  goal ongoing;goal revised this date  -KG     Row Name 12/08/20 1553          Transfer Goal 1 (PT)    Activity/Assistive Device (Transfer Goal 1, PT)  sit-to-stand/stand-to-sit;bed-to-chair/chair-to-bed  -KG     [place] Level/Cues Needed (Transfer Goal 1, PT)  moderate assist (50-74% patient effort)  -KG     Time Frame (Transfer Goal 1, PT)  2 weeks  -KG     Progress/Outcome (Transfer Goal 1, PT)  goal ongoing;goal revised this date  -KG     Row Name 12/08/20 1553          Gait Training Goal 1 (PT)    Activity/Assistive Device (Gait Training Goal 1, PT)  gait (walking locomotion)  -KG     [place] Level (Gait Training Goal 1, PT)  maximum assist (25-49% patient effort)  -KG     Distance (Gait Training Goal 1, PT)  10 feet  -KG     Time Frame (Gait Training Goal 1, PT)  2 weeks  -KG     Progress/Outcome (Gait Training Goal 1, PT)  goal ongoing;goal revised this date  -KG       User Key  (r) = Recorded By, (t) = Taken By, (c) = Cosigned By    Initials Name Provider Type    DAISY Carly Wood PT Physical Therapist        Clinical Impression     Row Name 12/08/20 1551          Pain    Additional Documentation  Pain Scale: Numbers Pre/Post-Treatment (Group)  -KG     Row Name 12/08/20 1551          Pain Scale: Numbers Pre/Post-Treatment    Pretreatment Pain Rating  0/10 - no pain  -KG      Posttreatment Pain Rating  0/10 - no pain  -KG     Row Name 12/08/20 1551          Plan of Care Review    Plan of Care Reviewed With  patient  -KG     Outcome Summary  PT re-evaluation completed for pt s/p transfer to ICU presenting with L sided weakness, impaired balance, and decreased functional mobility. Pt performed STS x3 from chair with maxA x2, progressing to modA x2 and B UE support. Pt's goals have been revised and pt would continue to benefit from PT skilled care. Recommend D/C to IP rehab facility.  -KG     Row Name 12/08/20 1551          Vital Signs    Pre Systolic BP Rehab  125  -KG     Pre Treatment Diastolic BP  75  -KG     Post Systolic BP Rehab  128  -KG     Post Treatment Diastolic BP  74  -KG     Pretreatment Heart Rate (beats/min)  68  -KG     Posttreatment Heart Rate (beats/min)  70  -KG     Pre SpO2 (%)  95  -KG     O2 Delivery Pre Treatment  room air  -KG     Post SpO2 (%)  97  -KG     O2 Delivery Post Treatment  room air  -KG     Pre Patient Position  Sitting  -KG     Intra Patient Position  Standing  -KG     Post Patient Position  Sitting  -KG     Row Name 12/08/20 1551          Positioning and Restraints    Pre-Treatment Position  sitting in chair/recliner  -KG     Post Treatment Position  chair  -KG     In Chair  notified nsg;reclined;call light within reach;encouraged to call for assist;exit alarm on;RUE elevated;LUE elevated;waffle cushion;legs elevated  -KG       User Key  (r) = Recorded By, (t) = Taken By, (c) = Cosigned By    Initials Name Provider Type    KG Carly Wood, PT Physical Therapist        Outcome Measures     Row Name 12/08/20 1559          How much help from another person do you currently need...    Turning from your back to your side while in flat bed without using bedrails?  2  -KG     Moving from lying on back to sitting on the side of a flat bed without bedrails?  2  -KG     Moving to and from a bed to a chair (including a wheelchair)?  2  -KG      Standing up from a chair using your arms (e.g., wheelchair, bedside chair)?  2  -KG     Climbing 3-5 steps with a railing?  1  -KG     To walk in hospital room?  1  -KG     AM-PAC 6 Clicks Score (PT)  10  -KG     Row Name 12/08/20 1554          Modified Brooks Scale    Modified Brooks Scale  4 - Moderately severe disability.  Unable to walk without assistance, and unable to attend to own bodily needs without assistance.  -KG     Row Name 12/08/20 1554          Functional Assessment    Outcome Measure Options  AM-PAC 6 Clicks Basic Mobility (PT);Modified Brooks  -KG       User Key  (r) = Recorded By, (t) = Taken By, (c) = Cosigned By    Initials Name Provider Type    Carly Rosen, PT Physical Therapist        Physical Therapy Education                 Title: PT OT SLP Therapies (In Progress)     Topic: Physical Therapy (In Progress)     Point: Mobility training (In Progress)     Learning Progress Summary           Patient Acceptance, E, NR by  at 12/8/2020 1412    Acceptance, E,D, NR,VU by  at 12/5/2020 1400    Comment: HEP, safety, transfers, bed mobility, gait    Acceptance, E,D, VU,NR by LR at 12/3/2020 1330    Comment: Educated on benefits of mobility and being OOB. Educated on correct supine to sit t/f technique, correct sit<->stand t/f technique, correct bed to chair t/f technique, safety with mobility, LE HEP, and progression of POC.    Acceptance, E,D, VU,NR by LR at 12/2/2020 1405    Comment: Educated on precautions, benefits of mobility, safety with mobility, correct supine<->sit t/f technique, correct sit<->stand t/f technique, and progression of POC.   Family Acceptance, E,D, NR,VU by  at 12/5/2020 1400    Comment: HEP, safety, transfers, bed mobility, gait   Significant Other Acceptance, E,D, VU,NR by LR at 12/3/2020 1330    Comment: Educated on benefits of mobility and being OOB. Educated on correct supine to sit t/f technique, correct sit<->stand t/f technique, correct bed to chair  t/f technique, safety with mobility, LE HEP, and progression of POC.    Acceptance, E,D, VU,NR by LR at 12/2/2020 1405    Comment: Educated on precautions, benefits of mobility, safety with mobility, correct supine<->sit t/f technique, correct sit<->stand t/f technique, and progression of POC.                   Point: Home exercise program (In Progress)     Learning Progress Summary           Patient Acceptance, E, NR by KG at 12/8/2020 1412    Acceptance, E,D, NR,VU by SH at 12/5/2020 1400    Comment: HEP, safety, transfers, bed mobility, gait    Acceptance, E,D, VU,NR by LR at 12/3/2020 1330    Comment: Educated on benefits of mobility and being OOB. Educated on correct supine to sit t/f technique, correct sit<->stand t/f technique, correct bed to chair t/f technique, safety with mobility, LE HEP, and progression of POC.    Acceptance, E,D, VU,NR by LR at 12/2/2020 1405    Comment: Educated on precautions, benefits of mobility, safety with mobility, correct supine<->sit t/f technique, correct sit<->stand t/f technique, and progression of POC.   Family Acceptance, E,D, NR,VU by  at 12/5/2020 1400    Comment: HEP, safety, transfers, bed mobility, gait   Significant Other Acceptance, E,D, VU,NR by LR at 12/3/2020 1330    Comment: Educated on benefits of mobility and being OOB. Educated on correct supine to sit t/f technique, correct sit<->stand t/f technique, correct bed to chair t/f technique, safety with mobility, LE HEP, and progression of POC.    Acceptance, E,D, VU,NR by LR at 12/2/2020 1405    Comment: Educated on precautions, benefits of mobility, safety with mobility, correct supine<->sit t/f technique, correct sit<->stand t/f technique, and progression of POC.                   Point: Body mechanics (In Progress)     Learning Progress Summary           Patient Acceptance, E, NR by KG at 12/8/2020 1412    Acceptance, E,D, VU,NR by LR at 12/3/2020 1330    Comment: Educated on benefits of mobility and being  OOB. Educated on correct supine to sit t/f technique, correct sit<->stand t/f technique, correct bed to chair t/f technique, safety with mobility, LE HEP, and progression of POC.    Acceptance, E,D, VU,NR by LR at 12/2/2020 1405    Comment: Educated on precautions, benefits of mobility, safety with mobility, correct supine<->sit t/f technique, correct sit<->stand t/f technique, and progression of POC.   Significant Other Acceptance, E,D, VU,NR by LR at 12/3/2020 1330    Comment: Educated on benefits of mobility and being OOB. Educated on correct supine to sit t/f technique, correct sit<->stand t/f technique, correct bed to chair t/f technique, safety with mobility, LE HEP, and progression of POC.    Acceptance, E,D, VU,NR by LR at 12/2/2020 1405    Comment: Educated on precautions, benefits of mobility, safety with mobility, correct supine<->sit t/f technique, correct sit<->stand t/f technique, and progression of POC.                   Point: Precautions (In Progress)     Learning Progress Summary           Patient Acceptance, E, NR by KG at 12/8/2020 1412    Acceptance, E,D, VU,NR by LR at 12/3/2020 1330    Comment: Educated on benefits of mobility and being OOB. Educated on correct supine to sit t/f technique, correct sit<->stand t/f technique, correct bed to chair t/f technique, safety with mobility, LE HEP, and progression of POC.    Acceptance, E,D, VU,NR by LR at 12/2/2020 1405    Comment: Educated on precautions, benefits of mobility, safety with mobility, correct supine<->sit t/f technique, correct sit<->stand t/f technique, and progression of POC.   Significant Other Acceptance, E,D, VU,NR by LR at 12/3/2020 1330    Comment: Educated on benefits of mobility and being OOB. Educated on correct supine to sit t/f technique, correct sit<->stand t/f technique, correct bed to chair t/f technique, safety with mobility, LE HEP, and progression of POC.    Acceptance, E,D, VU,NR by LR at 12/2/2020 1405    Comment:  Educated on precautions, benefits of mobility, safety with mobility, correct supine<->sit t/f technique, correct sit<->stand t/f technique, and progression of POC.                               User Key     Initials Effective Dates Name Provider Type Discipline    SH 06/19/15 -  Danette Mann, PT Physical Therapist PT    LR 06/19/15 -  Marizol Vargas, PT Physical Therapist PT    KG 05/22/20 -  Caryl Wood, PT Physical Therapist PT              PT Recommendation and Plan  Planned Therapy Interventions (PT): balance training, bed mobility training, gait training, strengthening, transfer training  Plan of Care Reviewed With: patient  Outcome Summary: PT re-evaluation completed for pt s/p transfer to ICU presenting with L sided weakness, impaired balance, and decreased functional mobility. Pt performed STS x3 from chair with maxA x2, progressing to modA x2 and B UE support. Pt's goals have been revised and pt would continue to benefit from PT skilled care. Recommend D/C to IP rehab facility.     Time Calculation:   PT Charges     Row Name 12/08/20 1412             Time Calculation    Start Time  1412  -KG      PT Received On  12/08/20  -KG      PT Goal Re-Cert Due Date  12/18/20  -KG         Time Calculation- PT    Total Timed Code Minutes- PT  23 minute(s)  -KG         Timed Charges    38239 - PT Therapeutic Activity Minutes  23  -KG        User Key  (r) = Recorded By, (t) = Taken By, (c) = Cosigned By    Initials Name Provider Type    KG Carly Wood, PT Physical Therapist        Therapy Charges for Today     Code Description Service Date Service Provider Modifiers Qty    75756343420 HC PT THERAPEUTIC ACT EA 15 MIN 12/8/2020 Carly Wood, PT GP 2    49815129400 HC PT RE-EVAL ESTABLISHED PLAN 2 12/8/2020 Carly Wood, PT GP 1    33876072353 HC PT THER SUPP EA 15 MIN 12/8/2020 Carly Wood, PT GP 2          PT G-Codes  Outcome Measure Options: AM-PAC 6 Clicks  Basic Mobility (PT), Modified Peter  AM-PAC 6 Clicks Score (PT): 10  AM-PAC 6 Clicks Score (OT): 12  Modified Dixon Scale: 4 - Moderately severe disability.  Unable to walk without assistance, and unable to attend to own bodily needs without assistance.    Priti Wood, PT  12/8/2020

## 2020-12-08 NOTE — CODE DOCUMENTATION
Patient had a fall in room.  Staff heard patient fall, alarms in place. MD notified, patient responding appropriately to questions.

## 2020-12-08 NOTE — PROGRESS NOTES
INTENSIVIST   PROGRESS NOTE     Hospital:  LOS: 7 days      S     Mr. Dirk Miles, 80 y.o. male is followed for:      Acute CVA (cerebrovascular accident) (CMS/[facility])    A-fib (CMS/[facility])    Hypertension    Diabetes mellitus (CMS/[facility])    As an Intensivist, we provide an integrated approach to the ICU patient and family, medical management of comorbid conditions, including but not limited to electrolytes, glycemic control, organ dysfunction, lead interdisciplinary rounds and coordinate the care with all other services, including those from other specialists.     Interval History:  I was called by Dr. Rincon to transfer him to the ICU for close neuro monitoring, after a fall, and new ICH as per CT scan.    About a week ago, he was admitted with a CVA, suspected to be embolic secondary to his A Fib. His initial symptoms were sudden onset of dizziness and left side weakness.     He was started on anticoagulation with DOAC, and then he fell and hit his head.    No fevers.    I saw upon arrival to 2B ICU. He does not know why he was transferred to the ICU.  He seems confused.    Temp  Min: 97.8 °F (36.6 °C)  Max: 98.4 °F (36.9 °C)     The patient's relevant past medical, surgical and social history were reviewed and updated in Epic as appropriate.          O     Vitals:  Temp: 98.2 °F (36.8 °C) (12/07/20 2019) Temp  Min: 97.8 °F (36.6 °C)  Max: 98.4 °F (36.9 °C)   Temp core:      BP: 118/82 (12/07/20 2324) BP  Min: 110/78  Max: 186/93   Pulse: 77 (12/07/20 2324) Pulse  Min: 60  Max: 77   Resp: 18 (12/07/20 2324) Resp  Min: 16  Max: 18   SpO2: 93 % (12/07/20 2324) SpO2  Min: 88 %  Max: 96 %   Device: room air (12/07/20 2010)    Flow Rate:   No data recorded     Intake/Ouptut 24 hrs (7:00AM - 6:59 AM)  Intake & Output (last 3 days)       12/05 0701 - 12/06 0700 12/06 0701 - 12/07 0700 12/07 0701 - 12/08 0700    P.O.  630     Total Intake(mL/kg)  630 (7.5)     Urine (mL/kg/hr) 950 (0.5) 1135 (0.6) 775 (0.4)    Total Output  950 1135 775    Net -950 -505 -775           Urine Unmeasured Occurrence 2 x 1 x     Stool Unmeasured Occurrence 1 x          Medications (drips):  niCARdipine, Last Rate: Stopped (12/07/20 2120)      Physical Examination  Telemetry:  Rhythm: atrial rhythm (12/07/20 2010)  Atrial Rhythm: atrial fibrillation (12/07/20 2010)      Constitutional:  No acute distress.   Cardiovascular: IRR.   Normal heart sounds.  No murmurs, gallop or rub.   Respiratory: Normal breath sounds  No adventitious sounds.   Abdominal:  Soft with no tenderness.  No distension.   No HSM.   Extremities: Warm.  Dry.  No cyanosis.  No Edema   Neurological:   Awake.   Ecchymosis, left eye.  Best Eye Response: 4-->(E4) spontaneous (12/07/20 2010)  Best Motor Response: 6-->(M6) obeys commands (12/07/20 2010)  Best Verbal Response: 5-->(V5) oriented (12/07/20 2010)  Alli Coma Scale Score: 15 (12/07/20 2010)     NIH Stroke Scale  Interval: baseline (12/04/20 0800)  1a. Level of Consciousness: 0-->Alert, keenly responsive (12/07/20 2241)  1b. LOC Questions: 0-->Answers both questions correctly (12/07/20 2241)  1c. LOC Commands: 0-->Performs both tasks correctly (12/07/20 2241)  2. Best Gaze: 1-->Partial gaze palsy, gaze is abnormal in one or both eyes, but forced deviation or total gaze paresis is not present (12/07/20 2241)  3. Visual: 1-->Partial hemianopia (12/07/20 2241)  4. Facial Palsy: 0-->Normal symmetrical movements (12/07/20 2241)  5a. Motor Arm, Left: 2-->Some effort against gravity, limb cannot get to or maintain (if cued) 90 (or 45) degrees, drifts down to bed, but has some effort against gravity (12/07/20 2241)  5b. Motor Arm, Right: 0-->No drift, limb holds 90 (or 45) degrees for full 10 secs (12/07/20 2241)  6a. Motor Leg, Left: 1-->Drift, leg falls by the end of the 5-sec period but does not hit bed (12/07/20 2241)  6b. Motor Leg, Right: 0-->No drift, leg holds 30 degree position for full 5 secs (12/07/20 2241)  7. Limb Ataxia:  1-->Present in one limb (12/07/20 2241)  8. Sensory: 2-->Severe to total sensory loss, patient is not aware of being touched in the face, arm, and leg (12/07/20 2241)  9. Best Language: 0-->No aphasia, normal (12/07/20 2241)  10. Dysarthria: 0-->Normal (12/07/20 2241)  11. Extinction and Inattention (formerly Neglect): 1-->Visual, tactile, auditory, spatial, or personal inattention or extinction to bilateral simultaneous stimulation in one of the sensory modalities (12/07/20 2241)  Total (NIH Stroke Scale): 9 (12/07/20 2241)    Hematology:  Results from last 7 days   Lab Units 12/05/20  0850 12/04/20  1003  12/01/20  1139   WBC 10*3/mm3 7.86 7.70   < > 9.00   HEMOGLOBIN g/dL 13.2 12.4*   < > 11.9*   MCV fL 103.0* 101.6*   < > 103.5*   PLATELETS 10*3/mm3 191 185   < > 183   NEUTROS ABS 10*3/mm3  --   --   --  6.35   LYMPHS ABS 10*3/mm3  --   --   --  1.21   EOS ABS 10*3/mm3  --   --   --  0.47*    < > = values in this interval not displayed.     Chemistry:  Estimated Creatinine Clearance: 51.4 mL/min (A) (by C-G formula based on SCr of 1.36 mg/dL (H)).  Results from last 7 days   Lab Units 12/06/20  1423 12/05/20  0850   SODIUM mmol/L 135* 134*   POTASSIUM mmol/L 4.6 4.4   CHLORIDE mmol/L 97* 98   CO2 mmol/L 27.0 23.0   BUN mg/dL 23 21   CREATININE mg/dL 1.36* 1.33*   GLUCOSE mg/dL 168* 162*     Results from last 7 days   Lab Units 12/06/20  1423 12/05/20  0850 12/04/20  1003 12/03/20  0706   CALCIUM mg/dL 9.5 9.7 9.9 9.9   MAGNESIUM mg/dL  --   --  2.0 1.9       COVID-19  Lab Results   Lab Value Date/Time    COVID19 Not Detected 12/01/2020 1408       Images:  Ct Head Without Contrast    Result Date: 12/7/2020  There is mixed subacute and chronic hemorrhage in the infarcted tissue bed centered in the right occipital lobe. This is new since the previous scan. The volume of acute hemorrhage is less than 30 cc. Overall the thickness of the area of encephalomalacia has increased by about 4 mm since the previous  examination. Mass effect shows a slight increase. NOTIFICATION: Critical Value/emergent results were called by telephone at the time of interpretation on 12/7/2020 7:40 PM to the patient's nurse, who verbally acknowledged these results. She will be conveying the results to the nurse practitioner. Signer Name: Ray Perkins MD  Signed: 12/7/2020 7:40 PM  Workstation Name: [facility]  [facility]      Echo:  Results for orders placed during the hospital encounter of 12/01/20   Adult Transthoracic Echo Complete W/ Cont if Necessary Per Protocol (With Agitated Saline)    Narrative · Left ventricular ejection fraction appears to be 61 - 65%. Left   ventricular systolic function is normal.  · Left atrial volume is moderately increased.  · Saline test results are negative.  · The right atrial cavity is mildly dilated.  · Moderate tricuspid valve regurgitation is present.  · Estimated right ventricular systolic pressure from tricuspid   regurgitation is moderately elevated (45-55 mmHg).          Results: Reviewed.  I reviewed the patient's new laboratory and imaging results.  I independently reviewed the patient's new images.    Medications: Reviewed.    Assessment/Plan   A / P     12/07/20    Assessment:    80 y.o.male, admitted on 12/1/2020 with Acute CVA and subsequentTraumatic brain injury:     1. CVA, Embolic. R PCA stroke. High grade L ICA stenosis 80-90%.  1. ICH, post a fall in his hospital room 12/07/20 vs hemorrhagic transformation of his AIS.  2. A Fib  3. HTN on ß-blockers, ACEI and diuretics.  4. Dyslipidemia on statins  5. Macrocytic anemia  Lab Results   Lab Value Date/Time    IRON 53 (L) 12/01/2020 1139    LABIRON 13 (L) 12/01/2020 1139    TRANSFERRIN 280 12/01/2020 1139    TIBC 417 12/01/2020 1139     Lab Results   Lab Value Date/Time    [id number] 207 (L) 12/02/2020 0826    FOLATE 10.70 12/02/2020 0826      6. T2DM    Results from last 7 days   Lab Units 12/07/20  9278  12/07/20  1701 12/07/20  1141 12/07/20  0727 12/06/20 2006 12/06/20  1557   GLUCOSE mg/dL 141* 136* 146* 120 141* 158*     Lab Results   Lab Value Date/Time    HGBA1C 6.40 (H) 12/02/2020 0826       Nutrition Support: Patient isn't on Tube Feeding   Modulars: Patient doesn't have any tube feeding modular orders   Diet: Diet Regular   Advance Directives: Code Status and Medical Interventions:   Ordered at: 12/01/20 1809     Level Of Support Discussed With:    Patient     Code Status:    CPR     Medical Interventions (Level of Support Prior to Arrest):    Full        Plan:    1. Antithrombotics (ASA, DOAC) on hold at present.  2. Keep SBP < 160 mmHg  3. Discussed with Dr. Murray. Hopefully no need to reverse anticoagulation  4. F/O Head images.  5. Goal: Glucose < 180 mg/dL.   6. Disposition: Keep in ICU.   1. Labs in AM    Plan of care and goals reviewed during interdisciplinary rounds.  I discussed the patient's findings and my recommendations with patient and nursing staff    Level of Risk is High due to:  illness with threat to life or bodily function.     Time: 25 minutes, in direct patient care, with the patient and/or on the allen coordinating care with other health care providers.     I have spent > 50% percent of this time, counseling and discussing management.     Zachery Horner MD, FACP, FCCP, CNSC  Intensive Care Medicine, Nutrition Support and Pulmonary Medicine     [x]  Primary Attending  []  Consultant

## 2020-12-08 NOTE — SIGNIFICANT NOTE
Cross Cover:    Notified by our APRN about CT scan that came back after a traumatic fall today with blood.    I called NS on Call Dr. KRISTIN Murray to review scan.    Goal is to keep blood pressure under 160 systolic per conversation    Called Stroke Navigator on call  Called ICU attending on call    Reviewed Scan    Patient fell in the bathroom and hit his head earlier today    AOx3 (person, place and time)  Persistent left sided weakness from stroke  Right arm and leg good strength  No headache reported  No changes reported  Firm scalp trauma with residual blood in hair on Left lateral head cleaned earlier    Seen by Primary Attending this afternoon after event    I called patient's family (wife) - Saari about event this afternoon, current status, and plan to move to ICU for closer monitoring.    It is unclear if this is progression of stroke, hemorrhagic conversion on aspirin and Eliquis or traumatic bleeding.    He has pain in his Right Neck muscles which could be strain from head strike, but based on hematoma outside head he struck Left lateral head today it appears.

## 2020-12-09 ENCOUNTER — APPOINTMENT (OUTPATIENT)
Dept: CT IMAGING | Facility: HOSPITAL | Age: 80
End: 2020-12-09

## 2020-12-09 VITALS
SYSTOLIC BLOOD PRESSURE: 104 MMHG | WEIGHT: 176.81 LBS | HEART RATE: 68 BPM | HEIGHT: 69 IN | OXYGEN SATURATION: 93 % | TEMPERATURE: 98.1 F | DIASTOLIC BLOOD PRESSURE: 81 MMHG | RESPIRATION RATE: 20 BRPM | BODY MASS INDEX: 26.19 KG/M2

## 2020-12-09 LAB
GLUCOSE BLDC GLUCOMTR-MCNC: 114 MG/DL (ref 70–130)
GLUCOSE BLDC GLUCOMTR-MCNC: 124 MG/DL (ref 70–130)

## 2020-12-09 PROCEDURE — 82962 GLUCOSE BLOOD TEST: CPT

## 2020-12-09 PROCEDURE — 70450 CT HEAD/BRAIN W/O DYE: CPT

## 2020-12-09 PROCEDURE — 97530 THERAPEUTIC ACTIVITIES: CPT

## 2020-12-09 PROCEDURE — 97110 THERAPEUTIC EXERCISES: CPT

## 2020-12-09 PROCEDURE — 99233 SBSQ HOSP IP/OBS HIGH 50: CPT | Performed by: STUDENT IN AN ORGANIZED HEALTH CARE EDUCATION/TRAINING PROGRAM

## 2020-12-09 PROCEDURE — 99232 SBSQ HOSP IP/OBS MODERATE 35: CPT | Performed by: INTERNAL MEDICINE

## 2020-12-09 RX ORDER — METOPROLOL TARTRATE 50 MG/1
50 TABLET, FILM COATED ORAL EVERY 12 HOURS SCHEDULED
Start: 2020-12-09

## 2020-12-09 RX ORDER — AMOXICILLIN 250 MG
2 CAPSULE ORAL 2 TIMES DAILY
Start: 2020-12-09

## 2020-12-09 RX ORDER — ATORVASTATIN CALCIUM 80 MG/1
80 TABLET, FILM COATED ORAL NIGHTLY
Status: ON HOLD
Start: 2020-12-09 | End: 2021-07-30 | Stop reason: SDUPTHER

## 2020-12-09 RX ORDER — BACITRACIN ZINC AND POLYMYXIN B SULFATE 500; 10000 [USP'U]/G; [USP'U]/G
OINTMENT TOPICAL EVERY 12 HOURS SCHEDULED
Start: 2020-12-09 | End: 2020-12-12

## 2020-12-09 RX ORDER — LISINOPRIL 20 MG/1
20 TABLET ORAL
Start: 2020-12-10 | End: 2021-01-15 | Stop reason: HOSPADM

## 2020-12-09 RX ORDER — CYANOCOBALAMIN 1000 UG/ML
1000 INJECTION, SOLUTION INTRAMUSCULAR; SUBCUTANEOUS WEEKLY
Start: 2020-12-15

## 2020-12-09 RX ADMIN — BACITRACIN ZINC AND POLYMYXIN B SULFATE: at 08:10

## 2020-12-09 RX ADMIN — ACETAMINOPHEN 650 MG: 325 TABLET, FILM COATED ORAL at 04:10

## 2020-12-09 RX ADMIN — HYDROCORTISONE: 25 CREAM TOPICAL at 08:11

## 2020-12-09 RX ADMIN — LISINOPRIL 20 MG: 20 TABLET ORAL at 08:10

## 2020-12-09 RX ADMIN — DOCUSATE SODIUM 50 MG AND SENNOSIDES 8.6 MG 2 TABLET: 8.6; 5 TABLET, FILM COATED ORAL at 08:10

## 2020-12-09 RX ADMIN — METOPROLOL TARTRATE 50 MG: 50 TABLET, FILM COATED ORAL at 08:10

## 2020-12-09 NOTE — DISCHARGE SUMMARY
DISCHARGE SUMMARY       Patient name: Dirk Miles  CSN: [id number]  MRN: [id number]  : 1940  Today's date: 2020     Date of Admission: 2020  Date of Discharge:  2020    Admitting Physician: Tonia Barrientos MD  Primary Care Provider: Tia Pratt MD  Consultations:   Robert Murray MD  Neurosurgery  Surjit Jerry MD   Neurology     Admission Diagnosis: Acute CVA     Discharge Diagnoses:     Acute CVA (cerebrovascular accident) (CMS/[facility])    A-fib (CMS/[facility])    Hypertension    Diabetes mellitus (CMS/[facility])    Imaging:  Ct Angiogram Head    Result Date: 2020  Abrupt nonopacification compatible with likely acute thrombotic occlusion of the right P2 segment PCA. There is otherwise intracranial atherosclerotic disease with mild narrowing of the supraclinoid segment internal carotid arteries bilaterally. No further evidence of high-grade flow limiting stenosis, large vessel occlusion or aneurysmal dilatation.  Focal severe, 80-90% narrowing of the left proximal ICA, with prominent component of soft plaque noted. No significant atherosclerotic narrowing of the right ICA origin.  Findings discussed with the stroke team by Jose Narayanan via phone at 11:52 AM 2020   This report was finalized on 2020 12:06 PM by Jose Narayanan.      Xr Elbow 2 View Left    Result Date: 2020  1. No acute osseous abnormality. 2. Degenerative arthropathy. Signer Name: Quang Silva MD  Signed: 2020 7:43 PM  Workstation [facility]  Radiology [facility]    Xr Forearm 2 View Left    Result Date: 2020  Negative left forearm series. Signer Name: Quang Silva MD  Signed: 2020 7:43 PM  Workstation Name: [email or web address]  Radiology [facility]    Ct Head Without Contrast    Result Date: 2020  Redemonstrated large right PCA territory acute infarct with hemorrhagic transformation. No evidence of significant interval hemorrhage  from recent comparison. Unchanged associated cerebral edema without herniation or midline shift.   This report was finalized on 12/9/2020 8:39 AM by Jose Narayanan.      Ct Head Without Contrast    Result Date: 12/8/2020  Redemonstrated evolving acute right PCA territory infarct with hemorrhagic transformation, mildly increased in degree of hemorrhage from most recent comparison, with some increased localized mass effect including leftward midline shift near the septum pellucidum.   This report was finalized on 12/8/2020 8:34 AM by Jose Narayanan.      Ct Head Without Contrast    Result Date: 12/7/2020  There is mixed subacute and chronic hemorrhage in the infarcted tissue bed centered in the right occipital lobe. This is new since the previous scan. The volume of acute hemorrhage is less than 30 cc. Overall the thickness of the area of encephalomalacia has increased by about 4 mm since the previous examination. Mass effect shows a slight increase. NOTIFICATION: Critical Value/emergent results were called by telephone at the time of interpretation on 12/7/2020 7:40 PM to the patient's nurse, who verbally acknowledged these results. She will be conveying the results to the nurse practitioner. Signer Name: Ray Perkins MD  Signed: 12/7/2020 7:40 PM  Workstation Name: [id number]  [facility]    Ct Head Without Contrast    Result Date: 12/4/2020  Large evolving right occipital infarction. There is no hemorrhagic conversion. The remainder of the brain is stable. Chronic changes present bilaterally.  D:  12/04/2020 E:  12/04/2020  This report was finalized on 12/4/2020 6:45 PM by Dr. Merlene Howard MD.      Ct Head Without Contrast    Result Date: 12/2/2020  Evolving right PCA territory infarct without evidence of hemorrhage, significant global mass effect or new territorial ischemia.   This report was finalized on 12/2/2020 6:30 PM by Jose Narayanan.      Ct Angiogram Neck    Result  Date: 12/1/2020  Abrupt nonopacification compatible with likely acute thrombotic occlusion of the right P2 segment PCA. There is otherwise intracranial atherosclerotic disease with mild narrowing of the supraclinoid segment internal carotid arteries bilaterally. No further evidence of high-grade flow limiting stenosis, large vessel occlusion or aneurysmal dilatation.  Focal severe, 80-90% narrowing of the left proximal ICA, with prominent component of soft plaque noted. No significant atherosclerotic narrowing of the right ICA origin.  Findings discussed with the stroke team by Jose Narayanan via phone at 11:52 AM 12/1/2020   This report was finalized on 12/1/2020 12:06 PM by Jose Narayanan.      Mri Brain Without Contrast    Result Date: 12/1/2020  Right PCA territory acute infarct encompassing the occipital, posterior temporal lobes and right thalamus, without evidence of significant hemorrhage, global mass effect or midline shift.   This report was finalized on 12/1/2020 4:21 PM by Jose Narayanan.      Xr Chest 1 View    Result Date: 12/4/2020  No acute cardiopulmonary disease. The heart is enlarged.  D:  12/04/2020 E:  12/04/2020  This report was finalized on 12/4/2020 6:45 PM by Dr. Merlene Howard MD.      Xr Chest 1 View    Result Date: 12/1/2020  Cardiac enlargement and pulmonary vascular engorgement, without overt edema.  This report was finalized on 12/1/2020 12:58 PM by Jose Narayanan.      Ct Head Without Contrast Stroke Protocol    Result Date: 12/1/2020  No acute intracranial hemorrhage. Transcortical hypoattenuation in the right PCA territory concerning for area of acute infarct.  Scan performed at 11:17 AM 12/1/2020. Findings discussed with the stroke team in person by Jose Narayanan at time of scan acquisition.   This report was finalized on 12/1/2020 11:53 AM by Jose Narayanan.      Ct Cerebral Perfusion With & Without Contrast    Result Date: 12/1/2020  Right PCA territory core  infarct with fairly matched area of diminished cerebral blood flow, without significant surrounding ischemic tissue at risk.   This report was finalized on 12/1/2020 11:59 AM by Jose Narayanan.      History of Present Illness:  Dirk Miles is a 80 y.o. male with PMH significant for PAF not on anticoagulation who presented to [facility] ED 12/1/20 for stroke evaluation.     Per report, patient woke up in his usual state of health and took out the trash. At approximately 0800 he developed acute onset of headache and gait disturbance, and upon wife checking his BP it was noted to be elevated at 212 systolic. She called her son for help and when they could not ambulate him they called EMS. He was taken to our ED for further evaluation.     On ED arrival he had left-sided neglect, right-sided gaze preference, left-sided drift, decreased sensation, and a complete left hemianopsia, with NIHSS 10. His head CT was negative for hemorrhage but did show hypoattenuation within the right PCA territory. Following discussion with Dr. Jerry of Neurology the decision was made to not give tPA due to risk of hemorrhage in the posterior circulation. Subsequent CTA head/neck and CTP were performed and revealed a right PCA infarct with matched area of diminished cerebral blood flow, as well as a distal right P2 segment occlusion.     He was admitted to the hospital medicine service for further management.     Hospital Course:  Following hospital admission MRI of the brain was performed which showed a large right PCA stroke, old left MCA frontal lobe infarct, and moderate to severe white matter disease. TTE with LVEF of 61% and was negative for PFO. Follow up CTH was negative for hemorrhage and he was initiated on Eliquis per Neurology recs on 12/4.     On the evening of 12/7 patient suffered a fall in the bathroom in which he struck his head / received an abrasion. Dr. Murray was contacted who reviewed STAT CTH which showed a small amount  "of hemorrhage surrounding prior PCA infarct. It was unclear at this time if this was d/t hemorrhagic transformation vs fall, and ICU transfer with closer monitoring / tight BP control was recommended.      Repeat CTH was performed yesterday and reviewed by Dr. Johnson which showed slight worsening of hemorrhage. It was recommended by Neurology to hold AC (other than aspirin) for at least 7-10 days and if patient remained stable for the next 24 hours, he could be discharged to rehab. This morning's CTH was reviewed by both Dr. Lawson and Dr. Murray who felt bleed was stable and he was ready for rehab from their standpoint. Per neurosurgery recs he will have a repeat CTH in one week as an outpatient, and will continue his original follow up appt with Dr. Johnson regarding his carotid disease.     Patient is felt to have reached maximal benefit from this hospitalization and is ready for discharge. He has worked with PT/OT/SLP who have all recommended continued inpatient therapy upon discharge. He was accepted for rehabilitation at [facility] and has a bed on the Stroke Unit. He will be transported today at approximately 1600 via ambulance transfer.      Vitals:  /78 (BP Location: Right arm, Patient Position: Sitting)   Pulse 68   Temp 98.1 °F (36.7 °C) (Oral)   Resp 20   Ht 175.3 cm (69\")   Wt 80.2 kg (176 lb 12.9 oz)   SpO2 94%   BMI 26.11 kg/m²     GENERAL : NAD, conversant  RESPIRATORY/THORAX : normal respiratory effort and no intercostal retractions, CTAB  CARDIOVASCULAR : Normal S1/S2, RRR.  No lower ext edema.  GASTROINTESTINAL : Soft, NT/ND. BS x 4 normoactive. No hepatosplenomegaly.  MUSCULOSKELETAL : No cyanosis, clubbing, or ischemia  NEUROLOGICAL: alert and oriented to person, place and time  PSYCHOLOGICAL : Appropriate affect    Interval: (Handoff)  1a. Level of Consciousness: 1-->Not alert, but arousable by minor stimulation to obey, answer, or respond  1b. LOC Questions: 1-->Answers one " question correctly  1c. LOC Commands: 0-->Performs both tasks correctly  2. Best Gaze: 1-->Partial gaze palsy, gaze is abnormal in one or both eyes, but forced deviation or total gaze paresis is not present  3. Visual: 2-->Complete hemianopia  4. Facial Palsy: 0-->Normal symmetrical movements  5a. Motor Arm, Left: 3-->No effort against gravity, limb falls  5b. Motor Arm, Right: 0-->No drift, limb holds 90 (or 45) degrees for full 10 secs  6a. Motor Leg, Left: 1-->Drift, leg falls by the end of the 5-sec period but does not hit bed  6b. Motor Leg, Right: 0-->No drift, leg holds 30 degree position for full 5 secs  7. Limb Ataxia: 1-->Present in one limb  8. Sensory: 2-->Severe to total sensory loss, patient is not aware of being touched in the face, arm, and leg  9. Best Language: 0-->No aphasia, normal  10. Dysarthria: 0-->Normal  11. Extinction and Inattention (formerly Neglect): 1-->Visual, tactile, auditory, spatial, or personal inattention or extinction to bilateral simultaneous stimulation in one of the sensory modalities    Total (NIH Stroke Scale): 13    Labs:  Results from last 7 days   Lab Units 12/08/20  0643   WBC 10*3/mm3 9.17   HEMOGLOBIN g/dL 13.0   HEMATOCRIT % 41.3   PLATELETS 10*3/mm3 214     Results from last 7 days   Lab Units 12/08/20  0643  12/03/20  0706   SODIUM mmol/L 138   < > 140   POTASSIUM mmol/L 4.7   < > 4.2   CHLORIDE mmol/L 98   < > 103   CO2 mmol/L 28.0   < > 26.0   BUN mg/dL 24*   < > 18   CREATININE mg/dL 1.30*   < > 1.25   CALCIUM mg/dL 9.8   < > 9.9   BILIRUBIN mg/dL  --   --  0.6   ALK PHOS U/L  --   --  79   ALT (SGPT) U/L  --   --  14   AST (SGOT) U/L  --   --  22   GLUCOSE mg/dL 127*   < > 130*    < > = values in this interval not displayed.         Magnesium   Date Value Ref Range Status   12/08/2020 2.2 1.6 - 2.4 mg/dL Final     Phosphorus   Date Value Ref Range Status   12/08/2020 4.0 2.5 - 4.5 mg/dL Final              Discharge Medications      New Medications       Instructions Start Date   bacitracin-polymyxin b [id number] UNIT/GM ointment ointment   Topical, Every 12 Hours Scheduled      cyanocobalamin 1000 MCG/ML injection   1,000 mcg, Intramuscular, Weekly   Start Date: December 15, 2020     hydrocortisone 2.5 % cream   Topical, Every 12 Hours Scheduled      insulin lispro 100 UNIT/ML injection  Commonly known as: humaLOG   0-7 Units, Subcutaneous, 3 Times Daily Before Meals      lisinopril 20 MG tablet  Commonly known as: PRINIVIL,ZESTRIL   20 mg, Oral, Every 24 Hours Scheduled   Start Date: December 10, 2020     niCARdipine in NaCl 20-0.86 MG/200ML-% infusion  Commonly known as: CARDENE-IV   5-15 mg/hr (5-15 mg/hr), Intravenous, Titrated      sennosides-docusate 8.6-50 MG per tablet  Commonly known as: PERICOLACE   2 tablets, Oral, 2 Times Daily         Changes to Medications      Instructions Start Date   atorvastatin 80 MG tablet  Commonly known as: LIPITOR  What changed:   · medication strength  · how much to take  · when to take this   80 mg, Oral, Nightly      metoprolol tartrate 50 MG tablet  Commonly known as: LOPRESSOR  What changed:   · medication strength  · how much to take  · when to take this   50 mg, Oral, Every 12 Hours Scheduled         Continue These Medications      Instructions Start Date   aspirin 81 MG EC tablet   81 mg, Oral, Daily      furosemide 20 MG tablet  Commonly known as: LASIX   20 mg, Oral, 2 Times Weekly, ON TUES AND SAT          Stop These Medications    metFORMIN 500 MG tablet  Commonly known as: GLUCOPHAGE          Discharge Diet:   Diet Instructions     Diet: Regular      Discharge Diet: Regular        Activity at Discharge:    Activity Instructions     Activity as Tolerated          Follow-up Appointments  Future Appointments   Date Time Provider Department Center   12/17/2020  2:45 PM [facility]     Additional Instructions for the Follow-ups that You Need to Schedule     Discharge Follow-up with PCP   As directed        Currently Documented PCP:    Tia Pratt MD    PCP Phone Number:    [phone]     Follow Up Details: 1 week following D/C from rehab         Discharge Follow-up with Specialty: Neurology; 3 Weeks   As directed      Specialty: Neurology    Follow Up: 3 Weeks    Follow Up Details: following D/C from rehab         CT head wo contrast    Dec 16, 2020 (Approximate)      STEREOTACTIC GUIDANCE PROTOCOL?: No             Discharge Instructions:  1. Ok to D/C to [facility]  2. Can restart ASA per Neurology   3. Repeat CTH in one week   4. F/U as above      Questions for Current Code Status     Question Answer Comment    Code Status CPR     Medical Interventions (Level of Support Prior to Arrest) Full     Level Of Support Discussed With Patient         Ashley Traylor, DNP, APRN, AGACNP-BC  Pulmonary and Critical Care Medicine  12/09/20     Time: Discharge 45 min    CC: Tia Pratt MD    *Please note that portions of this note were completed with a voice recognition program. Efforts were made to edit the dictations, but occasionally words are mistranscribed.

## 2020-12-09 NOTE — PLAN OF CARE
Problem: Adult Inpatient Plan of Care  Goal: Plan of Care Review  Recent Flowsheet Documentation  Taken 12/9/2020 1144 by Gerson Frederick OT  Progress: improving  Plan of Care Reviewed With: patient  Outcome Summary: Pt required ModA for all bed mobility and was dependent for davon-care/toileting skills in supine, progressed to ModA for STS tfs w/ BUE support, and continues to require MaxA for bed/chair tfs w/ BUE support. Pt performed 1/10reps BUE AAROM ther-ex in supported sitting and demo'd improved balance during unsupported sitting weight shifting activity w/ verbal and physical cueing for midline orientation. Pt's cognition markedly improved this date. Rec cont IPOT per POC. Rec d/c to IRF.

## 2020-12-09 NOTE — PLAN OF CARE
Goal Outcome Evaluation:  Plan of Care Reviewed With: patient  Progress: no change  Outcome Summary: NIH 10. Continues to have Left side neglect. Patient is oriented to questions but confused in conversation. Large incontinent loose stool. VSS. WCTM.

## 2020-12-09 NOTE — PROGRESS NOTES
Intensive Care Follow-up     Hospital:  LOS: 8 days   Mr. Dirk Miles, 80 y.o. male is followed for:   Acute CVA (cerebrovascular accident) (CMS/HCC)            History of present illness:   80-year-old male who was initially admitted for an acute CVA experienced a fall on the floor with concern for new intracranial hemorrhage on 2020-12-08.  Was moved to the ICU for closer monitoring.  Serial CT scans were unremarkable.  And anticoagulation was held.    Subjective   Interval History:  Patient doing well this morning.  He has no complaints.  Initially was supposed to go to rehab this morning, unfortunately due to a communication error the ambulance was canceled.  He has no other complaints his work on physical therapy.           The patient's past medical, surgical and social history were reviewed and updated in Epic as appropriate.       Objective     Infusions:  niCARdipine, 5-15 mg/hr, Last Rate: Stopped (12/07/20 2120)      Medications:  atorvastatin, 80 mg, Oral, Nightly  bacitracin-polymyxin b, , Topical, Q12H  cyanocobalamin, 1,000 mcg, Intramuscular, Weekly  furosemide, 20 mg, Oral, Once per day on Mon Thu  hydrocortisone, , Topical, Q12H  insulin lispro, 0-7 Units, Subcutaneous, TID AC  lisinopril, 20 mg, Oral, Q24H  metoprolol tartrate, 50 mg, Oral, Q12H  senna-docusate sodium, 2 tablet, Oral, BID  sodium chloride, 10 mL, Intravenous, Q12H      I reviewed the patient's medications.    Vital Sign Min/Max for last 24 hours  Temp  Min: 97.4 °F (36.3 °C)  Max: 98.2 °F (36.8 °C)   BP  Min: 103/69  Max: 163/106   Pulse  Min: 61  Max: 76   Resp  Min: 16  Max: 18   SpO2  Min: 92 %  Max: 98 %   No data recorded       Input/Output for last 24 hour shift  12/08 0701 - 12/09 0700  In: 400 [P.O.:400]  Out: -       GENERAL : NAD, conversant  RESPIRATORY/THORAX : normal respiratory effort and no intercostal retractions, CTAB  CARDIOVASCULAR : Normal S1/S2, RRR.  No lower ext edema.  GASTROINTESTINAL : Soft, NT/ND.  BS x 4 normoactive. No hepatosplenomegaly.  MUSCULOSKELETAL : No cyanosis, clubbing, or ischemia  NEUROLOGICAL: alert and oriented to person, place and time  PSYCHOLOGICAL : Appropriate affect    Results from last 7 days   Lab Units 12/08/20  0643 12/05/20  0850 12/04/20  1003   WBC 10*3/mm3 9.17 7.86 7.70   HEMOGLOBIN g/dL 13.0 13.2 12.4*   PLATELETS 10*3/mm3 214 191 185     Results from last 7 days   Lab Units 12/08/20  0643 12/06/20  1423 12/05/20  0850 12/04/20  1003 12/03/20  0706   SODIUM mmol/L 138 135* 134* 136 140   POTASSIUM mmol/L 4.7 4.6 4.4 4.4 4.2   CO2 mmol/L 28.0 27.0 23.0 24.0 26.0   BUN mg/dL 24* 23 21 18 18   CREATININE mg/dL 1.30* 1.36* 1.33* 1.20 1.25   MAGNESIUM mg/dL 2.2  --   --  2.0 1.9   PHOSPHORUS mg/dL 4.0  --   --   --   --    GLUCOSE mg/dL 127* 168* 162* 160* 130*     Estimated Creatinine Clearance: 51.4 mL/min (A) (by C-G formula based on SCr of 1.3 mg/dL (H)).          I reviewed the patient's new clinical results.  I reviewed the patient's new imaging results/reports including actual images and agree with reports.              Imaging Results (Last 24 Hours)     Procedure Component Value Units Date/Time    CT Head Without Contrast [[id number]] Collected: 12/09/20 0836     Updated: 12/09/20 0842    Narrative:      EXAMINATION: CT HEAD WO CONTRAST-      INDICATION: Cerebral hemorrhage suspected; R41.841-Cognitive  communication deficit; I63.9-Cerebral infarction, unspecified     TECHNIQUE: Axial noncontrast CT of the head with multiplanar  reconstruction     The radiation dose reduction device was turned on for each scan per the  ALARA (As Low as Reasonably Achievable) protocol.     COMPARISON: One day prior     FINDINGS: Continued evolution of large right PCA territory acute infarct  with areas of hemorrhagic transformation, not significantly increased  from comparison with persistent associated cerebral edema and local mass  effect, without midline shift or herniation. Unchanged  effacement of the  right lateral ventricle, without evidence of obstruction. No evidence of  new mass, mass effect or territorial ischemia elsewhere. The orbits are  normal and the paranasal sinuses are grossly clear.       Impression:      Redemonstrated large right PCA territory acute infarct with  hemorrhagic transformation. No evidence of significant interval  hemorrhage from recent comparison. Unchanged associated cerebral edema  without herniation or midline shift.        This report was finalized on 12/9/2020 8:39 AM by Jose Narayanan.             Assessment/Plan   Impression        Acute CVA (cerebrovascular accident) (CMS/[facility])    A-fib (CMS/[facility])    Hypertension    Diabetes mellitus (CMS/[facility])       Plan        80-year-old male with past medical history significant for A. fib, hypertension, and diabetes mellitus.  Who presented to [facility] ICU on 2020-12-08 after recent CVA and had a fall on the floor with a new intracranial hemorrhage moved to the ICU for closer neurological monitoring.  With serial follow-up CT scans showing stability.  Anticoagulation was held other than a baby aspirin.  Patient is continued with physical therapy.    · Stroke management per neurology and neurosurgery  · Continue current antihypertensive regimen  · Continue current insulin regimen  · Continue baby aspirin, and hold all other anticoagulation given intracranial bleed.  They will restart on follow-up.  · Patient is okay to discharge when an ambulance is available.    Plan of care and goals reviewed with mulitdisciplinary/antibiotic stewardship team during rounds.   I discussed the patient's findings and my recommendations with patient and nursing staff       Lesia Cotton DO  Pulmonary, Critical care and Sleep Medicine

## 2020-12-09 NOTE — PROGRESS NOTES
Case Management Discharge Note      Final Note: Waitin on patient discharge status,  [facility] ambulance cancelled at 0900, Desiree at [facility] notified.  Patients discharge status confirmed will be transported at 1600 via stretcher per [facility],  Trip #[id number].         Selected Continued Care - Admitted Since 12/1/2020     Destination Coordination complete    Service Provider Selected Services Address Phone Fax Patient Preferred    [facility]  Inpatient Rehabilitation [address] [id number] [phone] --          Durable Medical Equipment    No services have been selected for the patient.              Dialysis/Infusion    No services have been selected for the patient.              Home Medical Care    No services have been selected for the patient.              Therapy    No services have been selected for the patient.              Community Resources    No services have been selected for the patient.                  Transportation Services  Ambulance: [facility]    Final Discharge Disposition Code: 62 - inpatient rehab facility

## 2020-12-09 NOTE — PROGRESS NOTES
Stroke Progress Note       Chief Complaint:  Fall     Subjective    Subjective     Subjective:  Had a fall yesterday     Review of Systems   Unable to asses due to patient condition     Objective      Temp:  [97.4 °F (36.3 °C)-98.2 °F (36.8 °C)] 97.9 °F (36.6 °C)  Heart Rate:  [61-76] 70  Resp:  [16-18] 16  BP: (103-163)/() 125/90        NEURO    MENTAL STATUS: AAOx2, memory not  intact, fund of knowledge not appropriate    LANG/SPEECH: Naming and repetition not  intact, fluent, does not follow 3-step commands    CRANIAL NERVES:  Pupillary, corneal, cough and gag intact     MOTOR:   Moves all extremities equally     REFLEXES: not done     SENSORY:     COORDINATION: Not assessed due to patient condition     STATION: Not assessed due to patient condition    GAIT: Not assessed due to patient condition  Results Review:    I reviewed the patient's new clinical results.    Lab Results (last 24 hours)     Procedure Component Value Units Date/Time    POC Glucose Once [[id number]]  (Normal) Collected: 12/09/20 0723    Specimen: Blood Updated: 12/09/20 0725     Glucose 114 mg/dL     POC Glucose Once [[id number]]  (Normal) Collected: 12/08/20 2008    Specimen: Blood Updated: 12/08/20 2010     Glucose 123 mg/dL     COVID PRE-OP / PRE-PROCEDURE SCREENING ORDER (NO ISOLATION) - Swab, Nasopharynx [[id number]]  (Normal) Collected: 12/08/20 1656    Specimen: Swab from Nasopharynx Updated: 12/08/20 1812    Narrative:      The following orders were created for panel order COVID PRE-OP / PRE-PROCEDURE SCREENING ORDER (NO ISOLATION) - Swab, Nasopharynx.  Procedure                               Abnormality         Status                     ---------                               -----------         ------                     COVID-19,CEPHEID,MICHAEL IN-...[[id number]]  Normal              Final result                 Please view results for these tests on the individual orders.    COVID-19,CEPHEID,MICHAEL IN-HOUSE(OR EMERGENT/ADD-ON),NP SWAB  IN TRANSPORT MEDIA 3-4 HR TAT - Swab, Nasopharynx [[id number]]  (Normal) Collected: 12/08/20 1656    Specimen: Swab from Nasopharynx Updated: 12/08/20 1812     COVID19 Not Detected    Narrative:      Fact sheet for providers: https://www.fda.gov/media/040346/download     Fact sheet for patients: https://www.fda.gov/media/257528/download    POC Glucose Once [[id number]]  (Normal) Collected: 12/08/20 1747    Specimen: Blood Updated: 12/08/20 1748     Glucose 121 mg/dL     POC Glucose Once [[id number]]  (Abnormal) Collected: 12/08/20 1627    Specimen: Blood Updated: 12/08/20 1628     Glucose 132 mg/dL     POC Glucose Once [[id number]]  (Normal) Collected: 12/08/20 1145    Specimen: Blood Updated: 12/08/20 1201     Glucose 124 mg/dL     Basic Metabolic Panel [[id number]]  (Abnormal) Collected: 12/08/20 0643    Specimen: Blood Updated: 12/08/20 0859     Glucose 127 mg/dL      BUN 24 mg/dL      Creatinine 1.30 mg/dL      Sodium 138 mmol/L      Potassium 4.7 mmol/L      Chloride 98 mmol/L      CO2 28.0 mmol/L      Calcium 9.8 mg/dL      eGFR Non African Amer 53 mL/min/1.73      BUN/Creatinine Ratio 18.5     Anion Gap 12.0 mmol/L     Narrative:      GFR Normal >60  Chronic Kidney Disease <60  Kidney Failure <15      Magnesium [[id number]]  (Normal) Collected: 12/08/20 0643    Specimen: Blood Updated: 12/08/20 0859     Magnesium 2.2 mg/dL     Phosphorus [[id number]]  (Normal) Collected: 12/08/20 0643    Specimen: Blood Updated: 12/08/20 0859     Phosphorus 4.0 mg/dL     TSH [[id number]]  (Normal) Collected: 12/08/20 0643    Specimen: Blood Updated: 12/08/20 0856     TSH 2.590 uIU/mL         Ct Head Without Contrast    Result Date: 12/8/2020  Redemonstrated evolving acute right PCA territory infarct with hemorrhagic transformation, mildly increased in degree of hemorrhage from most recent comparison, with some increased localized mass effect including leftward midline shift near the septum pellucidum.   This report was finalized on  12/8/2020 8:34 AM by Jose Narayanan.      Ct Head Without Contrast    Result Date: 12/7/2020  There is mixed subacute and chronic hemorrhage in the infarcted tissue bed centered in the right occipital lobe. This is new since the previous scan. The volume of acute hemorrhage is less than 30 cc. Overall the thickness of the area of encephalomalacia has increased by about 4 mm since the previous examination. Mass effect shows a slight increase. NOTIFICATION: Critical Value/emergent results were called by telephone at the time of interpretation on 12/7/2020 7:40 PM to the patient's nurse, who verbally acknowledged these results. She will be conveying the results to the nurse practitioner. Signer Name: Ray Perkins MD  Signed: 12/7/2020 7:40 PM  [facility]  [facility]    Results for orders placed during the hospital encounter of 12/01/20   Adult Transthoracic Echo Complete W/ Cont if Necessary Per Protocol (With Agitated Saline)    Narrative · Left ventricular ejection fraction appears to be 61 - 65%. Left   ventricular systolic function is normal.  · Left atrial volume is moderately increased.  · Saline test results are negative.  · The right atrial cavity is mildly dilated.  · Moderate tricuspid valve regurgitation is present.  · Estimated right ventricular systolic pressure from tricuspid   regurgitation is moderately elevated (45-55 mmHg).                Assessment/Plan     Assessment/Plan:   80 yr old male with a PMH of a fib (not on anticoagulation), asymptomatic severe LICA stenosis, and new RPCA territory infarct that was recently started on anticoagulation with apixaban on 12/4/2020, subsequently had a fall on 12/7 and developed new hemorrhage in the right cerebral hemisphere.     1. Right intracerebral hemorrhage, possibly hemorraghic conversion complexed with fall with being on AC.   -Hold Anticoagulation for now.   -Patient is stable today 12/9, can start aspirin  81 and   can be discharged to rehab as planned.          2. Symptomatic left Internal carotid artery stenosis-Follow up with Dr. Johnson as planned.           Jemal Lawson MD  12/09/20  08:11 EST

## 2020-12-09 NOTE — PROGRESS NOTES
HOD# : 8        Acute CVA (cerebrovascular accident) (CMS/HCC)    A-fib (CMS/HCC)    Hypertension    Diabetes mellitus (CMS/HCC)      Temp:  [97.4 °F (36.3 °C)-98.2 °F (36.8 °C)] 97.9 °F (36.6 °C)  Heart Rate:  [61-76] 70  Resp:  [16-18] 16  BP: (103-163)/() 149/96  I/O last 3 completed shifts:  In: 400 [P.O.:400]  Out: -   No intake/output data recorded.  Vital signs were reviewed and documented in the chart      EXAM   Deferred    PLAN     Reviewed CT scans stable overnight discussed case with Dr. Lawson  Patient is ready from rehab from a neurosurgical standpoint this is nonsurgical hemorrhage    Can follow-up as scheduled for carotid disease we will discontinue anticoagulation for now continuation baby aspirin outpatient

## 2020-12-09 NOTE — PROGRESS NOTES
"Clinical Nutrition Note      Patient Name: Dirk Miles  MRN: [id number]  Admission date: 12/1/2020      Multidisciplinary Rounds    Additional information obtained during MDR:  RN reports pt could not transfer to [facility] ambulance not available; CM has arranged for transfer to [facility] at 4 pm.    Current diet: Diet Regular; Consistent Carbohydrate    Oral Nutrition Supplement: Boost Glucose control twice daily    Pertinent medical data reviewed:  No nutrition risk identified on nursing screen; MST score \"0\"  Oral intake ~ 25% of documented meals    Intervention:  ONS provided  Plan of care and goals reviewed    Monitor:  RD to follow per protocol      Mary Hernadez MS,RD,LD  12/09/20 11:03 EST  Time: 15  mins       "

## 2020-12-09 NOTE — THERAPY TREATMENT NOTE
Patient Name: Dirk Miles  : 1940    MRN: [id number]                              Today's Date: 2020       Admit Date: 2020    Visit Dx:     ICD-10-CM ICD-9-CM   1. Cognitive communication deficit  R41.841 799.52   2. Acute CVA (cerebrovascular accident) (CMS/HCC)  I63.9 434.91     Patient Active Problem List   Diagnosis   • Acute CVA (cerebrovascular accident) (CMS/HCC)   • A-fib (CMS/HCC)   • Hypertension   • Diabetes mellitus (CMS/HCC)     Past Medical History:   Diagnosis Date   • A-fib (CMS/HCC)    • Diabetes mellitus (CMS/HCC)    • Hypertension      Past Surgical History:   Procedure Laterality Date   • CATARACT EXTRACTION, BILATERAL     • HERNIA REPAIR       General Information     Row Name 20 1138          OT Time and Intention    Document Type  therapy note (daily note)  -CS     Mode of Treatment  occupational therapy  -CS     Row Name 20 1138          General Information    Patient Profile Reviewed  yes  -CS     Existing Precautions/Restrictions  fall;other (see comments) L sided weakness/neglect  -CS     Barriers to Rehab  medically complex;cognitive status;visual deficit  -CS     Row Name 20 1138          Cognition    Orientation Status (Cognition)  oriented x 3  -CS     Row Name 20 1138          Safety Issues, Functional Mobility    Safety Issues Affecting Function (Mobility)  insight into deficits/self-awareness;safety precaution awareness;safety precautions follow-through/compliance  -CS     Impairments Affecting Function (Mobility)  balance;cognition;coordination;endurance/activity tolerance;postural/trunk control;range of motion (ROM);strength  -CS     Cognitive Impairments, Mobility Safety/Performance  attention;awareness, need for assistance;insight into deficits/self-awareness;safety precaution follow-through;safety precaution awareness  -CS       User Key  (r) = Recorded By, (t) = Taken By, (c) = Cosigned By    Initials Name Provider Type    CS  Gerson Frederick OT Occupational Therapist        Mobility/ADL's     Row Name 12/09/20 1139          Bed Mobility    Bed Mobility  rolling left;supine-sit  -CS     Rolling Left [place] (Bed Mobility)  moderate assist (50% patient effort);verbal cues  -CS     Supine-Sit [place] (Bed Mobility)  moderate assist (50% patient effort);verbal cues  -CS     Bed Mobility, Safety Issues  decreased use of arms for pushing/pulling;decreased use of legs for bridging/pushing;impaired trunk control for bed mobility  -CS     Assistive Device (Bed Mobility)  bed rails;draw sheet;head of bed elevated  -CS     Row Name 12/09/20 1139          Transfers    Transfers  sit-stand transfer;bed-chair transfer  -CS     Bed-Chair [place] (Transfers)  maximum assist (25% patient effort);verbal cues  -CS     Assistive Device (Bed-Chair Transfers)  other (see comments) BUE support  -CS     Sit-Stand [place] (Transfers)  moderate assist (50% patient effort);verbal cues  -CS     Row Name 12/09/20 1139          Sit-Stand Transfer    Assistive Device (Sit-Stand Transfers)  other (see comments) BUE support  -CS     Row Name 12/09/20 1139          Functional Mobility    Functional Mobility- Comment  Defer to PT  -CS     Row Name 12/09/20 1139          Activities of Daily Living    BADL Assessment/Intervention  lower body dressing;toileting  -CS     Row Name 12/09/20 1139          Toileting Assessment/Training    [place] Level (Toileting)  adjust/manage clothing;perform perineal hygiene;dependent (less than 25% patient effort)  -CS     Position (Toileting)  supine  -CS     Row Name 12/09/20 1139          Lower Body Dressing Assessment/Training    [place] Level (Lower Body Dressing)  don;socks;dependent (less than 25% patient effort)  -CS     Position (Lower Body Dressing)  supine  -CS       User Key  (r) = Recorded By, (t) = Taken By, (c) = Cosigned By    Initials Name Provider Type    CS Gerson Frederick OT Occupational  Therapist        Obj/Interventions     Row Name 12/09/20 1142          Shoulder (Therapeutic Exercise)    Shoulder (Therapeutic Exercise)  AAROM (active assistive range of motion)  -     Shoulder AAROM (Therapeutic Exercise)  right assist left;bilateral;flexion;extension;external rotation;internal rotation;10 repetitions  -[facility] Name 12/09/20 1142          Elbow/Forearm (Therapeutic Exercise)    Elbow/Forearm (Therapeutic Exercise)  AAROM (active assistive range of motion)  -     Elbow/Forearm AAROM (Therapeutic Exercise)  right assist left;bilateral;flexion;extension;10 repetitions  -CS     Row Name 12/09/20 1142          Balance    Balance Assessment  sitting static balance;standing static balance;sitting dynamic balance;standing dynamic balance  -     Static Sitting Balance  mild impairment;unsupported;sitting, edge of bed  -     Dynamic Sitting Balance  moderate impairment;unsupported;sitting, edge of bed  -     Static Standing Balance  moderate impairment;supported;standing  -     Dynamic Standing Balance  not tested  -     Balance Interventions  sitting;weight shifting activity  -     Comment, Balance  Lateral lean/push to the L in sitting and standing  -CS     Row Name 12/09/20 1142          Therapeutic Exercise    Therapeutic Exercise  shoulder;elbow/forearm  -       User Key  (r) = Recorded By, (t) = Taken By, (c) = Cosigned By    Initials Name Provider Type    CS Gerson Frederick OT Occupational Therapist        Goals/Plan    No documentation.       Clinical Impression     Row Name 12/09/20 1144          Pain Assessment    Additional Documentation  Pain Scale: Numbers Pre/Post-Treatment (Group)  -CS     Row Name 12/09/20 1144          Pain Scale: Numbers Pre/Post-Treatment    Pretreatment Pain Rating  0/10 - no pain  -     Posttreatment Pain Rating  0/10 - no pain  -CS     Pre/Posttreatment Pain Comment  tolerated  -     Pain Intervention(s)  Repositioned;Ambulation/increased  activity  -CS     Row Name 12/09/20 1144          Plan of Care Review    Plan of Care Reviewed With  patient  -CS     Progress  improving  -CS     Outcome Summary  Pt required ModA for all bed mobility and was dependent for davon-care/toileting skills in supine, progressed to ModA for STS tfs w/ BUE support, and continues to require MaxA for bed/chair tfs w/ BUE support. Pt performed 1/10reps BUE AAROM ther-ex in supported sitting and demo'd improved balance during unsupported sitting weight shifting activity w/ verbal and physical cueing for midline orientation. Pt's cognition markedly improved this date. Rec cont IPOT per POC. Rec d/c to IRF.  -CS     Row Name 12/09/20 1144          Therapy Plan Review/Discharge Plan (OT)    Anticipated Discharge Disposition (OT)  inpatient rehabilitation facility  -CS     Row Name 12/09/20 1144          Vital Signs    Pre Systolic BP Rehab  118 RN cleared for tx, VSS  -CS     Pre Treatment Diastolic BP  76  -CS     Post Systolic BP Rehab  128  -CS     Post Treatment Diastolic BP  82  -CS     Pretreatment Heart Rate (beats/min)  64  -CS     Posttreatment Heart Rate (beats/min)  68  -CS     Pre SpO2 (%)  98  -CS     O2 Delivery Pre Treatment  room air  -CS     O2 Delivery Intra Treatment  room air  -CS     Post SpO2 (%)  99  -CS     O2 Delivery Post Treatment  room air  -CS     Pre Patient Position  Supine  -CS     Intra Patient Position  Standing  -CS     Post Patient Position  Sitting  -CS     Row Name 12/09/20 1144          Positioning and Restraints    Pre-Treatment Position  in bed  -CS     Post Treatment Position  chair  -CS     In Chair  notified nsg;reclined;sitting;call light within reach;exit alarm on;waffle cushion;on mechanical lift sling;legs elevated;encouraged to call for assist  -CS       User Key  (r) = Recorded By, (t) = Taken By, (c) = Cosigned By    Initials Name Provider Type    Gerson Turpin, OT Occupational Therapist        Outcome Measures     Row Name  12/09/20 1149          How much help from another is currently needed...    Putting on and taking off regular lower body clothing?  1  -CS     Bathing (including washing, rinsing, and drying)  2  -CS     Toileting (which includes using toilet bed pan or urinal)  2  -CS     Putting on and taking off regular upper body clothing  2  -CS     Taking care of personal grooming (such as brushing teeth)  2  -CS     Eating meals  3  -CS     AM-PAC 6 Clicks Score (OT)  12  -CS     Row Name 12/09/20 1149          Modified [place] Scale    Pre-Stroke Modified [place] Scale  0 - No Symptoms at all.  -CS     Modified [place] Scale  4 - Moderately severe disability.  Unable to walk without assistance, and unable to attend to own bodily needs without assistance.  -CS     Row Name 12/09/20 1149          Functional Assessment    Outcome Measure Options  AM-PAC 6 Clicks Daily Activity (OT);Modified Peter  -CS       User Key  (r) = Recorded By, (t) = Taken By, (c) = Cosigned By    Initials Name Provider Type     Gerson Frederick OT Occupational Therapist        Occupational Therapy Education                 Title: PT OT SLP Therapies (In Progress)     Topic: Occupational Therapy (Done)     Point: ADL training (Done)     Description:   Instruct learner(s) on proper safety adaptation and remediation techniques during self care or transfers.   Instruct in proper use of assistive devices.              Learning Progress Summary           Patient Acceptance, E, VU by  at 12/9/2020 1151    Acceptance, E, NR,VU by CS at 12/8/2020 1422    Acceptance, E, VU,NR by  at 12/6/2020 1027    Acceptance, E,D, VU,NR by TA at 12/2/2020 1142   Family Acceptance, E, VU,NR by  at 12/6/2020 1027   Significant Other Acceptance, E,D, VU,NR by TA at 12/2/2020 1142                   Point: Home exercise program (Done)     Description:   Instruct learner(s) on appropriate technique for monitoring, assisting and/or progressing therapeutic  exercises/activities.              Learning Progress Summary           Patient Acceptance, E, VU by CS at 12/9/2020 1151    Acceptance, E, NR,VU by CS at 12/8/2020 1422    Acceptance, E, VU,NR by  at 12/6/2020 1027    Acceptance, E,D, VU,NR by TA at 12/2/2020 1142   Family Acceptance, E, VU,NR by  at 12/6/2020 1027   Significant Other Acceptance, E,D, VU,NR by TA at 12/2/2020 1142                   Point: Precautions (Done)     Description:   Instruct learner(s) on prescribed precautions during self-care and functional transfers.              Learning Progress Summary           Patient Acceptance, E, VU by CS at 12/9/2020 1151    Acceptance, E, NR,VU by CS at 12/8/2020 1422    Acceptance, E,D, VU,NR by TA at 12/2/2020 1142   Significant Other Acceptance, E,D, VU,NR by TA at 12/2/2020 1142                   Point: Body mechanics (Done)     Description:   Instruct learner(s) on proper positioning and spine alignment during self-care, functional mobility activities and/or exercises.              Learning Progress Summary           Patient Acceptance, E, VU by CS at 12/9/2020 1151    Acceptance, E, NR,VU by  at 12/8/2020 1422    Acceptance, E, VU,NR by  at 12/6/2020 1027    Acceptance, E,D, VU,NR by TA at 12/2/2020 1142   Family Acceptance, E, VU,NR by  at 12/6/2020 1027   Significant Other Acceptance, E,D, VU,NR by TA at 12/2/2020 1142                               User Key     Initials Effective Dates Name Provider Type Discipline    TA 03/14/16 -  Dirk Clifford, OT Occupational Therapist OT     01/29/20 -  Yudy Ravi OT Occupational Therapist OT     10/21/20 -  Gerson Frederick OT Occupational Therapist OT              OT Recommendation and Plan  Therapy Frequency (OT): daily  Plan of Care Review  Plan of Care Reviewed With: patient  Progress: improving  Outcome Summary: Pt required ModA for all bed mobility and was dependent for davon-care/toileting skills in supine, progressed to ModA for STS  tfs w/ BUE support, and continues to require MaxA for bed/chair tfs w/ BUE support. Pt performed 1/10reps BUE AAROM ther-ex in supported sitting and demo'd improved balance during unsupported sitting weight shifting activity w/ verbal and physical cueing for midline orientation. Pt's cognition markedly improved this date. Rec cont IPOT per POC. Rec d/c to IRF.     Time Calculation:   Time Calculation- OT     Row Name 12/09/20 1153             Time Calculation- OT    OT Start Time  0947  -CS      OT Received On  12/09/20  -CS      OT Goal Re-Cert Due Date  12/18/20  -CS         Timed Charges    69613 - OT Therapeutic Exercise Minutes  8  -CS      04356 - OT Therapeutic Activity Minutes  20  -CS        User Key  (r) = Recorded By, (t) = Taken By, (c) = Cosigned By    Initials Name Provider Type    CS Gerson Frederick, OT Occupational Therapist        Therapy Charges for Today     Code Description Service Date Service Provider Modifiers Qty    98293054350 HC OT THERAPEUTIC ACT EA 15 MIN 12/8/2020 Gerson Frederick, OT GO 2    68958747374 HC OT RE-EVAL 2 12/8/2020 Gerson Frederick, OT GO 1    00756047930 HC OT THER PROC EA 15 MIN 12/9/2020 Gerson Frederick, OT GO 1    14771294249 HC OT THERAPEUTIC ACT EA 15 MIN 12/9/2020 Gerson Frederick, OT GO 1               Gerson Frederick OT  12/9/2020

## 2020-12-11 ENCOUNTER — TRANSCRIBE ORDERS (OUTPATIENT)
Dept: ADMINISTRATIVE | Facility: HOSPITAL | Age: 80
End: 2020-12-11

## 2020-12-15 LAB — CREAT BLDA-MCNC: 1.4 MG/DL (ref 0.6–1.3)

## 2020-12-16 ENCOUNTER — HOSPITAL ENCOUNTER (OUTPATIENT)
Dept: CT IMAGING | Facility: HOSPITAL | Age: 80
Discharge: HOME | End: 2020-12-16
Admitting: NURSE PRACTITIONER

## 2020-12-16 DIAGNOSIS — I63.9 ACUTE CVA (CEREBROVASCULAR ACCIDENT) (HCC): ICD-10-CM

## 2020-12-16 PROCEDURE — 70450 CT HEAD/BRAIN W/O DYE: CPT

## 2020-12-17 ENCOUNTER — TELEPHONE (OUTPATIENT)
Dept: PULMONOLOGY | Facility: CLINIC | Age: 80
End: 2020-12-17

## 2020-12-29 ENCOUNTER — TELEPHONE (OUTPATIENT)
Dept: NEUROSURGERY | Facility: CLINIC | Age: 80
End: 2020-12-29

## 2020-12-29 NOTE — TELEPHONE ENCOUNTER
Tried to call to cancel and reschedule the appointment. Appointment is still on until I speak with the patient.

## 2020-12-29 NOTE — TELEPHONE ENCOUNTER
THE PATIENT'S WIFE MARILOU CAMACHO CALLED TO CANCEL THE PATIENT'S APPOINTMENT WITH [facility] ON 1/11. WE DO NOT HAVE [facility] VERBAL AND THE PATIENT IS NOT ABLE TO GIVE VERBAL CONSENT BECAUSE HE HAD A STROKE AND IS CURRENTLY AT [facility].    MARILOU CAN BE REACHED [phone]    THANK YOU!

## 2020-12-30 NOTE — TELEPHONE ENCOUNTER
Spoke to the wife, he is being transferred to [facility] in [place]. They will take over his medical care there. They will call us if they need anything.     Upcoming appointment cancelled and patient and wife's request.

## 2021-01-02 ENCOUNTER — APPOINTMENT (OUTPATIENT)
Dept: CT IMAGING | Facility: HOSPITAL | Age: 81
End: 2021-01-02

## 2021-01-02 ENCOUNTER — HOSPITAL ENCOUNTER (EMERGENCY)
Facility: HOSPITAL | Age: 81
Discharge: NURSING, REHAB OR LONG-TERM CARE | End: 2021-01-02
Attending: EMERGENCY MEDICINE | Admitting: EMERGENCY MEDICINE

## 2021-01-02 VITALS
HEIGHT: 69 IN | TEMPERATURE: 98.2 F | SYSTOLIC BLOOD PRESSURE: 116 MMHG | HEART RATE: 77 BPM | RESPIRATION RATE: 18 BRPM | WEIGHT: 185 LBS | DIASTOLIC BLOOD PRESSURE: 49 MMHG | BODY MASS INDEX: 27.4 KG/M2 | OXYGEN SATURATION: 95 %
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
## 2021-01-02 DIAGNOSIS — S01.01XA SCALP LACERATION, INITIAL ENCOUNTER: Primary | ICD-10-CM

## 2021-01-02 DIAGNOSIS — S00.03XA CONTUSION OF SCALP, INITIAL ENCOUNTER: ICD-10-CM

## 2021-01-02 LAB
BACTERIA UR QL AUTO: ABNORMAL /HPF
BILIRUB UR QL STRIP: NEGATIVE
CLARITY UR: CLEAR
COLOR UR: YELLOW
GLUCOSE UR STRIP-MCNC: NEGATIVE MG/DL
HGB UR QL STRIP.AUTO: NEGATIVE
HYALINE CASTS UR QL AUTO: ABNORMAL /LPF
KETONES UR QL STRIP: NEGATIVE
LEUKOCYTE ESTERASE UR QL STRIP.AUTO: ABNORMAL
NITRITE UR QL STRIP: NEGATIVE
PH UR STRIP.AUTO: 6 [PH] (ref 5–8)
PROT UR QL STRIP: NEGATIVE
RBC # UR: ABNORMAL /HPF
REF LAB TEST METHOD: ABNORMAL
SP GR UR STRIP: 1.01 (ref 1–1.03)
SQUAMOUS #/AREA URNS HPF: ABNORMAL /HPF
UROBILINOGEN UR QL STRIP: ABNORMAL
WBC UR QL AUTO: ABNORMAL /HPF

## 2021-01-02 PROCEDURE — 36415 COLL VENOUS BLD VENIPUNCTURE: CPT

## 2021-01-02 PROCEDURE — 70450 CT HEAD/BRAIN W/O DYE: CPT

## 2021-01-02 PROCEDURE — 99283 EMERGENCY DEPT VISIT LOW MDM: CPT

## 2021-01-02 PROCEDURE — 72125 CT NECK SPINE W/O DYE: CPT

## 2021-01-02 PROCEDURE — 81001 URINALYSIS AUTO W/SCOPE: CPT | Performed by: EMERGENCY MEDICINE

## 2021-01-02 NOTE — ED PROVIDER NOTES
Subjective   Patient is an 80-year-old white male patient of [facility] who presents to the ER via EMS with left occipital scalp laceration that he sustained when he fell  out of his wheelchair after he unbuckled himself, hitting his head on the wheelchair.  Patient takes baby aspirin daily.  Denies any headache, change in vision, nausea or vomiting since the incident.  Denies neck pain      History provided by:  Patient  Head Injury  Location:  Occipital  Time since incident:  30 minutes  Mechanism of injury: fall    Fall:     Fall occurred: wheelchair.    Impact surface:  Hard floor  Pain details:     Quality:  Aching    Severity:  Mild    Duration:  30 minutes    Timing:  Constant    Progression:  Unchanged  Chronicity:  New  Relieved by:  None tried  Worsened by:  Nothing  Ineffective treatments:  None tried  Associated symptoms: no blurred vision, no disorientation, no double vision, no headaches, no nausea, no neck pain and no vomiting        Review of Systems   Eyes: Negative for blurred vision, double vision and visual disturbance.   Gastrointestinal: Negative for nausea and vomiting.   Musculoskeletal: Negative for neck pain.   Skin: Positive for wound (scalp).   Neurological: Negative for dizziness, weakness and headaches.   All other systems reviewed and are negative.      Past Medical History:   Diagnosis Date   • A-fib (CMS/HCC)    • Diabetes mellitus (CMS/[facility])    • Hypertension        No Known Allergies    Past Surgical History:   Procedure Laterality Date   • CATARACT EXTRACTION, BILATERAL     • HERNIA REPAIR         No family history on file.    Social History     Socioeconomic History   • Marital status:      Spouse name: Not on file   • Number of children: Not on file   • Years of education: Not on file   • Highest education level: Not on file   Tobacco Use   • Smoking status: Former Smoker   • Smokeless tobacco: Never Used   • Tobacco comment: QUIT 25YRS AGO    Substance and Sexual  Activity   • Alcohol use: Never     Frequency: Never   • Drug use: Never   • Sexual activity: Defer           Objective   Physical Exam  Vitals signs and nursing note reviewed.   Constitutional:       Appearance: Normal appearance.   HENT:      Head: Normocephalic.      Right Ear: External ear normal. No hemotympanum.      Left Ear: External ear normal. No hemotympanum.      Nose: Nose normal.      Mouth/Throat:      Mouth: Mucous membranes are moist.   Eyes:      Conjunctiva/sclera: Conjunctivae normal.   Neck:      Musculoskeletal: Normal range of motion and neck supple.   Cardiovascular:      Rate and Rhythm: Normal rate and regular rhythm.   Pulmonary:      Effort: Pulmonary effort is normal.      Breath sounds: Normal breath sounds.   Abdominal:      General: Abdomen is flat. Bowel sounds are normal.      Palpations: Abdomen is soft.      Tenderness: There is no abdominal tenderness.   Skin:     General: Skin is warm and dry.      Findings: Laceration (left occipital scalp 2 cm linear non contaminated lac) present.   Neurological:      General: No focal deficit present.      Mental Status: He is alert and oriented to person, place, and time.   Psychiatric:         Mood and Affect: Mood normal.         Behavior: Behavior normal.         Laceration Repair    Date/Time: 1/5/2021 4:45 PM  Performed by: Lesli Alarcon APRN  Authorized by: Mahesh Puga MD     Consent:     Consent obtained:  Verbal    Consent given by:  Patient    Risks discussed:  Infection and pain  Anesthesia (see MAR for exact dosages):     Anesthesia method:  None  Laceration details:     Location:  Scalp    Scalp location:  Occipital    Length (cm):  2  Repair type:     Repair type:  Simple  Pre-procedure details:     Preparation:  Patient was prepped and draped in usual sterile fashion  Exploration:     Hemostasis achieved with:  Direct pressure    Wound exploration: entire depth of wound probed and visualized      Wound extent: no foreign  bodies/material noted      Contaminated: no    Treatment:     Area cleansed with:  Hibiclens    Amount of cleaning:  Standard    Irrigation solution:  Sterile saline    Irrigation method:  Syringe  Skin repair:     Repair method:  Staples    Number of staples:  3  Approximation:     Approximation:  Close  Post-procedure details:     Dressing:  Open (no dressing)    Patient tolerance of procedure:  Tolerated well, no immediate complications               ED Course          No results found for this or any previous visit (from the past 24 hour(s)).  Note: In addition to lab results from this visit, the labs listed above may include labs taken at another facility or during a different encounter within the last 24 hours. Please correlate lab times with ED admission and discharge times for further clarification of the services performed during this visit.    CT Head Without Contrast   Final Result   Area of persistent low-attenuation left frontal lobe along   with intermixed heterogeneous attenuation in the right parietal lobe   more heterogeneous or speckled in appearance of hyperattenuation from   prior comparison 12/16/2020 of likely residual hemorrhage from that   exam. No new hemorrhage or intraventricular hemorrhage extension is   identified with effacement of the right lateral ventricle similar to   prior. No midline shift or hydrocephalus.       DICTATED:   01/02/2021   EDITED/ls :   01/02/2021            This report was finalized on 1/4/2021 3:21 PM by Dr. Pavel Gaffney.          CT Cervical Spine Without Contrast   Final Result   No acute osseous findings of the cervical spine in grossly   anatomic alignment with degenerative changes throughout. No acute   fracture.       DICTATED:   01/02/2021   EDITED/ls :   01/02/2021            This report was finalized on 1/4/2021 3:21 PM by Dr. Pavel Gaffney.            Vitals:    01/02/21 1430 01/02/21 1445 01/02/21 1545 01/02/21 1547   BP: 145/75 129/66 116/49    BP  Location:       Patient Position:       Pulse:       Resp:       Temp:       TempSrc:       SpO2:    95%   Weight:       Height:         Medications - No data to display  ECG/EMG Results (last 24 hours)     ** No results found for the last 24 hours. **        No orders to display                                         MDM    Final diagnoses:   Scalp laceration, initial encounter   Contusion of scalp, initial encounter   Fall from wheelchair, initial encounter            Lesli Alarcon, MARGARITA  01/05/21 4048

## 2021-01-02 NOTE — ED PROVIDER NOTES
Subjective   Dirk Miner is an 80 year old male who presents to the ED due to a fall. While at [facility] for post-stroke rehabilitation, nursing staff witnessed the patient fall forward out of his wheelchair, hitting his head. The patient denies loss of consciousness, neck pain, and hitting his knees. He takes 81 mg of Aspirin daily. There are no other acute symptoms present at this time.      History provided by:  Patient and EMS personnel  Fall  Mechanism of injury: fall    Mechanism of injury comment:  Fall from wheelchair.  Injury location:  Head/neck  Head/neck injury location:  Head  Incident location:  Nursing home ([facility]  Time since incident:  2 hours  Arrived directly from scene: yes    Fall:     Fall occurred: From wheelchair.    Point of impact:  Head    Entrapped after fall: no    Suspicion of alcohol use: no    Suspicion of drug use: no    Tetanus status:  Unknown  Prior to arrival data:     Loss of consciousness: no    Associated symptoms: no loss of consciousness and no neck pain        Review of Systems   Musculoskeletal: Negative for neck pain.   Neurological: Negative for loss of consciousness.   All other systems reviewed and are negative.      Past Medical History:   Diagnosis Date   • A-fib (CMS/[facility])    • Diabetes mellitus (CMS/[facility])    • Hypertension        No Known Allergies    Past Surgical History:   Procedure Laterality Date   • CATARACT EXTRACTION, BILATERAL     • HERNIA REPAIR         No family history on file.    Social History     Socioeconomic History   • Marital status:      Spouse name: Not on file   • Number of children: Not on file   • Years of education: Not on file   • Highest education level: Not on file   Tobacco Use   • Smoking status: Former Smoker   • Smokeless tobacco: Never Used   • Tobacco comment: QUIT 25YRS AGO    Substance and Sexual Activity   • Alcohol use: Never     Frequency: Never   • Drug use: Never   • Sexual activity:  Defer         Objective   Physical Exam  Vitals signs and nursing note reviewed.   Constitutional:       General: He is not in acute distress.     Appearance: He is well-developed.   HENT:      Head: Normocephalic.      Comments: 2.5 cm linear, non-contaminated lesion on head.     Nose: Nose normal.   Eyes:      General: No scleral icterus.     Conjunctiva/sclera: Conjunctivae normal.   Neck:      Musculoskeletal: Normal range of motion and neck supple.      Comments: Mild discomfort with flexion and extension.  Cardiovascular:      Rate and Rhythm: Normal rate and regular rhythm.      Heart sounds: Normal heart sounds. No murmur.   Pulmonary:      Effort: Pulmonary effort is normal. No respiratory distress.      Breath sounds: Normal breath sounds.   Abdominal:      Palpations: Abdomen is soft.      Tenderness: There is no abdominal tenderness.   Musculoskeletal: Normal range of motion.         General: No tenderness.      Comments: Normal, non-tender bilateral hips, shoulders, elbows, wrists, and ankles. Non-tender thoracic spine and lumbar spine.   Skin:     General: Skin is warm and dry.      Comments: 2.5 cm linear, non-contaminated lesion on head. No visible or palpable hematoma.   Neurological:      Mental Status: He is alert. Mental status is at baseline. He is disoriented.      Comments: Disoriented to time and place. Oriented to person and situation.   Psychiatric:         Behavior: Behavior normal.         Laceration Repair    Date/Time: 1/29/2021 3:20 PM  Performed by: Lesli Alarcon APRN  Authorized by: Mahesh Puga MD     Consent:     Consent obtained:  Verbal    Consent given by:  Patient    Risks discussed:  Pain  Anesthesia (see MAR for exact dosages):     Anesthesia method:  None  Laceration details:     Location:  Scalp    Length (cm):  2.5  Repair type:     Repair type:  Simple  Pre-procedure details:     Preparation:  Patient was prepped and draped in usual sterile fashion and imaging  obtained to evaluate for foreign bodies  Exploration:     Hemostasis achieved with:  Direct pressure    Wound exploration: entire depth of wound probed and visualized      Contaminated: no    Treatment:     Area cleansed with:  Hibiclens and saline    Amount of cleaning:  Standard    Irrigation solution:  Sterile saline    Irrigation volume:  20    Irrigation method:  Syringe  Skin repair:     Repair method:  Staples    Number of staples:  3  Approximation:     Approximation:  Close  Post-procedure details:     Dressing:  Open (no dressing)    Patient tolerance of procedure:  Tolerated well, no immediate complications             ED Course      Recent Results (from the past 24 hour(s))   Urinalysis With Microscopic If Indicated (No Culture) - Urine, Clean Catch    Collection Time: 01/02/21 12:27 PM    Specimen: Urine, Clean Catch   Result Value Ref Range    Color, UA Yellow Yellow, Straw    Appearance, UA Clear Clear    pH, UA 6.0 5.0 - 8.0    Specific Gravity, UA 1.015 1.001 - 1.030    Glucose, UA Negative Negative    Ketones, UA Negative Negative    Bilirubin, UA Negative Negative    Blood, UA Negative Negative    Protein, UA Negative Negative    Leuk Esterase, UA Trace (A) Negative    Nitrite, UA Negative Negative    Urobilinogen, UA 2.0 E.U./dL (A) 0.2 - 1.0 E.U./dL   Urinalysis, Microscopic Only - Urine, Clean Catch    Collection Time: 01/02/21 12:27 PM    Specimen: Urine, Clean Catch   Result Value Ref Range    RBC, UA 0-2 None Seen, 0-2 /HPF    WBC, UA 3-5 (A) None Seen, 0-2 /HPF    Bacteria, UA 1+ (A) None Seen, Trace /HPF    Squamous Epithelial Cells, UA 0-2 None Seen, 0-2 /HPF    Hyaline Casts, UA 0-6 0 - 6 /LPF    Methodology Automated Microscopy      Note: In addition to lab results from this visit, the labs listed above may include labs taken at another facility or during a different encounter within the last 24 hours. Please correlate lab times with ED admission and discharge times for further  "clarification of the services performed during this visit.    CT Head Without Contrast   Preliminary Result   Area of persistent low-attenuation left frontal lobe along   with intermixed heterogeneous attenuation in the right parietal lobe   more heterogeneous or speckled in appearance of hyperattenuation from   prior comparison 12/16/2020 of likely residual hemorrhage from that   exam. No new hemorrhage or intraventricular hemorrhage extension is   identified with effacement of the right lateral ventricle similar to   prior. No midline shift or hydrocephalus.       DICTATED:   01/02/2021   EDITED/ls :   01/02/2021               CT Cervical Spine Without Contrast   Preliminary Result   No acute osseous findings of the cervical spine in grossly   anatomic alignment with degenerative changes throughout. No acute   fracture.       DICTATED:   01/02/2021   EDITED/ls :   01/02/2021                 Vitals:    01/02/21 1220 01/02/21 1400 01/02/21 1430 01/02/21 1445   BP: 135/74 135/79 145/75 129/66   BP Location: Right arm      Patient Position: Sitting      Pulse: 77      Resp: 18      Temp: 98.2 °F (36.8 °C)      TempSrc: Oral      SpO2: 97% 96%     Weight: 83.9 kg (185 lb)      Height: 175.3 cm (69\")        Medications - No data to display  ECG/EMG Results (last 24 hours)     ** No results found for the last 24 hours. **        No orders to display                   DISCHARGE    Patient discharged in stable condition.    Reviewed implications of results, diagnosis, meds, responsibility to follow up, warning signs and symptoms of possible worsening, potential complications and reasons to return to ER.    Patient/Family voiced understanding of above instructions.    Discussed plan for discharge, as there is no emergent indication for admission.  Pt/family is agreeable and understands need for follow up and possible repeat testing.  Pt/family is aware that discharge does not mean that nothing is wrong but that it indicates " no emergency is currently present that requires admission and they must continue care with follow-up as given below or with a physician of their choice.     FOLLOW-UP  Tia Pratt MD  [address] [address]  [phone]    In 10 days  Staple Removal         Medication List      No changes were made to your prescriptions during this visit.     Advised to watch for signs of infection, follow-up with PCP in the next 10 days for staple removal and return to the ER as needed for worsening symptoms or new symptoms.  Patient and family verbalized understanding of all discussed                               MDM    Final diagnoses:   Scalp laceration, initial encounter   Contusion of scalp, initial encounter   Fall from wheelchair, initial encounter       Documentation assistance provided by scribe Lauren K Collett.  Information recorded by the scribe was done at my direction and has been verified and validated by me.     Collett, Lauren K  01/02/21 1448       Collett, Lauren K  01/02/21 1456       Lesli Alarcon APRN  01/29/21 [id number]

## 2021-01-10 ENCOUNTER — APPOINTMENT (OUTPATIENT)
Dept: CT IMAGING | Facility: HOSPITAL | Age: 81
End: 2021-01-10

## 2021-01-10 ENCOUNTER — HOSPITAL ENCOUNTER (INPATIENT)
Facility: HOSPITAL | Age: 81
LOS: 5 days | Discharge: NURSING, REHAB OR LONG-TERM CARE | End: 2021-01-15
Attending: EMERGENCY MEDICINE | Admitting: HOSPITALIST

## 2021-01-10 ENCOUNTER — APPOINTMENT (OUTPATIENT)
Dept: GENERAL RADIOLOGY | Facility: HOSPITAL | Age: 81
End: 2021-01-10

## 2021-01-10 DIAGNOSIS — L03.317 CELLULITIS OF RIGHT BUTTOCK: Primary | ICD-10-CM

## 2021-01-10 DIAGNOSIS — U07.1 LAB TEST POSITIVE FOR DETECTION OF COVID-19 VIRUS: ICD-10-CM

## 2021-01-10 DIAGNOSIS — R13.10 DYSPHAGIA, UNSPECIFIED TYPE: ICD-10-CM

## 2021-01-10 DIAGNOSIS — R09.02 HYPOXIA: ICD-10-CM

## 2021-01-10 DIAGNOSIS — R41.82 ALTERED MENTAL STATUS, UNSPECIFIED ALTERED MENTAL STATUS TYPE: ICD-10-CM

## 2021-01-10 DIAGNOSIS — J01.30 ACUTE SPHENOIDAL SINUSITIS, RECURRENCE NOT SPECIFIED: ICD-10-CM

## 2021-01-10 PROBLEM — D63.8 ANEMIA, CHRONIC DISEASE: Status: ACTIVE | Noted: 2021-01-10

## 2021-01-10 PROBLEM — I65.22 STENOSIS OF LEFT CAROTID ARTERY: Status: ACTIVE | Noted: 2021-01-10

## 2021-01-10 PROBLEM — E78.5 HYPERLIPEMIA: Status: ACTIVE | Noted: 2021-01-10

## 2021-01-10 PROBLEM — F03.918 DEMENTIA WITH BEHAVIORAL DISTURBANCE (HCC): Status: ACTIVE | Noted: 2021-01-10

## 2021-01-10 LAB
ALBUMIN SERPL-MCNC: 3.3 G/DL (ref 3.5–5.2)
ALBUMIN/GLOB SERPL: 1 G/DL
ALP SERPL-CCNC: 68 U/L (ref 39–117)
ALT SERPL W P-5'-P-CCNC: 15 U/L (ref 1–41)
ANION GAP SERPL CALCULATED.3IONS-SCNC: 9 MMOL/L (ref 5–15)
AST SERPL-CCNC: 19 U/L (ref 1–40)
B PARAPERT DNA SPEC QL NAA+PROBE: NOT DETECTED
B PERT DNA SPEC QL NAA+PROBE: NOT DETECTED
BACTERIA UR QL AUTO: ABNORMAL /HPF
BASOPHILS # BLD AUTO: 0.01 10*3/MM3 (ref 0–0.2)
BASOPHILS NFR BLD AUTO: 0.1 % (ref 0–1.5)
BILIRUB SERPL-MCNC: 0.4 MG/DL (ref 0–1.2)
BILIRUB UR QL STRIP: NEGATIVE
BUN SERPL-MCNC: 20 MG/DL (ref 8–23)
BUN/CREAT SERPL: 17.5 (ref 7–25)
C PNEUM DNA NPH QL NAA+NON-PROBE: NOT DETECTED
CALCIUM SPEC-SCNC: 8.9 MG/DL (ref 8.6–10.5)
CHLORIDE SERPL-SCNC: 101 MMOL/L (ref 98–107)
CLARITY UR: CLEAR
CO2 SERPL-SCNC: 27 MMOL/L (ref 22–29)
COLOR UR: YELLOW
CREAT SERPL-MCNC: 1.14 MG/DL (ref 0.76–1.27)
CRP SERPL-MCNC: 10.66 MG/DL (ref 0–0.5)
D-LACTATE SERPL-SCNC: 1.7 MMOL/L (ref 0.5–2)
DEPRECATED RDW RBC AUTO: 50.1 FL (ref 37–54)
EOSINOPHIL # BLD AUTO: 0.01 10*3/MM3 (ref 0–0.4)
EOSINOPHIL NFR BLD AUTO: 0.1 % (ref 0.3–6.2)
ERYTHROCYTE [DISTWIDTH] IN BLOOD BY AUTOMATED COUNT: 13.5 % (ref 12.3–15.4)
FERRITIN SERPL-MCNC: 493.1 NG/ML (ref 30–400)
FLUAV H1 2009 PAND RNA NPH QL NAA+PROBE: NOT DETECTED
FLUAV H1 HA GENE NPH QL NAA+PROBE: NOT DETECTED
FLUAV H3 RNA NPH QL NAA+PROBE: NOT DETECTED
FLUAV SUBTYP SPEC NAA+PROBE: NOT DETECTED
FLUBV RNA ISLT QL NAA+PROBE: NOT DETECTED
GFR SERPL CREATININE-BSD FRML MDRD: 62 ML/MIN/1.73
GLOBULIN UR ELPH-MCNC: 3.4 GM/DL
GLUCOSE SERPL-MCNC: 160 MG/DL (ref 65–99)
GLUCOSE UR STRIP-MCNC: NEGATIVE MG/DL
HADV DNA SPEC NAA+PROBE: NOT DETECTED
HCOV 229E RNA SPEC QL NAA+PROBE: NOT DETECTED
HCOV HKU1 RNA SPEC QL NAA+PROBE: NOT DETECTED
HCOV NL63 RNA SPEC QL NAA+PROBE: NOT DETECTED
HCOV OC43 RNA SPEC QL NAA+PROBE: NOT DETECTED
HCT VFR BLD AUTO: 30.6 % (ref 37.5–51)
HGB BLD-MCNC: 9.4 G/DL (ref 13–17.7)
HGB UR QL STRIP.AUTO: NEGATIVE
HMPV RNA NPH QL NAA+NON-PROBE: NOT DETECTED
HOLD SPECIMEN: NORMAL
HOLD SPECIMEN: NORMAL
HPIV1 RNA SPEC QL NAA+PROBE: NOT DETECTED
HPIV2 RNA SPEC QL NAA+PROBE: NOT DETECTED
HPIV3 RNA NPH QL NAA+PROBE: NOT DETECTED
HPIV4 P GENE NPH QL NAA+PROBE: NOT DETECTED
HYALINE CASTS UR QL AUTO: ABNORMAL /LPF
IMM GRANULOCYTES # BLD AUTO: 0.34 10*3/MM3 (ref 0–0.05)
IMM GRANULOCYTES NFR BLD AUTO: 2.9 % (ref 0–0.5)
KETONES UR QL STRIP: ABNORMAL
LEUKOCYTE ESTERASE UR QL STRIP.AUTO: NEGATIVE
LYMPHOCYTES # BLD AUTO: 0.42 10*3/MM3 (ref 0.7–3.1)
LYMPHOCYTES NFR BLD AUTO: 3.6 % (ref 19.6–45.3)
M PNEUMO IGG SER IA-ACNC: NOT DETECTED
MAGNESIUM SERPL-MCNC: 1.9 MG/DL (ref 1.6–2.4)
MCH RBC QN AUTO: 31.2 PG (ref 26.6–33)
MCHC RBC AUTO-ENTMCNC: 30.7 G/DL (ref 31.5–35.7)
MCV RBC AUTO: 101.7 FL (ref 79–97)
MONOCYTES # BLD AUTO: 0.88 10*3/MM3 (ref 0.1–0.9)
MONOCYTES NFR BLD AUTO: 7.6 % (ref 5–12)
NEUTROPHILS NFR BLD AUTO: 85.7 % (ref 42.7–76)
NEUTROPHILS NFR BLD AUTO: 9.9 10*3/MM3 (ref 1.7–7)
NITRITE UR QL STRIP: NEGATIVE
NRBC BLD AUTO-RTO: 0 /100 WBC (ref 0–0.2)
NT-PROBNP SERPL-MCNC: ABNORMAL PG/ML (ref 0–1800)
PH UR STRIP.AUTO: 5.5 [PH] (ref 5–8)
PLAT MORPH BLD: NORMAL
PLATELET # BLD AUTO: 206 10*3/MM3 (ref 140–450)
PMV BLD AUTO: 10.2 FL (ref 6–12)
POTASSIUM SERPL-SCNC: 4.4 MMOL/L (ref 3.5–5.2)
PROCALCITONIN SERPL-MCNC: 0.32 NG/ML (ref 0–0.25)
PROT SERPL-MCNC: 6.7 G/DL (ref 6–8.5)
PROT UR QL STRIP: ABNORMAL
RBC # BLD AUTO: 3.01 10*6/MM3 (ref 4.14–5.8)
RBC # UR: ABNORMAL /HPF
RBC MORPH BLD: NORMAL
REF LAB TEST METHOD: ABNORMAL
RHINOVIRUS RNA SPEC NAA+PROBE: NOT DETECTED
RSV RNA NPH QL NAA+NON-PROBE: NOT DETECTED
SARS-COV-2 RNA NPH QL NAA+NON-PROBE: DETECTED
SODIUM SERPL-SCNC: 137 MMOL/L (ref 136–145)
SP GR UR STRIP: 1.06 (ref 1–1.03)
SQUAMOUS #/AREA URNS HPF: ABNORMAL /HPF
TROPONIN T SERPL-MCNC: 0.02 NG/ML (ref 0–0.03)
UROBILINOGEN UR QL STRIP: ABNORMAL
WBC # BLD AUTO: 11.56 10*3/MM3 (ref 3.4–10.8)
WBC MORPH BLD: NORMAL
WBC UR QL AUTO: ABNORMAL /HPF
WHOLE BLOOD HOLD SPECIMEN: NORMAL
WHOLE BLOOD HOLD SPECIMEN: NORMAL

## 2021-01-10 PROCEDURE — 85007 BL SMEAR W/DIFF WBC COUNT: CPT | Performed by: EMERGENCY MEDICINE

## 2021-01-10 PROCEDURE — 86900 BLOOD TYPING SEROLOGIC ABO: CPT

## 2021-01-10 PROCEDURE — 93005 ELECTROCARDIOGRAM TRACING: CPT | Performed by: EMERGENCY MEDICINE

## 2021-01-10 PROCEDURE — 99285 EMERGENCY DEPT VISIT HI MDM: CPT

## 2021-01-10 PROCEDURE — 87086 URINE CULTURE/COLONY COUNT: CPT | Performed by: PHYSICIAN ASSISTANT

## 2021-01-10 PROCEDURE — 80053 COMPREHEN METABOLIC PANEL: CPT | Performed by: EMERGENCY MEDICINE

## 2021-01-10 PROCEDURE — 87040 BLOOD CULTURE FOR BACTERIA: CPT | Performed by: PHYSICIAN ASSISTANT

## 2021-01-10 PROCEDURE — 25010000002 VANCOMYCIN 10 G RECONSTITUTED SOLUTION

## 2021-01-10 PROCEDURE — 99223 1ST HOSP IP/OBS HIGH 75: CPT | Performed by: INTERNAL MEDICINE

## 2021-01-10 PROCEDURE — 25010000002 PIPERACILLIN SOD-TAZOBACTAM PER 1 G: Performed by: PHYSICIAN ASSISTANT

## 2021-01-10 PROCEDURE — 83605 ASSAY OF LACTIC ACID: CPT | Performed by: PHYSICIAN ASSISTANT

## 2021-01-10 PROCEDURE — 86140 C-REACTIVE PROTEIN: CPT | Performed by: INTERNAL MEDICINE

## 2021-01-10 PROCEDURE — 93005 ELECTROCARDIOGRAM TRACING: CPT

## 2021-01-10 PROCEDURE — 86901 BLOOD TYPING SEROLOGIC RH(D): CPT

## 2021-01-10 PROCEDURE — 83735 ASSAY OF MAGNESIUM: CPT | Performed by: EMERGENCY MEDICINE

## 2021-01-10 PROCEDURE — 72193 CT PELVIS W/DYE: CPT

## 2021-01-10 PROCEDURE — 81001 URINALYSIS AUTO W/SCOPE: CPT | Performed by: PHYSICIAN ASSISTANT

## 2021-01-10 PROCEDURE — 0202U NFCT DS 22 TRGT SARS-COV-2: CPT | Performed by: INTERNAL MEDICINE

## 2021-01-10 PROCEDURE — 71045 X-RAY EXAM CHEST 1 VIEW: CPT

## 2021-01-10 PROCEDURE — 70450 CT HEAD/BRAIN W/O DYE: CPT

## 2021-01-10 PROCEDURE — 82728 ASSAY OF FERRITIN: CPT | Performed by: INTERNAL MEDICINE

## 2021-01-10 PROCEDURE — P9612 CATHETERIZE FOR URINE SPEC: HCPCS

## 2021-01-10 PROCEDURE — 83880 ASSAY OF NATRIURETIC PEPTIDE: CPT | Performed by: INTERNAL MEDICINE

## 2021-01-10 PROCEDURE — 85025 COMPLETE CBC W/AUTO DIFF WBC: CPT | Performed by: EMERGENCY MEDICINE

## 2021-01-10 PROCEDURE — 84484 ASSAY OF TROPONIN QUANT: CPT | Performed by: EMERGENCY MEDICINE

## 2021-01-10 PROCEDURE — 25010000002 IOPAMIDOL 61 % SOLUTION: Performed by: EMERGENCY MEDICINE

## 2021-01-10 PROCEDURE — 84145 PROCALCITONIN (PCT): CPT | Performed by: PHYSICIAN ASSISTANT

## 2021-01-10 RX ORDER — DOCUSATE SODIUM 100 MG/1
200 CAPSULE, LIQUID FILLED ORAL 2 TIMES DAILY
COMMUNITY

## 2021-01-10 RX ORDER — IPRATROPIUM BROMIDE AND ALBUTEROL SULFATE 2.5; .5 MG/3ML; MG/3ML
3 SOLUTION RESPIRATORY (INHALATION) EVERY 4 HOURS PRN
COMMUNITY

## 2021-01-10 RX ORDER — MULTIVIT WITH MINERALS/LUTEIN
250 TABLET ORAL DAILY
COMMUNITY

## 2021-01-10 RX ORDER — BACLOFEN 10 MG/1
10 TABLET ORAL 3 TIMES DAILY
Status: ON HOLD | COMMUNITY
End: 2021-01-15 | Stop reason: SDUPTHER

## 2021-01-10 RX ORDER — FAMOTIDINE 20 MG/1
20 TABLET, FILM COATED ORAL 2 TIMES DAILY
COMMUNITY

## 2021-01-10 RX ORDER — AMLODIPINE BESYLATE 5 MG/1
5 TABLET ORAL DAILY
COMMUNITY

## 2021-01-10 RX ORDER — ONDANSETRON 4 MG/1
4 TABLET, FILM COATED ORAL EVERY 8 HOURS PRN
COMMUNITY

## 2021-01-10 RX ORDER — LANOLIN ALCOHOL/MO/W.PET/CERES
1000 CREAM (GRAM) TOPICAL DAILY
COMMUNITY

## 2021-01-10 RX ORDER — ASPIRIN 81 MG/1
81 TABLET, CHEWABLE ORAL DAILY
COMMUNITY
End: 2021-01-15 | Stop reason: HOSPADM

## 2021-01-10 RX ORDER — SODIUM CHLORIDE 0.9 % (FLUSH) 0.9 %
10 SYRINGE (ML) INJECTION AS NEEDED
Status: DISCONTINUED | OUTPATIENT
Start: 2021-01-10 | End: 2021-01-15 | Stop reason: HOSPADM

## 2021-01-10 RX ORDER — AMOXICILLIN AND CLAVULANATE POTASSIUM 875; 125 MG/1; MG/1
1 TABLET, FILM COATED ORAL 2 TIMES DAILY
COMMUNITY
End: 2021-01-15 | Stop reason: HOSPADM

## 2021-01-10 RX ORDER — CLONIDINE HYDROCHLORIDE 0.1 MG/1
0.1 TABLET ORAL 3 TIMES DAILY PRN
COMMUNITY
End: 2021-03-02 | Stop reason: HOSPADM

## 2021-01-10 RX ORDER — MELATONIN
2000 DAILY
COMMUNITY

## 2021-01-10 RX ORDER — UREA 10 %
6 LOTION (ML) TOPICAL NIGHTLY
COMMUNITY
End: 2021-09-23 | Stop reason: DRUGHIGH

## 2021-01-10 RX ORDER — PREDNISONE 10 MG/1
10 TABLET ORAL DAILY
COMMUNITY
End: 2021-01-15 | Stop reason: HOSPADM

## 2021-01-10 RX ORDER — ACETAMINOPHEN 325 MG/1
650 TABLET ORAL EVERY 6 HOURS PRN
COMMUNITY

## 2021-01-10 RX ADMIN — IOPAMIDOL 100 ML: 612 INJECTION, SOLUTION INTRAVENOUS at 20:12

## 2021-01-10 RX ADMIN — TAZOBACTAM SODIUM AND PIPERACILLIN SODIUM 3.38 G: 375; 3 INJECTION, SOLUTION INTRAVENOUS at 22:03

## 2021-01-10 RX ADMIN — VANCOMYCIN HYDROCHLORIDE 1500 MG: 100 INJECTION, POWDER, LYOPHILIZED, FOR SOLUTION INTRAVENOUS at 23:41

## 2021-01-11 LAB
ABO GROUP BLD: NORMAL
ABO GROUP BLD: NORMAL
ALBUMIN SERPL-MCNC: 3.4 G/DL (ref 3.5–5.2)
ALBUMIN/GLOB SERPL: 1.2 G/DL
ALP SERPL-CCNC: 69 U/L (ref 39–117)
ALT SERPL W P-5'-P-CCNC: 15 U/L (ref 1–41)
ANION GAP SERPL CALCULATED.3IONS-SCNC: 11 MMOL/L (ref 5–15)
AST SERPL-CCNC: 18 U/L (ref 1–40)
BACTERIA SPEC AEROBE CULT: NO GROWTH
BASOPHILS # BLD MANUAL: 0 10*3/MM3 (ref 0–0.2)
BASOPHILS NFR BLD AUTO: 0 % (ref 0–1.5)
BILIRUB CONJ SERPL-MCNC: 0.2 MG/DL (ref 0–0.3)
BILIRUB SERPL-MCNC: 0.4 MG/DL (ref 0–1.2)
BLD GP AB SCN SERPL QL: NEGATIVE
BUN SERPL-MCNC: 19 MG/DL (ref 8–23)
BUN/CREAT SERPL: 15 (ref 7–25)
CALCIUM SPEC-SCNC: 8.9 MG/DL (ref 8.6–10.5)
CHLORIDE SERPL-SCNC: 102 MMOL/L (ref 98–107)
CO2 SERPL-SCNC: 28 MMOL/L (ref 22–29)
CREAT SERPL-MCNC: 1.27 MG/DL (ref 0.76–1.27)
D DIMER PPP FEU-MCNC: 3.99 MCGFEU/ML (ref 0–0.56)
DEPRECATED RDW RBC AUTO: 51.2 FL (ref 37–54)
EOSINOPHIL # BLD MANUAL: 0 10*3/MM3 (ref 0–0.4)
EOSINOPHIL NFR BLD MANUAL: 0 % (ref 0.3–6.2)
ERYTHROCYTE [DISTWIDTH] IN BLOOD BY AUTOMATED COUNT: 13.6 % (ref 12.3–15.4)
GFR SERPL CREATININE-BSD FRML MDRD: 55 ML/MIN/1.73
GLOBULIN UR ELPH-MCNC: 2.8 GM/DL
GLUCOSE BLDC GLUCOMTR-MCNC: 140 MG/DL (ref 70–130)
GLUCOSE BLDC GLUCOMTR-MCNC: 173 MG/DL (ref 70–130)
GLUCOSE BLDC GLUCOMTR-MCNC: 206 MG/DL (ref 70–130)
GLUCOSE BLDC GLUCOMTR-MCNC: 260 MG/DL (ref 70–130)
GLUCOSE SERPL-MCNC: 150 MG/DL (ref 65–99)
HCT VFR BLD AUTO: 27.3 % (ref 37.5–51)
HGB BLD-MCNC: 8.5 G/DL (ref 13–17.7)
IRON 24H UR-MRATE: 17 MCG/DL (ref 59–158)
IRON SATN MFR SERPL: 8 % (ref 20–50)
LYMPHOCYTES # BLD MANUAL: 0.33 10*3/MM3 (ref 0.7–3.1)
LYMPHOCYTES NFR BLD MANUAL: 3 % (ref 19.6–45.3)
LYMPHOCYTES NFR BLD MANUAL: 6 % (ref 5–12)
MACROCYTES BLD QL SMEAR: ABNORMAL
MCH RBC QN AUTO: 31.8 PG (ref 26.6–33)
MCHC RBC AUTO-ENTMCNC: 31.1 G/DL (ref 31.5–35.7)
MCV RBC AUTO: 102.2 FL (ref 79–97)
MONOCYTES # BLD AUTO: 0.66 10*3/MM3 (ref 0.1–0.9)
NEUTROPHILS # BLD AUTO: 9.98 10*3/MM3 (ref 1.7–7)
NEUTROPHILS NFR BLD MANUAL: 67 % (ref 42.7–76)
NEUTS BAND NFR BLD MANUAL: 24 % (ref 0–5)
PLAT MORPH BLD: NORMAL
PLATELET # BLD AUTO: 186 10*3/MM3 (ref 140–450)
PMV BLD AUTO: 10.2 FL (ref 6–12)
POLYCHROMASIA BLD QL SMEAR: ABNORMAL
POTASSIUM SERPL-SCNC: 4.4 MMOL/L (ref 3.5–5.2)
PROT SERPL-MCNC: 6.2 G/DL (ref 6–8.5)
RBC # BLD AUTO: 2.67 10*6/MM3 (ref 4.14–5.8)
RH BLD: POSITIVE
RH BLD: POSITIVE
SODIUM SERPL-SCNC: 141 MMOL/L (ref 136–145)
T&S EXPIRATION DATE: NORMAL
TIBC SERPL-MCNC: 222 MCG/DL (ref 298–536)
TRANSFERRIN SERPL-MCNC: 149 MG/DL (ref 200–360)
TROPONIN T SERPL-MCNC: 0.01 NG/ML (ref 0–0.03)
WBC # BLD AUTO: 10.97 10*3/MM3 (ref 3.4–10.8)
WBC MORPH BLD: NORMAL

## 2021-01-11 PROCEDURE — 99233 SBSQ HOSP IP/OBS HIGH 50: CPT | Performed by: INTERNAL MEDICINE

## 2021-01-11 PROCEDURE — 84466 ASSAY OF TRANSFERRIN: CPT | Performed by: INTERNAL MEDICINE

## 2021-01-11 PROCEDURE — 25010000002 ENOXAPARIN PER 10 MG: Performed by: INTERNAL MEDICINE

## 2021-01-11 PROCEDURE — 85007 BL SMEAR W/DIFF WBC COUNT: CPT | Performed by: INTERNAL MEDICINE

## 2021-01-11 PROCEDURE — 25010000002 PIPERACILLIN SOD-TAZOBACTAM PER 1 G: Performed by: INTERNAL MEDICINE

## 2021-01-11 PROCEDURE — 63710000001 INSULIN LISPRO (HUMAN) PER 5 UNITS: Performed by: INTERNAL MEDICINE

## 2021-01-11 PROCEDURE — 82962 GLUCOSE BLOOD TEST: CPT

## 2021-01-11 PROCEDURE — 86900 BLOOD TYPING SEROLOGIC ABO: CPT | Performed by: INTERNAL MEDICINE

## 2021-01-11 PROCEDURE — 25010000002 VANCOMYCIN 10 G RECONSTITUTED SOLUTION

## 2021-01-11 PROCEDURE — 85379 FIBRIN DEGRADATION QUANT: CPT | Performed by: INTERNAL MEDICINE

## 2021-01-11 PROCEDURE — 82248 BILIRUBIN DIRECT: CPT | Performed by: INTERNAL MEDICINE

## 2021-01-11 PROCEDURE — 85025 COMPLETE CBC W/AUTO DIFF WBC: CPT | Performed by: INTERNAL MEDICINE

## 2021-01-11 PROCEDURE — 83540 ASSAY OF IRON: CPT | Performed by: INTERNAL MEDICINE

## 2021-01-11 PROCEDURE — 86901 BLOOD TYPING SEROLOGIC RH(D): CPT | Performed by: INTERNAL MEDICINE

## 2021-01-11 PROCEDURE — 25010000002 FUROSEMIDE PER 20 MG: Performed by: INTERNAL MEDICINE

## 2021-01-11 PROCEDURE — 84484 ASSAY OF TROPONIN QUANT: CPT | Performed by: INTERNAL MEDICINE

## 2021-01-11 PROCEDURE — 25010000002 DEXAMETHASONE PER 1 MG: Performed by: INTERNAL MEDICINE

## 2021-01-11 PROCEDURE — 86850 RBC ANTIBODY SCREEN: CPT | Performed by: INTERNAL MEDICINE

## 2021-01-11 PROCEDURE — 80053 COMPREHEN METABOLIC PANEL: CPT | Performed by: INTERNAL MEDICINE

## 2021-01-11 RX ORDER — ASCORBIC ACID 500 MG
250 TABLET ORAL DAILY
Status: DISCONTINUED | OUTPATIENT
Start: 2021-01-11 | End: 2021-01-15 | Stop reason: HOSPADM

## 2021-01-11 RX ORDER — DEXAMETHASONE SODIUM PHOSPHATE 4 MG/ML
6 INJECTION, SOLUTION INTRA-ARTICULAR; INTRALESIONAL; INTRAMUSCULAR; INTRAVENOUS; SOFT TISSUE
Status: COMPLETED | OUTPATIENT
Start: 2021-01-11 | End: 2021-01-14

## 2021-01-11 RX ORDER — ACETAMINOPHEN 650 MG/1
650 SUPPOSITORY RECTAL EVERY 4 HOURS PRN
Status: DISCONTINUED | OUTPATIENT
Start: 2021-01-11 | End: 2021-01-15 | Stop reason: HOSPADM

## 2021-01-11 RX ORDER — ACETAMINOPHEN 325 MG/1
650 TABLET ORAL EVERY 6 HOURS PRN
Status: DISCONTINUED | OUTPATIENT
Start: 2021-01-11 | End: 2021-01-11 | Stop reason: SDUPTHER

## 2021-01-11 RX ORDER — DEXAMETHASONE SODIUM PHOSPHATE 4 MG/ML
6 INJECTION, SOLUTION INTRA-ARTICULAR; INTRALESIONAL; INTRAMUSCULAR; INTRAVENOUS; SOFT TISSUE DAILY
Status: DISCONTINUED | OUTPATIENT
Start: 2021-01-11 | End: 2021-01-11

## 2021-01-11 RX ORDER — ATORVASTATIN CALCIUM 40 MG/1
80 TABLET, FILM COATED ORAL NIGHTLY
Status: DISCONTINUED | OUTPATIENT
Start: 2021-01-11 | End: 2021-01-15 | Stop reason: HOSPADM

## 2021-01-11 RX ORDER — ACETAMINOPHEN 325 MG/1
650 TABLET ORAL EVERY 4 HOURS PRN
Status: DISCONTINUED | OUTPATIENT
Start: 2021-01-11 | End: 2021-01-15 | Stop reason: HOSPADM

## 2021-01-11 RX ORDER — DEXTROSE MONOHYDRATE 25 G/50ML
25 INJECTION, SOLUTION INTRAVENOUS
Status: DISCONTINUED | OUTPATIENT
Start: 2021-01-11 | End: 2021-01-11

## 2021-01-11 RX ORDER — ACETAMINOPHEN 160 MG/5ML
650 SOLUTION ORAL EVERY 4 HOURS PRN
Status: DISCONTINUED | OUTPATIENT
Start: 2021-01-11 | End: 2021-01-15 | Stop reason: HOSPADM

## 2021-01-11 RX ORDER — FUROSEMIDE 10 MG/ML
20 INJECTION INTRAMUSCULAR; INTRAVENOUS DAILY
Status: DISCONTINUED | OUTPATIENT
Start: 2021-01-11 | End: 2021-01-15

## 2021-01-11 RX ORDER — MELATONIN
2000 DAILY
Status: DISCONTINUED | OUTPATIENT
Start: 2021-01-11 | End: 2021-01-15 | Stop reason: HOSPADM

## 2021-01-11 RX ORDER — NICOTINE POLACRILEX 4 MG
15 LOZENGE BUCCAL
Status: DISCONTINUED | OUTPATIENT
Start: 2021-01-11 | End: 2021-01-15 | Stop reason: HOSPADM

## 2021-01-11 RX ORDER — ONDANSETRON 2 MG/ML
4 INJECTION INTRAMUSCULAR; INTRAVENOUS EVERY 6 HOURS PRN
Status: DISCONTINUED | OUTPATIENT
Start: 2021-01-11 | End: 2021-01-15 | Stop reason: HOSPADM

## 2021-01-11 RX ORDER — DEXTROSE MONOHYDRATE 25 G/50ML
25 INJECTION, SOLUTION INTRAVENOUS
Status: DISCONTINUED | OUTPATIENT
Start: 2021-01-11 | End: 2021-01-15 | Stop reason: HOSPADM

## 2021-01-11 RX ORDER — ONDANSETRON 4 MG/1
4 TABLET, FILM COATED ORAL EVERY 6 HOURS PRN
Status: DISCONTINUED | OUTPATIENT
Start: 2021-01-11 | End: 2021-01-15 | Stop reason: HOSPADM

## 2021-01-11 RX ORDER — DOCUSATE SODIUM 100 MG/1
200 CAPSULE, LIQUID FILLED ORAL DAILY
Status: DISCONTINUED | OUTPATIENT
Start: 2021-01-11 | End: 2021-01-15 | Stop reason: HOSPADM

## 2021-01-11 RX ORDER — IPRATROPIUM BROMIDE AND ALBUTEROL SULFATE 2.5; .5 MG/3ML; MG/3ML
3 SOLUTION RESPIRATORY (INHALATION) EVERY 4 HOURS PRN
Status: DISCONTINUED | OUTPATIENT
Start: 2021-01-11 | End: 2021-01-15 | Stop reason: HOSPADM

## 2021-01-11 RX ORDER — CHOLECALCIFEROL (VITAMIN D3) 125 MCG
5 CAPSULE ORAL NIGHTLY
Status: DISCONTINUED | OUTPATIENT
Start: 2021-01-11 | End: 2021-01-15 | Stop reason: HOSPADM

## 2021-01-11 RX ORDER — LISINOPRIL 2.5 MG/1
2.5 TABLET ORAL
Status: DISCONTINUED | OUTPATIENT
Start: 2021-01-11 | End: 2021-01-15 | Stop reason: HOSPADM

## 2021-01-11 RX ORDER — SODIUM CHLORIDE 0.9 % (FLUSH) 0.9 %
1-10 SYRINGE (ML) INJECTION AS NEEDED
Status: DISCONTINUED | OUTPATIENT
Start: 2021-01-11 | End: 2021-01-15 | Stop reason: HOSPADM

## 2021-01-11 RX ORDER — SODIUM CHLORIDE 0.9 % (FLUSH) 0.9 %
10 SYRINGE (ML) INJECTION EVERY 12 HOURS SCHEDULED
Status: DISCONTINUED | OUTPATIENT
Start: 2021-01-11 | End: 2021-01-15 | Stop reason: HOSPADM

## 2021-01-11 RX ORDER — AMOXICILLIN 250 MG
2 CAPSULE ORAL NIGHTLY PRN
Status: DISCONTINUED | OUTPATIENT
Start: 2021-01-11 | End: 2021-01-15 | Stop reason: HOSPADM

## 2021-01-11 RX ORDER — LANOLIN ALCOHOL/MO/W.PET/CERES
1000 CREAM (GRAM) TOPICAL DAILY
Status: DISCONTINUED | OUTPATIENT
Start: 2021-01-11 | End: 2021-01-15 | Stop reason: HOSPADM

## 2021-01-11 RX ORDER — NICOTINE POLACRILEX 4 MG
15 LOZENGE BUCCAL
Status: DISCONTINUED | OUTPATIENT
Start: 2021-01-11 | End: 2021-01-11

## 2021-01-11 RX ORDER — AMLODIPINE BESYLATE 5 MG/1
5 TABLET ORAL DAILY
Status: DISCONTINUED | OUTPATIENT
Start: 2021-01-11 | End: 2021-01-15 | Stop reason: HOSPADM

## 2021-01-11 RX ORDER — FAMOTIDINE 20 MG/1
20 TABLET, FILM COATED ORAL 2 TIMES DAILY
Status: DISCONTINUED | OUTPATIENT
Start: 2021-01-11 | End: 2021-01-15 | Stop reason: HOSPADM

## 2021-01-11 RX ORDER — ASPIRIN 81 MG/1
81 TABLET, CHEWABLE ORAL DAILY
Status: DISCONTINUED | OUTPATIENT
Start: 2021-01-11 | End: 2021-01-15 | Stop reason: HOSPADM

## 2021-01-11 RX ADMIN — SODIUM CHLORIDE, PRESERVATIVE FREE 10 ML: 5 INJECTION INTRAVENOUS at 21:47

## 2021-01-11 RX ADMIN — Medication 5 MG: at 01:52

## 2021-01-11 RX ADMIN — VANCOMYCIN HYDROCHLORIDE 1250 MG: 100 INJECTION, POWDER, LYOPHILIZED, FOR SOLUTION INTRAVENOUS at 21:49

## 2021-01-11 RX ADMIN — ACETAMINOPHEN 650 MG: 325 TABLET, FILM COATED ORAL at 21:47

## 2021-01-11 RX ADMIN — FAMOTIDINE 20 MG: 20 TABLET, FILM COATED ORAL at 21:47

## 2021-01-11 RX ADMIN — CYANOCOBALAMIN TAB 1000 MCG 1000 MCG: 1000 TAB at 10:23

## 2021-01-11 RX ADMIN — AMLODIPINE BESYLATE 5 MG: 5 TABLET ORAL at 10:23

## 2021-01-11 RX ADMIN — DEXAMETHASONE SODIUM PHOSPHATE 6 MG: 4 INJECTION, SOLUTION INTRA-ARTICULAR; INTRALESIONAL; INTRAMUSCULAR; INTRAVENOUS; SOFT TISSUE at 10:36

## 2021-01-11 RX ADMIN — REMDESIVIR 200 MG: 100 INJECTION, POWDER, LYOPHILIZED, FOR SOLUTION INTRAVENOUS at 04:21

## 2021-01-11 RX ADMIN — ASPIRIN 81 MG CHEWABLE TABLET 81 MG: 81 TABLET CHEWABLE at 10:24

## 2021-01-11 RX ADMIN — SODIUM CHLORIDE, PRESERVATIVE FREE 10 ML: 5 INJECTION INTRAVENOUS at 12:56

## 2021-01-11 RX ADMIN — ACETAMINOPHEN 650 MG: 325 TABLET, FILM COATED ORAL at 01:51

## 2021-01-11 RX ADMIN — TAZOBACTAM SODIUM AND PIPERACILLIN SODIUM 3.38 G: 375; 3 INJECTION, SOLUTION INTRAVENOUS at 21:49

## 2021-01-11 RX ADMIN — TAZOBACTAM SODIUM AND PIPERACILLIN SODIUM 3.38 G: 375; 3 INJECTION, SOLUTION INTRAVENOUS at 05:36

## 2021-01-11 RX ADMIN — DEXAMETHASONE SODIUM PHOSPHATE 6 MG: 4 INJECTION, SOLUTION INTRA-ARTICULAR; INTRALESIONAL; INTRAMUSCULAR; INTRAVENOUS; SOFT TISSUE at 01:52

## 2021-01-11 RX ADMIN — FUROSEMIDE 20 MG: 10 INJECTION, SOLUTION INTRAMUSCULAR; INTRAVENOUS at 01:52

## 2021-01-11 RX ADMIN — Medication 5 MG: at 21:47

## 2021-01-11 RX ADMIN — ATORVASTATIN CALCIUM 80 MG: 40 TABLET, FILM COATED ORAL at 21:47

## 2021-01-11 RX ADMIN — INSULIN LISPRO 2 UNITS: 100 INJECTION, SOLUTION INTRAVENOUS; SUBCUTANEOUS at 10:36

## 2021-01-11 RX ADMIN — TAZOBACTAM SODIUM AND PIPERACILLIN SODIUM 3.38 G: 375; 3 INJECTION, SOLUTION INTRAVENOUS at 14:53

## 2021-01-11 RX ADMIN — Medication 2000 UNITS: at 10:24

## 2021-01-11 RX ADMIN — INSULIN LISPRO 2 UNITS: 100 INJECTION, SOLUTION INTRAVENOUS; SUBCUTANEOUS at 12:56

## 2021-01-11 RX ADMIN — ATORVASTATIN CALCIUM 80 MG: 40 TABLET, FILM COATED ORAL at 01:52

## 2021-01-11 RX ADMIN — SODIUM CHLORIDE, PRESERVATIVE FREE 10 ML: 5 INJECTION INTRAVENOUS at 01:53

## 2021-01-11 RX ADMIN — DOCUSATE SODIUM 200 MG: 100 CAPSULE ORAL at 10:24

## 2021-01-11 RX ADMIN — LISINOPRIL 2.5 MG: 2.5 TABLET ORAL at 10:23

## 2021-01-11 RX ADMIN — OXYCODONE HYDROCHLORIDE AND ACETAMINOPHEN 250 MG: 500 TABLET ORAL at 10:23

## 2021-01-11 RX ADMIN — ENOXAPARIN SODIUM 40 MG: 40 INJECTION SUBCUTANEOUS at 12:55

## 2021-01-11 RX ADMIN — FAMOTIDINE 20 MG: 20 TABLET, FILM COATED ORAL at 10:24

## 2021-01-11 NOTE — H&P
[facility]  HISTORY AND PHYSICAL    Patient Name: Dirk Miles  : 1940  MRN: [id number]  Primary Care Physician: Emil, No Known  Date of admission: 1/10/2021      Subjective   Subjective     Chief Complaint:  Buttock wound (patient poor historian secondary to CVA/dementia)    HPI:  Dirk Miles is a 80 y.o. male     80-year-old male presented to ED from [facility].  Secondary to altered mental status-confused,talking inappropriate.(at baseline)    Also known to have positive Covid-diagnosed on 2020(recently transferred there from [facility] on 20)  No cough, no hypoxia, noted per NH.    Buttock wound getting worse,  erythematous,low grade fever.     Recent history  He was admitted in early December secondary to acute CVA involving right PCA-with left-sided deficit, TPA was not given secondary to high risk of bleeding, patient systolic blood pressure was 212 on initial presentation.    MRI brain-showed large right PCA stroke, old left MCA frontal lobe infarct, moderate to severe white matter disease.  TTE-ejection fraction 61%, moderate TR, RVSP 45-55 mmHg.    Patient suffered a fall on -with small amount of hemorrhage noted in right PCA infarct area, anticoagulation was hold for 10 days.    Current COVID Risks are:  [x] Fever []  Cough [] Shortness of breath [] Fatigue [] Change in taste or smell    [] Exposure to COVID positive patient  [x] High risk facility   []  NONE    Review of Systems   Complete ROS done, which is negative except as above and HPI.  Old records reviewed and summarized in PM hx      Personal History     Past Medical History:   Diagnosis Date       PMHx  CVA-aspirin 81 mg  Paroxysmal A. fib-CHADS2 VASC=6   Eliquis on 2020 was discontinued secondary to intracranial bleed after a fall, only on aspirin currently.  Hypertension-Norvasc 5 mg p.o. daily, lisinopril 20 mg p.o. daily, Lopressor 50  mg/12  Hyperlipidemia-Lipitor 80 mg p.o. daily  Pulmonary htn-Lasix 20 mg twice a week as needed  DM 2/borderline, hemoglobin A1c-6.4, on 12/2/2020-insulin-blood sugar 160 tonight.  Chronic constipation-fecal impaction on CT pelvis-we will start with enema.  PVD-left ICA stenosis-was supposed to follow-up with Dr. Johnson.              Past Surgical History:   Procedure Laterality Date   • CATARACT EXTRACTION, BILATERAL     • HERNIA REPAIR         Family History: Altered mental status-could not be assessed.    Social History:  reports that he has quit smoking. He has never used smokeless tobacco. He reports that he does not drink alcohol or use drugs.      Medications:  Available home medication information reviewed.  (Not in a hospital admission)      No Known Allergies    Objective   Objective     Vital Signs:   Temp:  [99.7 °F (37.6 °C)] 99.7 °F (37.6 °C)  Heart Rate:  [79-93] 79  Resp:  [18] 18-91% on room air  BP: (108-122)/(66-77) 122/66        Physical Exam     GENERAL- Not distressed, well nourished.  Confused  RS- CTA-BL, anteriorly, did not cooperate much with exam.  CVS- s1s2 Regular, no murmur.  ABD- soft, minimal tenderness in epigastric area on deep palpation, not distended, no organomegaly.  EXT- no edema.  NEURO-confused, talking inappropriately, not answering any questions appropriately, moving lower extremities spontaneously left leg less as compared to the right, left arm in the soft sleeve not moving, did not open his eyes, left vision loss  EYES- Conjunctivae are normal. Pupils are equal, round, and reactive to light. No scleral icterus.   ENT- no external ear nose lesions, mucosa moist.  NECK- No JVD present. No tracheal deviation present. No thyromegaly present,No cervical lymphadenopathy.  JOINTS/MSK- no deformity, no swelling.  SKIN-multiple bruising in lower extremity, on the right buttock patient has approximately 5 x 5 cm erythematous area with approximately 1 cm central area which is  firm, no obvious discharge,  , PSYCHIATRIC-could not be assessed.  Results Reviewed:  I have personally reviewed current lab and radiology data.    Results from last 7 days   Lab Units 01/10/21  1921   WBC 10*3/mm3 11.56*   HEMOGLOBIN g/dL 9.4*   HEMATOCRIT % 30.6*   PLATELETS 10*3/mm3 206     Results from last 7 days   Lab Units 01/10/21  1941 01/10/21  1921   SODIUM mmol/L  --  137   POTASSIUM mmol/L  --  4.4   CHLORIDE mmol/L  --  101   CO2 mmol/L  --  27.0   BUN mg/dL  --  20   CREATININE mg/dL  --  1.14   GLUCOSE mg/dL  --  160*   CALCIUM mg/dL  --  8.9   ALT (SGPT) U/L  --  15   AST (SGOT) U/L  --  19   TROPONIN T ng/mL  --  0.020   PROBNP pg/mL  --  11,359.0*   LACTATE mmol/L 1.7  --    PROCALCITONIN ng/mL  --  0.32*     Estimated Creatinine Clearance: 54.4 mL/min (by C-G formula based on SCr of 1.14 mg/dL).  Brief Urine Lab Results  (Last result in the past 365 days)      Color   Clarity   Blood   Leuk Est   Nitrite   Protein   CREAT   Urine HCG        01/10/21 2052 Yellow Clear Negative Negative Negative 30 mg/dL (1+)             Imaging Results (Last 24 Hours)     Procedure Component Value Units Date/Time    CT Pelvis With Contrast [[id number]] Collected: 01/10/21 2037     Updated: 01/10/21 2039    Narrative:      INDICATION:    Buttock abscess right side altered mental status confusion and positive for Covid    TECHNIQUE:   CT of the pelvis during intravenous administration of contrast. contrast dose recorded in the patient's chart. Coronal and sagittal reconstructions were obtained.  Radiation dose reduction techniques included automated exposure control or exposure  modulation based on body size. Radiation audit for number of CT and nuclear cardiology exams performed in the last year: 12.      COMPARISON:    None available.    FINDINGS:  There is abnormal soft tissue edema with skin thickening right buttock area inferiorly and medially adjacent to the gluteal fold. It is centered in the subcutaneous fat  and the overall area of at least focal soft tissue edema measure as about 3.3 x 4.7 x  2.4 cm dimension. There is vague central lower attenuation. The timing of the contrast is arterial. Because of this, the study is not sensitive for rim-enhancing fluid collection. Findings most concerning for at least cellulitis/phlegmon and possibly  ill-defined abscess. There is extension of soft tissue edema and cellulitic change more anteriorly to the right side perineum as well. This cellulitic change appears to extend to the posterior aspect of the scrotum. I do not see evidence for soft tissue  gas but please correlate for any physical evidence of scrotal cellulitis. There is no bone destruction or CT evidence for osteomyelitis. Urinary bladder is unremarkable.    There is a large amount of stool in the rectum consistent with impaction. There is sigmoid diverticulosis with moderate stool. There is an old left superior acetabular fracture. There are extensive atherosclerotic vascular calcifications.      Impression:        1. There is at minimum cellulitic change in the right buttock extending to the right side of the perineum and possibly the posterior inferior aspect of the scrotum with skin thickening. I do not see evidence for soft tissue gas. There is more focal  abnormal soft tissue attenuation in the subcutaneous fat centered right buttock inferomedially adjacent to the gluteal crease. There is associated ill-defined lower attenuation centrally. This could be phlegmonous change or early abscess. Of note the  contrast bolus is arterial and therefore not sensitive for diagnosis of rim-enhancing fluid collection. There is no underlying bone destruction. Please correlate with physical examination of the scrotum for evidence of scrotal cellulitis.  2. Fecal impaction in the rectum.  3. Sigmoid diverticulosis.  4. Extensive vascular calcifications.  5. Chronic left acetabular fracture    Signer Name: Gisel Nova MD    Signed: 1/10/2021 8:37 PM   Workstation Name: [email or web address]    [facility]    CT Head Without Contrast [[id number]] Collected: 01/10/21 2024     Updated: 01/10/21 2026    Narrative:      HISTORY:   Altered mental status. Confusion and possible Covid.    TECHNIQUE:   CT head without contrast. Radiation dose reduction techniques included automated exposure control or exposure modulation based on body size. Radiation audit for number of CT and nuclear cardiology exams performed in the last year: 12.      COMPARISON:   Head CT from 1/2/2021.    FINDINGS:   There is motion limitation of current and prior study. There is moderate mucous retention cyst or polyp at the right maxillary antrum and a small mucous retention cyst or polyp at the left maxillary antrum. The mastoid air cells are clear. There is  mucosal thickening in the ethmoid air cells. There is near complete opacification of left sphenoid sinus with an air-fluid level consistent with a component of acute sinusitis. Redemonstrated is atrophy with extensive white matter low-attenuation that is  nonspecific but likely due to small vessel disease. There is chronic encephalomalacia left posterior frontal lobe. There is low-attenuation with punctate hyperdensity and local mass effect involving the right posterior temporal lobe to occipital lobe  most consistent with a late subacute to chronic infarct with hemorrhagic transformation. On comparison to the most recent study no new hemorrhage is seen and there is no change in mass effect. There is malacia of the right posterior thalamus and adjacent  posterior limb of the right internal capsule as well as low attenuation into the right basal ganglia more posteriorly are consistent with evolution of infarct is a small lacune in the anterior left michael. There is no new mass affect obvious acute cortical  infarct.      Impression:        1. Allowing for motion on both prior and current study findings are  most consistent with expected interval evolution of late subacute to chronic infarct involving the right posterior cerebral artery distribution with punctate areas of hemorrhagic  transformation. No interval new hemorrhage or increase in mass effect is appreciated.  2. Pre-existing extensive probable sequelae of small vessel disease.  3. Chronic malacic change left posterior frontal lobe #  4. there is new near complete opacification of the left sphenoid sinus with an air-fluid level consistent with a component of acute sinusitis. This is a significant change from prior.    Signer Name: Gisel Nova MD   Signed: 1/10/2021 8:24 PM   Workstation Name: [email or web address]    [facility]    XR Chest 1 View [[id number]] Collected: 01/10/21 1939     Updated: 01/10/21 1941    Narrative:      CR Chest 1 Vw    INDICATION:   Weakness dizziness confused.     COMPARISON:    Chest x-ray 12/4/2020.    FINDINGS:  Single portable AP view(s) of the chest.  There is globular cardiac silhouette enlargement which is increased from the prior study and there is mild increase in central vascular markings and interstitial markings. This likely mild congestive heart  failure. There is no pleural effusion or pneumothorax. There is no acute infiltrate.      Impression:      There is interval increase in cardiac silhouette size with mild vascular congestion and interstitial edema. Findings most suggestive of interval development of mild congestive failure. Clinical correlation and follow-up to resolution recommended.    Signer Name: Gisel Nova MD   Signed: 1/10/2021 7:39 PM   Workstation Name: [email or web address]    [facility]        Results for orders placed during the hospital encounter of 12/01/20   Adult Transthoracic Echo Complete W/ Cont if Necessary Per Protocol (With Agitated Saline)    Narrative · Left ventricular ejection fraction appears to be 61 - 65%. Left   ventricular systolic function is normal.  ·  Left atrial volume is moderately increased.  · Saline test results are negative.  · The right atrial cavity is mildly dilated.  · Moderate tricuspid valve regurgitation is present.  · Estimated right ventricular systolic pressure from tricuspid   regurgitation is moderately elevated (45-55 mmHg).              Labs  Troponin-0.02    Sodium-137, potassium 4.4, BUN/creatinine 20/1.1, bicarb 27, magnesium 1.9    LFTs-WNL, albumin 3.3    Lactic acid 1.7, pro-Marcio 0.32    WBC-11, hemoglobin 10, hematocrit 30, , platelet 206, neutrophils 85    CT pelvis with contrast-cellulitic changes in the right buttock, along with?  Early abscess also involving the area please see official report, fecal impaction.    Chest x-ray-cardiomegaly, mild PVC  EKG-A. fib, , heart rate 95, no acute ST-T wave changes.            Assessment/Plan   Active Hospital Problems    Diagnosis POA   • **Cellulitis of right buttock [L03.317] Yes   • Dementia with behavioral disturbance (CMS/HCC) [F03.91] Unknown   • Hyperlipemia [E78.5] Unknown   • Stenosis of left carotid artery [I65.22] Unknown   • Anemia, chronic disease [D63.8] Unknown   • COVID-19 virus detected [U07.1] Unknown   • A-fib (CMS/HCC) [I48.91] Yes   • Hypertension [I10] Yes   • Diabetes mellitus (CMS/HCC) [E11.9] Yes   • Acute CVA (cerebrovascular accident) (CMS/HCC) [I63.9] Yes       Assessment & Plan       Altered mental status-as per family, at baseline, usually have good days and bad days since his stroke.  CT head-chronic right PCA area infarct, complete opacification of left sphenoid sinus-representing acute sinusitis.  -On antibiotics for his buttock wound.    Buttock wound-likely pressure wound, with superimposed cellulitis,?  Abscess  -Zosyn, Vanco started in ED, will continue, wound consult in a.m., may need I&D depending on the wound input.    Head laceration-with staples in the posterior scalp-removed in the ED.    Covid positive at [facility] on 1/7/2020-we  will retest him, if positive will continue Decadron 6 mg, not overtly hypoxic, therefore did not start him on any other treatment.    Anemia  -Known B12 deficiency, will check stool occult for ruling out any acute bleed, check iron profile    CVA-aspirin 81 mg-we will continue it for now.    Paroxysmal A. fib-CHADS2 VASC= 6,  Eliquis on 12/4/2020 was discontinued secondary to intracranial bleed after a fall, only on aspirin currently.    Hypertension-Norvasc 5 mg p.o. daily, lisinopril 2.5 mg p.o. daily, Lopressor 50 mg/12-continue home meds.    Hyperlipidemia-Lipitor 80 mg p.o. daily    Pulmonary htn-Lasix 20 mg twice a week as needed-proBNP added, chest x-ray mild PVC-as needed if needed.    DM 2/borderline, hemoglobin A1c-6.4, on 12/2/2020-insulin-blood sugar 160 tonight.-Fingerstick coverage.    Chronic constipation-fecal impaction on CT pelvis-we will continue, senna, colace,    PVD-left ICA stenosis-was supposed to follow-up with Dr. Johnson.    DVT prophylaxis:    -TEDs/SCDs  -Lovenox-none in case procedure is needed.    CODE STATUS:    Code Status and Medical Interventions:   Ordered at: 01/10/21 6458     Limited Support to NOT Include:    Intubation     Code Status:    No CPR     Medical Interventions (Level of Support Prior to Arrest):    Limited     Comments:    dnr per living will and wife confirmed it Sarai [phone]         Admission Status:  I believe this patient meets inpatient status, secondary to buttock wound, Covid positive status, altered mental status-needing IV antibiotics, further diagnostic work-up.

## 2021-01-11 NOTE — PROGRESS NOTES
[facility] Medicine Services  PROGRESS NOTE    Patient Name: Dirk Miles  : 1940  MRN: [id number]    Date of Admission: 1/10/2021  Primary Care Physician: Provider, No Known    Subjective   Subjective     CC:  covid 19  AMS  Buttock cellilits    HPI:  Patient confused. Unable to have conversation  D/w nursing. Patient on 2LNC. Febrile overnight to Tmax 101.5    ROS:  uto     Objective   Objective     Vital Signs:   Temp:  [97.9 °F (36.6 °C)-101.5 °F (38.6 °C)] 97.9 °F (36.6 °C)  Heart Rate:  [] 88  Resp:  [18-22] 18  BP: (101-133)/(53-90) 111/75        Physical Exam:  With patient's consent, physical exam was conducted via visual telemedicine encounter due to patient's current isolation requirements in the interest of PPE conservation.    Constitutional: frail, elderly, resting in bed  HENT: NCAT, MMM, no conjunctival injection  Respiratory: Good effort, nonlabored respirations , on 2L  Cardiovascular:  tele with NSR  Musculoskeletal: No edema, normal muscle tone and mass for age  Psychiatric: Appropriate affect, good insight and judgement, cooperative  Neurologic: Oriented x 3, movements symmetric BUE and BLE, speech clear and fluent  Skin: No visible rashes, no jaundice seen on exposed skin through window        Results Reviewed:  Results from last 7 days   Lab Units 21  0710 01/10/21  1921   WBC 10*3/mm3 10.97* 11.56*   HEMOGLOBIN g/dL 8.5* 9.4*   HEMATOCRIT % 27.3* 30.6*   PLATELETS 10*3/mm3 186 206   PROCALCITONIN ng/mL  --  0.32*     Results from last 7 days   Lab Units 01/10/21  192   SODIUM mmol/L 137   POTASSIUM mmol/L 4.4   CHLORIDE mmol/L 101   CO2 mmol/L 27.0   BUN mg/dL 20   CREATININE mg/dL 1.14   GLUCOSE mg/dL 160*   CALCIUM mg/dL 8.9   ALT (SGPT) U/L 15   AST (SGOT) U/L 19   TROPONIN T ng/mL 0.020   PROBNP pg/mL 11,359.0*     Estimated Creatinine Clearance: 53.9 mL/min (by C-G formula based on SCr of 1.14 mg/dL).    Microbiology Results Abnormal      Procedure Component Value - Date/Time    Respiratory Panel PCR w/COVID-19(SARS-CoV-2) JOHN/MICHAEL/ROLANDO/PAD/COR/MAD/CHET In-House, NP Swab in UTM/VTM, 3-4 HR TAT - Swab, Nasopharynx [[id number]]  (Abnormal) Collected: 01/10/21 2235    Lab Status: Final result Specimen: Swab from Nasopharynx Updated: 01/10/21 2344     ADENOVIRUS, PCR Not Detected     Coronavirus 229E Not Detected     Coronavirus HKU1 Not Detected     Coronavirus NL63 Not Detected     Coronavirus OC43 Not Detected     COVID19 Detected     Human Metapneumovirus Not Detected     Human Rhinovirus/Enterovirus Not Detected     Influenza A PCR Not Detected     Influenza A H1 Not Detected     Influenza A H1 2009 PCR Not Detected     Influenza A H3 Not Detected     Influenza B PCR Not Detected     Parainfluenza Virus 1 Not Detected     Parainfluenza Virus 2 Not Detected     Parainfluenza Virus 3 Not Detected     Parainfluenza Virus 4 Not Detected     RSV, PCR Not Detected     Bordetella pertussis pcr Not Detected     Bordetella parapertussis PCR Not Detected     Chlamydophila pneumoniae PCR Not Detected     Mycoplasma pneumo by PCR Not Detected    Narrative:      Fact sheet for providers: https://docs.[employer]/wp-content/uploads/VAZ6993-2181-GF2.1-EUA-Provider-Fact-Sheet-3.pdf    Fact sheet for patients: https://docs.[employer]/wp-content/uploads/PLG4660-7206-TW2.1-EUA-Patient-Fact-Sheet-1.pdf    Test performed by PCR.          Imaging Results (Last 24 Hours)     Procedure Component Value Units Date/Time    CT Pelvis With Contrast [[id number]] Collected: 01/10/21 2037     Updated: 01/10/21 2039    Narrative:      INDICATION:    Buttock abscess right side altered mental status confusion and positive for Covid    TECHNIQUE:   CT of the pelvis during intravenous administration of contrast. contrast dose recorded in the patient's chart. Coronal and sagittal reconstructions were obtained.  Radiation dose reduction techniques included automated exposure control or  exposure  modulation based on body size. Radiation audit for number of CT and nuclear cardiology exams performed in the last year: 12.      COMPARISON:    None available.    FINDINGS:  There is abnormal soft tissue edema with skin thickening right buttock area inferiorly and medially adjacent to the gluteal fold. It is centered in the subcutaneous fat and the overall area of at least focal soft tissue edema measure as about 3.3 x 4.7 x  2.4 cm dimension. There is vague central lower attenuation. The timing of the contrast is arterial. Because of this, the study is not sensitive for rim-enhancing fluid collection. Findings most concerning for at least cellulitis/phlegmon and possibly  ill-defined abscess. There is extension of soft tissue edema and cellulitic change more anteriorly to the right side perineum as well. This cellulitic change appears to extend to the posterior aspect of the scrotum. I do not see evidence for soft tissue  gas but please correlate for any physical evidence of scrotal cellulitis. There is no bone destruction or CT evidence for osteomyelitis. Urinary bladder is unremarkable.    There is a large amount of stool in the rectum consistent with impaction. There is sigmoid diverticulosis with moderate stool. There is an old left superior acetabular fracture. There are extensive atherosclerotic vascular calcifications.      Impression:        1. There is at minimum cellulitic change in the right buttock extending to the right side of the perineum and possibly the posterior inferior aspect of the scrotum with skin thickening. I do not see evidence for soft tissue gas. There is more focal  abnormal soft tissue attenuation in the subcutaneous fat centered right buttock inferomedially adjacent to the gluteal crease. There is associated ill-defined lower attenuation centrally. This could be phlegmonous change or early abscess. Of note the  contrast bolus is arterial and therefore not sensitive for  diagnosis of rim-enhancing fluid collection. There is no underlying bone destruction. Please correlate with physical examination of the scrotum for evidence of scrotal cellulitis.  2. Fecal impaction in the rectum.  3. Sigmoid diverticulosis.  4. Extensive vascular calcifications.  5. Chronic left acetabular fracture    Signer Name: Gisel Nova MD   Signed: 1/10/2021 8:37 PM   Workstation Name: [id number]    [facility]    CT Head Without Contrast [[id number]] Collected: 01/10/21 2024     Updated: 01/10/21 2026    Narrative:      HISTORY:   Altered mental status. Confusion and possible Covid.    TECHNIQUE:   CT head without contrast. Radiation dose reduction techniques included automated exposure control or exposure modulation based on body size. Radiation audit for number of CT and nuclear cardiology exams performed in the last year: 12.      COMPARISON:   Head CT from 1/2/2021.    FINDINGS:   There is motion limitation of current and prior study. There is moderate mucous retention cyst or polyp at the right maxillary antrum and a small mucous retention cyst or polyp at the left maxillary antrum. The mastoid air cells are clear. There is  mucosal thickening in the ethmoid air cells. There is near complete opacification of left sphenoid sinus with an air-fluid level consistent with a component of acute sinusitis. Redemonstrated is atrophy with extensive white matter low-attenuation that is  nonspecific but likely due to small vessel disease. There is chronic encephalomalacia left posterior frontal lobe. There is low-attenuation with punctate hyperdensity and local mass effect involving the right posterior temporal lobe to occipital lobe  most consistent with a late subacute to chronic infarct with hemorrhagic transformation. On comparison to the most recent study no new hemorrhage is seen and there is no change in mass effect. There is malacia of the right posterior thalamus and  adjacent  posterior limb of the right internal capsule as well as low attenuation into the right basal ganglia more posteriorly are consistent with evolution of infarct is a small lacune in the anterior left michael. There is no new mass affect obvious acute cortical  infarct.      Impression:        1. Allowing for motion on both prior and current study findings are most consistent with expected interval evolution of late subacute to chronic infarct involving the right posterior cerebral artery distribution with punctate areas of hemorrhagic  transformation. No interval new hemorrhage or increase in mass effect is appreciated.  2. Pre-existing extensive probable sequelae of small vessel disease.  3. Chronic malacic change left posterior frontal lobe #  4. there is new near complete opacification of the left sphenoid sinus with an air-fluid level consistent with a component of acute sinusitis. This is a significant change from prior.    Signer Name: Gisel Nova MD   Signed: 1/10/2021 8:24 PM   Workstation Name: [email or web address]    [facility]    XR Chest 1 View [[id number]] Collected: 01/10/21 1939     Updated: 01/10/21 1941    Narrative:      CR Chest 1 Vw    INDICATION:   Weakness dizziness confused.     COMPARISON:    Chest x-ray 12/4/2020.    FINDINGS:  Single portable AP view(s) of the chest.  There is globular cardiac silhouette enlargement which is increased from the prior study and there is mild increase in central vascular markings and interstitial markings. This likely mild congestive heart  failure. There is no pleural effusion or pneumothorax. There is no acute infiltrate.      Impression:      There is interval increase in cardiac silhouette size with mild vascular congestion and interstitial edema. Findings most suggestive of interval development of mild congestive failure. Clinical correlation and follow-up to resolution recommended.    Signer Name: Gisel Nova MD   Signed: 1/10/2021  7:39 PM   Workstation Name: [facility]    [facility]          Results for orders placed during the hospital encounter of 12/01/20   Adult Transthoracic Echo Complete W/ Cont if Necessary Per Protocol (With Agitated Saline)    Narrative · Left ventricular ejection fraction appears to be 61 - 65%. Left   ventricular systolic function is normal.  · Left atrial volume is moderately increased.  · Saline test results are negative.  · The right atrial cavity is mildly dilated.  · Moderate tricuspid valve regurgitation is present.  · Estimated right ventricular systolic pressure from tricuspid   regurgitation is moderately elevated (45-55 mmHg).          I have reviewed the medications:  Scheduled Meds:[START ON 1/12/2021] Pharmacy Consult, , Does not apply, Once  amLODIPine, 5 mg, Oral, Daily  vitamin C, 250 mg, Oral, Daily  aspirin, 81 mg, Oral, Daily  atorvastatin, 80 mg, Oral, Nightly  cholecalciferol, 2,000 Units, Oral, Daily  dexamethasone, 6 mg, Intravenous, Daily  docusate sodium, 200 mg, Oral, Daily  famotidine, 20 mg, Oral, BID  furosemide, 20 mg, Intravenous, Daily  insulin lispro, 0-7 Units, Subcutaneous, TID AC  lisinopril, 2.5 mg, Oral, Q24H  melatonin, 5 mg, Oral, Nightly  piperacillin-tazobactam, 3.375 g, Intravenous, Q8H  [START ON 1/12/2021] remdesivir, 100 mg, Intravenous, Q24H  sodium chloride, 10 mL, Intravenous, Q12H  vancomycin, 1,250 mg, Intravenous, Q24H  vitamin B-12, 1,000 mcg, Oral, Daily      Continuous Infusions:Pharmacy Consult - Remdesivir,   Pharmacy to dose vancomycin,       PRN Meds:.acetaminophen **OR** acetaminophen **OR** acetaminophen  •  dextrose  •  dextrose  •  glucagon (human recombinant)  •  ipratropium-albuterol  •  ondansetron **OR** ondansetron  •  Pharmacy Consult - Remdesivir  •  Pharmacy to dose vancomycin  •  senna-docusate sodium  •  sodium chloride  •  sodium chloride    Assessment/Plan   Assessment & Plan     Active Hospital Problems    Diagnosis   POA   • **Cellulitis of right buttock [L03.317]  Yes   • Dementia with behavioral disturbance (CMS/[facility]) [F03.91]  Unknown   • Hyperlipemia [E78.5]  Unknown   • Stenosis of left carotid artery [I65.22]  Unknown   • Anemia, chronic disease [D63.8]  Unknown   • COVID-19 virus detected [U07.1]  Unknown   • A-fib (CMS/[facility]) [I48.91]  Yes   • Hypertension [I10]  Yes   • Diabetes mellitus (CMS/[facility]) [E11.9]  Yes   • Acute CVA (cerebrovascular accident) (CMS/[facility]) [I63.9]  Yes      Resolved Hospital Problems   No resolved problems to display.        Brief Hospital Course to date:  Dirk Miles is a 80 y.o. 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 Patient also noted to have buttock wound which has been getting worse per NH, was noted to be erythematous with low grade fever.     COVID 19   Hypoxia   --confirmed covid 19+ (1/07 at NH and here 1/11)  --remdesivir started overnight D1/5, decadron D1/10  --imaging reviewed, abx as below for cellulitis but do not favor superimposed bacterial PNA  --wean oxygen as tolerated--currently on 2L  --trend covid progression labs- note D-dimer is elevated--- though patient has several reasons for this to be elevated in setting of cellulitis , covid etc- but PE does remain on ddx- would consider CTA if oxygenation worsened (though patient was previously on AC and this was stopped due to ICH after fall-- so unclear if he would be candidate for AC)  --type and screen for possible need convalescent plasma  --albuterol MDI, anti-tussive, anti-emetics as needed  --lovenox for DVT ppx  --continue airborne/contact precautions     Altered Mental Status with baseline dementia  --CT H chronic right PCA area infarct, complete opacification of left sphenoid sinus-representing acute sinusitis.  -On antibiotics for his buttock wound.    Buttock  Wound  Cellulitis  --likely pressure wound with possibility of superimposed cellulitis  --zosyn/vanc started in ED, continue for now, wound consult for AM-- have reviewed their recs with unstable POA 1x2cm pressure wound  --continue wound care with thera-honey/xeroform      Previous CVA  --continue home ASA    Pulm HTN  --continue lasix 20 twice weekly    DM2  --A1C 6.4 in 12/20-- SSI, monitor     Chronic constipation  ---fecal impaction noted on CT A&P-- continue aggressive bowel regimen    D/w wife Sarai via phone 1212pm    DVT Prophylaxis:  lovenox      Disposition: I expect the patient to be discharged pending improvement    CODE STATUS:   Code Status and Medical Interventions:   Ordered at: 01/10/21 7432     Limited Support to NOT Include:    Intubation     Code Status:    No CPR     Medical Interventions (Level of Support Prior to Arrest):    Limited     Comments:    dnr per living will and wife confirmed it Sarai [phone]       Shilpi Fernandez MD  01/11/21

## 2021-01-11 NOTE — PROGRESS NOTES
Pharmacy Consult-Vancomycin Dosing  Dirk Miles is a  80 y.o. male receiving vancomycin therapy.     Indication: SSTI  Consulting Provider: hospitalist  ID Consult:     Goal AUC: 400 - 600 mg/L*hr    Current Antimicrobial Therapy  Anti-Infectives (From admission, onward)      Ordered     Dose/Rate Route Frequency Start Stop    01/11/21 0258  remdesivir 100 mg in sodium chloride 0.9 % 250 mL IVPB (powder vial)     Ordering Provider: Sue Buckley MD    100 mg  over 60 Minutes Intravenous Every 24 Hours 01/12/21 0900 01/16/21 0859    01/10/21 2311  piperacillin-tazobactam (ZOSYN) 3.375 g in iso-osmotic dextrose 50 ml (premix)     Note to Pharmacy: Please dose per gfr   Ordering Provider: Sue Buckley MD    3.375 g  over 4 Hours Intravenous Every 8 Hours Scheduled 01/11/21 0600 01/12/21 2159    01/11/21 0258  remdesivir 200 mg in sodium chloride 0.9 % 250 mL IVPB (powder vial)     Ordering Provider: Sue Buckley MD    200 mg  over 60 Minutes Intravenous Every 24 Hours 01/11/21 0345 01/12/21 0344    01/10/21 2212  vancomycin 1500 mg/500 mL 0.9% NS IVPB ([facility])     Ordering Provider: Daniel Zamorano, PharmD    20 mg/kg × 74.4 kg  over 90 Minutes Intravenous Once 01/10/21 2214 01/11/21 0111    01/10/21 2211  Pharmacy to dose vancomycin     Ordering Provider: Sue Buckley MD     Does not apply Continuous PRN 01/10/21 2211 01/15/21 2210    01/10/21 2147  piperacillin-tazobactam (ZOSYN) 3.375 g in iso-osmotic dextrose 50 ml (premix)     Ordering Provider: Carli Calle PA    3.375 g  over 30 Minutes Intravenous Once 01/10/21 2149 01/10/21 2233            Allergies  Allergies as of 01/10/2021    (No Known Allergies)       Labs    Results from last 7 days   Lab Units 01/10/21  1921   BUN mg/dL 20   CREATININE mg/dL 1.14       Results from last 7 days   Lab Units 01/10/21  1921   WBC 10*3/mm3 11.56*       Evaluation of Dosing     Last Dose Received in the ED/Outside Facility: [id number] in ED  Is Patient on  "Dialysis or Renal Replacement:     Ht - 175.3 cm (69\")  Wt - 73.8 kg (162 lb 9.6 oz)    Estimated Creatinine Clearance: 53.9 mL/min (by C-G formula based on SCr of 1.14 mg/dL).    Intake & Output (last 3 days)         01/08 0701 - 01/09 0700 01/09 0701 - 01/10 0700 01/10 0701 - 01/11 0700    IV Piggyback   50    Total Intake(mL/kg)   50 (0.7)    Net   +50                   Microbiology and Radiology  Microbiology Results (last 10 days)       Procedure Component Value - Date/Time    Respiratory Panel PCR w/COVID-19(SARS-CoV-2) JOHN/MICHAEL/ROLANDO/PAD/COR/MAD/CHET In-House, NP Swab in UTM/VTM, 3-4 HR TAT - Swab, Nasopharynx [[id number]]  (Abnormal) Collected: 01/10/21 2235    Lab Status: Final result Specimen: Swab from Nasopharynx Updated: 01/10/21 2344     ADENOVIRUS, PCR Not Detected     Coronavirus 229E Not Detected     Coronavirus HKU1 Not Detected     Coronavirus NL63 Not Detected     Coronavirus OC43 Not Detected     COVID19 Detected     Human Metapneumovirus Not Detected     Human Rhinovirus/Enterovirus Not Detected     Influenza A PCR Not Detected     Influenza A H1 Not Detected     Influenza A H1 2009 PCR Not Detected     Influenza A H3 Not Detected     Influenza B PCR Not Detected     Parainfluenza Virus 1 Not Detected     Parainfluenza Virus 2 Not Detected     Parainfluenza Virus 3 Not Detected     Parainfluenza Virus 4 Not Detected     RSV, PCR Not Detected     Bordetella pertussis pcr Not Detected     Bordetella parapertussis PCR Not Detected     Chlamydophila pneumoniae PCR Not Detected     Mycoplasma pneumo by PCR Not Detected    Narrative:      Fact sheet for providers: https://docs.[employer]wp-content/uploads/UCN7917-5153-DO4.1-EUA-Provider-Fact-Sheet-3.pdf    Fact sheet for patients: https://docs.[employer]wp-content/uploads/GET9979-4765-AC4.1-EUA-Patient-Fact-Sheet-1.pdf    Test performed by PCR.            Reported Vancomycin Levels                         Insight[employer] AUC Calculation:    Current AUC: "   0 mg/L*hr    Predicted Steady State AUC :   517 mg/L*hr    Assessment/Plan: The patient was started on a bolus of 20mg/kg for a dose of 1500mg . Will initiate maintenance dose at 1250 mg IV every 24 hours.  Plan for trough as patient approaches steady state, prior to the 3rd dose.  Due to infection severity, will target an AUC of 400-600.      Pharmacy will continue to follow the patient’s culture results and clinical progress daily.    Daniel Zamorano, DevonD

## 2021-01-11 NOTE — PLAN OF CARE
Goal Outcome Evaluation:   Patient admitted early this morning and is currently resting with intermittent restlessness and attempts to pull at lines, telemetry, oxygen. Pt is alert to self only and is confused with periods of agitation but is mostly pleasant. Order for non violent restraints received and patient has tolerated well. Patient has had a slight fever which was treated with Tylenol  mg and was effective. Pt receiving Remdesivir along with IV abx and decadron. Pt currently on 2LNC while sleeping but tolerates room air well while awake. Attempted to take photo of wound on Right buttock for wound care upon admission, but patient was not agitated but will continue to attempt. Safety protocols in place. Will continue to monitor patient.

## 2021-01-11 NOTE — PROGRESS NOTES
Discharge Planning Assessment  [facility]     Patient Name: Dirk Miles  MRN: [id number]  Today's Date: 1/11/2021    Admit Date: 1/10/2021    Discharge Needs Assessment     Row Name 01/11/21 7052       Living Environment    Lives With  other (see comments);facility resident    Name(s) of Who Lives With Patient  Long term care at [facility]    Current Living Arrangements  residential facility    Provides Primary Care For  no one, unable/limited ability to care for self    Family Caregiver if Needed  spouse    Quality of Family Relationships  involved;helpful    Able to Return to Prior Arrangements  yes    Living Arrangement Comments  Return to [facility]       Resource/Environmental Concerns    Resource/Environmental Concerns  none       Transition Planning    Patient/Family Anticipates Transition to  long-term care facility    Transportation Anticipated  health plan transportation       Discharge Needs Assessment    Readmission Within the Last 30 Days  no previous admission in last 30 days    Current Outpatient/Agency/Support Group  skilled nursing facility    Equipment Currently Used at Home  hospital bed;wheelchair    Equipment Needed After Discharge  none        Discharge Plan     Row Name 01/11/21 8272       Plan    Plan  Initial CM eval - Return to [facility]    Patient/Family in Agreement with Plan  yes    Plan Comments  Talked with patient's wife over the phone - She confirms the patient is a long term care resident at [facility]. His  there is Melva Coe [phone] and he was on the [address] [facility] [phone] Ext [phone]. CM will contact [facility] regarding his return. Remdesivir continues at this time. CM will need to arrange EMS upon discharge for transport - Following- jeanine [phone]    Final Discharge Disposition Code  04 - intermediate care facility        Continued Care and Services - Admitted Since 1/10/2021    Coordination has not been started for this encounter.      Selected Continued Care - Prior Encounters Includes selections from prior encounters from 10/12/2020 to 1/11/2021    Discharged on 12/9/2020 Admission date: 12/1/2020 - Discharge disposition: Rehab Facility or Unit (DC - External)    Destination     Service Provider Selected Services Address Phone Fax Patient Preferred    [facility]  Inpatient Rehabilitation [address] [id number] [phone] --                      Demographic Summary     Row Name 01/11/21 1353       General Information    Admission Type  inpatient    Arrived From  [facility]    Referral Source  admission list    Reason for Consult  discharge planning    Preferred Language  English     Used During This Interaction  no       Contact Information    Permission Granted to Share Info With          Functional Status     Row Name 01/11/21 1353       Functional Status    Usual Activity Tolerance  poor    Current Activity Tolerance  poor       Functional Status, IADL    Medications  assistive equipment and person    Meal Preparation  assistive equipment and person    Housekeeping  assistive equipment and person    Laundry  assistive equipment and person    Shopping  assistive equipment and person        Psychosocial    No documentation.       Abuse/Neglect    No documentation.       Legal    No documentation.       Substance Abuse    No documentation.       Patient Forms    No documentation.           Lorri Moss RN

## 2021-01-11 NOTE — NURSING NOTE
wocn consulted for right buttock PI     Right buttock - unstageable POA about 1.5cm x 2cm x 0.1cm black eschar with yellow slough to 80% of wound bed with well defined edges. Recommend thera-honey to wound bed, xeroform and silicone foam dressing changed daily or prn per soiling.     Skin care and wound prevention orders placed. Primary nurse notified of plan of care.     wocn will continue to follow; call with any questions or concerns.

## 2021-01-11 NOTE — ED PROVIDER NOTES
Subjective   Pt is a 81 yo male presenting to ED with complaints of AMS, positive Covid and buttock wound. PMHx significant for CVA (12-1-20), HTN, HLD, DM (insulin) and Afib (no anticoagulation). Pt sent to ED by [facility] Loera for confusion, positive Covid and right buttocks wound/abscess. Pt a poor historian. Noted recent CVA with left sided deficits. He denies any complaints in ED. Report from nursing home patient tested positive for Covid on 1-7-2021. No reports of recent fever, difficulty breathing, vomiting, diarrhea or complaints of pain. Pt started on Augmentin for right buttocks wound / pressure sore on 1-8-2021.       History provided by:  Medical records, patient and nursing home      Review of Systems   Unable to perform ROS: Mental status change   Constitutional: Negative for fever.   Respiratory: Negative for cough and shortness of breath.    Gastrointestinal: Negative for abdominal pain, diarrhea and vomiting.   Skin: Positive for wound (right buttocks).   Neurological: Positive for weakness (Left side from previous CVA).   Psychiatric/Behavioral: Positive for confusion.       Past Medical History:   Diagnosis Date   • A-fib (CMS/[facility])    • Arthritis    • B12 deficiency    • Coronary artery disease    • COVID-19    • Diabetes mellitus (CMS/[facility])    • DNR (do not resuscitate)    • Dysphagia    • Falls    • GERD (gastroesophageal reflux disease)    • Hemiplegia (CMS/HCC)    • Hyperlipidemia    • Hypertension    • Insomnia    • Pressure sore on buttocks    • Stroke (CMS/[facility])        No Known Allergies    Past Surgical History:   Procedure Laterality Date   • CATARACT EXTRACTION, BILATERAL     • HERNIA REPAIR         History reviewed. No pertinent family history.    Social History     Socioeconomic History   • Marital status:      Spouse name: Not on file   • Number of children: Not on file   • Years of education: Not on file   • Highest education level: Not on file   Tobacco Use   • Smoking status:  Former Smoker   • Smokeless tobacco: Never Used   • Tobacco comment: QUIT 25YRS AGO    Substance and Sexual Activity   • Alcohol use: Never     Frequency: Never   • Drug use: Never   • Sexual activity: Defer           Objective   Physical Exam  Vitals signs and nursing note reviewed.   Constitutional:       General: He is not in acute distress.  HENT:      Head: Atraumatic.      Mouth/Throat:      Mouth: Mucous membranes are moist.   Eyes:      Extraocular Movements: Extraocular movements intact.      Pupils: Pupils are equal, round, and reactive to light.   Neck:      Musculoskeletal: Neck supple.   Cardiovascular:      Rate and Rhythm: Normal rate. Rhythm irregular.   Pulmonary:      Effort: Pulmonary effort is normal. No respiratory distress.   Abdominal:      Palpations: Abdomen is soft.      Tenderness: There is no abdominal tenderness.   Musculoskeletal:         General: No swelling.   Skin:     General: Skin is warm.      Findings: Erythema (Wound / pressure sore right buttocks with mild surrounding cellulitis. TTP) present.   Neurological:      Mental Status: He is alert.      Motor: Weakness (Left UE and LE) present.   Psychiatric:      Comments: Attempts to answer questions. Repeats requests verbally. Follows some commands.          Procedures           ED Course  ED Course as of Michelet 10 2248   Sun Michelet 10, 2021   1900 SpO2: 91 % [RT]   1930 SpO2: 91 % [RT]   2121 Procalcitonin(!): 0.32 [RT]   2121 Lactate: 1.7 [RT]   2121 WBC(!): 11.56 [RT]   2247 Staples placed in posterior scalp 1-2-21. Removed 3 staples in ED without difficulty. Wound healing well.     [RT]      ED Course User Index  [RT] Carli Calle PA      Discussed patient with Dr. Urrutia who is agreeable with ED course and tx plan for admission. Zosyn initiated in ED for right buttocks cellulitis / pressure wound.     Discussed admission with hospitalist Dr. Sharif.    Reviewed old records.     Attempted to discuss results with patient tx plan  for admission.     Recent Results (from the past 24 hour(s))   Comprehensive Metabolic Panel    Collection Time: 01/10/21  7:21 PM    Specimen: Blood   Result Value Ref Range    Glucose 160 (H) 65 - 99 mg/dL    BUN 20 8 - 23 mg/dL    Creatinine 1.14 0.76 - 1.27 mg/dL    Sodium 137 136 - 145 mmol/L    Potassium 4.4 3.5 - 5.2 mmol/L    Chloride 101 98 - 107 mmol/L    CO2 27.0 22.0 - 29.0 mmol/L    Calcium 8.9 8.6 - 10.5 mg/dL    Total Protein 6.7 6.0 - 8.5 g/dL    Albumin 3.30 (L) 3.50 - 5.20 g/dL    ALT (SGPT) 15 1 - 41 U/L    AST (SGOT) 19 1 - 40 U/L    Alkaline Phosphatase 68 39 - 117 U/L    Total Bilirubin 0.4 0.0 - 1.2 mg/dL    eGFR Non African Amer 62 >60 mL/min/1.73    Globulin 3.4 gm/dL    A/G Ratio 1.0 g/dL    BUN/Creatinine Ratio 17.5 7.0 - 25.0    Anion Gap 9.0 5.0 - 15.0 mmol/L   Troponin    Collection Time: 01/10/21  7:21 PM    Specimen: Blood   Result Value Ref Range    Troponin T 0.020 0.000 - 0.030 ng/mL   Magnesium    Collection Time: 01/10/21  7:21 PM    Specimen: Blood   Result Value Ref Range    Magnesium 1.9 1.6 - 2.4 mg/dL   Light Blue Top    Collection Time: 01/10/21  7:21 PM   Result Value Ref Range    Extra Tube hold for add-on    Green Top (Gel)    Collection Time: 01/10/21  7:21 PM   Result Value Ref Range    Extra Tube Hold for add-ons.    Lavender Top    Collection Time: 01/10/21  7:21 PM   Result Value Ref Range    Extra Tube hold for add-on    Gold Top - SST    Collection Time: 01/10/21  7:21 PM   Result Value Ref Range    Extra Tube Hold for add-ons.    CBC Auto Differential    Collection Time: 01/10/21  7:21 PM    Specimen: Blood   Result Value Ref Range    WBC 11.56 (H) 3.40 - 10.80 10*3/mm3    RBC 3.01 (L) 4.14 - 5.80 10*6/mm3    Hemoglobin 9.4 (L) 13.0 - 17.7 g/dL    Hematocrit 30.6 (L) 37.5 - 51.0 %    .7 (H) 79.0 - 97.0 fL    MCH 31.2 26.6 - 33.0 pg    MCHC 30.7 (L) 31.5 - 35.7 g/dL    RDW 13.5 12.3 - 15.4 %    RDW-SD 50.1 37.0 - 54.0 fl    MPV 10.2 6.0 - 12.0 fL     Platelets 206 140 - 450 10*3/mm3    Neutrophil % 85.7 (H) 42.7 - 76.0 %    Lymphocyte % 3.6 (L) 19.6 - 45.3 %    Monocyte % 7.6 5.0 - 12.0 %    Eosinophil % 0.1 (L) 0.3 - 6.2 %    Basophil % 0.1 0.0 - 1.5 %    Immature Grans % 2.9 (H) 0.0 - 0.5 %    Neutrophils, Absolute 9.90 (H) 1.70 - 7.00 10*3/mm3    Lymphocytes, Absolute 0.42 (L) 0.70 - 3.10 10*3/mm3    Monocytes, Absolute 0.88 0.10 - 0.90 10*3/mm3    Eosinophils, Absolute 0.01 0.00 - 0.40 10*3/mm3    Basophils, Absolute 0.01 0.00 - 0.20 10*3/mm3    Immature Grans, Absolute 0.34 (H) 0.00 - 0.05 10*3/mm3    nRBC 0.0 0.0 - 0.2 /100 WBC   Procalcitonin    Collection Time: 01/10/21  7:21 PM    Specimen: Blood   Result Value Ref Range    Procalcitonin 0.32 (H) 0.00 - 0.25 ng/mL   Scan Slide    Collection Time: 01/10/21  7:21 PM    Specimen: Blood   Result Value Ref Range    RBC Morphology Normal Normal    WBC Morphology Normal Normal    Platelet Morphology Normal Normal   Lactic Acid, Plasma    Collection Time: 01/10/21  7:41 PM    Specimen: Blood   Result Value Ref Range    Lactate 1.7 0.5 - 2.0 mmol/L   Urinalysis With Culture If Indicated - Urine, Catheter In/Out    Collection Time: 01/10/21  8:52 PM    Specimen: Urine, Catheter In/Out   Result Value Ref Range    Color, UA Yellow Yellow, Straw    Appearance, UA Clear Clear    pH, UA 5.5 5.0 - 8.0    Specific Gravity, UA 1.061 (H) 1.001 - 1.030    Glucose, UA Negative Negative    Ketones, UA Trace (A) Negative    Bilirubin, UA Negative Negative    Blood, UA Negative Negative    Protein, UA 30 mg/dL (1+) (A) Negative    Leuk Esterase, UA Negative Negative    Nitrite, UA Negative Negative    Urobilinogen, UA 1.0 E.U./dL 0.2 - 1.0 E.U./dL   Urinalysis, Microscopic Only - Urine, Catheter In/Out    Collection Time: 01/10/21  8:52 PM    Specimen: Urine, Catheter In/Out   Result Value Ref Range    RBC, UA 0-2 None Seen, 0-2 /HPF    WBC, UA 13-20 (A) None Seen, 0-2 /HPF    Bacteria, UA None Seen None Seen, Trace /HPF     Squamous Epithelial Cells, UA 13-20 (A) None Seen, 0-2 /HPF    Hyaline Casts, UA 7-12 0 - 6 /LPF    Methodology Manual Light Microscopy      Note: In addition to lab results from this visit, the labs listed above may include labs taken at another facility or during a different encounter within the last 24 hours. Please correlate lab times with ED admission and discharge times for further clarification of the services performed during this visit.    CT Pelvis With Contrast   Final Result      1. There is at minimum cellulitic change in the right buttock extending to the right side of the perineum and possibly the posterior inferior aspect of the scrotum with skin thickening. I do not see evidence for soft tissue gas. There is more focal   abnormal soft tissue attenuation in the subcutaneous fat centered right buttock inferomedially adjacent to the gluteal crease. There is associated ill-defined lower attenuation centrally. This could be phlegmonous change or early abscess. Of note the   contrast bolus is arterial and therefore not sensitive for diagnosis of rim-enhancing fluid collection. There is no underlying bone destruction. Please correlate with physical examination of the scrotum for evidence of scrotal cellulitis.   2. Fecal impaction in the rectum.   3. Sigmoid diverticulosis.   4. Extensive vascular calcifications.   5. Chronic left acetabular fracture      Signer Name: Gisel Nova MD    Signed: 1/10/2021 8:37 PM    Workstation Name: [email or web address]     [facility]      CT Head Without Contrast   Final Result      1. Allowing for motion on both prior and current study findings are most consistent with expected interval evolution of late subacute to chronic infarct involving the right posterior cerebral artery distribution with punctate areas of hemorrhagic   transformation. No interval new hemorrhage or increase in mass effect is appreciated.   2. Pre-existing extensive probable sequelae  of small vessel disease.   3. Chronic malacic change left posterior frontal lobe #   4. there is new near complete opacification of the left sphenoid sinus with an air-fluid level consistent with a component of acute sinusitis. This is a significant change from prior.      Signer Name: Gisel Nova MD    Signed: 1/10/2021 8:24 PM    Workstation Name: [email or web address]     [facility]      XR Chest 1 View   Final Result   There is interval increase in cardiac silhouette size with mild vascular congestion and interstitial edema. Findings most suggestive of interval development of mild congestive failure. Clinical correlation and follow-up to resolution recommended.      Signer Name: Gisel Nova MD    Signed: 1/10/2021 7:39 PM    [email or web address]     [facility]        Vitals:    01/10/21 2053 01/10/21 2100 01/10/21 2130 01/10/21 2230   BP: 122/66 119/65 121/64    Patient Position:       Pulse: 79 89 79 86   Resp:       Temp:       TempSrc:       SpO2: 96% 96% 97% 97%   Weight:       Height:         Medications   sodium chloride 0.9 % flush 10 mL (has no administration in time range)   Pharmacy to dose vancomycin (has no administration in time range)   vancomycin 1500 mg/500 mL 0.9% NS IVPB ([facility]) (has no administration in time range)   iopamidol (ISOVUE-300) 61 % injection 100 mL (100 mL Intravenous Given 1/10/21 2012)   piperacillin-tazobactam (ZOSYN) 3.375 g in iso-osmotic dextrose 50 ml (premix) (0 g Intravenous Stopped 1/10/21 2233)     ECG/EMG Results (last 24 hours)     Procedure Component Value Units Date/Time    ECG 12 Lead [[id number]] Collected: 01/10/21 1850     Updated: 01/10/21 1851        ECG 12 Lead               COVID-19 RISK SCREEN     1. Has the patient had close contact without PPE with a lab confirmed COVID-19 (+) person or a person under investigation (PUI) for COVID-19 infection?  -- Yes     2. Has the patient had respiratory symptoms,  worsened/new cough and/or SOA, unexplained fever, or sudden loss of smell and/or taste in the past 7 days? --  Yes    3. Does the patient have baseline higher exposure risk such as working in healthcare field, currently residing in healthcare facility, or ongoing hemodialysis?  --  Yes                                          MDM    Final diagnoses:   Cellulitis of right buttock   Altered mental status, unspecified altered mental status type   Lab test positive for detection of COVID-19 virus   Hypoxia   Acute sphenoidal sinusitis, recurrence not specified            Carli Calle PA  01/10/21 2205       Carli Calle PA  01/10/21 2244

## 2021-01-12 LAB
ALBUMIN SERPL-MCNC: 1.6 G/DL (ref 3.5–5.2)
ALP SERPL-CCNC: 38 U/L (ref 39–117)
ALT SERPL W P-5'-P-CCNC: 8 U/L (ref 1–41)
AST SERPL-CCNC: 11 U/L (ref 1–40)
BILIRUB CONJ SERPL-MCNC: <0.2 MG/DL (ref 0–0.3)
BILIRUB INDIRECT SERPL-MCNC: ABNORMAL MG/DL
BILIRUB SERPL-MCNC: 0.2 MG/DL (ref 0–1.2)
CREAT SERPL-MCNC: 0.56 MG/DL (ref 0.76–1.27)
GFR SERPL CREATININE-BSD FRML MDRD: 140 ML/MIN/1.73
GLUCOSE BLDC GLUCOMTR-MCNC: 167 MG/DL (ref 70–130)
GLUCOSE BLDC GLUCOMTR-MCNC: 181 MG/DL (ref 70–130)
GLUCOSE BLDC GLUCOMTR-MCNC: 190 MG/DL (ref 70–130)
GLUCOSE BLDC GLUCOMTR-MCNC: 209 MG/DL (ref 70–130)
GLUCOSE BLDC GLUCOMTR-MCNC: 254 MG/DL (ref 70–130)
PROT SERPL-MCNC: 4.1 G/DL (ref 6–8.5)

## 2021-01-12 PROCEDURE — 99233 SBSQ HOSP IP/OBS HIGH 50: CPT | Performed by: INTERNAL MEDICINE

## 2021-01-12 PROCEDURE — 25010000002 PIPERACILLIN SOD-TAZOBACTAM PER 1 G: Performed by: INTERNAL MEDICINE

## 2021-01-12 PROCEDURE — 25010000002 DEXAMETHASONE PER 1 MG: Performed by: INTERNAL MEDICINE

## 2021-01-12 PROCEDURE — 25010000002 FUROSEMIDE PER 20 MG: Performed by: INTERNAL MEDICINE

## 2021-01-12 PROCEDURE — 97162 PT EVAL MOD COMPLEX 30 MIN: CPT

## 2021-01-12 PROCEDURE — 82565 ASSAY OF CREATININE: CPT | Performed by: INTERNAL MEDICINE

## 2021-01-12 PROCEDURE — 97530 THERAPEUTIC ACTIVITIES: CPT

## 2021-01-12 PROCEDURE — 97165 OT EVAL LOW COMPLEX 30 MIN: CPT

## 2021-01-12 PROCEDURE — 25010000002 ENOXAPARIN PER 10 MG: Performed by: INTERNAL MEDICINE

## 2021-01-12 PROCEDURE — 92610 EVALUATE SWALLOWING FUNCTION: CPT

## 2021-01-12 PROCEDURE — 82962 GLUCOSE BLOOD TEST: CPT

## 2021-01-12 PROCEDURE — 63710000001 INSULIN LISPRO (HUMAN) PER 5 UNITS: Performed by: INTERNAL MEDICINE

## 2021-01-12 PROCEDURE — XW033E5 INTRODUCTION OF REMDESIVIR ANTI-INFECTIVE INTO PERIPHERAL VEIN, PERCUTANEOUS APPROACH, NEW TECHNOLOGY GROUP 5: ICD-10-PCS | Performed by: HOSPITALIST

## 2021-01-12 PROCEDURE — 80076 HEPATIC FUNCTION PANEL: CPT | Performed by: INTERNAL MEDICINE

## 2021-01-12 RX ADMIN — Medication 2000 UNITS: at 09:58

## 2021-01-12 RX ADMIN — FAMOTIDINE 20 MG: 20 TABLET, FILM COATED ORAL at 09:57

## 2021-01-12 RX ADMIN — LISINOPRIL 2.5 MG: 2.5 TABLET ORAL at 09:59

## 2021-01-12 RX ADMIN — CYANOCOBALAMIN TAB 1000 MCG 1000 MCG: 1000 TAB at 09:57

## 2021-01-12 RX ADMIN — INSULIN LISPRO 2 UNITS: 100 INJECTION, SOLUTION INTRAVENOUS; SUBCUTANEOUS at 09:58

## 2021-01-12 RX ADMIN — FAMOTIDINE 20 MG: 20 TABLET, FILM COATED ORAL at 20:07

## 2021-01-12 RX ADMIN — TAZOBACTAM SODIUM AND PIPERACILLIN SODIUM 3.38 G: 375; 3 INJECTION, SOLUTION INTRAVENOUS at 14:16

## 2021-01-12 RX ADMIN — INSULIN LISPRO 2 UNITS: 100 INJECTION, SOLUTION INTRAVENOUS; SUBCUTANEOUS at 12:06

## 2021-01-12 RX ADMIN — SODIUM CHLORIDE, PRESERVATIVE FREE 10 ML: 5 INJECTION INTRAVENOUS at 20:08

## 2021-01-12 RX ADMIN — Medication 5 MG: at 20:07

## 2021-01-12 RX ADMIN — REMDESIVIR 100 MG: 100 INJECTION, POWDER, LYOPHILIZED, FOR SOLUTION INTRAVENOUS at 10:00

## 2021-01-12 RX ADMIN — TAZOBACTAM SODIUM AND PIPERACILLIN SODIUM 3.38 G: 375; 3 INJECTION, SOLUTION INTRAVENOUS at 06:08

## 2021-01-12 RX ADMIN — INSULIN LISPRO 3 UNITS: 100 INJECTION, SOLUTION INTRAVENOUS; SUBCUTANEOUS at 16:46

## 2021-01-12 RX ADMIN — DEXAMETHASONE SODIUM PHOSPHATE 6 MG: 4 INJECTION, SOLUTION INTRA-ARTICULAR; INTRALESIONAL; INTRAMUSCULAR; INTRAVENOUS; SOFT TISSUE at 09:57

## 2021-01-12 RX ADMIN — DOCUSATE SODIUM 200 MG: 100 CAPSULE ORAL at 09:57

## 2021-01-12 RX ADMIN — ENOXAPARIN SODIUM 40 MG: 40 INJECTION SUBCUTANEOUS at 09:57

## 2021-01-12 RX ADMIN — ATORVASTATIN CALCIUM 80 MG: 40 TABLET, FILM COATED ORAL at 20:07

## 2021-01-12 RX ADMIN — FUROSEMIDE 20 MG: 10 INJECTION, SOLUTION INTRAMUSCULAR; INTRAVENOUS at 09:57

## 2021-01-12 RX ADMIN — ASPIRIN 81 MG CHEWABLE TABLET 81 MG: 81 TABLET CHEWABLE at 09:57

## 2021-01-12 RX ADMIN — AMLODIPINE BESYLATE 5 MG: 5 TABLET ORAL at 09:58

## 2021-01-12 RX ADMIN — OXYCODONE HYDROCHLORIDE AND ACETAMINOPHEN 250 MG: 500 TABLET ORAL at 09:58

## 2021-01-12 RX ADMIN — TAZOBACTAM SODIUM AND PIPERACILLIN SODIUM 3.38 G: 375; 3 INJECTION, SOLUTION INTRAVENOUS at 20:08

## 2021-01-12 NOTE — PROGRESS NOTES
[facility] Medicine Services  PROGRESS NOTE    Patient Name: Dirk Miles  : 1940  MRN: [id number]    Date of Admission: 1/10/2021  Primary Care Physician: Provider, No Known    Subjective   Subjective     CC:  covid 19  AMS  Buttock cellilits    HPI:  Confused but resting pleasantly this morning.  Has had issues with agitation/combativeness with nursing. D/w patient's spouse via phone yesterday. Has required restraints intermittently     ROS:  uto     Objective   Objective     Vital Signs:   Temp:  [97.8 °F (36.6 °C)-98.3 °F (36.8 °C)] 97.8 °F (36.6 °C)  Heart Rate:  [] 98  Resp:  [18-22] 18  BP: (118-143)/() 143/100        Physical Exam:  With patient's consent, physical exam was conducted via visual telemedicine encounter due to patient's current isolation requirements in the interest of PPE conservation.    Constitutional: frail, elderly, resting in bed  HENT: NCAT, MMM, no conjunctival injection  Respiratory: Good effort, nonlabored respirations , on RA  Cardiovascular:  tele with NSR  Musculoskeletal: No edema, normal muscle tone and mass for age  Psychiatric: uto  Neurologic: uto  Skin: No visible rashes, no jaundice seen on exposed skin through window        Results Reviewed:  Results from last 7 days   Lab Units 21  0710 01/10/21  192   WBC 10*3/mm3 10.97* 11.56*   HEMOGLOBIN g/dL 8.5* 9.4*   HEMATOCRIT % 27.3* 30.6*   PLATELETS 10*3/mm3 186 206   PROCALCITONIN ng/mL  --  0.32*     Results from last 7 days   Lab Units 21  1024 21  0710 01/10/21  1921   SODIUM mmol/L  --  141 137   POTASSIUM mmol/L  --  4.4 4.4   CHLORIDE mmol/L  --  102 101   CO2 mmol/L  --  28.0 27.0   BUN mg/dL  --  19 20   CREATININE mg/dL  --  1.27 1.14   GLUCOSE mg/dL  --  150* 160*   CALCIUM mg/dL  --  8.9 8.9   ALT (SGPT) U/L  --  15 15   AST (SGOT) U/L  --  18 19   TROPONIN T ng/mL 0.014  --  0.020   PROBNP pg/mL  --   --  11,359.0*     Estimated Creatinine  Clearance: 48.4 mL/min (by C-G formula based on SCr of 1.27 mg/dL).    Microbiology Results Abnormal     Procedure Component Value - Date/Time    Blood Culture - Blood, Arm, Right [[id number]] Collected: 01/10/21 1945    Lab Status: Preliminary result Specimen: Blood from Arm, Right Updated: 01/11/21 2001     Blood Culture No growth at 24 hours    Blood Culture - Blood, Arm, Left [[id number]] Collected: 01/10/21 1930    Lab Status: Preliminary result Specimen: Blood from Arm, Left Updated: 01/11/21 2001     Blood Culture No growth at 24 hours    Urine Culture - Urine, Urine, Catheter In/Out [[id number]]  (Normal) Collected: 01/10/21 2052    Lab Status: Final result Specimen: Urine, Catheter In/Out Updated: 01/11/21 1809     Urine Culture No growth    Respiratory Panel PCR w/COVID-19(SARS-CoV-2) JOHN/MICHAEL/ROLANDO/PAD/COR/MAD/CHET In-House, NP Swab in UTM/VTM, 3-4 HR TAT - Swab, Nasopharynx [[id number]]  (Abnormal) Collected: 01/10/21 2235    Lab Status: Final result Specimen: Swab from Nasopharynx Updated: 01/10/21 2344     ADENOVIRUS, PCR Not Detected     Coronavirus 229E Not Detected     Coronavirus HKU1 Not Detected     Coronavirus NL63 Not Detected     Coronavirus OC43 Not Detected     COVID19 Detected     Human Metapneumovirus Not Detected     Human Rhinovirus/Enterovirus Not Detected     Influenza A PCR Not Detected     Influenza A H1 Not Detected     Influenza A H1 2009 PCR Not Detected     Influenza A H3 Not Detected     Influenza B PCR Not Detected     Parainfluenza Virus 1 Not Detected     Parainfluenza Virus 2 Not Detected     Parainfluenza Virus 3 Not Detected     Parainfluenza Virus 4 Not Detected     RSV, PCR Not Detected     Bordetella pertussis pcr Not Detected     Bordetella parapertussis PCR Not Detected     Chlamydophila pneumoniae PCR Not Detected     Mycoplasma pneumo by PCR Not Detected    Narrative:      Fact sheet for providers:  https://docs.[employer]/wp-content/uploads/OFQ7328-6951-EU6.1-EUA-Provider-Fact-Sheet-3.pdf    Fact sheet for patients: https://docs.[employer]/wp-content/uploads/WUW5487-0552-FF2.1-EUA-Patient-Fact-Sheet-1.pdf    Test performed by PCR.          Imaging Results (Last 24 Hours)     ** No results found for the last 24 hours. **          Results for orders placed during the hospital encounter of 12/01/20   Adult Transthoracic Echo Complete W/ Cont if Necessary Per Protocol (With Agitated Saline)    Narrative · Left ventricular ejection fraction appears to be 61 - 65%. Left   ventricular systolic function is normal.  · Left atrial volume is moderately increased.  · Saline test results are negative.  · The right atrial cavity is mildly dilated.  · Moderate tricuspid valve regurgitation is present.  · Estimated right ventricular systolic pressure from tricuspid   regurgitation is moderately elevated (45-55 mmHg).          I have reviewed the medications:  Scheduled Meds:Pharmacy Consult, , Does not apply, Once  amLODIPine, 5 mg, Oral, Daily  vitamin C, 250 mg, Oral, Daily  aspirin, 81 mg, Oral, Daily  atorvastatin, 80 mg, Oral, Nightly  cholecalciferol, 2,000 Units, Oral, Daily  dexamethasone, 6 mg, Intravenous, Daily With Breakfast    Or  dexamethasone, 6 mg, Oral, Daily With Breakfast  docusate sodium, 200 mg, Oral, Daily  enoxaparin, 40 mg, Subcutaneous, Daily  famotidine, 20 mg, Oral, BID  furosemide, 20 mg, Intravenous, Daily  insulin lispro, 0-7 Units, Subcutaneous, TID AC  lisinopril, 2.5 mg, Oral, Q24H  melatonin, 5 mg, Oral, Nightly  piperacillin-tazobactam, 3.375 g, Intravenous, Q8H  remdesivir, 100 mg, Intravenous, Q24H  sodium chloride, 10 mL, Intravenous, Q12H  vancomycin, 1,250 mg, Intravenous, Q24H  vitamin B-12, 1,000 mcg, Oral, Daily      Continuous Infusions:Pharmacy Consult - Remdesivir,   Pharmacy to dose vancomycin,       PRN Meds:.acetaminophen **OR** acetaminophen **OR** acetaminophen  •   dextrose  •  dextrose  •  glucagon (human recombinant)  •  ipratropium-albuterol  •  ondansetron **OR** ondansetron  •  Pharmacy Consult - Remdesivir  •  Pharmacy to dose vancomycin  •  senna-docusate sodium  •  sodium chloride  •  sodium chloride    Assessment/Plan   Assessment & Plan     Active Hospital Problems    Diagnosis  POA   • **Cellulitis of right buttock [L03.317]  Yes   • Dementia with behavioral disturbance (CMS/[facility]) [F03.91]  Unknown   • Hyperlipemia [E78.5]  Unknown   • Stenosis of left carotid artery [I65.22]  Unknown   • Anemia, chronic disease [D63.8]  Unknown   • COVID-19 virus detected [U07.1]  Unknown   • A-fib (CMS/[facility]) [I48.91]  Yes   • Hypertension [I10]  Yes   • Diabetes mellitus (CMS/[facility]) [E11.9]  Yes   • Acute CVA (cerebrovascular accident) (CMS/[facility]) [I63.9]  Yes      Resolved Hospital Problems   No resolved problems to display.        Brief Hospital Course to date:  Dirk Miles is a 80 y.o. male with history of recent PCA CVA (December 2020), dementia, anemia, pulmonary HTN, pAF who presents from [facility] with known +covid 19 test (1/7/21) and altered mental status. Patient also noted to have buttock wound which has been getting worse per NH, was noted to be erythematous with low grade fever.     COVID 19   Hypoxia   --confirmed covid 19+ (1/07 at NH and here 1/11)- appropriate for removal from precautions/quaruntine 1/27/21  --remdesivir started overnight D2/5, decadron D2/10  --imaging reviewed, abx as below for cellulitis but do not favor superimposed bacterial PNA  --wean oxygen as tolerated--currently weaned to 2L (has been weaned to RA this morning)  --trend covid progression labs- note D-dimer is elevated--- though patient has several reasons for this to be elevated in setting of cellulitis , covid etc- but PE does remain on ddx- would consider CTA if oxygenation worsened (though patient was previously on AC and this was stopped due to ICH after fall-- so unclear if he  would be candidate for AC)  --type and screen for possible need convalescent plasma  --albuterol MDI, anti-tussive, anti-emetics as needed  --lovenox for DVT ppx  --continue airborne/contact precautions     Altered Mental Status with baseline dementia  --CT H chronic right PCA area infarct, complete opacification of left sphenoid sinus-representing acute sinusitis.  -On antibiotics for his buttock wound.    Buttock Wound  Cellulitis  --likely pressure wound with possibility of superimposed cellulitis  --zosyn/vanc started in ED, d/c vanc today and continue zosyn, wound consult for AM-- have reviewed their recs with unstable POA 1x2cm pressure wound  --continue wound care with thera-honey/xeroform    Pyuria  --UA with 13-20 WBC but also significant squam- on abx as above- cx no growth    Previous CVA  --continue home ASA    Pulm HTN  --continue lasix 20 twice weekly    DM2  --A1C 6.4 in 12/20-- SSI, monitor     Chronic constipation  ---fecal impaction noted on CT A&P-- continue aggressive bowel regimen    D/w wife Sarai via phone 1059am    DVT Prophylaxis:  lovenox      Disposition: I expect the patient to be discharged pending improvement    CODE STATUS:   Code Status and Medical Interventions:   Ordered at: 01/10/21 1385     Limited Support to NOT Include:    Intubation     Code Status:    No CPR     Medical Interventions (Level of Support Prior to Arrest):    Limited     Comments:    dnr per living will and wife confirmed it Sarai [phone]       Shilpi Fernandez MD  01/12/21

## 2021-01-12 NOTE — THERAPY EVALUATION
Patient Name: Dirk Miles  : 1940    MRN: [id number]                              Today's Date: 2021       Admit Date: 1/10/2021    Visit Dx:     ICD-10-CM ICD-9-CM   1. Cellulitis of right buttock  L03.317 682.5   2. Altered mental status, unspecified altered mental status type  R41.82 780.97   3. Lab test positive for detection of COVID-19 virus  U07.1 079.89   4. Hypoxia  R09.02 799.02   5. Acute sphenoidal sinusitis, recurrence not specified  J01.30 461.3     Patient Active Problem List   Diagnosis   • Acute CVA (cerebrovascular accident) (CMS/HCC)   • A-fib (CMS/[facility], located [facility])   • Hypertension   • Diabetes mellitus (CMS/HCC)   • Cellulitis of right buttock   • Dementia with behavioral disturbance (CMS/[facility], located [facility])   • Hyperlipemia   • Stenosis of left carotid artery   • Anemia, chronic disease   • COVID-19 virus detected     Past Medical History:   Diagnosis Date   • A-fib (CMS/[facility] located [facility])    • Arthritis    • B12 deficiency    • Coronary artery disease    • COVID-19    • Diabetes mellitus (CMS/[facility] located [facility])    • DNR (do not resuscitate)    • Dysphagia    • Falls    • GERD (gastroesophageal reflux disease)    • Hemiplegia (CMS/[facility] located [facility])    • Hyperlipidemia    • Hypertension    • Insomnia    • Pressure sore on buttocks    • Stroke (CMS/HCC)      Past Surgical History:   Procedure Laterality Date   • CATARACT EXTRACTION, BILATERAL     • HERNIA REPAIR       General Information     Row Name 21 1324          Physical Therapy Time and Intention    Document Type  evaluation  -     Mode of Treatment  physical therapy  -     Row Name 21 1324          General Information    Patient Profile Reviewed  yes  -LS     Prior Level of Function  -- Pt poor historian due to cognition; TBA further. Recent CVA, [facility], then LTC placement.  -LS     Existing Precautions/Restrictions  fall;other (see comments) CVA 2020 with residual L weakness  -LS     Barriers to Rehab  medically complex;previous functional deficit;cognitive status  -LS      Row Name 01/12/21 1324          Living Environment    Lives With  facility resident Benson Loera  -     Row Name 01/12/21 1324          Home Main Entrance    Number of Stairs, Main Entrance  none  -     Row Name 01/12/21 1324          Cognition    Orientation Status (Cognition)  oriented to;person;disoriented to;place;situation;time  -     Row Name 01/12/21 1324          Safety Issues, Functional Mobility    Impairments Affecting Function (Mobility)  balance;cognition;coordination;endurance/activity tolerance;motor control;muscle tone abnormal;postural/trunk control;range of motion (ROM);strength  -       User Key  (r) = Recorded By, (t) = Taken By, (c) = Cosigned By    Initials Name Provider Type    Bettina Nevarez PT Physical Therapist        Mobility     Row Name 01/12/21 1326          Bed Mobility    Rolling Left [place] (Bed Mobility)  maximum assist (25% patient effort);2 person assist;verbal cues  -LS     Rolling Right [place] (Bed Mobility)  maximum assist (25% patient effort);2 person assist;verbal cues  -LS     Supine-Sit [place] (Bed Mobility)  maximum assist (25% patient effort);2 person assist;verbal cues  -LS     Sit-Supine [place] (Bed Mobility)  maximum assist (25% patient effort);verbal cues  -LS     Assistive Device (Bed Mobility)  bed rails;draw sheet;head of bed elevated  -     Comment (Bed Mobility)  Max vc's and demonstration for sequencing.  -     Row Name 01/12/21 1326          Transfers    Comment (Transfers)  STS x2 from EOB with BUE support and blocking feet/ knees. L lateral lean.  -     Row Name 01/12/21 1326          Sit-Stand Transfer    Sit-Stand [place] (Transfers)  maximum assist (25% patient effort);verbal cues;2 person assist  -     Assistive Device (Sit-Stand Transfers)  other (see comments) BUE support  -     Row Name 01/12/21 1326          Gait/Stairs (Locomotion)    [place] Level (Gait)  maximum assist (25% patient effort);2  person assist  -LS     Distance in Feet (Gait)  2  -LS     Deviations/Abnormal Patterns (Gait)  weight shifting decreased  -LS     Comment (Gait/Stairs)  Able to take 4 small sidesteps at EOB towards HOB; required assist for weightshifting and advacement of LEs. Impulsivity to sit.  -LS       User Key  (r) = Recorded By, (t) = Taken By, (c) = Cosigned By    Initials Name Provider Type    LS Bettina Payton, PT Physical Therapist        Obj/Interventions     Row Name 01/12/21 1329          Range of Motion Comprehensive    General Range of Motion  bilateral lower extremity ROM WFL  -LS     Comment, General Range of Motion  RLE WFL; noted increased tone LLE but ROM grossly WFL  -LS     Row Name 01/12/21 1329          Strength Comprehensive (MMT)    General Manual Muscle Testing (MMT) Assessment  lower extremity strength deficits identified  -LS     Comment, General Manual Muscle Testing (MMT) Assessment  pt with difficulty following commands for formal MMT; LLE grossly 3+/5; RLE grossly 4/5  -LS     Row Name 01/12/21 1329          Balance    Balance Assessment  sitting static balance;standing static balance  -LS     Static Sitting Balance  moderate impairment;sitting, edge of bed  -LS     Static Standing Balance  moderate impairment;supported;standing  -LS     Balance Interventions  sitting;supported;static;trunk training exercise  -LS     Row Name 01/12/21 1329          Sensory Assessment (Somatosensory)    Sensory Assessment (Somatosensory)  unable/difficult to assess  -LS       User Key  (r) = Recorded By, (t) = Taken By, (c) = Cosigned By    Initials Name Provider Type     Bettina Payton, PT Physical Therapist        Goals/Plan     Row Name 01/12/21 1337          Bed Mobility Goal 1 (PT)    Activity/Assistive Device (Bed Mobility Goal 1, PT)  sit to supine/supine to sit  -LS     [place] Level/Cues Needed (Bed Mobility Goal 1, PT)  minimum assist (75% or more patient effort)  -LS     Time Frame (Bed Mobility  Goal 1, PT)  2 weeks  -LS     Progress/Outcomes (Bed Mobility Goal 1, PT)  goal ongoing  -LS     Row Name 01/12/21 0607          Transfer Goal 1 (PT)    Activity/Assistive Device (Transfer Goal 1, PT)  bed-to-chair/chair-to-bed  -LS     [place] Level/Cues Needed (Transfer Goal 1, PT)  minimum assist (75% or more patient effort)  -LS     Time Frame (Transfer Goal 1, PT)  2 weeks  -LS     Progress/Outcome (Transfer Goal 1, PT)  goal ongoing  -LS     Row Name 01/12/21 1337          Gait Training Goal 1 (PT)    Activity/Assistive Device (Gait Training Goal 1, PT)  gait (walking locomotion);assistive device use  -LS     [place] Level (Gait Training Goal 1, PT)  moderate assist (50-74% patient effort);2 person assist  -LS     Distance (Gait Training Goal 1, PT)  5  -LS     Time Frame (Gait Training Goal 1, PT)  2 weeks  -LS       User Key  (r) = Recorded By, (t) = Taken By, (c) = Cosigned By    Initials Name Provider Type    LS Bettina Payton, PT Physical Therapist        Clinical Impression     Row Name 01/12/21 4007          Pain Scale: FACES Pre/Post-Treatment    Pain: FACES Scale, Pretreatment  2-->hurts little bit  -LS     Posttreatment Pain Rating  2-->hurts little bit  -LS     Pain Location  other (see comments) buttocks wound  -     Row Name 01/12/21 9947          Plan of Care Review    Plan of Care Reviewed With  patient  -LS     Progress  no change  -LS     Outcome Summary  PT initial evaluation completed. Pt demonstrates decreased indep and safety re: functional mobility, warranting further skilled PT services to promote PLOF and safe d/c. Limited today by pleasant confusion and residual L-deficits (from hx of CVA), but very cooperative with bed mobility, STS, and sidesteps at EOB (max x2 A). Recommend return to LTC facility at d/c. Will plan to further clarify pt's PLOF in determination if HHPT (at [facility]) may be appropriate.  -     Row Name 01/12/21 0444          Therapy Assessment/Plan  (PT)    Patient/Family Therapy Goals Statement (PT)  return to PLOF  -LS     Rehab Potential (PT)  good, to achieve stated therapy goals  -LS     Criteria for Skilled Interventions Met (PT)  yes;skilled treatment is necessary  -LS     Row Name 01/12/21 1330          Vital Signs    Pre Systolic BP Rehab  136  -LS     Pre Treatment Diastolic BP  95  -LS     Pretreatment Heart Rate (beats/min)  93  -LS     Posttreatment Heart Rate (beats/min)  89  -LS     O2 Delivery Pre Treatment  room air  -LS     O2 Delivery Intra Treatment  room air  -LS     Post SpO2 (%)  96  -LS     O2 Delivery Post Treatment  room air  -LS     Pre Patient Position  Supine  -LS     Intra Patient Position  Standing  -LS     Post Patient Position  Supine  -LS     Row Name 01/12/21 1330          Positioning and Restraints    Pre-Treatment Position  in bed  -LS     Post Treatment Position  bed  -LS     In Bed  notified nsg;fowlers;patient within staff view;exit alarm on;encouraged to call for assist;legs elevated;heels elevated  -LS     Restraints  released:;reapplied:;notified nsg:;soft limb  -LS       User Key  (r) = Recorded By, (t) = Taken By, (c) = Cosigned By    Initials Name Provider Type    LS Bettina Payton, PT Physical Therapist        Outcome Measures     Row Name 01/12/21 1337          How much help from another person do you currently need...    Turning from your back to your side while in flat bed without using bedrails?  2  -LS     Moving from lying on back to sitting on the side of a flat bed without bedrails?  2  -LS     Moving to and from a bed to a chair (including a wheelchair)?  2  -LS     Standing up from a chair using your arms (e.g., wheelchair, bedside chair)?  2  -LS     Climbing 3-5 steps with a railing?  1  -LS     To walk in hospital room?  2  -LS     AM-PAC 6 Clicks Score (PT)  11  -LS     Row Name 01/12/21 1338          Functional Assessment    Outcome Measure Options  AM-PAC 6 Clicks Basic Mobility (PT)  -LS       User  Key  (r) = Recorded By, (t) = Taken By, (c) = Cosigned By    Initials Name Provider Type    LS Bettina Payton, PT Physical Therapist        Physical Therapy Education                 Title: PT OT SLP Therapies (In Progress)     Topic: Physical Therapy (In Progress)     Point: Mobility training (In Progress)     Learning Progress Summary           Patient Acceptance, E,D, NR by LS at 1/12/2021 1339                   Point: Home exercise program (Not Started)     Learner Progress:  Not documented in this visit.          Point: Body mechanics (In Progress)     Learning Progress Summary           Patient Acceptance, E,D, NR by LS at 1/12/2021 1339                   Point: Precautions (In Progress)     Learning Progress Summary           Patient Acceptance, E,D, NR by LS at 1/12/2021 1339                               User Key     Initials Effective Dates Name Provider Type Discipline     06/19/15 -  Bettina Payton PT Physical Therapist PT              PT Recommendation and Plan  Planned Therapy Interventions (PT): balance training, bed mobility training, gait training, home exercise program, patient/family education, strengthening, transfer training, stair training  Plan of Care Reviewed With: patient  Progress: no change  Outcome Summary: PT initial evaluation completed. Pt demonstrates decreased indep and safety re: functional mobility, warranting further skilled PT services to promote PLOF and safe d/c. Limited today by pleasant confusion and residual L-deficits (from hx of CVA), but very cooperative with bed mobility, STS, and sidesteps at EOB (max x2 A). Recommend return to LTC facility at d/c. Will plan to further clarify pt's PLOF in determination if HHPT (at [facility]) may be appropriate.     Time Calculation:   PT Charges     Row Name 01/12/21 9684             Time Calculation    Start Time  1112  -LS      PT Received On  01/12/21  -      PT Goal Re-Cert Due Date  01/22/21  -         Time  Calculation- PT    Total Timed Code Minutes- PT  9 minute(s)  -LS         Timed Charges    92157 - PT Therapeutic Activity Minutes  9  -LS        User Key  (r) = Recorded By, (t) = Taken By, (c) = Cosigned By    Initials Name Provider Type    Bettina Nevarez, PT Physical Therapist        Therapy Charges for Today     Code Description Service Date Service Provider Modifiers Qty    31794177674 HC PT EVAL MOD COMPLEXITY 3 1/12/2021 Bettina Payton, PT GP 1    [id number]  PT THERAPEUTIC ACT EA 15 MIN 1/12/2021 Bettina Payton, PT GP 1          PT G-Codes  Outcome Measure Options: AM-PAC 6 Clicks Basic Mobility (PT)  AM-PAC 6 Clicks Score (PT): 11  AM-PAC 6 Clicks Score (OT): 8    Bettina Payton, PT  1/12/2021

## 2021-01-12 NOTE — PROGRESS NOTES
"Continued Stay Note  [facility]     Patient Name: Dirk Miles  MRN: [id number]  Today's Date: 1/12/2021    Admit Date: 1/10/2021    Discharge Plan     Row Name 01/12/21 1222       Plan    Plan  Return to [facility]    Plan Comments  Spoke with Melva Coe -  at [facility] - She verified the patient has a long term bed there and can return when medically ready. Patient will need EMS transport at discharge and nurse will be able to call report to their \"House Charge\" # [phone].  will continue to update Melva/[facility]BA regarding anticipated return date - jeanine [phone]    Final Discharge Disposition Code  04 - intermediate care facility        Discharge Codes    No documentation.             Jeanine Moss RN    "

## 2021-01-12 NOTE — PLAN OF CARE
Goal Outcome Evaluation:  Plan of Care Reviewed With: patient  Progress: no change  Outcome Summary: OT evaluation completed.  Pt. demonstrating decrease cognition, balance, strength, ROM and endurance. Pt. oriented to self and birthday only.  Pt. with fair simple command following.  Pt. max of 2 supine to sit and sit to stand.  Mod A to brush hair.  Pt. appropriate for skilled therapy to address deficit areas and promote [place]onna level of indepenence ADL and transfers.

## 2021-01-12 NOTE — PLAN OF CARE
Goal Outcome Evaluation:  Plan of Care Reviewed With: patient  Progress: no change   SLP evaluation completed. Will address dysphagia during treatment. Please see note for further details and recommendations.

## 2021-01-12 NOTE — PLAN OF CARE
Goal Outcome Evaluation:  Plan of Care Reviewed With: patient  Progress: no change  Outcome Summary: pt continues to meet criteria for soft wrist restraints, remains confused, vss, no complaints at this time, continue to monitor

## 2021-01-12 NOTE — THERAPY EVALUATION
Acute Care - Speech Language Pathology   Swallow Initial Evaluation [facility]   Clinical Swallow Evaluation     Patient Name: Dirk Miles  : 1940  MRN: [id number]  Today's Date: 2021               Admit Date: 1/10/2021    Visit Dx:     ICD-10-CM ICD-9-CM   1. Cellulitis of right buttock  L03.317 682.5   2. Altered mental status, unspecified altered mental status type  R41.82 780.97   3. Lab test positive for detection of COVID-19 virus  U07.1 079.89   4. Hypoxia  R09.02 799.02   5. Acute sphenoidal sinusitis, recurrence not specified  J01.30 461.3   6. Dysphagia, unspecified type  R13.10 787.20     Patient Active Problem List   Diagnosis   • Acute CVA (cerebrovascular accident) (CMS/[facility])   • A-fib (CMS/[facility])   • Hypertension   • Diabetes mellitus (CMS/[facility])   • Cellulitis of right buttock   • Dementia with behavioral disturbance (CMS/[facility])   • Hyperlipemia   • Stenosis of left carotid artery   • Anemia, chronic disease   • COVID-19 virus detected     Past Medical History:   Diagnosis Date   • A-fib (CMS/[facility])    • Arthritis    • B12 deficiency    • Coronary artery disease    • COVID-19    • Diabetes mellitus (CMS/[facility])    • DNR (do not resuscitate)    • Dysphagia    • Falls    • GERD (gastroesophageal reflux disease)    • Hemiplegia (CMS/[facility])    • Hyperlipidemia    • Hypertension    • Insomnia    • Pressure sore on buttocks    • Stroke (CMS/[facility])      Past Surgical History:   Procedure Laterality Date   • CATARACT EXTRACTION, BILATERAL     • HERNIA REPAIR          SWALLOW EVALUATION (last 72 hours)      SLP Adult Swallow Evaluation     Row Name 21 1425                   Rehab Evaluation    Document Type  evaluation  -RD        Subjective Information  no complaints  -RD        Patient Observations  alert  -RD        Patient/Family/Caregiver Comments/Observations  No family present, pt in restraints, confused but talkative  -RD        Patient Effort  adequate  -RD           General Information     Patient Profile Reviewed  yes  -RD        Pertinent History Of Current Problem  Adm w/ R buttock cellulitis, hx of stroke, COVID-19, afib, dementia at baseline. Consult for swallowing. Was on nectar-thick and pureed diet prior to swallow evaluation. Pt is a resident @ a SNF in LTC at [facility].   -RD        Current Method of Nutrition  pureed;nectar/syrup-thick liquids  -RD        Precautions/Limitations, Vision  vision impairment, bilaterally  -RD        Precautions/Limitations, Hearing  WFL;for purposes of eval  -RD        Prior Level of Function-Communication  cognitive-linguistic impairment;other (see comments) dementia  -RD        Prior Level of Function-Swallowing  unknown  -RD        Plans/Goals Discussed with  patient  -RD        Barriers to Rehab  cognitive status;previous functional deficit  -RD        Patient's Goals for Discharge  patient did not state  -RD           Pain    Additional Documentation  Pain Scale: FACES Pre/Post-Treatment (Group)  -RD           Pain Scale: FACES Pre/Post-Treatment    Pain: FACES Scale, Pretreatment  0-->no hurt  -RD        Posttreatment Pain Rating  0-->no hurt  -RD           Oral Musculature and Cranial Nerve Assessment    Oral Motor General Assessment  generalized oral motor weakness;unable to assess;other (see comments) not following oral motor commands  -RD           General Eating/Swallowing Observations    Respiratory Support Currently in Use  room air  -RD        Eating/Swallowing Skills  fed by SLP  -RD        Positioning During Eating  upright in bed  -RD        Utensils Used  spoon;cup;straw  -RD        Consistencies Trialed  thin liquids;nectar/syrup-thick liquids;pureed;soft textures;regular textures  -RD           Clinical Swallow Eval    Oral Prep Phase  impaired  -RD        Oral Transit  impaired  -RD        Oral Residue  impaired  -RD        Pharyngeal Phase  no overt signs/symptoms of pharyngeal impairment  -RD        Clinical Swallow Evaluation  Summary  Consult for swallowing. Pt currently on pureed diet & nectar-thick liquids. Unsure of baseline w/ swallowing @ SNF, but admitted on this diet. No overt s/s of aspiration w/ any trial of thins, nectar, or pureed trials. Unsure of baseline w/ liquids. F/u to ensure diet tolerance and upgrade liquids if no further concerns noted.   -RD           Oral Prep Concerns    Oral Prep Concerns  prolonged mastication;increased prep time  -RD        Prolonged Mastication  regular consistencies;mechanical soft  -RD        Increased Prep Time  regular consistencies;mechanical soft  -RD        Oral Prep Concerns, Comment  Suspect dentition, cognition, and general oral motor weakness impacting. Unsure of baseline, but suspect pureed. Manipulation appears adequate for pureed.   -RD           Oral Transit Concerns    Oral Transit Concerns  increased oral transit time  -RD        Increased Oral Transit Time  mechanical soft;regular consistencies  -RD           Oral Residue Concerns    Oral Residue Concerns  diffuse residue throughout oral cavity  -RD        Diffuse Residue Throughout Oral Cavity  regular consistencies  -RD        Oral Residue Concerns, Comment  cleared w/ liquid wash  -RD           Clinical Impression    Barriers to Overall Progress (SLP)  cognition  -RD        SLP Swallowing Diagnosis  mild-moderate;oral dysphagia;R/O pharyngeal dysphagia  -RD        Functional Impact  risk of aspiration/pneumonia  -RD        Rehab Potential/Prognosis, Swallowing  good, to achieve stated therapy goals  -RD        Swallow Criteria for Skilled Therapeutic Interventions Met  demonstrates skilled criteria  -RD           Recommendations    Therapy Frequency (Swallow)  PRN  -RD        Predicted Duration Therapy Intervention (Days)  until discharge  -RD        SLP Diet Recommendation  puree;nectar thick liquids  -RD        Recommended Diagnostics  other (see comments) f/u for diet tolerance/upgrade  -RD        Recommended  Precautions and Strategies  upright posture during/after eating;small bites of food and sips of liquid;general aspiration precautions;reflux precautions;1:1 supervision  -RD        Oral Care Recommendations  Oral Care BID/PRN  -RD        SLP Rec. for Method of Medication Administration  meds crushed;with pudding or applesauce;as tolerated  -RD        Monitor for Signs of Aspiration  yes;notify SLP if any concerns  -RD        Anticipated Discharge Disposition (SLP)  skilled nursing facility;anticipate therapy at next level of care  -RD          User Key  (r) = Recorded By, (t) = Taken By, (c) = Cosigned By    Initials Name Effective Dates    RD Carli Smith, MS CCC-SLP 04/03/18 -           EDUCATION  The patient has been educated in the following areas:   Dysphagia (Swallowing Impairment) Oral Care/Hydration Modified Diet Instruction.    SLP Recommendation and Plan  SLP Swallowing Diagnosis: mild-moderate, oral dysphagia, R/O pharyngeal dysphagia  SLP Diet Recommendation: puree, nectar thick liquids  Recommended Precautions and Strategies: upright posture during/after eating, small bites of food and sips of liquid, general aspiration precautions, reflux precautions, 1:1 supervision  SLP Rec. for Method of Medication Administration: meds crushed, with pudding or applesauce, as tolerated     Monitor for Signs of Aspiration: yes, notify SLP if any concerns  Recommended Diagnostics: other (see comments)(f/u for diet tolerance/upgrade)  Swallow Criteria for Skilled Therapeutic Interventions Met: demonstrates skilled criteria  Anticipated Discharge Disposition (SLP): skilled nursing facility, anticipate therapy at next level of care  Rehab Potential/Prognosis, Swallowing: good, to achieve stated therapy goals  Therapy Frequency (Swallow): PRN  Predicted Duration Therapy Intervention (Days): until discharge                              SLP GOALS     Row Name 01/12/21 0400             Oral Nutrition/Hydration Goal 1  (SLP)    Oral Nutrition/Hydration Goal 1, SLP  LTG: Pt will tolerate PO diet w/o immediate s/s of aspiration or complications w/ 100% accuracy w/o cues.   -RD      Time Frame (Oral Nutrition/Hydration Goal 1, SLP)  by discharge  -RD         Oral Nutrition/Hydration Goal 2 (SLP)    Oral Nutrition/Hydration Goal 2, SLP  Pt will tolerate nectar-thick and pureed diet w/ no overt s/s of aspiration w/ 100% accuracy w/o cues  -RD      Time Frame (Oral Nutrition/Hydration Goal 2, SLP)  short term goal (STG)  -RD         Oral Nutrition/Hydration Goal (SLP)    Oral Nutrition/Hydration Goal, SLP  Pt will tolerate therapeutic trials of thin liquids w/ no overt s/s of aspiration to indicate appropriateness for liquid upgrade w/ 100% accuracy w/o cues  -RD      Time Frame (Oral Nutrition/Hydration Goal, SLP)  short term goal (STG)  -RD        User Key  (r) = Recorded By, (t) = Taken By, (c) = Cosigned By    Initials Name Provider Type    Carli Judd MS CCC-SLP Speech and Language Pathologist             Time Calculation:   Time Calculation- SLP     Row Name 01/12/21 1546             Time Calculation- SLP    SLP Start Time  1425  -RD      SLP Received On  01/12/21  -        User Key  (r) = Recorded By, (t) = Taken By, (c) = Cosigned By    Initials Name Provider Type    Carli Judd MS CCC-SLP Speech and Language Pathologist          Therapy Charges for Today     Code Description Service Date Service Provider Modifiers Qty    [id number] HC ST EVAL ORAL PHARYNG SWALLOW 4 1/12/2021 Carli Smith MS CCC-SLP GN 1            Patient was not wearing a face mask and did not exhibit coughing during this therapy encounter.  Procedure performed was aerosolizing, involved close contact (within 6 feet for at least 15 minutes or longer), and did not involve contact with infectious secretions or specimens.  Therapist used appropriate personal protective equipment including gloves, standard procedure mask, eye  protection, gown and N95 mask.  Appropriate PPE was worn during the entire therapy session.  Hand hygiene was completed before and after therapy session.     Carli Smith MS CCC-SLP  1/12/2021

## 2021-01-12 NOTE — PLAN OF CARE
Goal Outcome Evaluation:  Plan of Care Reviewed With: patient  Progress: no change  Outcome Summary: PT initial evaluation completed. Pt demonstrates decreased indep and safety re: functional mobility, warranting further skilled PT services to promote PLOF and safe d/c. Limited today by pleasant confusion and residual L-deficits (from hx of CVA), but very cooperative with bed mobility, STS, and sidesteps at EOB (max x2 A). Recommend return to LTC facility at d/c. Will plan to further clarify pt's PLOF in determination if HHPT (at [facility]) may be appropriate.

## 2021-01-12 NOTE — THERAPY EVALUATION
Patient Name: Dirk Miles  : 1940    MRN: [id number]                              Today's Date: 2021       Admit Date: 1/10/2021    Visit Dx:     ICD-10-CM ICD-9-CM   1. Cellulitis of right buttock  L03.317 682.5   2. Altered mental status, unspecified altered mental status type  R41.82 780.97   3. Lab test positive for detection of COVID-19 virus  U07.1 079.89   4. Hypoxia  R09.02 799.02   5. Acute sphenoidal sinusitis, recurrence not specified  J01.30 461.3     Patient Active Problem List   Diagnosis   • Acute CVA (cerebrovascular accident) (CMS/HCC)   • A-fib (CMS/[facility])   • Hypertension   • Diabetes mellitus (CMS/HCC)   • Cellulitis of right buttock   • Dementia with behavioral disturbance (CMS/[facility])   • Hyperlipemia   • Stenosis of left carotid artery   • Anemia, chronic disease   • COVID-19 virus detected     Past Medical History:   Diagnosis Date   • A-fib (CMS/[facility])    • Arthritis    • B12 deficiency    • Coronary artery disease    • COVID-19    • Diabetes mellitus (CMS/HCC)    • DNR (do not resuscitate)    • Dysphagia    • Falls    • GERD (gastroesophageal reflux disease)    • Hemiplegia (CMS/[facility])    • Hyperlipidemia    • Hypertension    • Insomnia    • Pressure sore on buttocks    • Stroke (CMS/HCC)      Past Surgical History:   Procedure Laterality Date   • CATARACT EXTRACTION, BILATERAL     • HERNIA REPAIR       General Information     Row Name 21 1306          OT Time and Intention    Document Type  evaluation  -MURPHY     Mode of Treatment  occupational therapy  -MURPHY     Row Name 21 7433          General Information    Patient Profile Reviewed  yes  -MURPHY     Prior Level of Function  -- pt. unable to state. From LTC facility.  -MURPHY     Existing Precautions/Restrictions  fall  -MURPHY     Barriers to Rehab  previous functional deficit;cognitive status;physical barrier;contractures prior CVA impacting L side with limited mvmt and tone.  -MURPHY     Row Name 21 2575          Living  Environment    Lives With  facility resident LTC  -MURPHY     Row Name 01/12/21 1309          Home Main Entrance    Number of Stairs, Main Entrance  none  -MURPHY     Row Name 01/12/21 1309          Stairs Within Home, Primary    Number of Stairs, Within Home, Primary  none  -     Row Name 01/12/21 1309          Cognition    Orientation Status (Cognition)  oriented to;person;disoriented to;place;situation;time knew birthday  -MURPHY     Row Name 01/12/21 1309          Safety Issues, Functional Mobility    Safety Issues Affecting Function (Mobility)  ability to follow commands;at risk behavior observed;awareness of need for assistance;insight into deficits/self-awareness;friction/shear risk;judgment;problem-solving;safety precaution awareness;safety precautions follow-through/compliance;sequencing abilities  -     Impairments Affecting Function (Mobility)  balance;cognition;coordination;endurance/activity tolerance;motor control;muscle tone abnormal;postural/trunk control;range of motion (ROM);strength  -MURPHY     Cognitive Impairments, Mobility Safety/Performance  attention;awareness, need for assistance;insight into deficits/self-awareness;judgment;problem-solving/reasoning;safety precaution awareness;safety precaution follow-through;sequencing abilities  -     Row Name 01/12/21 1309          Relationship/Environment    Name(s) of Who Lives With Patient  LTC at [facility]  -MURPHY       User Key  (r) = Recorded By, (t) = Taken By, (c) = Cosigned By    Initials Name Provider Type    Juana Arzola OT Occupational Therapist          Mobility/ADL's     Row Name 01/12/21 1312          Bed Mobility    Bed Mobility  supine-sit;sit-supine;scooting/bridging;rolling right;rolling left  -MURPHY     Rolling Left [place] (Bed Mobility)  maximum assist (25% patient effort);2 person assist;verbal cues;nonverbal cues (demo/gesture)  -MURPHY     Rolling Right [place] (Bed Mobility)  maximum assist (25% patient effort);2 person  assist;verbal cues;nonverbal cues (demo/gesture)  -     Scooting/Bridging [place] (Bed Mobility)  2 person assist;verbal cues;nonverbal cues (demo/gesture);dependent (less than 25% patient effort)  -     Supine-Sit [place] (Bed Mobility)  maximum assist (25% patient effort);2 person assist;verbal cues;nonverbal cues (demo/gesture)  -     Sit-Supine [place] (Bed Mobility)  maximum assist (25% patient effort);verbal cues;nonverbal cues (demo/gesture)  -     Bed Mobility, Safety Issues  cognitive deficits limit understanding;decreased use of arms for pushing/pulling;decreased use of legs for bridging/pushing;impaired trunk control for bed mobility  -     Assistive Device (Bed Mobility)  bed rails;draw sheet;head of bed elevated  -     Comment (Bed Mobility)  pt. with limited use of LUE and LE, Pt. difficulty following cues to intiat mvmt.  -     Row Name 01/12/21 1312          Transfers    Transfers  sit-stand transfer  -     Comment (Transfers)  Pt. stood x 2 with knees blocked and UE support with gait belt.  Pt. with L lean and difficulty shifting weight for any stepping.  -     Sit-Stand [place] (Transfers)  maximum assist (25% patient effort);verbal cues;2 person assist;nonverbal cues (demo/gesture)  -     Row Name 01/12/21 1312          Sit-Stand Transfer    Assistive Device (Sit-Stand Transfers)  other (see comments) UE support and gait belt  -     Row Name 01/12/21 1312          Functional Mobility    Functional Mobility- Ind. Level  unable to perform  -     Functional Mobility- Safety Issues  sequencing ability decreased;weight-shifting ability decreased  -     Row Name 01/12/21 1312          Activities of Daily Living    BADL Assessment/Intervention  lower body dressing;grooming  -     Row Name 01/12/21 1312          Lower Body Dressing Assessment/Training    [place] Level (Lower Body Dressing)  don;socks;dependent (less than 25% patient effort)  -      Position (Lower Body Dressing)  supine  -MURPHY     Row Name 01/12/21 1312          Grooming Assessment/Training    [place] Level (Grooming)  hair care, combing/brushing;moderate assist (50% patient effort)  -     Assistive Devices (Grooming)  hand over hand  -     Position (Grooming)  sitting up in bed  -     Comment (Grooming)  hand over hand to intiate due to when cues to perform pt. just held in his right hand for extended time.  -       User Key  (r) = Recorded By, (t) = Taken By, (c) = Cosigned By    Initials Name Provider Type    Juana Arzola, OT Occupational Therapist        Obj/Interventions     Row Name 01/12/21 1318          Sensory Assessment (Somatosensory)    Sensory Assessment (Somatosensory)  unable/difficult to assess  -MURPHY     Row Name 01/12/21 1318          Vision Assessment/Intervention    Visual Impairment/Limitations  unable/difficult to assess  -MURPHY     Row Name 01/12/21 1318          Range of Motion Comprehensive    General Range of Motion  upper extremity range of motion deficits identified  -     Comment, General Range of Motion  RUE AROM WFL, LUE PROM % with mild to moderate tone noted.  -     Row Name 01/12/21 1318          Strength Comprehensive (MMT)    General Manual Muscle Testing (MMT) Assessment  upper extremity strength deficits identified  -     Comment, General Manual Muscle Testing (MMT) Assessment  RUE grossly 4+/5, LUE 0 to 1/5, see PT for LE  -MURPHY     Row Name 01/12/21 1318          Shoulder (Therapeutic Exercise)    Shoulder (Therapeutic Exercise)  PROM (passive range of motion)  -     Shoulder PROM (Therapeutic Exercise)  left;bilateral;flexion;extension;aBduction;aDduction;5 repetitions;10 repetitions;supine  -MURPHY     Row Name 01/12/21 1318          Elbow/Forearm (Therapeutic Exercise)    Elbow/Forearm (Therapeutic Exercise)  PROM (passive range of motion)  -     Elbow/Forearm PROM (Therapeutic Exercise)  left;flexion;extension;5 repetitions;10  repetitions;supine  -MURPHY     Row Name 01/12/21 1318          Wrist (Therapeutic Exercise)    Wrist (Therapeutic Exercise)  PROM (passive range of motion)  -     Wrist PROM (Therapeutic Exercise)  left;flexion;extension;5 repetitions;10 repetitions  -MURPHY     Row Name 01/12/21 1318          Hand (Therapeutic Exercise)    Hand (Therapeutic Exercise)  PROM (passive range of motion)  -     Hand PROM (Therapeutic Exercise)  left;finger flexion;finger extension;10 repetitions  -MURPHY     Row Name 01/12/21 1318          Balance    Balance Assessment  sitting static balance;standing static balance  -     Static Sitting Balance  moderate impairment;unsupported;sitting, edge of bed  -     Static Standing Balance  moderate impairment;supported;standing  -MURPHY     Row Name 01/12/21 1318          Therapeutic Exercise    Therapeutic Exercise  shoulder;elbow/forearm;wrist;hand  -       User Key  (r) = Recorded By, (t) = Taken By, (c) = Cosigned By    Initials Name Provider Type    Juana Arzola, OT Occupational Therapist        Goals/Plan    No documentation.       Clinical Impression     Row Name 01/12/21 1320          Pain Assessment    Additional Documentation  Pain Scale: FACES Pre/Post-Treatment (Group)  -MURPHY     Row Name 01/12/21 1320          Pain Scale: FACES Pre/Post-Treatment    Pain: FACES Scale, Pretreatment  2-->hurts little bit but up to 4-6 with mvmt  -     Posttreatment Pain Rating  2-->hurts little bit  -     Pain Location  -- buttocks  -MURPHY     Row Name 01/12/21 1320          Plan of Care Review    Plan of Care Reviewed With  patient  -MURPHY     Progress  no change  -     Outcome Summary  OT evaluation completed.  Pt. demonstrating decrease cognition, balance, strength, ROM and endurance. Pt. oriented to self and birthday only.  Pt. with fair simple command following.  Pt. max of 2 supine to sit and sit to stand.  Mod A to brush hair.  Pt. appropriate for skilled therapy to address deficit areas and  promote madonna level of indepenence ADL and transfers.  -MURPHY     Row Name 01/12/21 1320          Therapy Assessment/Plan (OT)    Patient/Family Therapy Goal Statement (OT)  unable to state  -MURPHY     Rehab Potential (OT)  good, to achieve stated therapy goals  -MURPHY     Criteria for Skilled Therapeutic Interventions Met (OT)  yes;meets criteria;skilled treatment is necessary  -MURPHY     Therapy Frequency (OT)  daily  -MURPHY     Row Name 01/12/21 1320          Therapy Plan Review/Discharge Plan (OT)    Anticipated Discharge Disposition (OT)  extended care facility;skilled nursing facility SNF only if strong change in PLOF  -MURPHY     Row Name 01/12/21 1320          Vital Signs    Pre Systolic BP Rehab  136  -MURPHY     Pre Treatment Diastolic BP  95  -MURPHY     Pretreatment Heart Rate (beats/min)  81  -MURPHY     Posttreatment Heart Rate (beats/min)  103  -MURPHY     Post SpO2 (%)  97  -MURPHY     O2 Delivery Post Treatment  room air  -MURPHY     Pre Patient Position  Supine  -MURPHY     Intra Patient Position  Standing  -MURPHY     Post Patient Position  Supine  -MURPHY     Row Name 01/12/21 1320          Positioning and Restraints    Pre-Treatment Position  in bed  -MURPHY     Post Treatment Position  bed  -MURPHY     In Bed  supine;call light within reach;exit alarm on;encouraged to call for assist  -MURPHY     Restraints  released:;reapplied:;soft limb  -MURPHY       User Key  (r) = Recorded By, (t) = Taken By, (c) = Cosigned By    Initials Name Provider Type    Jauna Arzola, OT Occupational Therapist        Outcome Measures     Row Name 01/12/21 1324          How much help from another is currently needed...    Putting on and taking off regular lower body clothing?  1  -MURPHY     Bathing (including washing, rinsing, and drying)  1  -MURPHY     Toileting (which includes using toilet bed pan or urinal)  1  -MURPHY     Putting on and taking off regular upper body clothing  1  -MURPHY     Taking care of personal grooming (such as brushing teeth)  2  -MURPHY     Eating meals  2  -MURPHY     AM-PAC  6 Clicks Score (OT)  8  -MURPHY     Row Name 01/12/21 1324          Functional Assessment    Outcome Measure Options  AM-PAC 6 Clicks Daily Activity (OT)  -MURPHY       User Key  (r) = Recorded By, (t) = Taken By, (c) = Cosigned By    Initials Name Provider Type    Juana Arzola OT Occupational Therapist        Occupational Therapy Education                 Title: PT OT SLP Therapies (In Progress)     Topic: Occupational Therapy (In Progress)     Point: ADL training (In Progress)     Description:   Instruct learner(s) on proper safety adaptation and remediation techniques during self care or transfers.   Instruct in proper use of assistive devices.              Learning Progress Summary           Patient Acceptance, E,D, NR by MURPHY at 1/12/2021 1325    Comment: reason for consult, noted deficits, bed mobility, grooming sequencing.                   Point: Home exercise program (In Progress)     Description:   Instruct learner(s) on appropriate technique for monitoring, assisting and/or progressing therapeutic exercises/activities.              Learning Progress Summary           Patient Acceptance, E,D, NR by MURPHY at 1/12/2021 1325    Comment: reason for consult, noted deficits, bed mobility, grooming sequencing.                   Point: Precautions (In Progress)     Description:   Instruct learner(s) on prescribed precautions during self-care and functional transfers.              Learning Progress Summary           Patient Acceptance, E,D, NR by MURPHY at 1/12/2021 1325    Comment: reason for consult, noted deficits, bed mobility, grooming sequencing.                   Point: Body mechanics (In Progress)     Description:   Instruct learner(s) on proper positioning and spine alignment during self-care, functional mobility activities and/or exercises.              Learning Progress Summary           Patient Acceptance, E,D, NR by MURPHY at 1/12/2021 1325    Comment: reason for consult, noted deficits, bed mobility, grooming  sequencing.                               User Key     Initials Effective Dates Name Provider Type Discipline     06/08/18 -  Juana Aviles OT Occupational Therapist OT              OT Recommendation and Plan  Therapy Frequency (OT): daily  Plan of Care Review  Plan of Care Reviewed With: patient  Progress: no change  Outcome Summary: OT evaluation completed.  Pt. demonstrating decrease cognition, balance, strength, ROM and endurance. Pt. oriented to self and birthday only.  Pt. with fair simple command following.  Pt. max of 2 supine to sit and sit to stand.  Mod A to brush hair.  Pt. appropriate for skilled therapy to address deficit areas and promote madonna level of indepenence ADL and transfers.     Time Calculation:   Time Calculation- OT     Row Name 01/12/21 1326             Time Calculation- OT    OT Start Time  1118  -MURPHY      OT Received On  01/12/21  -MURPHY      OT Goal Re-Cert Due Date  01/22/21  -MURPHY         Timed Charges    66423 - OT Therapeutic Exercise Minutes  4  -MURPHY        User Key  (r) = Recorded By, (t) = Taken By, (c) = Cosigned By    Initials Name Provider Type    Juana Arzola OT Occupational Therapist        Therapy Charges for Today     Code Description Service Date Service Provider Modifiers Qty    [id number]  OT EVAL LOW COMPLEXITY 4 1/12/2021 Juana Aviles OT GO 1               Juana Aviles OT  1/12/2021

## 2021-01-13 LAB
GLUCOSE BLDC GLUCOMTR-MCNC: 162 MG/DL (ref 70–130)
GLUCOSE BLDC GLUCOMTR-MCNC: 201 MG/DL (ref 70–130)
GLUCOSE BLDC GLUCOMTR-MCNC: 274 MG/DL (ref 70–130)

## 2021-01-13 PROCEDURE — 99232 SBSQ HOSP IP/OBS MODERATE 35: CPT | Performed by: FAMILY MEDICINE

## 2021-01-13 PROCEDURE — 25010000002 PIPERACILLIN SOD-TAZOBACTAM PER 1 G: Performed by: INTERNAL MEDICINE

## 2021-01-13 PROCEDURE — 25010000002 FUROSEMIDE PER 20 MG: Performed by: INTERNAL MEDICINE

## 2021-01-13 PROCEDURE — 82962 GLUCOSE BLOOD TEST: CPT

## 2021-01-13 PROCEDURE — 63710000001 INSULIN LISPRO (HUMAN) PER 5 UNITS: Performed by: INTERNAL MEDICINE

## 2021-01-13 PROCEDURE — 25010000002 ENOXAPARIN PER 10 MG: Performed by: INTERNAL MEDICINE

## 2021-01-13 PROCEDURE — 25010000002 DEXAMETHASONE PER 1 MG: Performed by: INTERNAL MEDICINE

## 2021-01-13 RX ORDER — DOXYCYCLINE 100 MG/1
100 CAPSULE ORAL EVERY 12 HOURS SCHEDULED
Status: DISCONTINUED | OUTPATIENT
Start: 2021-01-13 | End: 2021-01-15 | Stop reason: HOSPADM

## 2021-01-13 RX ADMIN — Medication 5 MG: at 22:15

## 2021-01-13 RX ADMIN — CYANOCOBALAMIN TAB 1000 MCG 1000 MCG: 1000 TAB at 08:32

## 2021-01-13 RX ADMIN — TAZOBACTAM SODIUM AND PIPERACILLIN SODIUM 3.38 G: 375; 3 INJECTION, SOLUTION INTRAVENOUS at 06:38

## 2021-01-13 RX ADMIN — OXYCODONE HYDROCHLORIDE AND ACETAMINOPHEN 250 MG: 500 TABLET ORAL at 08:32

## 2021-01-13 RX ADMIN — REMDESIVIR 100 MG: 100 INJECTION, POWDER, LYOPHILIZED, FOR SOLUTION INTRAVENOUS at 11:11

## 2021-01-13 RX ADMIN — INSULIN LISPRO 4 UNITS: 100 INJECTION, SOLUTION INTRAVENOUS; SUBCUTANEOUS at 17:01

## 2021-01-13 RX ADMIN — FUROSEMIDE 20 MG: 10 INJECTION, SOLUTION INTRAMUSCULAR; INTRAVENOUS at 08:33

## 2021-01-13 RX ADMIN — ATORVASTATIN CALCIUM 80 MG: 40 TABLET, FILM COATED ORAL at 22:15

## 2021-01-13 RX ADMIN — INSULIN LISPRO 2 UNITS: 100 INJECTION, SOLUTION INTRAVENOUS; SUBCUTANEOUS at 08:27

## 2021-01-13 RX ADMIN — ENOXAPARIN SODIUM 40 MG: 40 INJECTION SUBCUTANEOUS at 08:33

## 2021-01-13 RX ADMIN — AMLODIPINE BESYLATE 5 MG: 5 TABLET ORAL at 08:32

## 2021-01-13 RX ADMIN — Medication 2000 UNITS: at 08:37

## 2021-01-13 RX ADMIN — ASPIRIN 81 MG CHEWABLE TABLET 81 MG: 81 TABLET CHEWABLE at 08:32

## 2021-01-13 RX ADMIN — DEXAMETHASONE SODIUM PHOSPHATE 6 MG: 4 INJECTION, SOLUTION INTRA-ARTICULAR; INTRALESIONAL; INTRAMUSCULAR; INTRAVENOUS; SOFT TISSUE at 08:32

## 2021-01-13 RX ADMIN — SODIUM CHLORIDE, PRESERVATIVE FREE 10 ML: 5 INJECTION INTRAVENOUS at 08:34

## 2021-01-13 RX ADMIN — INSULIN LISPRO 3 UNITS: 100 INJECTION, SOLUTION INTRAVENOUS; SUBCUTANEOUS at 11:11

## 2021-01-13 RX ADMIN — DOXYCYCLINE 100 MG: 100 CAPSULE ORAL at 22:15

## 2021-01-13 RX ADMIN — SODIUM CHLORIDE, PRESERVATIVE FREE 10 ML: 5 INJECTION INTRAVENOUS at 22:15

## 2021-01-13 RX ADMIN — LISINOPRIL 2.5 MG: 2.5 TABLET ORAL at 08:28

## 2021-01-13 RX ADMIN — FAMOTIDINE 20 MG: 20 TABLET, FILM COATED ORAL at 22:15

## 2021-01-13 RX ADMIN — DOCUSATE SODIUM 200 MG: 100 CAPSULE ORAL at 08:27

## 2021-01-13 RX ADMIN — FAMOTIDINE 20 MG: 20 TABLET, FILM COATED ORAL at 08:32

## 2021-01-13 NOTE — PROGRESS NOTES
Clinical Nutrition   Reason For Visit: Follow-up protocol    Patient Name: Dirk Miles  YOB: 1940  MRN: [id number]  Date of Encounter: 01/13/21 09:25 EST  Admission date: 1/10/2021      Nutrition Assessment     Admission Problem List:    Cellulitis of right buttock    Acute CVA (cerebrovascular accident) (CMS/HCC)    A-fib (CMS/HCC)    Hypertension    Diabetes mellitus (CMS/HCC)    Dementia with behavioral disturbance (CMS/HCC)    Hyperlipemia    Stenosis of left carotid artery    Anemia, chronic disease    COVID-19 virus detected     (1/12) SLP eval - pureed/nectar    Resident of [facility]    Applicable PMH:    PMH: He  has a past medical history of A-fib (CMS/HCC), Arthritis, B12 deficiency, Coronary artery disease, COVID-19, Diabetes mellitus (CMS/HCC), DNR (do not resuscitate), Dysphagia, Falls, GERD (gastroesophageal reflux disease), Hemiplegia (CMS/HCC), Hyperlipidemia, Hypertension, Insomnia, Pressure sore on buttocks, and Stroke (CMS/HCC).   PSxH: He  has a past surgical history that includes Hernia repair and Cataract extraction, bilateral.        Reported/Observed/Food/Nutrition Related History   RD notes patient transferred to  from [facility] last night. As a result, current RN unaware how patient has been eating prior to this morning. RN reports that for breakfast this morning patient did not eat any of his eggs (dislikes them), but did eat all of his applesauce, pears, and orange juice. RN does not recall seeing Boost supplement on tray but feels like patient would have consumed at least some of it.    Anthropometrics   Height: 69in  Weight: 162lbs (bed scale wt 1/11 per ns doc)  BMI: 24.0  BMI classification: Normal: 18.5-24.9kg/m2   IBW: 160lbs    Weight change:   7.9% weight loss x 1 month per EMR    Weight Weight (kg) Weight (lbs) Weight Method   1/11/2021 73.755 kg 162 lb 9.6 oz Bed scale   1/10/2021 74.39 kg 164 lb -   1/2/2021 83.915 kg 185 lb Stated    12/8/2020 80.2 kg 176 lb 12.9 oz Bed scale   12/2/2020 83.915 kg 185 lb -   12/2/2020 83.915 kg 185 lb -   12/1/2020 83.915 kg 185 lb Stated   12/1/2020 83.915 kg 185 lb Stated       Labs reviewed   Labs reviewed: Yes    Medications reviewed   Medications reviewed: Yes  Pertinent: ABX, vitamin c, vitamin D, colace, decadron, pepcid, lasix, insulin, remdesivir, vitamin B-12    Needs Assessment   Height used: 69in  Weight used: 162lbs    Estimated Calories needs:   9914-2520 kcals (25-30kcals/kg)  1854 kcals (MSJ)    Estimated Protein needs:   88-110g pro      Current Nutrition Prescription   PO: Diet Pureed; Exira / Syrup Thick; Cardiac, Low Sodium, Consistent Carbohydrate  Oral Nutrition Supplement: Boost Glucose Control 3x daily    Evaluation of Received Nutrient/Fluid Intake: 42% / 3 meals (only meals documented since admission 1/10)    Nutrition Diagnosis     1/11, 1/13  Problem Inadequate oral intake   Etiology Altered mental status   Signs/Symptoms PO intake: 42% / 3 meals   Status: ongoing    1/11, 1/13  Problem Increased protein needs   Etiology Increased demand for nutrient with wound healing   Signs/Symptoms unstageable wound to buttock   Status: ongoing    Intervention   Intervention: Follow treatment progress, Care plan reviewed, Interview for preferences, Encourage intake, Supplement provided     -Ordered Boost pudding 1x daily.  -Communicated food preferences to kitchen.    Goal:   General: Nutrition support treatment  PO: Increase intake   Additional goals: No further weight loss    Monitoring/Evaluation:   Monitoring/Evaluation: Per protocol, I&O, PO intake, Supplement intake, Pertinent labs, Weight, Skin status, Symptoms, POC/GOC    Iesha Araujo RD  Time Spent: 15 min

## 2021-01-13 NOTE — PROGRESS NOTES
Continued Stay Note  [facility]     Patient Name: Dirk Miles  MRN: [id number]  Today's Date: 1/13/2021    Admit Date: 1/10/2021    Discharge Plan     Row Name 01/13/21 1438       Plan    Plan  [facility]    Patient/Family in Agreement with Plan  other (see comments)    Plan Comments  I spoke with Karo at [facility]. She is aware of the plan for the pt to transfer back there on Friday 1-. I have [facility] arranged for 1400 to transport. I will update the pts spouse in the am. The MD here would need [phone]. Nursing will need to call report to house charge at [phone] as the pt may go to a different unit at the facility.    Final Discharge Disposition Code  04 - intermediate care facility        Discharge Codes    No documentation.       Expected Discharge Date and Time     Expected Discharge Date Expected Discharge Time    Michelet 15, 2021             Yudy Cooper RN

## 2021-01-13 NOTE — PLAN OF CARE
Goal Outcome Evaluation:  Plan of Care Reviewed With: patient  Progress: no change  Outcome Summary: Pt transferred from 6A restrained. Pt has been kept in restraints and a new order was obtained by Bettina STYLES at 0100hrs. Pt remains pleasantly confused when awake pulling at lines and purewick catheter, therefore, restarints have remained on the entire shift. Pts wounds were assessed and redressed . Pt has been positioned all evening per hospital protocols to prevent any skin breakdown. Left arm is in a sleeve and kerlex wrap which is covering some old scabs. No new skin breakdown noted under sleeve  despite very dry skin. Pt has asked for water and thickened water was offered and he drank 120ml. Blood glucose at 2200 was 181 with no coverage since pt refused applesauce or evening snack. Overall, he did manage to sleep intermittently.

## 2021-01-13 NOTE — PROGRESS NOTES
[facility] Medicine Services  PROGRESS NOTE    Patient Name: Dirk Miles  : 1940  MRN: [id number]    Date of Admission: 1/10/2021  Primary Care Physician: Provider, No Known    Subjective   Subjective     CC:  Covid 19  Encephalopathy  Buttock cellulitis    HPI:  Intermittently agitated.  Overall stable on RA, afebrile x 48hrs. Per nursing erythema of buttock area only faint when patient rolled and checked today.    ROS:  UTO    Objective   Objective     Vital Signs:   Temp:  [97.8 °F (36.6 °C)-98.3 °F (36.8 °C)] 98.1 °F (36.7 °C)  Heart Rate:  [67-91] 67  Resp:  [16-17] 16  BP: (112-147)/(80-91) 139/91        Physical Exam:  Conducted via visual telemedicine encounter due to patient's current isolation requirements in the interest of PPE conservation.    Constitutional: frail, elderly, resting in bed calm  Respiratory: Good effort, nonlabored respirations, on RA  Cardiovascular:  tele with NSR  Musculoskeletal: No edema, normal muscle tone and mass for age  Skin: No visible rashes, no jaundice seen on exposed skin through window        Results Reviewed:  Results from last 7 days   Lab Units 21  0710 01/10/21  1921   WBC 10*3/mm3 10.97* 11.56*   HEMOGLOBIN g/dL 8.5* 9.4*   HEMATOCRIT % 27.3* 30.6*   PLATELETS 10*3/mm3 186 206   PROCALCITONIN ng/mL  --  0.32*     Results from last 7 days   Lab Units 21  1104 21  1024 21  0710 01/10/21  1921   SODIUM mmol/L  --   --  141 137   POTASSIUM mmol/L  --   --  4.4 4.4   CHLORIDE mmol/L  --   --  102 101   CO2 mmol/L  --   --  28.0 27.0   BUN mg/dL  --   --  19 20   CREATININE mg/dL 0.56*  --  1.27 1.14   GLUCOSE mg/dL  --   --  150* 160*   CALCIUM mg/dL  --   --  8.9 8.9   ALT (SGPT) U/L 8  --  15 15   AST (SGOT) U/L 11  --  18 19   TROPONIN T ng/mL  --  0.014  --  0.020   PROBNP pg/mL  --   --   --  11,359.0*     Estimated Creatinine Clearance: 76.6 mL/min (A) (by C-G formula based on SCr of 0.56 mg/dL  (L)).    Microbiology Results Abnormal     Procedure Component Value - Date/Time    Blood Culture - Blood, Arm, Right [[id number]] Collected: 01/10/21 1945    Lab Status: Preliminary result Specimen: Blood from Arm, Right Updated: 01/12/21 2000     Blood Culture No growth at 2 days    Blood Culture - Blood, Arm, Left [[id number]] Collected: 01/10/21 1930    Lab Status: Preliminary result Specimen: Blood from Arm, Left Updated: 01/12/21 2000     Blood Culture No growth at 2 days    Urine Culture - Urine, Urine, Catheter In/Out [[id number]]  (Normal) Collected: 01/10/21 2052    Lab Status: Final result Specimen: Urine, Catheter In/Out Updated: 01/11/21 1809     Urine Culture No growth    Respiratory Panel PCR w/COVID-19(SARS-CoV-2) JOHN/MICHAEL/ROLANDO/PAD/COR/MAD/CHET In-House, NP Swab in UTM/VTM, 3-4 HR TAT - Swab, Nasopharynx [[id number]]  (Abnormal) Collected: 01/10/21 2235    Lab Status: Final result Specimen: Swab from Nasopharynx Updated: 01/10/21 2344     ADENOVIRUS, PCR Not Detected     Coronavirus 229E Not Detected     Coronavirus HKU1 Not Detected     Coronavirus NL63 Not Detected     Coronavirus OC43 Not Detected     COVID19 Detected     Human Metapneumovirus Not Detected     Human Rhinovirus/Enterovirus Not Detected     Influenza A PCR Not Detected     Influenza A H1 Not Detected     Influenza A H1 2009 PCR Not Detected     Influenza A H3 Not Detected     Influenza B PCR Not Detected     Parainfluenza Virus 1 Not Detected     Parainfluenza Virus 2 Not Detected     Parainfluenza Virus 3 Not Detected     Parainfluenza Virus 4 Not Detected     RSV, PCR Not Detected     Bordetella pertussis pcr Not Detected     Bordetella parapertussis PCR Not Detected     Chlamydophila pneumoniae PCR Not Detected     Mycoplasma pneumo by PCR Not Detected    Narrative:      Fact sheet for providers: https://docs.[employer]/wp-content/uploads/ESJ8420-3486-GZ5.1-EUA-Provider-Fact-Sheet-3.pdf    Fact sheet for patients:  https://docs.[employer]wp-content/uploads/EJW5543-6083-NU0.1-EUA-Patient-Fact-Sheet-1.pdf    Test performed by PCR.          Imaging Results (Last 24 Hours)     ** No results found for the last 24 hours. **          Results for orders placed during the hospital encounter of 12/01/20   Adult Transthoracic Echo Complete W/ Cont if Necessary Per Protocol (With Agitated Saline)    Narrative · Left ventricular ejection fraction appears to be 61 - 65%. Left   ventricular systolic function is normal.  · Left atrial volume is moderately increased.  · Saline test results are negative.  · The right atrial cavity is mildly dilated.  · Moderate tricuspid valve regurgitation is present.  · Estimated right ventricular systolic pressure from tricuspid   regurgitation is moderately elevated (45-55 mmHg).          I have reviewed the medications:  Scheduled Meds:amLODIPine, 5 mg, Oral, Daily  vitamin C, 250 mg, Oral, Daily  aspirin, 81 mg, Oral, Daily  atorvastatin, 80 mg, Oral, Nightly  cholecalciferol, 2,000 Units, Oral, Daily  dexamethasone, 6 mg, Intravenous, Daily With Breakfast    Or  dexamethasone, 6 mg, Oral, Daily With Breakfast  docusate sodium, 200 mg, Oral, Daily  doxycycline, 100 mg, Oral, Q12H  enoxaparin, 40 mg, Subcutaneous, Daily  famotidine, 20 mg, Oral, BID  furosemide, 20 mg, Intravenous, Daily  insulin lispro, 0-7 Units, Subcutaneous, TID AC  lisinopril, 2.5 mg, Oral, Q24H  melatonin, 5 mg, Oral, Nightly  remdesivir, 100 mg, Intravenous, Q24H  sodium chloride, 10 mL, Intravenous, Q12H  vitamin B-12, 1,000 mcg, Oral, Daily      Continuous Infusions:Pharmacy Consult - Remdesivir,       PRN Meds:.•  acetaminophen **OR** acetaminophen **OR** acetaminophen  •  dextrose  •  dextrose  •  glucagon (human recombinant)  •  ipratropium-albuterol  •  ondansetron **OR** ondansetron  •  Pharmacy Consult - Remdesivir  •  senna-docusate sodium  •  sodium chloride  •  sodium chloride    Assessment/Plan   Assessment & Plan      Active Hospital Problems    Diagnosis  POA   • **Cellulitis of right buttock [L03.317]  Yes   • Dementia with behavioral disturbance (CMS/[facility]) [F03.91]  Unknown   • Hyperlipemia [E78.5]  Unknown   • Stenosis of left carotid artery [I65.22]  Unknown   • Anemia, chronic disease [D63.8]  Unknown   • COVID-19 virus detected [U07.1]  Unknown   • A-fib (CMS/[facility]) [I48.91]  Yes   • Hypertension [I10]  Yes   • Diabetes mellitus (CMS/[facility]) [E11.9]  Yes   • Acute CVA (cerebrovascular accident) (CMS/[facility]) [I63.9]  Yes      Resolved Hospital Problems   No resolved problems to display.        Brief Hospital Course to date:  Dirk Miles is a 80 y.o. 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 Patient also noted to have buttock wound which has been getting worse per NH, was noted to be erythematous with low grade fever.     This patient's problems and plans were partially entered by my partner and updated as appropriate by me 01/13/21.        COVID 19   Hypoxia   Metabolic encephalopathy of COVID infection with baseline dementia  --confirmed covid 19+ (1/07 at NH and here 1/11)  - appropriate for removal from precautions/quaruntine 1/27/21  --remdesivir started overnight D3/5, decadron D3/10  --imaging reviewed, abx as below for cellulitis but do not favor superimposed bacterial PNA  --on RA reliably  --albuterol MDI, anti-tussive, anti-emetics as needed  --lovenox for DVT ppx    Buttock Wound (POA about 1.5cm x 2cm x 0.1cm with central black eschar)   Mild associated cellulitis  --likely pressure wound with possibility of mild superimposed cellulitis  --zosyn/vanc started in ED, d/c'd vanc 1/12/12  -->  Today transition to PO Doxy for 5 additional days, erythema nearly resolved  --continue wound care with thera-honey/xeroform    Pyuria  --UA with 13-20 WBC but also significant  squam  -- cx no growth    Previous CVA  --continue home ASA    Pulm HTN  --continue lasix 20 twice weekly    DM2  --A1C 6.4 in 12/20-- SSI, monitor     Chronic constipation  ---fecal impaction noted on CT A&P  -- continue aggressive bowel regimen    DVT Prophylaxis:  lovenox      Disposition: I expect the patient to be discharged back to [facility] by EMS 1/15/21 @ 1400. Discharging MD will need to [phone].    CODE STATUS:   Code Status and Medical Interventions:   Ordered at: 01/10/21 1266     Limited Support to NOT Include:    Intubation     Code Status:    No CPR     Medical Interventions (Level of Support Prior to Arrest):    Limited     Comments:    dnr per living will and wife confirmed it Sarai [phone]       Rmoy Winston MD  01/13/21

## 2021-01-14 LAB
GLUCOSE BLDC GLUCOMTR-MCNC: 115 MG/DL (ref 70–130)
GLUCOSE BLDC GLUCOMTR-MCNC: 139 MG/DL (ref 70–130)
GLUCOSE BLDC GLUCOMTR-MCNC: 154 MG/DL (ref 70–130)
GLUCOSE BLDC GLUCOMTR-MCNC: 203 MG/DL (ref 70–130)
GLUCOSE BLDC GLUCOMTR-MCNC: 213 MG/DL (ref 70–130)

## 2021-01-14 PROCEDURE — 92526 ORAL FUNCTION THERAPY: CPT | Performed by: SPEECH-LANGUAGE PATHOLOGIST

## 2021-01-14 PROCEDURE — 82962 GLUCOSE BLOOD TEST: CPT

## 2021-01-14 PROCEDURE — 97530 THERAPEUTIC ACTIVITIES: CPT

## 2021-01-14 PROCEDURE — 25010000002 DEXAMETHASONE PER 1 MG: Performed by: INTERNAL MEDICINE

## 2021-01-14 PROCEDURE — 97110 THERAPEUTIC EXERCISES: CPT

## 2021-01-14 PROCEDURE — 25010000002 FUROSEMIDE PER 20 MG: Performed by: INTERNAL MEDICINE

## 2021-01-14 PROCEDURE — 63710000001 INSULIN LISPRO (HUMAN) PER 5 UNITS: Performed by: INTERNAL MEDICINE

## 2021-01-14 PROCEDURE — 25010000002 ENOXAPARIN PER 10 MG: Performed by: INTERNAL MEDICINE

## 2021-01-14 PROCEDURE — 99232 SBSQ HOSP IP/OBS MODERATE 35: CPT | Performed by: HOSPITALIST

## 2021-01-14 RX ADMIN — ATORVASTATIN CALCIUM 80 MG: 40 TABLET, FILM COATED ORAL at 22:13

## 2021-01-14 RX ADMIN — LISINOPRIL 2.5 MG: 2.5 TABLET ORAL at 10:21

## 2021-01-14 RX ADMIN — DEXAMETHASONE SODIUM PHOSPHATE 6 MG: 4 INJECTION, SOLUTION INTRA-ARTICULAR; INTRALESIONAL; INTRAMUSCULAR; INTRAVENOUS; SOFT TISSUE at 10:21

## 2021-01-14 RX ADMIN — INSULIN LISPRO 3 UNITS: 100 INJECTION, SOLUTION INTRAVENOUS; SUBCUTANEOUS at 17:15

## 2021-01-14 RX ADMIN — REMDESIVIR 100 MG: 100 INJECTION, POWDER, LYOPHILIZED, FOR SOLUTION INTRAVENOUS at 10:24

## 2021-01-14 RX ADMIN — Medication 2000 UNITS: at 10:20

## 2021-01-14 RX ADMIN — DOXYCYCLINE 100 MG: 100 CAPSULE ORAL at 10:21

## 2021-01-14 RX ADMIN — FUROSEMIDE 20 MG: 10 INJECTION, SOLUTION INTRAMUSCULAR; INTRAVENOUS at 10:47

## 2021-01-14 RX ADMIN — OXYCODONE HYDROCHLORIDE AND ACETAMINOPHEN 250 MG: 500 TABLET ORAL at 10:22

## 2021-01-14 RX ADMIN — ASPIRIN 81 MG CHEWABLE TABLET 81 MG: 81 TABLET CHEWABLE at 10:21

## 2021-01-14 RX ADMIN — ENOXAPARIN SODIUM 40 MG: 40 INJECTION SUBCUTANEOUS at 10:20

## 2021-01-14 RX ADMIN — FAMOTIDINE 20 MG: 20 TABLET, FILM COATED ORAL at 22:13

## 2021-01-14 RX ADMIN — AMLODIPINE BESYLATE 5 MG: 5 TABLET ORAL at 10:22

## 2021-01-14 RX ADMIN — SODIUM CHLORIDE, PRESERVATIVE FREE 10 ML: 5 INJECTION INTRAVENOUS at 10:24

## 2021-01-14 RX ADMIN — DOCUSATE SODIUM 50MG AND SENNOSIDES 8.6MG 2 TABLET: 8.6; 5 TABLET, FILM COATED ORAL at 22:13

## 2021-01-14 RX ADMIN — DOXYCYCLINE 100 MG: 100 CAPSULE ORAL at 22:13

## 2021-01-14 RX ADMIN — FAMOTIDINE 20 MG: 20 TABLET, FILM COATED ORAL at 10:21

## 2021-01-14 RX ADMIN — Medication 5 MG: at 22:13

## 2021-01-14 RX ADMIN — CYANOCOBALAMIN TAB 1000 MCG 1000 MCG: 1000 TAB at 10:22

## 2021-01-14 RX ADMIN — DOCUSATE SODIUM 200 MG: 100 CAPSULE ORAL at 10:22

## 2021-01-14 NOTE — THERAPY TREATMENT NOTE
Acute Care - Speech Language Pathology   Swallow Treatment Note [facility]     Patient Name: Dirk Miles  : 1940  MRN: [id number]  Today's Date: 2021               Admit Date: 1/10/2021    Visit Dx:     ICD-10-CM ICD-9-CM   1. Cellulitis of right buttock  L03.317 682.5   2. Altered mental status, unspecified altered mental status type  R41.82 780.97   3. Lab test positive for detection of COVID-19 virus  U07.1 079.89   4. Hypoxia  R09.02 799.02   5. Acute sphenoidal sinusitis, recurrence not specified  J01.30 461.3   6. Dysphagia, unspecified type  R13.10 787.20     Patient Active Problem List   Diagnosis   • Acute CVA (cerebrovascular accident) (CMS/[facility])   • A-fib (CMS/[facility])   • Hypertension   • Diabetes mellitus (CMS/[facility])   • Cellulitis of right buttock   • Dementia with behavioral disturbance (CMS/[facility])   • Hyperlipemia   • Stenosis of left carotid artery   • Anemia, chronic disease   • COVID-19 virus detected     Past Medical History:   Diagnosis Date   • A-fib (CMS/[facility])    • Arthritis    • B12 deficiency    • Coronary artery disease    • COVID-19    • Diabetes mellitus (CMS/[facility])    • DNR (do not resuscitate)    • Dysphagia    • Falls    • GERD (gastroesophageal reflux disease)    • Hemiplegia (CMS/[facility])    • Hyperlipidemia    • Hypertension    • Insomnia    • Pressure sore on buttocks    • Stroke (CMS/[facility])      Past Surgical History:   Procedure Laterality Date   • CATARACT EXTRACTION, BILATERAL     • HERNIA REPAIR          SWALLOW EVALUATION (last 72 hours)      SLP Adult Swallow Evaluation     Row Name 21 1330       Rehab Evaluation    Document Type  therapy note (daily note)  -CJ    Subjective Information  no complaints  -CJ    Patient Observations  alert;cooperative  -CJ    Patient/Family/Caregiver Comments/Observations  no family present  -CJ    Patient Effort  good  -CJ    Symptoms Noted During/After Treatment  none  -CJ       Pain    Additional Documentation  Pain Scale: FACES  Pre/Post-Treatment (Group)  -       Pain Scale: FACES Pre/Post-Treatment    Pain: FACES Scale, Pretreatment  0-->no hurt  -CJ    Posttreatment Pain Rating  0-->no hurt  -CJ       Clinical Impression    Daily Summary of Progress (SLP)  progress toward functional goals is good  -CJ    Barriers to Overall Progress (SLP)  --    SLP Swallowing Diagnosis  mild-moderate;oral dysphagia;R/O pharyngeal dysphagia  -    Functional Impact  risk of aspiration/pneumonia  -    Rehab Potential/Prognosis, Swallowing  good, to achieve stated therapy goals  -    Swallow Criteria for Skilled Therapeutic Interventions Met  demonstrates skilled criteria  -CJ    Plan for Continued Treatment (SLP)  Pt seen this pm for diet tolerance. Pt accepted trials of thin liquids via spoon cup and straw. No overt s/s of aspiration. No clinical s/s concerning for silent aspiration as pt is w/o changes in vocal quality, respirations or secretions post po presentations. Pt will benefit from instrumental MBS c-arm to determine candidacy for upgrade to thin liquids. Will plan for MBS c-arm tomorrow  -       Recommendations    Therapy Frequency (Swallow)  PRN  -    Predicted Duration Therapy Intervention (Days)  until discharge  -    SLP Diet Recommendation  puree;nectar thick liquids  -    Recommended Diagnostics  other (see comments)  -    Recommended Precautions and Strategies  upright posture during/after eating;small bites of food and sips of liquid;general aspiration precautions;reflux precautions;1:1 supervision  -    Oral Care Recommendations  Oral Care BID/PRN  -CJ    SLP Rec. for Method of Medication Administration  meds crushed;with pudding or applesauce;as tolerated  -    Monitor for Signs of Aspiration  yes;notify SLP if any concerns  -CJ    Anticipated Discharge Disposition (SLP)  skilled nursing facility;anticipate therapy at next level of care  -      User Key  (r) = Recorded By, (t) = Taken By, (c) = Cosigned By     Initials Name Effective Dates    SYLVESTER Carli Smith, MS CCC-SLP 04/03/18 -     CJ Maria Alejandra Boyer, MS CCC-SLP 02/11/20 -           EDUCATION  The patient has been educated in the following areas:   Dysphagia (Swallowing Impairment) Oral Care/Hydration Modified Diet Instruction.    SLP Recommendation and Plan  SLP Swallowing Diagnosis: mild-moderate, oral dysphagia, R/O pharyngeal dysphagia  SLP Diet Recommendation: puree, nectar thick liquids  Recommended Precautions and Strategies: upright posture during/after eating, small bites of food and sips of liquid, general aspiration precautions, reflux precautions, 1:1 supervision  SLP Rec. for Method of Medication Administration: meds crushed, with pudding or applesauce, as tolerated     Monitor for Signs of Aspiration: yes, notify SLP if any concerns  Recommended Diagnostics: other (see comments)  Swallow Criteria for Skilled Therapeutic Interventions Met: demonstrates skilled criteria  Anticipated Discharge Disposition (SLP): skilled nursing facility, anticipate therapy at next level of care  Rehab Potential/Prognosis, Swallowing: good, to achieve stated therapy goals  Therapy Frequency (Swallow): PRN  Predicted Duration Therapy Intervention (Days): until discharge     Daily Summary of Progress (SLP): progress toward functional goals is good    Plan for Continued Treatment (SLP): Pt seen this pm for diet tolerance. Pt accepted trials of thin liquids via spoon cup and straw. No overt s/s of aspiration. No clinical s/s concerning for silent aspiration as pt is w/o changes in vocal quality, respirations or secretions post po presentations. Pt will benefit from instrumental MBS c-arm to determine candidacy for upgrade to thin liquids. Will plan for MBS c-arm tomorrow              Plan of Care Reviewed With: patient  Progress: improving    SLP GOALS     Row Name 01/14/21 1330 01/12/21 9038          Oral Nutrition/Hydration Goal 1 (SLP)    Oral Nutrition/Hydration Goal  1, SLP  LTG: Pt will tolerate PO diet w/o immediate s/s of aspiration or complications w/ 100% accuracy w/o cues.   -CJ  LTG: Pt will tolerate PO diet w/o immediate s/s of aspiration or complications w/ 100% accuracy w/o cues.   -RD     Time Frame (Oral Nutrition/Hydration Goal 1, SLP)  by discharge  -CJ  by discharge  -RD     Progress/Outcomes (Oral Nutrition/Hydration Goal 1, SLP)  goal ongoing  -CJ  --        Oral Nutrition/Hydration Goal 2 (SLP)    Oral Nutrition/Hydration Goal 2, SLP  Pt will tolerate nectar-thick and pureed diet w/ no overt s/s of aspiration w/ 100% accuracy w/o cues  -CJ  Pt will tolerate nectar-thick and pureed diet w/ no overt s/s of aspiration w/ 100% accuracy w/o cues  -RD     Time Frame (Oral Nutrition/Hydration Goal 2, SLP)  short term goal (STG)  -CJ  short term goal (STG)  -RD     Barriers (Oral Nutrition/Hydration Goal 2, SLP)  Pt accepted trials of nectar thick and puree consistency w/o overt s/s of aspiration. No clinical s/s concerning for silent aspiration.  -CJ  --     Progress/Outcomes (Oral Nutrition/Hydration Goal 2, SLP)  continuing progress toward goal  -CJ  --        Oral Nutrition/Hydration Goal (SLP)    Oral Nutrition/Hydration Goal, SLP  Pt will tolerate therapeutic trials of thin liquids w/ no overt s/s of aspiration to indicate appropriateness for liquid upgrade w/ 100% accuracy w/o cues  -CJ  Pt will tolerate therapeutic trials of thin liquids w/ no overt s/s of aspiration to indicate appropriateness for liquid upgrade w/ 100% accuracy w/o cues  -RD     Time Frame (Oral Nutrition/Hydration Goal, SLP)  short term goal (STG)  -CJ  short term goal (STG)  -RD     Barriers (Oral Nutrition/Hydration Goal, SLP)  Pt accepted ~5 oz of thin liquids via all presentation styles w/o overt s/s of aspiration. No clinical s/s concerning for silent aspiration.   -CJ  --     Progress/Outcomes (Oral Nutrition/Hydration Goal, SLP)  continuing progress toward goal  -CJ  --       User Key   (r) = Recorded By, (t) = Taken By, (c) = Cosigned By    Initials Name Provider Type    Carli Judd MS CCC-SLP Speech and Language Pathologist    Maria Alejandra Rabago MS CCC-SLP Speech and Language Pathologist             Time Calculation:   Time Calculation- SLP     Row Name 01/14/21 1525             Time Calculation- SLP    SLP Start Time  1330  -      SLP Received On  01/14/21  -        User Key  (r) = Recorded By, (t) = Taken By, (c) = Cosigned By    Initials Name Provider Type    Maria Alejandra Rabago MS CCC-SLP Speech and Language Pathologist          Therapy Charges for Today     Code Description Service Date Service Provider Modifiers Qty    [id number] HC ST TREATMENT SWALLOW 3 1/14/2021 Maria Alejandra Boyer MS CCC-SLP GN 1        Patient was not wearing a face mask and did not exhibit coughing during this therapy encounter.  Procedure performed was aerosolizing, involved close contact (within 6 feet for at least 15 minutes or longer), and did not involve contact with infectious secretions or specimens.  Therapist used appropriate personal protective equipment including gloves, standard procedure mask, eye protection, gown and N95 mask.  Appropriate PPE was worn during the entire therapy session.  Hand hygiene was completed before and after therapy session.          MS RUSSELL Duran  1/14/2021

## 2021-01-14 NOTE — PLAN OF CARE
Goal Outcome Evaluation:  Plan of Care Reviewed With: patient  Progress: improving   SLP treatment completed. Will continue to address dysphagia; plan for MBS tomorrow to determine candidacy for upgrade before discharge. Please see note for further details and recommendations.

## 2021-01-14 NOTE — PROGRESS NOTES
[facility] Medicine Services  PROGRESS NOTE    Patient Name: Dirk Miles  : 1940  MRN: [id number]    Date of Admission: 1/10/2021  Primary Care Physician: Provider, No Known    Subjective   Subjective     CC:  F/U COVID-19, AMS, buttock cellulitis    HPI:  Patient sleeping this morning. No acute issues overnight.    ROS:  Unable to obtain due to dementia    Objective   Objective     Vital Signs:   Temp:  [97.8 °F (36.6 °C)] 97.8 °F (36.6 °C)  Heart Rate:  [81] 81  Resp:  [18] 18  BP: (119)/(84) 119/84        Physical Exam:  Conducted via visual telemedicine encounter due to patient's current isolation requirements in the interest of PPE conservation.    Constitutional: No acute distress, nontoxic, frail, sleeping  Respiratory: Good effort, nonlabored respirations on room air  Cardiovascular:  tele with NSR  Musculoskeletal: No edema  Skin: No visible rashes, no jaundice seen on exposed skin through window        Results Reviewed:  Results from last 7 days   Lab Units 21  0710 01/10/21  1921   WBC 10*3/mm3 10.97* 11.56*   HEMOGLOBIN g/dL 8.5* 9.4*   HEMATOCRIT % 27.3* 30.6*   PLATELETS 10*3/mm3 186 206   PROCALCITONIN ng/mL  --  0.32*     Results from last 7 days   Lab Units 21  1104 21  1024 21  0710 01/10/21  1921   SODIUM mmol/L  --   --  141 137   POTASSIUM mmol/L  --   --  4.4 4.4   CHLORIDE mmol/L  --   --  102 101   CO2 mmol/L  --   --  28.0 27.0   BUN mg/dL  --   --  19 20   CREATININE mg/dL 0.56*  --  1.27 1.14   GLUCOSE mg/dL  --   --  150* 160*   CALCIUM mg/dL  --   --  8.9 8.9   ALT (SGPT) U/L 8  --  15 15   AST (SGOT) U/L 11  --  18 19   TROPONIN T ng/mL  --  0.014  --  0.020   PROBNP pg/mL  --   --   --  11,359.0*     Estimated Creatinine Clearance: 72.8 mL/min (A) (by RONI-G formula based on SCr of 0.56 mg/dL (L)).    Microbiology Results Abnormal     Procedure Component Value - Date/Time    Blood Culture - Blood, Arm, Right [[id number]]  Collected: 01/10/21 1945    Lab Status: Preliminary result Specimen: Blood from Arm, Right Updated: 01/13/21 2001     Blood Culture No growth at 3 days    Blood Culture - Blood, Arm, Left [[id number]] Collected: 01/10/21 1930    Lab Status: Preliminary result Specimen: Blood from Arm, Left Updated: 01/13/21 2001     Blood Culture No growth at 3 days    Urine Culture - Urine, Urine, Catheter In/Out [[id number]]  (Normal) Collected: 01/10/21 2052    Lab Status: Final result Specimen: Urine, Catheter In/Out Updated: 01/11/21 1809     Urine Culture No growth    Respiratory Panel PCR w/COVID-19(SARS-CoV-2) JOHN/MICHAEL/ROLANDO/PAD/COR/MAD/CHET In-House, NP Swab in UTM/VTM, 3-4 HR TAT - Swab, Nasopharynx [[id number]]  (Abnormal) Collected: 01/10/21 2235    Lab Status: Final result Specimen: Swab from Nasopharynx Updated: 01/10/21 2344     ADENOVIRUS, PCR Not Detected     Coronavirus 229E Not Detected     Coronavirus HKU1 Not Detected     Coronavirus NL63 Not Detected     Coronavirus OC43 Not Detected     COVID19 Detected     Human Metapneumovirus Not Detected     Human Rhinovirus/Enterovirus Not Detected     Influenza A PCR Not Detected     Influenza A H1 Not Detected     Influenza A H1 2009 PCR Not Detected     Influenza A H3 Not Detected     Influenza B PCR Not Detected     Parainfluenza Virus 1 Not Detected     Parainfluenza Virus 2 Not Detected     Parainfluenza Virus 3 Not Detected     Parainfluenza Virus 4 Not Detected     RSV, PCR Not Detected     Bordetella pertussis pcr Not Detected     Bordetella parapertussis PCR Not Detected     Chlamydophila pneumoniae PCR Not Detected     Mycoplasma pneumo by PCR Not Detected    Narrative:      Fact sheet for providers: https://docs.[employer]ie[employer]/wp-content/uploads/CRG6647-2784-FJ6.1-EUA-Provider-Fact-Sheet-3.pdf    Fact sheet for patients: https://docs.[employer]/wp-content/uploads/MOF5811-0565-XM7.1-EUA-Patient-Fact-Sheet-1.pdf    Test performed by PCR.          Imaging Results  (Last 24 Hours)     ** No results found for the last 24 hours. **          Results for orders placed during the hospital encounter of 12/01/20   Adult Transthoracic Echo Complete W/ Cont if Necessary Per Protocol (With Agitated Saline)    Narrative · Left ventricular ejection fraction appears to be 61 - 65%. Left   ventricular systolic function is normal.  · Left atrial volume is moderately increased.  · Saline test results are negative.  · The right atrial cavity is mildly dilated.  · Moderate tricuspid valve regurgitation is present.  · Estimated right ventricular systolic pressure from tricuspid   regurgitation is moderately elevated (45-55 mmHg).          I have reviewed the medications:  Scheduled Meds:amLODIPine, 5 mg, Oral, Daily  vitamin C, 250 mg, Oral, Daily  aspirin, 81 mg, Oral, Daily  atorvastatin, 80 mg, Oral, Nightly  cholecalciferol, 2,000 Units, Oral, Daily  dexamethasone, 6 mg, Intravenous, Daily With Breakfast    Or  dexamethasone, 6 mg, Oral, Daily With Breakfast  docusate sodium, 200 mg, Oral, Daily  doxycycline, 100 mg, Oral, Q12H  enoxaparin, 40 mg, Subcutaneous, Daily  famotidine, 20 mg, Oral, BID  furosemide, 20 mg, Intravenous, Daily  insulin lispro, 0-7 Units, Subcutaneous, TID AC  lisinopril, 2.5 mg, Oral, Q24H  melatonin, 5 mg, Oral, Nightly  remdesivir, 100 mg, Intravenous, Q24H  sodium chloride, 10 mL, Intravenous, Q12H  vitamin B-12, 1,000 mcg, Oral, Daily      Continuous Infusions:Pharmacy Consult - Remdesivir,       PRN Meds:.•  acetaminophen **OR** acetaminophen **OR** acetaminophen  •  dextrose  •  dextrose  •  glucagon (human recombinant)  •  ipratropium-albuterol  •  ondansetron **OR** ondansetron  •  Pharmacy Consult - Remdesivir  •  senna-docusate sodium  •  sodium chloride  •  sodium chloride    Assessment/Plan   Assessment & Plan     Active Hospital Problems    Diagnosis  POA   • **Cellulitis of right buttock [L03.317]  Yes   • Dementia with behavioral disturbance (CMS/HCC)  [F03.91]  Unknown   • Hyperlipemia [E78.5]  Unknown   • Stenosis of left carotid artery [I65.22]  Unknown   • Anemia, chronic disease [D63.8]  Unknown   • COVID-19 virus detected [U07.1]  Unknown   • A-fib (CMS/[facility]) [I48.91]  Yes   • Hypertension [I10]  Yes   • Diabetes mellitus (CMS/[facility]) [E11.9]  Yes   • Acute CVA (cerebrovascular accident) (CMS/[facility]) [I63.9]  Yes      Resolved Hospital Problems   No resolved problems to display.        Brief Hospital Course to date:  Dirk Miles is a 80 y.o. 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 Patient also noted to have buttock wound which has been getting worse per NH, was noted to be erythematous with low grade fever.     This patient's problems and plans were partially entered by my partner and updated as appropriate by me 01/14/21.    *All problems are new to me today.    COVID-19   Hypoxia   Metabolic encephalopathy of COVID infection with baseline dementia  --Confirmed COVID-19 positive (1/07 at NH and here 1/11).  --Appropriate for removal from precautions/quarantine 1/27/21.  --Continue Decadron Day 5/10.  --Remdesivir Day 4/5.  --Imaging reviewed, ATBx as below for cellulitis but do not favor superimposed bacterial PNA.  --Stable on room air at this time.     Buttock Wound (POA about 1.5cm x 2cm x 0.1cm with central black eschar)   Mild associated cellulitis  --Likely pressure wound with possibility of mild superimposed cellulitis/  --Vanc/Zosyn started in ED, stopped Vanc 1/12  --> Now transitioned to PO Doxycycline for 5 additional days.   --Continue wound care with thera-honey/xeroform.     Pyuria  --UA with 13-20 WBC but also significant squamous cells.  --Urine culture no growth.     Previous CVA  --Continue home ASA.     Pulminary HTN  --Continue Lasix 20 mg twice weekly.     DM2  --HbA1c  6.4 in 12/20.  --Continue SSI.     Chronic constipation  --Fecal impaction noted on CT A/P.  --Continue aggressive bowel regimen.    PAF  --[id number] = 6. Previously on Eliquis but discontinued after recent intracranial bleed. He is a high fall risk as well.     DVT Prophylaxis:  Lovenox      Disposition: I expect the patient to be discharged back to [facility] tomorrow.    CODE STATUS:   Code Status and Medical Interventions:   Ordered at: 01/10/21 6874     Limited Support to NOT Include:    Intubation     Code Status:    No CPR     Medical Interventions (Level of Support Prior to Arrest):    Limited     Comments:    dnr per living will and wife confirmed it Sarai [phone]       Dari Vargas MD  01/14/21

## 2021-01-14 NOTE — PLAN OF CARE
Problem: Adult Inpatient Plan of Care  Goal: Plan of Care Review  Recent Flowsheet Documentation  Taken 1/14/2021 1501 by Shyanne Henry OT  Plan of Care Reviewed With: patient  Outcome Summary: Pt required max a supine to sit,  setup and Vcs to wash face,  mod a to comb hair,  Max A to perform 3  STS given BUE,  max a sit to supine.   Pt is limited by confusion and weakness.

## 2021-01-14 NOTE — PLAN OF CARE
Goal Outcome Evaluation:  Plan of Care Reviewed With: patient  Progress: no change  Outcome Summary: Pt required MAXAx2 for supine to EOB. 2 attempts for successful STS with MAXAx2 via BUE support. Uncontrolled impulsive sit. Deferred further mobility d/t difficulty following commands and intermittently uncooperative/confused. Inc time/effort for all mobility.

## 2021-01-14 NOTE — PROGRESS NOTES
Continued Stay Note  [facility]     Patient Name: Dirk Miles  MRN: [id number]  Today's Date: 1/14/2021    Admit Date: 1/10/2021    Discharge Plan     Row Name 01/14/21 1351       Plan    Plan Comments  I spoke with the pts spouse who is in agreement and aware of the planned DC back to [facility] at 1400 on 1-.        Discharge Codes    No documentation.       Expected Discharge Date and Time     Expected Discharge Date Expected Discharge Time    Michelet 15, 2021             Yudy Cooper RN

## 2021-01-14 NOTE — THERAPY TREATMENT NOTE
Patient Name: Dirk Miles  : 1940    MRN: [id number]                              Today's Date: 2021       Admit Date: 1/10/2021    Visit Dx:     ICD-10-CM ICD-9-CM   1. Cellulitis of right buttock  L03.317 682.5   2. Altered mental status, unspecified altered mental status type  R41.82 780.97   3. Lab test positive for detection of COVID-19 virus  U07.1 079.89   4. Hypoxia  R09.02 799.02   5. Acute sphenoidal sinusitis, recurrence not specified  J01.30 461.3   6. Dysphagia, unspecified type  R13.10 787.20     Patient Active Problem List   Diagnosis   • Acute CVA (cerebrovascular accident) (CMS/[facility])   • A-fib (CMS/[facility])   • Hypertension   • Diabetes mellitus (CMS/[facility])   • Cellulitis of right buttock   • Dementia with behavioral disturbance (CMS/[facility])   • Hyperlipemia   • Stenosis of left carotid artery   • Anemia, chronic disease   • COVID-19 virus detected     Past Medical History:   Diagnosis Date   • A-fib (CMS/[facility])    • Arthritis    • B12 deficiency    • Coronary artery disease    • COVID-19    • Diabetes mellitus (CMS/[facility])    • DNR (do not resuscitate)    • Dysphagia    • Falls    • GERD (gastroesophageal reflux disease)    • Hemiplegia (CMS/[facility])    • Hyperlipidemia    • Hypertension    • Insomnia    • Pressure sore on buttocks    • Stroke (CMS/[facility])      Past Surgical History:   Procedure Laterality Date   • CATARACT EXTRACTION, BILATERAL     • HERNIA REPAIR       General Information     Row Name 21 1509          OT Time and Intention    Document Type  therapy note (daily note)  -AC     Mode of Treatment  occupational therapy  -     Row Name 21 1502          General Information    Patient Profile Reviewed  yes  -AC     Existing Precautions/Restrictions  fall;other (see comments) dementia, L sided weakness, contact/airborne  -AC     Row Name 21 8707          Cognition    Orientation Status (Cognition)  oriented to;person;disoriented to;place;time  -AC     Row Name 21 3413           Safety Issues, Functional Mobility    Safety Issues Affecting Function (Mobility)  ability to follow commands;at risk behavior observed;awareness of need for assistance;insight into deficits/self-awareness;judgment;safety precaution awareness;safety precautions follow-through/compliance;sequencing abilities  -     Impairments Affecting Function (Mobility)  balance;cognition;coordination;endurance/activity tolerance;motor control;muscle tone abnormal;postural/trunk control;range of motion (ROM);strength  -     Cognitive Impairments, Mobility Safety/Performance  attention;judgment;problem-solving/reasoning;safety precaution awareness;safety precaution follow-through;sequencing abilities  -       User Key  (r) = Recorded By, (t) = Taken By, (c) = Cosigned By    Initials Name Provider Type     Shyanne Henry OT Occupational Therapist          Mobility/ADL's     Row Name 01/14/21 1501          Bed Mobility    Bed Mobility  supine-sit;sit-supine  -     Supine-Sit [place] (Bed Mobility)  verbal cues;nonverbal cues (demo/gesture);maximum assist (25% patient effort)  -     Sit-Supine [place] (Bed Mobility)  verbal cues;nonverbal cues (demo/gesture);maximum assist (25% patient effort)  -     Bed Mobility, Safety Issues  cognitive deficits limit understanding;decreased use of arms for pushing/pulling;decreased use of legs for bridging/pushing  -     Assistive Device (Bed Mobility)  bed rails;draw sheet;head of bed elevated  -     Comment (Bed Mobility)  completed 3 STS given BUE support  -     Row Name 01/14/21 1501          Transfers    Transfers  sit-stand transfer  -     Sit-Stand [place] (Transfers)  verbal cues;maximum assist (25% patient effort);nonverbal cues (demo/gesture)  -     Row Name 01/14/21 1501          Sit-Stand Transfer    Assistive Device (Sit-Stand Transfers)  -- gt belt and BUE support  -     Row Name 01/14/21 1501          Activities of Daily Living     BADL Assessment/Intervention  grooming;lower body dressing  -AC     Row Name 01/14/21 1501          Lower Body Dressing Assessment/Training    [place] Level (Lower Body Dressing)  don;socks;dependent (less than 25% patient effort)  -AC     Position (Lower Body Dressing)  supine  -AC     Row Name 01/14/21 1501          Grooming Assessment/Training    [place] Level (Grooming)  set up;verbal cues;wash face, hands;moderate assist (50% patient effort);hair care, combing/brushing  -AC     Position (Grooming)  unsupported sitting  -AC       User Key  (r) = Recorded By, (t) = Taken By, (c) = Cosigned By    Initials Name Provider Type    Shyanne Reid, OT Occupational Therapist        Obj/Interventions     Row Name 01/14/21 1501          Shoulder (Therapeutic Exercise)    Shoulder (Therapeutic Exercise)  PROM (passive range of motion)  -     Shoulder PROM (Therapeutic Exercise)  flexion;extension;10 repetitions  -     Row Name 01/14/21 1501          Elbow/Forearm (Therapeutic Exercise)    Elbow/Forearm PROM (Therapeutic Exercise)  bilateral;flexion;extension;10 repetitions  -AC       User Key  (r) = Recorded By, (t) = Taken By, (c) = Cosigned By    Initials Name Provider Type    Shyanne Reid, OT Occupational Therapist        Goals/Plan    No documentation.       Clinical Impression     Row Name 01/14/21 1501          Pain Scale: FACES Pre/Post-Treatment    Pain: FACES Scale, Pretreatment  0-->no hurt  -AC     Posttreatment Pain Rating  0-->no hurt  -     Row Name 01/14/21 1501          Plan of Care Review    Plan of Care Reviewed With  patient  -AC     Outcome Summary  Pt required max a supine to sit,  setup and Vcs to wash face,  mod a to comb hair,  Max A to perform 3  STS given BUE,  max a sit to supine.   Pt is limited by confusion and weakness.  -AC       User Key  (r) = Recorded By, (t) = Taken By, (c) = Cosigned By    Initials Name Provider Type    Shyanne Reid, OT Occupational Therapist         Outcome Measures     Row Name 01/14/21 1501          How much help from another is currently needed...    Putting on and taking off regular lower body clothing?  1  -AC     Bathing (including washing, rinsing, and drying)  1  -AC     Toileting (which includes using toilet bed pan or urinal)  1  -AC     Putting on and taking off regular upper body clothing  1  -AC     Taking care of personal grooming (such as brushing teeth)  2  -AC     Eating meals  2  -AC     AM-PAC 6 Clicks Score (OT)  8  -AC     Row Name 01/14/21 1501          Functional Assessment    Outcome Measure Options  AM-PAC 6 Clicks Daily Activity (OT)  -       User Key  (r) = Recorded By, (t) = Taken By, (c) = Cosigned By    Initials Name Provider Type    Shyanne Reid OT Occupational Therapist        Occupational Therapy Education                 Title: PT OT SLP Therapies (In Progress)     Topic: Occupational Therapy (In Progress)     Point: ADL training (In Progress)     Description:   Instruct learner(s) on proper safety adaptation and remediation techniques during self care or transfers.   Instruct in proper use of assistive devices.              Learning Progress Summary           Patient Nonacceptance, E, NL by PRESTON at 1/14/2021 1501    Acceptance, E, NR by SURENDRA at 1/12/2021 1615    Acceptance, E,D, NR by MURPHY at 1/12/2021 1325    Comment: reason for consult, noted deficits, bed mobility, grooming sequencing.                   Point: Home exercise program (In Progress)     Description:   Instruct learner(s) on appropriate technique for monitoring, assisting and/or progressing therapeutic exercises/activities.              Learning Progress Summary           Patient Acceptance, E, NR by SURENDRA at 1/12/2021 1615    Acceptance, E,D, NR by MURPHY at 1/12/2021 1325    Comment: reason for consult, noted deficits, bed mobility, grooming sequencing.                   Point: Precautions (In Progress)     Description:   Instruct learner(s) on prescribed  precautions during self-care and functional transfers.              Learning Progress Summary           Patient Acceptance, E, NR by  at 1/12/2021 1615    Acceptance, E,D, NR by MURPHY at 1/12/2021 1325    Comment: reason for consult, noted deficits, bed mobility, grooming sequencing.                   Point: Body mechanics (In Progress)     Description:   Instruct learner(s) on proper positioning and spine alignment during self-care, functional mobility activities and/or exercises.              Learning Progress Summary           Patient Acceptance, E, NR by  at 1/12/2021 1615    Acceptance, E,D, NR by MURPHY at 1/12/2021 1325    Comment: reason for consult, noted deficits, bed mobility, grooming sequencing.                               User Key     Initials Effective Dates Name Provider Type Discipline     06/08/18 -  Juana Aviles OT Occupational Therapist OT     06/23/15 -  Shyanne Henry OT Occupational Therapist OT     03/30/20 -  Lazara Zee RN Registered Nurse Nurse              OT Recommendation and Plan     Plan of Care Review  Plan of Care Reviewed With: patient  Outcome Summary: Pt required max a supine to sit,  setup and Vcs to wash face,  mod a to comb hair,  Max A to perform 3  STS given BUE,  max a sit to supine.   Pt is limited by confusion and weakness.     Time Calculation:   Time Calculation- OT     Row Name 01/14/21 1501             Time Calculation- OT    OT Received On  01/14/21  -      OT Goal Re-Cert Due Date  01/22/21  -        User Key  (r) = Recorded By, (t) = Taken By, (c) = Cosigned By    Initials Name Provider Type     Shyanne Henry, OT Occupational Therapist        Therapy Charges for Today     Code Description Service Date Service Provider Modifiers Qty    [id number]  OT THERAPEUTIC ACT EA 15 MIN 1/14/2021 Shyanne Henry, OT GO 1               Shyanne LARKIN. COSMO Henry  1/14/2021

## 2021-01-14 NOTE — PLAN OF CARE
VS stable. Plan for a swallow study tomorrow and plan for patient to be DC tomorrow back to [facility]. Pt only oriented to self and is pleasantly confused.  Will continue to monitor  Problem: Adult Inpatient Plan of Care  Goal: Plan of Care Review  Outcome: Ongoing, Progressing

## 2021-01-14 NOTE — THERAPY TREATMENT NOTE
Patient Name: Dirk Miles  : 1940    MRN: [id number]                              Today's Date: 2021       Admit Date: 1/10/2021    Visit Dx:     ICD-10-CM ICD-9-CM   1. Cellulitis of right buttock  L03.317 682.5   2. Altered mental status, unspecified altered mental status type  R41.82 780.97   3. Lab test positive for detection of COVID-19 virus  U07.1 079.89   4. Hypoxia  R09.02 799.02   5. Acute sphenoidal sinusitis, recurrence not specified  J01.30 461.3   6. Dysphagia, unspecified type  R13.10 787.20     Patient Active Problem List   Diagnosis   • Acute CVA (cerebrovascular accident) (CMS/HCC)   • A-fib (CMS/[facility])   • Hypertension   • Diabetes mellitus (CMS/[facility])   • Cellulitis of right buttock   • Dementia with behavioral disturbance (CMS/[facility])   • Hyperlipemia   • Stenosis of left carotid artery   • Anemia, chronic disease   • COVID-19 virus detected     Past Medical History:   Diagnosis Date   • A-fib (CMS/[facility])    • Arthritis    • B12 deficiency    • Coronary artery disease    • COVID-19    • Diabetes mellitus (CMS/[facility])    • DNR (do not resuscitate)    • Dysphagia    • Falls    • GERD (gastroesophageal reflux disease)    • Hemiplegia (CMS/[facility])    • Hyperlipidemia    • Hypertension    • Insomnia    • Pressure sore on buttocks    • Stroke (CMS/[facility])      Past Surgical History:   Procedure Laterality Date   • CATARACT EXTRACTION, BILATERAL     • HERNIA REPAIR       General Information     Row Name 21 1112          Physical Therapy Time and Intention    Document Type  therapy note (daily note)  -VG     Mode of Treatment  individual therapy;physical therapy  -VG     Row Name 21 1112          General Information    Existing Precautions/Restrictions  fall;other (see comments) Subacute CVA with L-sided weakness  -VG     Row Name 21 1112          Cognition    Orientation Status (Cognition)  oriented to;person;disoriented to;situation;place;time  -VG       User Key  (r) = Recorded By,  (t) = Taken By, (c) = Cosigned By    Initials Name Provider Type    VG Abmer Choudhury, PT Physical Therapist        Mobility     Row Name 01/14/21 1115          Bed Mobility    Supine-Sit [place] (Bed Mobility)  maximum assist (25% patient effort);2 person assist;verbal cues  -VG     Sit-Supine [place] (Bed Mobility)  dependent (less than 25% patient effort);2 person assist;verbal cues  -VG     Assistive Device (Bed Mobility)  bed rails;draw sheet;head of bed elevated  -VG     Comment (Bed Mobility)  Cues for hand placement and sequencing. Pt with difficulty following commands. Intermittently uncooperative.  -VG     Row Name 01/14/21 1115          Transfers    Comment (Transfers)  Cues for hand placement and sequencing. Attempts x2 for successful STS then impulsively sits before cued. Deferred further mobility d/t cognitive status.  -VG       User Key  (r) = Recorded By, (t) = Taken By, (c) = Cosigned By    Initials Name Provider Type    VG Amber Choudhury, PT Physical Therapist        Obj/Interventions     Row Name 01/14/21 1122          Motor Skills    Therapeutic Exercise  other (see comments) AP, HS, SLR, hip abd/add 5-10x AAROM/PROM  -VG       User Key  (r) = Recorded By, (t) = Taken By, (c) = Cosigned By    Initials Name Provider Type    Amber Galicia, PT Physical Therapist        Goals/Plan    No documentation.       Clinical Impression     Row Name 01/14/21 1127          Pain Scale: FACES Pre/Post-Treatment    Pain: FACES Scale, Pretreatment  0-->no hurt  -VG     Posttreatment Pain Rating  0-->no hurt  -VG     Row Name 01/14/21 1127          Plan of Care Review    Plan of Care Reviewed With  patient  -VG     Progress  no change  -VG     Outcome Summary  Pt required MAXAx2 for supine to EOB. 2 attempts for successful STS with MAXAx2 via BUE support. Uncontrolled impulsive sit. Deferred further mobility d/t difficulty following commands and intermittently uncooperative/confused. Inc  time/effort for all mobility.  -VG     Row Name 01/14/21 1127 01/14/21 1103       Vital Signs    Pre Systolic BP Rehab  148  -VG  148  -VG    Pre Treatment Diastolic BP  96  -VG  96  -VG    Post Systolic BP Rehab  119  -VG  119  -VG    Post Treatment Diastolic BP  71  -VG  71  -VG    Pretreatment Heart Rate (beats/min)  74  -VG  74  -VG    Posttreatment Heart Rate (beats/min)  97  -VG  97  -VG    Pre SpO2 (%)  98  -VG  98  -VG    O2 Delivery Pre Treatment  room air  -VG  room air  -VG    Post SpO2 (%)  95  -VG  95  -VG    O2 Delivery Post Treatment  room air  -VG  room air  -VG    Pre Patient Position  Supine  -VG  Supine  -VG    Post Patient Position  Supine  -VG  Supine  -VG    Row Name 01/14/21 1127 01/14/21 1103       Positioning and Restraints    Pre-Treatment Position  in bed  -VG  in bed  -VG    Post Treatment Position  bed  -VG  bed  -VG    In Bed  fowlers;call light within reach;encouraged to call for assist;exit alarm on;side rails up x3;notified nsg  -VG  --      User Key  (r) = Recorded By, (t) = Taken By, (c) = Cosigned By    Initials Name Provider Type    VG Amber Choudhury, PT Physical Therapist        Outcome Measures     Row Name 01/14/21 1135          How much help from another person do you currently need...    Turning from your back to your side while in flat bed without using bedrails?  2  -VG     Moving from lying on back to sitting on the side of a flat bed without bedrails?  2  -VG     Moving to and from a bed to a chair (including a wheelchair)?  2  -VG     Standing up from a chair using your arms (e.g., wheelchair, bedside chair)?  2  -VG     Climbing 3-5 steps with a railing?  1  -VG     To walk in hospital room?  1  -VG     AM-PAC 6 Clicks Score (PT)  10  -VG     Row Name 01/14/21 1135          Functional Assessment    Outcome Measure Options  AM-PAC 6 Clicks Basic Mobility (PT)  -VG       User Key  (r) = Recorded By, (t) = Taken By, (c) = Cosigned By    Initials Name Provider Type    VG  Amber Choudhury, PT Physical Therapist        Physical Therapy Education                 Title: PT OT SLP Therapies (In Progress)     Topic: Physical Therapy (In Progress)     Point: Mobility training (In Progress)     Learning Progress Summary           Patient Acceptance, E, NL,NR by  at 1/14/2021 1136    Acceptance, E, NR by  at 1/12/2021 1615    Acceptance, E,D, NR by  at 1/12/2021 1339                   Point: Home exercise program (In Progress)     Learning Progress Summary           Patient Acceptance, E, NL,NR by  at 1/14/2021 1136    Acceptance, E, NR by  at 1/12/2021 1615                   Point: Body mechanics (In Progress)     Learning Progress Summary           Patient Acceptance, E, NL,NR by  at 1/14/2021 1136    Acceptance, E, NR by  at 1/12/2021 1615    Acceptance, E,D, NR by  at 1/12/2021 1339                   Point: Precautions (In Progress)     Learning Progress Summary           Patient Acceptance, E, NL,NR by  at 1/14/2021 1136    Acceptance, E, NR by  at 1/12/2021 1615    Acceptance, E,D, NR by  at 1/12/2021 1339                               User Key     Initials Effective Dates Name Provider Type Discipline     06/19/15 -  Bettina Payton, PT Physical Therapist PT     05/29/18 -  Amber Choudhury, PT Physical Therapist PT     03/30/20 -  Lazara Zee, RN Registered Nurse Nurse              PT Recommendation and Plan     Plan of Care Reviewed With: patient  Progress: no change  Outcome Summary: Pt required MAXAx2 for supine to EOB. 2 attempts for successful STS with MAXAx2 via BUE support. Uncontrolled impulsive sit. Deferred further mobility d/t difficulty following commands and intermittently uncooperative/confused. Inc time/effort for all mobility.     Time Calculation:   PT Charges     Row Name 01/14/21 1030             Time Calculation    Start Time  1030  -VG      PT Received On  01/14/21  -      PT Goal Re-Cert Due Date  01/22/21  -         Time  Calculation- PT    Total Timed Code Minutes- PT  40 minute(s)  -VG         Timed Charges    61374 - PT Therapeutic Exercise Minutes  10  -VG      93698 - PT Therapeutic Activity Minutes  30  -VG        User Key  (r) = Recorded By, (t) = Taken By, (c) = Cosigned By    Initials Name Provider Type    VG Amber Choudhury, PT Physical Therapist        Therapy Charges for Today     Code Description Service Date Service Provider Modifiers Qty    23353058980 HC PT THER PROC EA 15 MIN 1/14/2021 Amber Choudhury, PT GP 1    37379874060 HC PT THERAPEUTIC ACT EA 15 MIN 1/14/2021 Amber Choudhury, PT GP 2    94935717174 HC PT THER SUPP EA 15 MIN 1/14/2021 Amber Choudhury, PT GP 3          PT G-Codes  Outcome Measure Options: AM-PAC 6 Clicks Basic Mobility (PT)  AM-PAC 6 Clicks Score (PT): 10  AM-PAC 6 Clicks Score (OT): 8    Radha Choudhury PT  1/14/2021

## 2021-01-15 VITALS
HEIGHT: 69 IN | OXYGEN SATURATION: 95 % | DIASTOLIC BLOOD PRESSURE: 85 MMHG | RESPIRATION RATE: 18 BRPM | BODY MASS INDEX: 22.97 KG/M2 | TEMPERATURE: 97.6 F | WEIGHT: 155.1 LBS | SYSTOLIC BLOOD PRESSURE: 154 MMHG | HEART RATE: 80 BPM

## 2021-01-15 PROBLEM — L03.317 CELLULITIS OF RIGHT BUTTOCK: Status: RESOLVED | Noted: 2021-01-10 | Resolved: 2021-01-15

## 2021-01-15 LAB
ALBUMIN SERPL-MCNC: 3.4 G/DL (ref 3.5–5.2)
ALBUMIN/GLOB SERPL: 0.9 G/DL
ALP SERPL-CCNC: 73 U/L (ref 39–117)
ALT SERPL W P-5'-P-CCNC: 26 U/L (ref 1–41)
ANION GAP SERPL CALCULATED.3IONS-SCNC: 10 MMOL/L (ref 5–15)
AST SERPL-CCNC: 29 U/L (ref 1–40)
BACTERIA SPEC AEROBE CULT: NORMAL
BACTERIA SPEC AEROBE CULT: NORMAL
BILIRUB SERPL-MCNC: 0.6 MG/DL (ref 0–1.2)
BUN SERPL-MCNC: 28 MG/DL (ref 8–23)
BUN/CREAT SERPL: 26.4 (ref 7–25)
CALCIUM SPEC-SCNC: 9 MG/DL (ref 8.6–10.5)
CHLORIDE SERPL-SCNC: 98 MMOL/L (ref 98–107)
CO2 SERPL-SCNC: 29 MMOL/L (ref 22–29)
CREAT SERPL-MCNC: 1.06 MG/DL (ref 0.76–1.27)
DEPRECATED RDW RBC AUTO: 47.1 FL (ref 37–54)
ERYTHROCYTE [DISTWIDTH] IN BLOOD BY AUTOMATED COUNT: 13.2 % (ref 12.3–15.4)
GFR SERPL CREATININE-BSD FRML MDRD: 67 ML/MIN/1.73
GLOBULIN UR ELPH-MCNC: 3.9 GM/DL
GLUCOSE BLDC GLUCOMTR-MCNC: 134 MG/DL (ref 70–130)
GLUCOSE BLDC GLUCOMTR-MCNC: 200 MG/DL (ref 70–130)
GLUCOSE SERPL-MCNC: 129 MG/DL (ref 65–99)
HCT VFR BLD AUTO: 32.9 % (ref 37.5–51)
HGB BLD-MCNC: 10.4 G/DL (ref 13–17.7)
MCH RBC QN AUTO: 31.2 PG (ref 26.6–33)
MCHC RBC AUTO-ENTMCNC: 31.6 G/DL (ref 31.5–35.7)
MCV RBC AUTO: 98.8 FL (ref 79–97)
PLATELET # BLD AUTO: 264 10*3/MM3 (ref 140–450)
PMV BLD AUTO: 10.2 FL (ref 6–12)
POTASSIUM SERPL-SCNC: 3.8 MMOL/L (ref 3.5–5.2)
PROT SERPL-MCNC: 7.3 G/DL (ref 6–8.5)
RBC # BLD AUTO: 3.33 10*6/MM3 (ref 4.14–5.8)
SODIUM SERPL-SCNC: 137 MMOL/L (ref 136–145)
WBC # BLD AUTO: 8.7 10*3/MM3 (ref 3.4–10.8)

## 2021-01-15 PROCEDURE — 25010000002 ENOXAPARIN PER 10 MG: Performed by: INTERNAL MEDICINE

## 2021-01-15 PROCEDURE — 82962 GLUCOSE BLOOD TEST: CPT

## 2021-01-15 PROCEDURE — 63710000001 INSULIN LISPRO (HUMAN) PER 5 UNITS: Performed by: INTERNAL MEDICINE

## 2021-01-15 PROCEDURE — 63710000001 DEXAMETHASONE PER 0.25 MG

## 2021-01-15 PROCEDURE — 85027 COMPLETE CBC AUTOMATED: CPT | Performed by: HOSPITALIST

## 2021-01-15 PROCEDURE — 99239 HOSP IP/OBS DSCHRG MGMT >30: CPT | Performed by: HOSPITALIST

## 2021-01-15 PROCEDURE — 80053 COMPREHEN METABOLIC PANEL: CPT | Performed by: HOSPITALIST

## 2021-01-15 RX ORDER — BACLOFEN 10 MG/1
10 TABLET ORAL 3 TIMES DAILY PRN
Start: 2021-01-15

## 2021-01-15 RX ORDER — DOXYCYCLINE 100 MG/1
100 CAPSULE ORAL EVERY 12 HOURS SCHEDULED
Qty: 6 CAPSULE | Refills: 0
Start: 2021-01-15 | End: 2021-01-18

## 2021-01-15 RX ORDER — LISINOPRIL 2.5 MG/1
2.5 TABLET ORAL
Start: 2021-01-16 | End: 2021-03-02 | Stop reason: HOSPADM

## 2021-01-15 RX ORDER — DEXAMETHASONE 6 MG/1
6 TABLET ORAL
Qty: 4 TABLET | Refills: 0
Start: 2021-01-16 | End: 2021-01-20

## 2021-01-15 RX ADMIN — DEXAMETHASONE 6 MG: 4 TABLET ORAL at 09:35

## 2021-01-15 RX ADMIN — AMLODIPINE BESYLATE 5 MG: 5 TABLET ORAL at 09:35

## 2021-01-15 RX ADMIN — DOXYCYCLINE 100 MG: 100 CAPSULE ORAL at 09:36

## 2021-01-15 RX ADMIN — FAMOTIDINE 20 MG: 20 TABLET, FILM COATED ORAL at 09:35

## 2021-01-15 RX ADMIN — DOCUSATE SODIUM 200 MG: 100 CAPSULE ORAL at 09:35

## 2021-01-15 RX ADMIN — ASPIRIN 81 MG CHEWABLE TABLET 81 MG: 81 TABLET CHEWABLE at 09:35

## 2021-01-15 RX ADMIN — REMDESIVIR 100 MG: 100 INJECTION, POWDER, LYOPHILIZED, FOR SOLUTION INTRAVENOUS at 09:36

## 2021-01-15 RX ADMIN — Medication 2000 UNITS: at 09:35

## 2021-01-15 RX ADMIN — SODIUM CHLORIDE, PRESERVATIVE FREE 10 ML: 5 INJECTION INTRAVENOUS at 09:36

## 2021-01-15 RX ADMIN — ENOXAPARIN SODIUM 40 MG: 40 INJECTION SUBCUTANEOUS at 09:36

## 2021-01-15 RX ADMIN — INSULIN LISPRO 3 UNITS: 100 INJECTION, SOLUTION INTRAVENOUS; SUBCUTANEOUS at 12:22

## 2021-01-15 RX ADMIN — LISINOPRIL 2.5 MG: 2.5 TABLET ORAL at 09:36

## 2021-01-15 RX ADMIN — OXYCODONE HYDROCHLORIDE AND ACETAMINOPHEN 250 MG: 500 TABLET ORAL at 09:35

## 2021-01-15 NOTE — DISCHARGE PLACEMENT REQUEST
"Dirk Camacho (80 y.o. Male)   To Benson Loera  From Yudy Cooper [phone]    Date of Birth Social Security Number Address Home Phone MRN    1940  [address] [place], [place] [id number] [phone] [id number]    Sabianist Marital Status          Faith        Admission Date Admission Type Admitting Provider Attending Provider Department, Room/Bed    1/10/21 Emergency Dari Vargas MD Reddy, Mayuri V, MD [address], [address]/1    Discharge Date Discharge Disposition Discharge Destination         Skilled Nursing Facility (DC - External)              Attending Provider: Dari Vargas MD    Allergies: No Known Allergies    Isolation: Contact Air   Infection: COVID (confirmed) (01/10/21)   Code Status: No CPR    Ht: 175.3 cm (69\")   Wt: 70.4 kg (155 lb 1.6 oz)    Admission Cmt: None   Principal Problem: Cellulitis of right buttock [L03.317]                 Active Insurance as of 1/10/2021     Primary Coverage     Payor Plan Insurance Group Employer/Plan Group    MEDICARE MEDICARE A & B      Payor Plan Address Payor Plan Phone Number Payor Plan Fax Number Effective Dates    [address] [phone]  4/1/2005 - None Entered    [facility] [id number]       Subscriber Name Subscriber Birth Date Member ID       DIRK CAMACHO 1940 [id number]           Secondary Coverage     Payor Plan Insurance Group Employer/Plan Group    [facility]      Payor Plan Address Payor Plan Phone Number Payor Plan Fax Number Effective Dates    [facility] [phone]  1/1/2020 - None Entered    [address]       [facility] [address]       Subscriber Name Subscriber Birth Date Member ID       DIRK CAMACHO 1940 [id number]                 Emergency Contacts      (Rel.) Home Phone Work Phone Mobile Phone    Sarai Camacho (Spouse) [phone] -- [phone]    JdAmbrocio (Son) [phone] -- [phone]                 Discharge Summary "      Dari Vargas MD at 01/15/21 0945              [facility] Medicine Services  DISCHARGE SUMMARY    Patient Name: Dirk Miles  : 1940  MRN: [id number]    Date of Admission: 1/10/2021  6:43 PM  Date of Discharge:  01/15/21  Primary Care Physician: Provider, No Known    Consults     No orders found from 2020 to 2021.          Hospital Course     Presenting Problem:   Cellulitis of right buttock [L03.317]  Cellulitis of right buttock [L03.317]    Active Hospital Problems    Diagnosis  POA   • Dementia with behavioral disturbance (CMS/[facility]) [F03.91]  Unknown   • Hyperlipemia [E78.5]  Unknown   • Stenosis of left carotid artery [I65.22]  Unknown   • Anemia, chronic disease [D63.8]  Unknown   • COVID-19 virus detected [U07.1]  Unknown   • A-fib (CMS/[facility]) [I48.91]  Yes   • Hypertension [I10]  Yes   • Diabetes mellitus (CMS/[facility]) [E11.9]  Yes   • Acute CVA (cerebrovascular accident) (CMS/[facility]) [I63.9]  Yes      Resolved Hospital Problems    Diagnosis Date Resolved POA   • **Cellulitis of right buttock [L03.317] 01/15/2021 Yes          Hospital Course:  Dirk Miles is a 80 y.o. male with history of recent right PCA CVA (2020), dementia, HTN, HLD, anemia, pulmonary HTN, DM2, and PAF who presents from [facility] with known +COVID-19 test (21) and altered mental status. Patient also noted to have buttock wound which has been getting worse per NH, was noted to be erythematous with low grade fever.      COVID-19   Hypoxia   Metabolic encephalopathy of COVID infection with baseline dementia  --Confirmed COVID-19 positive ( at NH and here ).  --Appropriate for removal from precautions/quarantine 21.  --Continue Decadron Day 6/10--will need 4 more days at discharge.  --Completed 5 days of Remdesivir today.  --Imaging reviewed, ATBx as below for cellulitis but did not favor superimposed bacterial PNA.  --Stable on room air.  --I recommend DVT  prophylaxis x 2 weeks (40 mg Lovenox daily or ASA daily--can defer to MD at [facility]) at discharge given his general immobility and COVID illness which increases his risk of blood clots. Need to monitor closely as he does have hx of recent ICH but I think prophylactic dose Lovenox or ASA should be tolerated fine.     Buttock Wound (POA about 1.5cm x 2cm x 0.1cm with central black eschar)   Mild associated cellulitis  --Likely pressure wound with possibility of mild superimposed cellulitis/  --Vanc/Zosyn started in ED, stopped Vanc 1/12  --> Now transitioned to PO Doxycycline for 5 additional days (through 1/18/21).  --Continue wound care with thera-honey/xeroform.     Pyuria  --UA with 13-20 WBC but also significant squamous cells.  --Urine culture no growth.     Previous CVA  --Continue home ASA.     Pulmonary HTN  --Continue Lasix 20 mg twice weekly.     DM2  --HbA1c 6.4% in 12/20.  --Continue SSI.     Chronic constipation  --Fecal impaction noted on CT A/P.  --Improved.  --Continue aggressive bowel regimen.     PAF  --[id number] = 6. Previously on Eliquis but discontinued after recent intracranial bleed. He is a high fall risk as well.       Discharge Follow Up Recommendations for outpatient labs/diagnostics:  F/U with PCP in 1 week  Discussed with Dr. Mason at [facility] by phone on day of discharge    COVID Symptom Date of Onset:  unknown  Date of first positive COVID test: 1/7/21   Quarantine Complete 20 days after date of onset: 1/27/21      Day of Discharge     HPI:   Patient seen this morning, resting in bed. No issues overnight.    Review of Systems   Unable to obtain due to mental status     Vital Signs:   Temp:  [97.5 °F (36.4 °C)-97.8 °F (36.6 °C)] 97.6 °F (36.4 °C)  Heart Rate:  [78-80] 80  Resp:  [18] 18  BP: (133-154)/(72-96) 154/85     Physical Exam:  With patient's consent, physical exam was conducted via visual telemedicine encounter due to patient's current isolation requirements in the  interest of PPE conservation.    Constitutional: No acute distress, frail appearance, sleeping  Respiratory: Good effort, nonlabored respirations on room air  Cardiovascular:  tele with NSR  Musculoskeletal: No edema  Skin: No visible rashes, no jaundice seen on exposed skin through window    Pertinent  and/or Most Recent Results     LAB RESULTS:      Lab 01/15/21  0614 01/11/21  0710 01/10/21  1941 01/10/21  1921   WBC 8.70 10.97*  --  11.56*   HEMOGLOBIN 10.4* 8.5*  --  9.4*   HEMATOCRIT 32.9* 27.3*  --  30.6*   PLATELETS 264 186  --  206   NEUTROS ABS  --  9.98*  --  9.90*   IMMATURE GRANS (ABS)  --   --   --  0.34*   LYMPHS ABS  --   --   --  0.42*   MONOS ABS  --   --   --  0.88   EOS ABS  --  0.00  --  0.01   MCV 98.8* 102.2*  --  101.7*   CRP  --   --   --  10.66*   PROCALCITONIN  --   --   --  0.32*   LACTATE  --   --  1.7  --    D DIMER QUANT  --  3.99*  --   --          Lab 01/12/21  1104 01/11/21  0710 01/10/21  1921   SODIUM  --  141 137   POTASSIUM  --  4.4 4.4   CHLORIDE  --  102 101   CO2  --  28.0 27.0   ANION GAP  --  11.0 9.0   BUN  --  19 20   CREATININE 0.56* 1.27 1.14   GLUCOSE  --  150* 160*   CALCIUM  --  8.9 8.9   MAGNESIUM  --   --  1.9         Lab 01/12/21  1104 01/11/21  0710 01/10/21  1921   TOTAL PROTEIN 4.1* 6.2 6.7   ALBUMIN 1.60* 3.40* 3.30*   GLOBULIN  --  2.8 3.4   ALT (SGPT) 8 15 15   AST (SGOT) 11 18 19   BILIRUBIN 0.2 0.4 0.4   BILIRUBIN DIRECT <0.2 0.2  --    ALK PHOS 38* 69 68         Lab 01/11/21  1024 01/10/21  1921   PROBNP  --  11,359.0*   TROPONIN T 0.014 0.020             Lab 01/11/21  0710 01/10/21  1921   IRON 17*  --    IRON SATURATION 8*  --    TIBC 222*  --    TRANSFERRIN 149*  --    FERRITIN  --  493.10*   ABO TYPING A A   RH TYPING Positive Positive   ANTIBODY SCREEN Negative  --          Brief Urine Lab Results  (Last result in the past 365 days)      Color   Clarity   Blood   Leuk Est   Nitrite   Protein   CREAT   Urine HCG        01/10/21 2052 Yellow Clear  Negative Negative Negative 30 mg/dL (1+)             Microbiology Results (last 10 days)     Procedure Component Value - Date/Time    Respiratory Panel PCR w/COVID-19(SARS-CoV-2) JOHN/MICHAEL/ROLANDO/PAD/COR/MAD/CHET In-House, NP Swab in UTM/VTM, 3-4 HR TAT - Swab, Nasopharynx [[id number]]  (Abnormal) Collected: 01/10/21 2235    Lab Status: Final result Specimen: Swab from Nasopharynx Updated: 01/10/21 2344     ADENOVIRUS, PCR Not Detected     Coronavirus 229E Not Detected     Coronavirus HKU1 Not Detected     Coronavirus NL63 Not Detected     Coronavirus OC43 Not Detected     COVID19 Detected     Human Metapneumovirus Not Detected     Human Rhinovirus/Enterovirus Not Detected     Influenza A PCR Not Detected     Influenza A H1 Not Detected     Influenza A H1 2009 PCR Not Detected     Influenza A H3 Not Detected     Influenza B PCR Not Detected     Parainfluenza Virus 1 Not Detected     Parainfluenza Virus 2 Not Detected     Parainfluenza Virus 3 Not Detected     Parainfluenza Virus 4 Not Detected     RSV, PCR Not Detected     Bordetella pertussis pcr Not Detected     Bordetella parapertussis PCR Not Detected     Chlamydophila pneumoniae PCR Not Detected     Mycoplasma pneumo by PCR Not Detected    Narrative:      Fact sheet for providers: https://docs.[employer]/wp-content/uploads/XAW6288-9290-AT5.1-EUA-Provider-Fact-Sheet-3.pdf    Fact sheet for patients: https://docs.[employer]/wp-content/uploads/XSU7417-4127-WT5.1-EUA-Patient-Fact-Sheet-1.pdf    Test performed by PCR.    Urine Culture - Urine, Urine, Catheter In/Out [[id number]]  (Normal) Collected: 01/10/21 2052    Lab Status: Final result Specimen: Urine, Catheter In/Out Updated: 01/11/21 1809     Urine Culture No growth    Blood Culture - Blood, Arm, Right [[id number]] Collected: 01/10/21 1945    Lab Status: Preliminary result Specimen: Blood from Arm, Right Updated: 01/14/21 2001     Blood Culture No growth at 4 days    Blood Culture - Blood, Arm, Left [[id number]]  Collected: 01/10/21 1930    Lab Status: Preliminary result Specimen: Blood from Arm, Left Updated: 01/14/21 2001     Blood Culture No growth at 4 days          Ct Head Without Contrast    Result Date: 1/10/2021  HISTORY: Altered mental status. Confusion and possible Covid. TECHNIQUE: CT head without contrast. Radiation dose reduction techniques included automated exposure control or exposure modulation based on body size. Radiation audit for number of CT and nuclear cardiology exams performed in the last year: 12.  COMPARISON: Head CT from 1/2/2021. FINDINGS: There is motion limitation of current and prior study. There is moderate mucous retention cyst or polyp at the right maxillary antrum and a small mucous retention cyst or polyp at the left maxillary antrum. The mastoid air cells are clear. There is mucosal thickening in the ethmoid air cells. There is near complete opacification of left sphenoid sinus with an air-fluid level consistent with a component of acute sinusitis. Redemonstrated is atrophy with extensive white matter low-attenuation that is nonspecific but likely due to small vessel disease. There is chronic encephalomalacia left posterior frontal lobe. There is low-attenuation with punctate hyperdensity and local mass effect involving the right posterior temporal lobe to occipital lobe most consistent with a late subacute to chronic infarct with hemorrhagic transformation. On comparison to the most recent study no new hemorrhage is seen and there is no change in mass effect. There is malacia of the right posterior thalamus and adjacent posterior limb of the right internal capsule as well as low attenuation into the right basal ganglia more posteriorly are consistent with evolution of infarct is a small lacune in the anterior left michael. There is no new mass affect obvious acute cortical infarct.     1. Allowing for motion on both prior and current study findings are most consistent with expected  interval evolution of late subacute to chronic infarct involving the right posterior cerebral artery distribution with punctate areas of hemorrhagic transformation. No interval new hemorrhage or increase in mass effect is appreciated. 2. Pre-existing extensive probable sequelae of small vessel disease. 3. Chronic malacic change left posterior frontal lobe # 4. there is new near complete opacification of the left sphenoid sinus with an air-fluid level consistent with a component of acute sinusitis. This is a significant change from prior. Signer Name: Gisel Nova MD  Signed: 1/10/2021 8:24 PM  Workstation Name: [email or web address]  [facility]    Ct Pelvis With Contrast    Result Date: 1/10/2021  INDICATION:  Buttock abscess right side altered mental status confusion and positive for Covid TECHNIQUE: CT of the pelvis during intravenous administration of contrast. contrast dose recorded in the patient's chart. Coronal and sagittal reconstructions were obtained.  Radiation dose reduction techniques included automated exposure control or exposure modulation based on body size. Radiation audit for number of CT and nuclear cardiology exams performed in the last year: 12.  COMPARISON:  None available. FINDINGS: There is abnormal soft tissue edema with skin thickening right buttock area inferiorly and medially adjacent to the gluteal fold. It is centered in the subcutaneous fat and the overall area of at least focal soft tissue edema measure as about 3.3 x 4.7 x 2.4 cm dimension. There is vague central lower attenuation. The timing of the contrast is arterial. Because of this, the study is not sensitive for rim-enhancing fluid collection. Findings most concerning for at least cellulitis/phlegmon and possibly ill-defined abscess. There is extension of soft tissue edema and cellulitic change more anteriorly to the right side perineum as well. This cellulitic change appears to extend to the posterior aspect of  the scrotum. I do not see evidence for soft tissue gas but please correlate for any physical evidence of scrotal cellulitis. There is no bone destruction or CT evidence for osteomyelitis. Urinary bladder is unremarkable. There is a large amount of stool in the rectum consistent with impaction. There is sigmoid diverticulosis with moderate stool. There is an old left superior acetabular fracture. There are extensive atherosclerotic vascular calcifications.     1. There is at minimum cellulitic change in the right buttock extending to the right side of the perineum and possibly the posterior inferior aspect of the scrotum with skin thickening. I do not see evidence for soft tissue gas. There is more focal abnormal soft tissue attenuation in the subcutaneous fat centered right buttock inferomedially adjacent to the gluteal crease. There is associated ill-defined lower attenuation centrally. This could be phlegmonous change or early abscess. Of note the contrast bolus is arterial and therefore not sensitive for diagnosis of rim-enhancing fluid collection. There is no underlying bone destruction. Please correlate with physical examination of the scrotum for evidence of scrotal cellulitis. 2. Fecal impaction in the rectum. 3. Sigmoid diverticulosis. 4. Extensive vascular calcifications. 5. Chronic left acetabular fracture Signer Name: Gisel Nova MD  Signed: 1/10/2021 8:37 PM  Workstation Name: [id number]  [facility]    Xr Chest 1 View    Result Date: 1/10/2021  CR Chest 1 Vw INDICATION: Weakness dizziness confused. COMPARISON:  Chest x-ray 12/4/2020. FINDINGS: Single portable AP view(s) of the chest.  There is globular cardiac silhouette enlargement which is increased from the prior study and there is mild increase in central vascular markings and interstitial markings. This likely mild congestive heart failure. There is no pleural effusion or pneumothorax. There is no acute infiltrate.     There  is interval increase in cardiac silhouette size with mild vascular congestion and interstitial edema. Findings most suggestive of interval development of mild congestive failure. Clinical correlation and follow-up to resolution recommended. Signer Name: Gisel Nova MD  Signed: 1/10/2021 7:39 PM  Workstation Name: [email or web address]  [facility]      Results for orders placed during the hospital encounter of 12/01/20   Duplex Carotid Ultrasound CAR    Narrative · Left internal carotid artery stenosis of >70%.  · Left vertebral artery is bidirectional.  · Right internal carotid artery stenosis of 50-69%.          Results for orders placed during the hospital encounter of 12/01/20   Duplex Carotid Ultrasound CAR    Narrative · Left internal carotid artery stenosis of >70%.  · Left vertebral artery is bidirectional.  · Right internal carotid artery stenosis of 50-69%.          Results for orders placed during the hospital encounter of 12/01/20   Adult Transthoracic Echo Complete W/ Cont if Necessary Per Protocol (With Agitated Saline)    Narrative · Left ventricular ejection fraction appears to be 61 - 65%. Left   ventricular systolic function is normal.  · Left atrial volume is moderately increased.  · Saline test results are negative.  · The right atrial cavity is mildly dilated.  · Moderate tricuspid valve regurgitation is present.  · Estimated right ventricular systolic pressure from tricuspid   regurgitation is moderately elevated (45-55 mmHg).          Pending Labs     Order Current Status    Comprehensive Metabolic Panel In process    Blood Culture - Blood, Arm, Left Preliminary result    Blood Culture - Blood, Arm, Right Preliminary result        Discharge Details        Discharge Medications      New Medications      Instructions Start Date   dexamethasone 6 MG tablet  Commonly known as: DECADRON   6 mg, Oral, Daily With Breakfast   Start Date: January 16, 2021     doxycycline 100 MG  capsule  Commonly known as: MONODOX   100 mg, Oral, Every 12 Hours Scheduled      enoxaparin 40 MG/0.4ML solution syringe  Commonly known as: LOVENOX   40 mg, Subcutaneous, Daily   Start Date: January 16, 2021        Changes to Medications      Instructions Start Date   aspirin 81 MG EC tablet  What changed: Another medication with the same name was removed. Continue taking this medication, and follow the directions you see here.   81 mg, Oral, Daily      atorvastatin 80 MG tablet  Commonly known as: LIPITOR  What changed: additional instructions   80 mg, Oral, Nightly      baclofen 10 MG tablet  Commonly known as: LIORESAL  What changed:   · when to take this  · reasons to take this   10 mg, Oral, 3 Times Daily PRN, For muscle spasms       lisinopril 2.5 MG tablet  Commonly known as: PRINIVIL,ZESTRIL  What changed:   · medication strength  · how much to take   2.5 mg, Oral, Every 24 Hours Scheduled   Start Date: January 16, 2021        Continue These Medications      Instructions Start Date   acetaminophen 325 MG tablet  Commonly known as: TYLENOL   650 mg, Oral, Every 6 Hours PRN      amLODIPine 5 MG tablet  Commonly known as: NORVASC   5 mg, Oral, Daily      ARTIFICIAL TEARS OP   Ophthalmic      cholecalciferol 25 MCG (1000 UT) tablet  Commonly known as: VITAMIN D3   2,000 Units, Oral, Daily      cloNIDine 0.1 MG tablet  Commonly known as: CATAPRES   0.1 mg, Oral, 3 Times Daily PRN      vitamin B-12 1000 MCG tablet  Commonly known as: CYANOCOBALAMIN   1,000 mcg, Oral, Daily      cyanocobalamin 1000 MCG/ML injection   1,000 mcg, Intramuscular, Weekly      docusate sodium 100 MG capsule  Commonly known as: COLACE   200 mg, Oral, 2 Times Daily      famotidine 20 MG tablet  Commonly known as: PEPCID   20 mg, Oral, 2 Times Daily      furosemide 20 MG tablet  Commonly known as: LASIX   20 mg, Oral, 2 Times Weekly, ON TUES AND SAT       hydrocortisone 2.5 % cream   Topical, Every 12 Hours Scheduled      insulin aspart  100 UNIT/ML solution pen-injector sc pen  Commonly known as: novoLOG FLEXPEN   8 Units, Subcutaneous, As Needed      insulin lispro 100 UNIT/ML injection  Commonly known as: humaLOG, ADMELOG   0-7 Units, Subcutaneous, 3 Times Daily Before Meals      ipratropium-albuterol 0.5-2.5 mg/3 ml nebulizer  Commonly known as: DUO-NEB   3 mL, Nebulization, Every 4 Hours PRN      melatonin 1 MG tablet   6 mg, Oral, Nightly      metoprolol tartrate 50 MG tablet  Commonly known as: LOPRESSOR   50 mg, Oral, Every 12 Hours Scheduled      NON FORMULARY   Pt can take meds crushed in pudding per Meyer Loera       O2  Commonly known as: OXYGEN   2 L/min, Inhalation, Daily PRN      ondansetron 4 MG tablet  Commonly known as: ZOFRAN   4 mg, Oral, Every 8 Hours PRN      sennosides-docusate 8.6-50 MG per tablet  Commonly known as: PERICOLACE   2 tablets, Oral, 2 Times Daily      vitamin C 250 MG tablet  Commonly known as: ASCORBIC ACID   250 mg, Oral, Daily         Stop These Medications    Augmentin 875-125 MG per tablet  Generic drug: amoxicillin-clavulanate     niCARdipine in NaCl 20-0.86 MG/200ML-% infusion  Commonly known as: CARDENE-IV     predniSONE 10 MG tablet  Commonly known as: DELTASONE            No Known Allergies      Discharge Disposition:  Skilled Nursing Facility (DC - External)    Diet:  Hospital:  Diet Order   Procedures   • Diet Pureed; [place] / Syrup Thick; Cardiac, Low Sodium, Consistent Carbohydrate       Activity:  Activity Instructions     Activity as Tolerated            CODE STATUS:    Code Status and Medical Interventions:   Ordered at: 01/10/21 0350     Limited Support to NOT Include:    Intubation     Code Status:    No CPR     Medical Interventions (Level of Support Prior to Arrest):    Limited     Comments:    dnr per living will and wife confirmed it Sarai [phone]       No future appointments.    Additional Instructions for the Follow-ups that You Need to Schedule     Discharge Follow-up with PCP   As  directed       Currently Documented PCP:    Provider, No Known    PCP Phone Number:    None     Follow Up Details: 1 week                 Dari Vargas MD  01/15/21      Time Spent on Discharge:  I spent  35  minutes on this discharge activity which included: face-to-face encounter with the patient, reviewing the data in the system, coordination of the care with the nursing staff as well as consultants, documentation, and entering orders.         Electronically signed by Dari Vargas MD at 01/15/21 1007

## 2021-01-15 NOTE — PLAN OF CARE
Goal Outcome Evaluation:  SLP recommended MBS for today, prior to d/c; however, this was u/a to be completed due to scheduling conflicts. Recommend that pt cont to work w/ SLP at next level of care and may consider OP MBS when possible.

## 2021-01-15 NOTE — SIGNIFICANT NOTE
"PT bed alarm went off. Pt hanging off of side of bed. PCT assisted pt to ground. Pt did not hit his head. Pt stated, \"I am trying to go help Jr with that wall\" and \"I'm about to go milk that cow over there.\" Assessment performed. No changes noted. No complaints of pain. Charge Nurse Katharine, [facility] Lori, and Provider Dr. Vargas notified. Reorientation provided as well as teaching on use of call bell.   "

## 2021-01-15 NOTE — PROGRESS NOTES
Case Management Discharge Note      Final Note: I spoke with Myra the House Charge at [facility]. The pt is approved to return. Please call report to Myra at [phone] and ssend the copied chart, DC summary and any hard scripts for controlled meds with the pt. [facility] to transport at 1400. I notified Lesli with transport where the ambulance service would need to enter the facility and to call the House Charge. I spoke with the pts spouse yesterday and she is in agreemnt with the transfer.I faxed the summary to [phone].         Selected Continued Care - Admitted Since 1/10/2021     Destination Coordination complete    Service Provider Selected Services Address Phone Fax Patient Preferred    [facility]  Intermediate Care [address] [place] [address] [phone] --          Durable Medical Equipment    No services have been selected for the patient.              Dialysis/Infusion    No services have been selected for the patient.              Home Medical Care    No services have been selected for the patient.              Therapy    No services have been selected for the patient.              Community Resources    No services have been selected for the patient.                Selected Continued Care - Prior Encounters Includes selections from prior encounters from 10/12/2020 to 1/15/2021    Discharged on 12/9/2020 Admission date: 12/1/2020 - Discharge disposition: Rehab Facility or Unit (DC - External)    Destination     Service Provider Selected Services Address Phone Fax Patient Preferred    [facility]  Inpatient Rehabilitation [address] [facility] [address] [phone] --                         Final Discharge Disposition Code: 04 - intermediate care facility

## 2021-01-15 NOTE — DISCHARGE SUMMARY
[facility] Medicine Services  DISCHARGE SUMMARY    Patient Name: Dirk Miles  : 1940  MRN: [id number]    Date of Admission: 1/10/2021  6:43 PM  Date of Discharge:  01/15/21  Primary Care Physician: Provider, No Known    Consults     No orders found from 2020 to 2021.          Hospital Course     Presenting Problem:   Cellulitis of right buttock [L03.317]  Cellulitis of right buttock [L03.317]    Active Hospital Problems    Diagnosis  POA   • Dementia with behavioral disturbance (CMS/[facility]) [F03.91]  Unknown   • Hyperlipemia [E78.5]  Unknown   • Stenosis of left carotid artery [I65.22]  Unknown   • Anemia, chronic disease [D63.8]  Unknown   • COVID-19 virus detected [U07.1]  Unknown   • A-fib (CMS/[facility]) [I48.91]  Yes   • Hypertension [I10]  Yes   • Diabetes mellitus (CMS/[facility]) [E11.9]  Yes   • Acute CVA (cerebrovascular accident) (CMS/[facility]) [I63.9]  Yes      Resolved Hospital Problems    Diagnosis Date Resolved POA   • **Cellulitis of right buttock [L03.317] 01/15/2021 Yes          Hospital Course:  Dirk Miles is a 80 y.o. male with history of recent right PCA CVA (2020), dementia, HTN, HLD, anemia, pulmonary HTN, DM2, and PAF who presents from [facility] with known +COVID-19 test (21) and altered mental status. Patient also noted to have buttock wound which has been getting worse per NH, was noted to be erythematous with low grade fever.      COVID-19   Hypoxia   Metabolic encephalopathy of COVID infection with baseline dementia  --Confirmed COVID-19 positive ( at NH and here ).  --Appropriate for removal from precautions/quarantine 21.  --Continue Decadron Day 6/10--will need 4 more days at discharge.  --Completed 5 days of Remdesivir today.  --Imaging reviewed, ATBx as below for cellulitis but did not favor superimposed bacterial PNA.  --Stable on room air.  --I recommend DVT prophylaxis x 2 weeks (40 mg Lovenox daily or ASA  daily--can defer to MD at [facility]) at discharge given his general immobility and COVID illness which increases his risk of blood clots. Need to monitor closely as he does have hx of recent ICH but I think prophylactic dose Lovenox or ASA should be tolerated fine.     Buttock Wound (POA about 1.5cm x 2cm x 0.1cm with central black eschar)   Mild associated cellulitis  --Likely pressure wound with possibility of mild superimposed cellulitis/  --Vanc/Zosyn started in ED, stopped Vanc 1/12  --> Now transitioned to PO Doxycycline for 5 additional days (through 1/18/21).  --Continue wound care with thera-honey/xeroform.     Pyuria  --UA with 13-20 WBC but also significant squamous cells.  --Urine culture no growth.     Previous CVA  --Continue home ASA.     Pulmonary HTN  --Continue Lasix 20 mg twice weekly.     DM2  --HbA1c 6.4% in 12/20.  --Continue SSI.     Chronic constipation  --Fecal impaction noted on CT A/P.  --Improved.  --Continue aggressive bowel regimen.     PAF  --[id number] = 6. Previously on Eliquis but discontinued after recent intracranial bleed. He is a high fall risk as well.       Discharge Follow Up Recommendations for outpatient labs/diagnostics:  F/U with PCP in 1 week  Discussed with Dr. Mason at [facility] by phone on day of discharge    COVID Symptom Date of Onset:  unknown  Date of first positive COVID test: 1/7/21   Quarantine Complete 20 days after date of onset: 1/27/21      Day of Discharge     HPI:   Patient seen this morning, resting in bed. No issues overnight.    Review of Systems   Unable to obtain due to mental status     Vital Signs:   Temp:  [97.5 °F (36.4 °C)-97.8 °F (36.6 °C)] 97.6 °F (36.4 °C)  Heart Rate:  [78-80] 80  Resp:  [18] 18  BP: (133-154)/(72-96) 154/85     Physical Exam:  With patient's consent, physical exam was conducted via visual telemedicine encounter due to patient's current isolation requirements in the interest of PPE conservation.    Constitutional: No  acute distress, frail appearance, sleeping  Respiratory: Good effort, nonlabored respirations on room air  Cardiovascular:  tele with NSR  Musculoskeletal: No edema  Skin: No visible rashes, no jaundice seen on exposed skin through window    Pertinent  and/or Most Recent Results     LAB RESULTS:      Lab 01/15/21  0614 01/11/21  0710 01/10/21  1941 01/10/21  1921   WBC 8.70 10.97*  --  11.56*   HEMOGLOBIN 10.4* 8.5*  --  9.4*   HEMATOCRIT 32.9* 27.3*  --  30.6*   PLATELETS 264 186  --  206   NEUTROS ABS  --  9.98*  --  9.90*   IMMATURE GRANS (ABS)  --   --   --  0.34*   LYMPHS ABS  --   --   --  0.42*   MONOS ABS  --   --   --  0.88   EOS ABS  --  0.00  --  0.01   MCV 98.8* 102.2*  --  101.7*   CRP  --   --   --  10.66*   PROCALCITONIN  --   --   --  0.32*   LACTATE  --   --  1.7  --    D DIMER QUANT  --  3.99*  --   --          Lab 01/12/21  1104 01/11/21  0710 01/10/21  1921   SODIUM  --  141 137   POTASSIUM  --  4.4 4.4   CHLORIDE  --  102 101   CO2  --  28.0 27.0   ANION GAP  --  11.0 9.0   BUN  --  19 20   CREATININE 0.56* 1.27 1.14   GLUCOSE  --  150* 160*   CALCIUM  --  8.9 8.9   MAGNESIUM  --   --  1.9         Lab 01/12/21  1104 01/11/21  0710 01/10/21  1921   TOTAL PROTEIN 4.1* 6.2 6.7   ALBUMIN 1.60* 3.40* 3.30*   GLOBULIN  --  2.8 3.4   ALT (SGPT) 8 15 15   AST (SGOT) 11 18 19   BILIRUBIN 0.2 0.4 0.4   BILIRUBIN DIRECT <0.2 0.2  --    ALK PHOS 38* 69 68         Lab 01/11/21  1024 01/10/21  1921   PROBNP  --  11,359.0*   TROPONIN T 0.014 0.020             Lab 01/11/21  0710 01/10/21  1921   IRON 17*  --    IRON SATURATION 8*  --    TIBC 222*  --    TRANSFERRIN 149*  --    FERRITIN  --  493.10*   ABO TYPING A A   RH TYPING Positive Positive   ANTIBODY SCREEN Negative  --          Brief Urine Lab Results  (Last result in the past 365 days)      Color   Clarity   Blood   Leuk Est   Nitrite   Protein   CREAT   Urine HCG        01/10/21 2052 Yellow Clear Negative Negative Negative 30 mg/dL (1+)              Microbiology Results (last 10 days)     Procedure Component Value - Date/Time    Respiratory Panel PCR w/COVID-19(SARS-CoV-2) JOHN/MICHAEL/ROLANDO/PAD/COR/MAD/CHET In-House, NP Swab in UTM/VTM, 3-4 HR TAT - Swab, Nasopharynx [[id number]]  (Abnormal) Collected: 01/10/21 2235    Lab Status: Final result Specimen: Swab from Nasopharynx Updated: 01/10/21 2344     ADENOVIRUS, PCR Not Detected     Coronavirus 229E Not Detected     Coronavirus HKU1 Not Detected     Coronavirus NL63 Not Detected     Coronavirus OC43 Not Detected     COVID19 Detected     Human Metapneumovirus Not Detected     Human Rhinovirus/Enterovirus Not Detected     Influenza A PCR Not Detected     Influenza A H1 Not Detected     Influenza A H1 2009 PCR Not Detected     Influenza A H3 Not Detected     Influenza B PCR Not Detected     Parainfluenza Virus 1 Not Detected     Parainfluenza Virus 2 Not Detected     Parainfluenza Virus 3 Not Detected     Parainfluenza Virus 4 Not Detected     RSV, PCR Not Detected     Bordetella pertussis pcr Not Detected     Bordetella parapertussis PCR Not Detected     Chlamydophila pneumoniae PCR Not Detected     Mycoplasma pneumo by PCR Not Detected    Narrative:      Fact sheet for providers: https://docs.Zjdg.cn/wp-content/uploads/QEI7578-4183-NZ1.1-EUA-Provider-Fact-Sheet-3.pdf    Fact sheet for patients: https://docs.Zjdg.cn/wp-content/uploads/HJX3420-0420-ED2.1-EUA-Patient-Fact-Sheet-1.pdf    Test performed by PCR.    Urine Culture - Urine, Urine, Catheter In/Out [[id number]]  (Normal) Collected: 01/10/21 2052    Lab Status: Final result Specimen: Urine, Catheter In/Out Updated: 01/11/21 1809     Urine Culture No growth    Blood Culture - Blood, Arm, Right [[id number]] Collected: 01/10/21 1945    Lab Status: Preliminary result Specimen: Blood from Arm, Right Updated: 01/14/21 2001     Blood Culture No growth at 4 days    Blood Culture - Blood, Arm, Left [[id number]] Collected: 01/10/21 1930    Lab Status:  Preliminary result Specimen: Blood from Arm, Left Updated: 01/14/21 2001     Blood Culture No growth at 4 days          Ct Head Without Contrast    Result Date: 1/10/2021  HISTORY: Altered mental status. Confusion and possible Covid. TECHNIQUE: CT head without contrast. Radiation dose reduction techniques included automated exposure control or exposure modulation based on body size. Radiation audit for number of CT and nuclear cardiology exams performed in the last year: 12.  COMPARISON: Head CT from 1/2/2021. FINDINGS: There is motion limitation of current and prior study. There is moderate mucous retention cyst or polyp at the right maxillary antrum and a small mucous retention cyst or polyp at the left maxillary antrum. The mastoid air cells are clear. There is mucosal thickening in the ethmoid air cells. There is near complete opacification of left sphenoid sinus with an air-fluid level consistent with a component of acute sinusitis. Redemonstrated is atrophy with extensive white matter low-attenuation that is nonspecific but likely due to small vessel disease. There is chronic encephalomalacia left posterior frontal lobe. There is low-attenuation with punctate hyperdensity and local mass effect involving the right posterior temporal lobe to occipital lobe most consistent with a late subacute to chronic infarct with hemorrhagic transformation. On comparison to the most recent study no new hemorrhage is seen and there is no change in mass effect. There is malacia of the right posterior thalamus and adjacent posterior limb of the right internal capsule as well as low attenuation into the right basal ganglia more posteriorly are consistent with evolution of infarct is a small lacune in the anterior left michael. There is no new mass affect obvious acute cortical infarct.     1. Allowing for motion on both prior and current study findings are most consistent with expected interval evolution of late subacute to chronic  infarct involving the right posterior cerebral artery distribution with punctate areas of hemorrhagic transformation. No interval new hemorrhage or increase in mass effect is appreciated. 2. Pre-existing extensive probable sequelae of small vessel disease. 3. Chronic malacic change left posterior frontal lobe # 4. there is new near complete opacification of the left sphenoid sinus with an air-fluid level consistent with a component of acute sinusitis. This is a significant change from prior. Signer Name: Gisel Nova MD  Signed: 1/10/2021 8:24 PM  Workstation Name: [email or web address]  [facility]    Ct Pelvis With Contrast    Result Date: 1/10/2021  INDICATION:  Buttock abscess right side altered mental status confusion and positive for Covid TECHNIQUE: CT of the pelvis during intravenous administration of contrast. contrast dose recorded in the patient's chart. Coronal and sagittal reconstructions were obtained.  Radiation dose reduction techniques included automated exposure control or exposure modulation based on body size. Radiation audit for number of CT and nuclear cardiology exams performed in the last year: 12.  COMPARISON:  None available. FINDINGS: There is abnormal soft tissue edema with skin thickening right buttock area inferiorly and medially adjacent to the gluteal fold. It is centered in the subcutaneous fat and the overall area of at least focal soft tissue edema measure as about 3.3 x 4.7 x 2.4 cm dimension. There is vague central lower attenuation. The timing of the contrast is arterial. Because of this, the study is not sensitive for rim-enhancing fluid collection. Findings most concerning for at least cellulitis/phlegmon and possibly ill-defined abscess. There is extension of soft tissue edema and cellulitic change more anteriorly to the right side perineum as well. This cellulitic change appears to extend to the posterior aspect of the scrotum. I do not see evidence for soft  tissue gas but please correlate for any physical evidence of scrotal cellulitis. There is no bone destruction or CT evidence for osteomyelitis. Urinary bladder is unremarkable. There is a large amount of stool in the rectum consistent with impaction. There is sigmoid diverticulosis with moderate stool. There is an old left superior acetabular fracture. There are extensive atherosclerotic vascular calcifications.     1. There is at minimum cellulitic change in the right buttock extending to the right side of the perineum and possibly the posterior inferior aspect of the scrotum with skin thickening. I do not see evidence for soft tissue gas. There is more focal abnormal soft tissue attenuation in the subcutaneous fat centered right buttock inferomedially adjacent to the gluteal crease. There is associated ill-defined lower attenuation centrally. This could be phlegmonous change or early abscess. Of note the contrast bolus is arterial and therefore not sensitive for diagnosis of rim-enhancing fluid collection. There is no underlying bone destruction. Please correlate with physical examination of the scrotum for evidence of scrotal cellulitis. 2. Fecal impaction in the rectum. 3. Sigmoid diverticulosis. 4. Extensive vascular calcifications. 5. Chronic left acetabular fracture Signer Name: Gisel Nova MD  Signed: 1/10/2021 8:37 PM  [facility]: [email or web address]  [facility]    Xr Chest 1 View    Result Date: 1/10/2021  CR Chest 1 Vw INDICATION: Weakness dizziness confused. COMPARISON:  Chest x-ray 12/4/2020. FINDINGS: Single portable AP view(s) of the chest.  There is globular cardiac silhouette enlargement which is increased from the prior study and there is mild increase in central vascular markings and interstitial markings. This likely mild congestive heart failure. There is no pleural effusion or pneumothorax. There is no acute infiltrate.     There is interval increase in cardiac silhouette  size with mild vascular congestion and interstitial edema. Findings most suggestive of interval development of mild congestive failure. Clinical correlation and follow-up to resolution recommended. Signer Name: Gisel Nova MD  Signed: 1/10/2021 7:39 PM  Workstation Name: [email or web address]  [facility]      Results for orders placed during the hospital encounter of 12/01/20   Duplex Carotid Ultrasound CAR    Narrative · Left internal carotid artery stenosis of >70%.  · Left vertebral artery is bidirectional.  · Right internal carotid artery stenosis of 50-69%.          Results for orders placed during the hospital encounter of 12/01/20   Duplex Carotid Ultrasound CAR    Narrative · Left internal carotid artery stenosis of >70%.  · Left vertebral artery is bidirectional.  · Right internal carotid artery stenosis of 50-69%.          Results for orders placed during the hospital encounter of 12/01/20   Adult Transthoracic Echo Complete W/ Cont if Necessary Per Protocol (With Agitated Saline)    Narrative · Left ventricular ejection fraction appears to be 61 - 65%. Left   ventricular systolic function is normal.  · Left atrial volume is moderately increased.  · Saline test results are negative.  · The right atrial cavity is mildly dilated.  · Moderate tricuspid valve regurgitation is present.  · Estimated right ventricular systolic pressure from tricuspid   regurgitation is moderately elevated (45-55 mmHg).          Pending Labs     Order Current Status    Comprehensive Metabolic Panel In process    Blood Culture - Blood, Arm, Left Preliminary result    Blood Culture - Blood, Arm, Right Preliminary result        Discharge Details        Discharge Medications      New Medications      Instructions Start Date   dexamethasone 6 MG tablet  Commonly known as: DECADRON   6 mg, Oral, Daily With Breakfast   Start Date: January 16, 2021     doxycycline 100 MG capsule  Commonly known as: MONODOX   100 mg, Oral,  Every 12 Hours Scheduled      enoxaparin 40 MG/0.4ML solution syringe  Commonly known as: LOVENOX   40 mg, Subcutaneous, Daily   Start Date: January 16, 2021        Changes to Medications      Instructions Start Date   aspirin 81 MG EC tablet  What changed: Another medication with the same name was removed. Continue taking this medication, and follow the directions you see here.   81 mg, Oral, Daily      atorvastatin 80 MG tablet  Commonly known as: LIPITOR  What changed: additional instructions   80 mg, Oral, Nightly      baclofen 10 MG tablet  Commonly known as: LIORESAL  What changed:   · when to take this  · reasons to take this   10 mg, Oral, 3 Times Daily PRN, For muscle spasms       lisinopril 2.5 MG tablet  Commonly known as: PRINIVILZESTRIL  What changed:   · medication strength  · how much to take   2.5 mg, Oral, Every 24 Hours Scheduled   Start Date: January 16, 2021        Continue These Medications      Instructions Start Date   acetaminophen 325 MG tablet  Commonly known as: TYLENOL   650 mg, Oral, Every 6 Hours PRN      amLODIPine 5 MG tablet  Commonly known as: NORVASC   5 mg, Oral, Daily      ARTIFICIAL TEARS OP   Ophthalmic      cholecalciferol 25 MCG (1000 UT) tablet  Commonly known as: VITAMIN D3   2,000 Units, Oral, Daily      cloNIDine 0.1 MG tablet  Commonly known as: CATAPRES   0.1 mg, Oral, 3 Times Daily PRN      vitamin B-12 1000 MCG tablet  Commonly known as: CYANOCOBALAMIN   1,000 mcg, Oral, Daily      cyanocobalamin 1000 MCG/ML injection   1,000 mcg, Intramuscular, Weekly      docusate sodium 100 MG capsule  Commonly known as: COLACE   200 mg, Oral, 2 Times Daily      famotidine 20 MG tablet  Commonly known as: PEPCID   20 mg, Oral, 2 Times Daily      furosemide 20 MG tablet  Commonly known as: LASIX   20 mg, Oral, 2 Times Weekly, ON TUES AND SAT       hydrocortisone 2.5 % cream   Topical, Every 12 Hours Scheduled      insulin aspart 100 UNIT/ML solution pen-injector sc pen  Commonly  known as: novoLOG FLEXPEN   8 Units, Subcutaneous, As Needed      insulin lispro 100 UNIT/ML injection  Commonly known as: humaLOG, ADMELOG   0-7 Units, Subcutaneous, 3 Times Daily Before Meals      ipratropium-albuterol 0.5-2.5 mg/3 ml nebulizer  Commonly known as: DUO-NEB   3 mL, Nebulization, Every 4 Hours PRN      melatonin 1 MG tablet   6 mg, Oral, Nightly      metoprolol tartrate 50 MG tablet  Commonly known as: LOPRESSOR   50 mg, Oral, Every 12 Hours Scheduled      NON FORMULARY   Pt can take meds crushed in pudding per Meyer Loera       O2  Commonly known as: OXYGEN   2 L/min, Inhalation, Daily PRN      ondansetron 4 MG tablet  Commonly known as: ZOFRAN   4 mg, Oral, Every 8 Hours PRN      sennosides-docusate 8.6-50 MG per tablet  Commonly known as: PERICOLACE   2 tablets, Oral, 2 Times Daily      vitamin C 250 MG tablet  Commonly known as: ASCORBIC ACID   250 mg, Oral, Daily         Stop These Medications    Augmentin 875-125 MG per tablet  Generic drug: amoxicillin-clavulanate     niCARdipine in NaCl 20-0.86 MG/200ML-% infusion  Commonly known as: CARDENE-IV     predniSONE 10 MG tablet  Commonly known as: DELTASONE            No Known Allergies      Discharge Disposition:  Skilled Nursing Facility (DC - External)    Diet:  Hospital:  Diet Order   Procedures   • Diet Pureed; [place] / Syrup Thick; Cardiac, Low Sodium, Consistent Carbohydrate       Activity:  Activity Instructions     Activity as Tolerated            CODE STATUS:    Code Status and Medical Interventions:   Ordered at: 01/10/21 5899     Limited Support to NOT Include:    Intubation     Code Status:    No CPR     Medical Interventions (Level of Support Prior to Arrest):    Limited     Comments:    dnr per living will and wife confirmed it Sarai [phone]       No future appointments.    Additional Instructions for the Follow-ups that You Need to Schedule     Discharge Follow-up with PCP   As directed       Currently Documented PCP:     Provider, No Known    PCP Phone Number:    None     Follow Up Details: 1 week                 Dari Vargas MD  01/15/21      Time Spent on Discharge:  I spent  35  minutes on this discharge activity which included: face-to-face encounter with the patient, reviewing the data in the system, coordination of the care with the nursing staff as well as consultants, documentation, and entering orders.

## 2021-01-25 LAB
QT INTERVAL: 382 MS
QTC INTERVAL: 480 MS

## 2021-02-27 ENCOUNTER — APPOINTMENT (OUTPATIENT)
Dept: GENERAL RADIOLOGY | Facility: HOSPITAL | Age: 81
End: 2021-02-27

## 2021-02-27 ENCOUNTER — APPOINTMENT (OUTPATIENT)
Dept: CT IMAGING | Facility: HOSPITAL | Age: 81
End: 2021-02-27

## 2021-02-27 ENCOUNTER — HOSPITAL ENCOUNTER (INPATIENT)
Facility: HOSPITAL | Age: 81
LOS: 2 days | Discharge: NURSING, REHAB OR LONG-TERM CARE | End: 2021-03-02
Attending: EMERGENCY MEDICINE | Admitting: INTERNAL MEDICINE

## 2021-02-27 DIAGNOSIS — S06.5XAA SUBDURAL HEMATOMA (HCC): Primary | ICD-10-CM

## 2021-02-27 DIAGNOSIS — S51.012A SKIN TEAR OF LEFT ELBOW WITHOUT COMPLICATION, INITIAL ENCOUNTER: ICD-10-CM

## 2021-02-27 DIAGNOSIS — S01.112A LACERATION OF LEFT EYEBROW, INITIAL ENCOUNTER: ICD-10-CM

## 2021-02-27 PROCEDURE — 99285 EMERGENCY DEPT VISIT HI MDM: CPT

## 2021-02-27 PROCEDURE — 71045 X-RAY EXAM CHEST 1 VIEW: CPT

## 2021-02-27 PROCEDURE — 25010000002 TDAP 5-2.5-18.5 LF-MCG/0.5 SUSPENSION: Performed by: NURSE PRACTITIONER

## 2021-02-27 PROCEDURE — 72125 CT NECK SPINE W/O DYE: CPT

## 2021-02-27 PROCEDURE — 70450 CT HEAD/BRAIN W/O DYE: CPT

## 2021-02-27 PROCEDURE — 93005 ELECTROCARDIOGRAM TRACING: CPT | Performed by: NURSE PRACTITIONER

## 2021-02-27 PROCEDURE — 73070 X-RAY EXAM OF ELBOW: CPT

## 2021-02-27 PROCEDURE — 90715 TDAP VACCINE 7 YRS/> IM: CPT | Performed by: NURSE PRACTITIONER

## 2021-02-27 PROCEDURE — 90471 IMMUNIZATION ADMIN: CPT | Performed by: NURSE PRACTITIONER

## 2021-02-27 RX ORDER — LIDOCAINE HYDROCHLORIDE 10 MG/ML
10 INJECTION, SOLUTION EPIDURAL; INFILTRATION; INTRACAUDAL; PERINEURAL ONCE
Status: COMPLETED | OUTPATIENT
Start: 2021-02-27 | End: 2021-02-28

## 2021-02-27 RX ORDER — SODIUM CHLORIDE 0.9 % (FLUSH) 0.9 %
10 SYRINGE (ML) INJECTION AS NEEDED
Status: DISCONTINUED | OUTPATIENT
Start: 2021-02-27 | End: 2021-03-02 | Stop reason: HOSPADM

## 2021-02-27 RX ADMIN — TETANUS TOXOID, REDUCED DIPHTHERIA TOXOID AND ACELLULAR PERTUSSIS VACCINE, ADSORBED 0.5 ML: 5; 2.5; 8; 8; 2.5 SUSPENSION INTRAMUSCULAR at 21:40

## 2021-02-28 ENCOUNTER — APPOINTMENT (OUTPATIENT)
Dept: CT IMAGING | Facility: HOSPITAL | Age: 81
End: 2021-02-28

## 2021-02-28 PROBLEM — I25.10 CAD (CORONARY ARTERY DISEASE): Status: ACTIVE | Noted: 2021-02-28

## 2021-02-28 PROBLEM — I65.22 STENOSIS OF LEFT CAROTID ARTERY: Status: RESOLVED | Noted: 2021-01-10 | Resolved: 2021-02-28

## 2021-02-28 PROBLEM — R50.9 FEVER: Status: ACTIVE | Noted: 2021-02-28

## 2021-02-28 PROBLEM — I95.9 HYPOTENSION: Status: ACTIVE | Noted: 2021-02-28
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
## 2021-02-28 PROBLEM — I63.9 ACUTE CVA (CEREBROVASCULAR ACCIDENT) (HCC): Status: RESOLVED | Noted: 2020-12-01 | Resolved: 2021-02-28

## 2021-02-28 PROBLEM — Z86.73 HISTORY OF CVA (CEREBROVASCULAR ACCIDENT): Status: ACTIVE | Noted: 2021-02-28

## 2021-02-28 PROBLEM — S06.5XAA SUBDURAL HEMATOMA: Status: ACTIVE | Noted: 2021-02-28

## 2021-02-28 LAB
ALBUMIN SERPL-MCNC: 3.1 G/DL (ref 3.5–5.2)
ALBUMIN SERPL-MCNC: 3.2 G/DL (ref 3.5–5.2)
ALBUMIN/GLOB SERPL: 1.1 G/DL
ALBUMIN/GLOB SERPL: 1.1 G/DL
ALP SERPL-CCNC: 80 U/L (ref 39–117)
ALP SERPL-CCNC: 88 U/L (ref 39–117)
ALT SERPL W P-5'-P-CCNC: 10 U/L (ref 1–41)
ALT SERPL W P-5'-P-CCNC: 11 U/L (ref 1–41)
ANION GAP SERPL CALCULATED.3IONS-SCNC: 7 MMOL/L (ref 5–15)
ANION GAP SERPL CALCULATED.3IONS-SCNC: 8 MMOL/L (ref 5–15)
APTT PPP: 36.8 SECONDS (ref 24–37)
AST SERPL-CCNC: 13 U/L (ref 1–40)
AST SERPL-CCNC: 17 U/L (ref 1–40)
BASOPHILS # BLD AUTO: 0.05 10*3/MM3 (ref 0–0.2)
BASOPHILS # BLD AUTO: 0.06 10*3/MM3 (ref 0–0.2)
BASOPHILS NFR BLD AUTO: 0.6 % (ref 0–1.5)
BASOPHILS NFR BLD AUTO: 0.8 % (ref 0–1.5)
BILIRUB SERPL-MCNC: 0.5 MG/DL (ref 0–1.2)
BILIRUB SERPL-MCNC: 0.5 MG/DL (ref 0–1.2)
BILIRUB UR QL STRIP: NEGATIVE
BUN SERPL-MCNC: 15 MG/DL (ref 8–23)
BUN SERPL-MCNC: 15 MG/DL (ref 8–23)
BUN/CREAT SERPL: 12.8 (ref 7–25)
BUN/CREAT SERPL: 13.5 (ref 7–25)
CALCIUM SPEC-SCNC: 8.7 MG/DL (ref 8.6–10.5)
CALCIUM SPEC-SCNC: 8.7 MG/DL (ref 8.6–10.5)
CHLORIDE SERPL-SCNC: 104 MMOL/L (ref 98–107)
CHLORIDE SERPL-SCNC: 105 MMOL/L (ref 98–107)
CHOLEST SERPL-MCNC: 88 MG/DL (ref 0–200)
CLARITY UR: CLEAR
CO2 SERPL-SCNC: 27 MMOL/L (ref 22–29)
CO2 SERPL-SCNC: 27 MMOL/L (ref 22–29)
COLOR UR: YELLOW
CREAT SERPL-MCNC: 1.11 MG/DL (ref 0.76–1.27)
CREAT SERPL-MCNC: 1.17 MG/DL (ref 0.76–1.27)
D-LACTATE SERPL-SCNC: 1.5 MMOL/L (ref 0.5–2)
DEPRECATED RDW RBC AUTO: 56.3 FL (ref 37–54)
DEPRECATED RDW RBC AUTO: 57 FL (ref 37–54)
EOSINOPHIL # BLD AUTO: 0.31 10*3/MM3 (ref 0–0.4)
EOSINOPHIL # BLD AUTO: 0.47 10*3/MM3 (ref 0–0.4)
EOSINOPHIL NFR BLD AUTO: 3.9 % (ref 0.3–6.2)
EOSINOPHIL NFR BLD AUTO: 6.2 % (ref 0.3–6.2)
ERYTHROCYTE [DISTWIDTH] IN BLOOD BY AUTOMATED COUNT: 15 % (ref 12.3–15.4)
ERYTHROCYTE [DISTWIDTH] IN BLOOD BY AUTOMATED COUNT: 15.1 % (ref 12.3–15.4)
FLUAV RNA RESP QL NAA+PROBE: NOT DETECTED
FLUBV RNA RESP QL NAA+PROBE: NOT DETECTED
GFR SERPL CREATININE-BSD FRML MDRD: 60 ML/MIN/1.73
GFR SERPL CREATININE-BSD FRML MDRD: 64 ML/MIN/1.73
GLOBULIN UR ELPH-MCNC: 2.8 GM/DL
GLOBULIN UR ELPH-MCNC: 2.8 GM/DL
GLUCOSE BLDC GLUCOMTR-MCNC: 102 MG/DL (ref 70–130)
GLUCOSE BLDC GLUCOMTR-MCNC: 104 MG/DL (ref 70–130)
GLUCOSE BLDC GLUCOMTR-MCNC: 107 MG/DL (ref 70–130)
GLUCOSE BLDC GLUCOMTR-MCNC: 108 MG/DL (ref 70–130)
GLUCOSE BLDC GLUCOMTR-MCNC: 87 MG/DL (ref 70–130)
GLUCOSE SERPL-MCNC: 101 MG/DL (ref 65–99)
GLUCOSE SERPL-MCNC: 112 MG/DL (ref 65–99)
GLUCOSE UR STRIP-MCNC: NEGATIVE MG/DL
HBA1C MFR BLD: 6.4 % (ref 4.8–5.6)
HCT VFR BLD AUTO: 29.2 % (ref 37.5–51)
HCT VFR BLD AUTO: 29.6 % (ref 37.5–51)
HDLC SERPL-MCNC: 32 MG/DL (ref 40–60)
HGB BLD-MCNC: 9.1 G/DL (ref 13–17.7)
HGB BLD-MCNC: 9.2 G/DL (ref 13–17.7)
HGB UR QL STRIP.AUTO: NEGATIVE
IMM GRANULOCYTES # BLD AUTO: 0.03 10*3/MM3 (ref 0–0.05)
IMM GRANULOCYTES # BLD AUTO: 0.03 10*3/MM3 (ref 0–0.05)
IMM GRANULOCYTES NFR BLD AUTO: 0.4 % (ref 0–0.5)
IMM GRANULOCYTES NFR BLD AUTO: 0.4 % (ref 0–0.5)
INR PPP: 1.23 (ref 0.85–1.16)
INR PPP: 1.29 (ref 0.85–1.16)
KETONES UR QL STRIP: NEGATIVE
LDLC SERPL CALC-MCNC: 34 MG/DL (ref 0–100)
LDLC/HDLC SERPL: 0.98 {RATIO}
LEUKOCYTE ESTERASE UR QL STRIP.AUTO: NEGATIVE
LYMPHOCYTES # BLD AUTO: 0.94 10*3/MM3 (ref 0.7–3.1)
LYMPHOCYTES # BLD AUTO: 1.14 10*3/MM3 (ref 0.7–3.1)
LYMPHOCYTES NFR BLD AUTO: 11.9 % (ref 19.6–45.3)
LYMPHOCYTES NFR BLD AUTO: 15 % (ref 19.6–45.3)
MAGNESIUM SERPL-MCNC: 1.9 MG/DL (ref 1.6–2.4)
MCH RBC QN AUTO: 31.8 PG (ref 26.6–33)
MCH RBC QN AUTO: 32.3 PG (ref 26.6–33)
MCHC RBC AUTO-ENTMCNC: 30.7 G/DL (ref 31.5–35.7)
MCHC RBC AUTO-ENTMCNC: 31.5 G/DL (ref 31.5–35.7)
MCV RBC AUTO: 102.5 FL (ref 79–97)
MCV RBC AUTO: 103.5 FL (ref 79–97)
MONOCYTES # BLD AUTO: 0.88 10*3/MM3 (ref 0.1–0.9)
MONOCYTES # BLD AUTO: 0.92 10*3/MM3 (ref 0.1–0.9)
MONOCYTES NFR BLD AUTO: 11.1 % (ref 5–12)
MONOCYTES NFR BLD AUTO: 12.1 % (ref 5–12)
NEUTROPHILS NFR BLD AUTO: 4.97 10*3/MM3 (ref 1.7–7)
NEUTROPHILS NFR BLD AUTO: 5.71 10*3/MM3 (ref 1.7–7)
NEUTROPHILS NFR BLD AUTO: 65.5 % (ref 42.7–76)
NEUTROPHILS NFR BLD AUTO: 72.1 % (ref 42.7–76)
NITRITE UR QL STRIP: NEGATIVE
NRBC BLD AUTO-RTO: 0 /100 WBC (ref 0–0.2)
NRBC BLD AUTO-RTO: 0 /100 WBC (ref 0–0.2)
PH UR STRIP.AUTO: 7.5 [PH] (ref 5–8)
PLATELET # BLD AUTO: 200 10*3/MM3 (ref 140–450)
PLATELET # BLD AUTO: 203 10*3/MM3 (ref 140–450)
PMV BLD AUTO: 10.4 FL (ref 6–12)
PMV BLD AUTO: 10.5 FL (ref 6–12)
POTASSIUM SERPL-SCNC: 4.6 MMOL/L (ref 3.5–5.2)
POTASSIUM SERPL-SCNC: 4.7 MMOL/L (ref 3.5–5.2)
PROCALCITONIN SERPL-MCNC: 0.15 NG/ML (ref 0–0.25)
PROT SERPL-MCNC: 5.9 G/DL (ref 6–8.5)
PROT SERPL-MCNC: 6 G/DL (ref 6–8.5)
PROT UR QL STRIP: NEGATIVE
PROTHROMBIN TIME: 15.2 SECONDS (ref 11.5–14)
PROTHROMBIN TIME: 15.8 SECONDS (ref 11.5–14)
QT INTERVAL: 362 MS
QT INTERVAL: 382 MS
QTC INTERVAL: 450 MS
QTC INTERVAL: 467 MS
RBC # BLD AUTO: 2.85 10*6/MM3 (ref 4.14–5.8)
RBC # BLD AUTO: 2.86 10*6/MM3 (ref 4.14–5.8)
SARS-COV-2 RNA RESP QL NAA+PROBE: NOT DETECTED
SODIUM SERPL-SCNC: 138 MMOL/L (ref 136–145)
SODIUM SERPL-SCNC: 140 MMOL/L (ref 136–145)
SP GR UR STRIP: 1.01 (ref 1–1.03)
TRIGL SERPL-MCNC: 124 MG/DL (ref 0–150)
TROPONIN T SERPL-MCNC: 0.02 NG/ML (ref 0–0.03)
TSH SERPL DL<=0.05 MIU/L-ACNC: 1.96 UIU/ML (ref 0.27–4.2)
UROBILINOGEN UR QL STRIP: NORMAL
VLDLC SERPL-MCNC: 22 MG/DL (ref 5–40)
WBC # BLD AUTO: 7.59 10*3/MM3 (ref 3.4–10.8)
WBC # BLD AUTO: 7.92 10*3/MM3 (ref 3.4–10.8)

## 2021-02-28 PROCEDURE — 80061 LIPID PANEL: CPT | Performed by: NURSE PRACTITIONER

## 2021-02-28 PROCEDURE — 25010000002 LORAZEPAM PER 2 MG: Performed by: EMERGENCY MEDICINE

## 2021-02-28 PROCEDURE — P9612 CATHETERIZE FOR URINE SPEC: HCPCS

## 2021-02-28 PROCEDURE — 70450 CT HEAD/BRAIN W/O DYE: CPT

## 2021-02-28 PROCEDURE — 85025 COMPLETE CBC W/AUTO DIFF WBC: CPT | Performed by: NURSE PRACTITIONER

## 2021-02-28 PROCEDURE — 99223 1ST HOSP IP/OBS HIGH 75: CPT | Performed by: FAMILY MEDICINE

## 2021-02-28 PROCEDURE — 85610 PROTHROMBIN TIME: CPT | Performed by: FAMILY MEDICINE

## 2021-02-28 PROCEDURE — 83605 ASSAY OF LACTIC ACID: CPT | Performed by: FAMILY MEDICINE

## 2021-02-28 PROCEDURE — 93005 ELECTROCARDIOGRAM TRACING: CPT | Performed by: FAMILY MEDICINE

## 2021-02-28 PROCEDURE — 25010000002 PIPERACILLIN SOD-TAZOBACTAM PER 1 G: Performed by: NURSE PRACTITIONER

## 2021-02-28 PROCEDURE — 84145 PROCALCITONIN (PCT): CPT | Performed by: FAMILY MEDICINE

## 2021-02-28 PROCEDURE — 85025 COMPLETE CBC W/AUTO DIFF WBC: CPT | Performed by: FAMILY MEDICINE

## 2021-02-28 PROCEDURE — 0HQ1XZZ REPAIR FACE SKIN, EXTERNAL APPROACH: ICD-10-PCS | Performed by: INTERNAL MEDICINE

## 2021-02-28 PROCEDURE — 87086 URINE CULTURE/COLONY COUNT: CPT | Performed by: FAMILY MEDICINE

## 2021-02-28 PROCEDURE — 82962 GLUCOSE BLOOD TEST: CPT

## 2021-02-28 PROCEDURE — 81003 URINALYSIS AUTO W/O SCOPE: CPT | Performed by: NURSE PRACTITIONER

## 2021-02-28 PROCEDURE — 87636 SARSCOV2 & INF A&B AMP PRB: CPT | Performed by: FAMILY MEDICINE

## 2021-02-28 PROCEDURE — 84484 ASSAY OF TROPONIN QUANT: CPT | Performed by: FAMILY MEDICINE

## 2021-02-28 PROCEDURE — 99222 1ST HOSP IP/OBS MODERATE 55: CPT | Performed by: PHYSICIAN ASSISTANT

## 2021-02-28 PROCEDURE — 80053 COMPREHEN METABOLIC PANEL: CPT | Performed by: NURSE PRACTITIONER

## 2021-02-28 PROCEDURE — 85730 THROMBOPLASTIN TIME PARTIAL: CPT | Performed by: FAMILY MEDICINE

## 2021-02-28 PROCEDURE — 83735 ASSAY OF MAGNESIUM: CPT | Performed by: FAMILY MEDICINE

## 2021-02-28 PROCEDURE — 83036 HEMOGLOBIN GLYCOSYLATED A1C: CPT | Performed by: NURSE PRACTITIONER

## 2021-02-28 PROCEDURE — 80053 COMPREHEN METABOLIC PANEL: CPT | Performed by: FAMILY MEDICINE

## 2021-02-28 PROCEDURE — 93010 ELECTROCARDIOGRAM REPORT: CPT | Performed by: INTERNAL MEDICINE

## 2021-02-28 PROCEDURE — 87040 BLOOD CULTURE FOR BACTERIA: CPT | Performed by: FAMILY MEDICINE

## 2021-02-28 PROCEDURE — 99223 1ST HOSP IP/OBS HIGH 75: CPT | Performed by: PSYCHIATRY & NEUROLOGY

## 2021-02-28 PROCEDURE — 85610 PROTHROMBIN TIME: CPT | Performed by: NURSE PRACTITIONER

## 2021-02-28 PROCEDURE — 25010000002 VANCOMYCIN 10 G RECONSTITUTED SOLUTION

## 2021-02-28 PROCEDURE — 84443 ASSAY THYROID STIM HORMONE: CPT | Performed by: NURSE PRACTITIONER

## 2021-02-28 RX ORDER — SODIUM CHLORIDE 9 MG/ML
75 INJECTION, SOLUTION INTRAVENOUS ONCE
Status: COMPLETED | OUTPATIENT
Start: 2021-02-28 | End: 2021-02-28

## 2021-02-28 RX ORDER — ACETAMINOPHEN 650 MG/1
650 SUPPOSITORY RECTAL ONCE
Status: COMPLETED | OUTPATIENT
Start: 2021-02-28 | End: 2021-02-28

## 2021-02-28 RX ORDER — NICOTINE POLACRILEX 4 MG
15 LOZENGE BUCCAL
Status: DISCONTINUED | OUTPATIENT
Start: 2021-02-28 | End: 2021-03-02 | Stop reason: HOSPADM

## 2021-02-28 RX ORDER — DEXTROSE MONOHYDRATE 25 G/50ML
25 INJECTION, SOLUTION INTRAVENOUS
Status: DISCONTINUED | OUTPATIENT
Start: 2021-02-28 | End: 2021-03-02 | Stop reason: HOSPADM

## 2021-02-28 RX ORDER — CHOLECALCIFEROL (VITAMIN D3) 125 MCG
5 CAPSULE ORAL NIGHTLY PRN
Status: DISCONTINUED | OUTPATIENT
Start: 2021-02-28 | End: 2021-03-02 | Stop reason: HOSPADM

## 2021-02-28 RX ORDER — LORAZEPAM 2 MG/ML
1 INJECTION INTRAMUSCULAR ONCE
Status: COMPLETED | OUTPATIENT
Start: 2021-02-28 | End: 2021-02-28

## 2021-02-28 RX ORDER — SODIUM CHLORIDE 0.9 % (FLUSH) 0.9 %
10 SYRINGE (ML) INJECTION EVERY 12 HOURS SCHEDULED
Status: DISCONTINUED | OUTPATIENT
Start: 2021-02-28 | End: 2021-03-02 | Stop reason: HOSPADM

## 2021-02-28 RX ORDER — SODIUM CHLORIDE 0.9 % (FLUSH) 0.9 %
10 SYRINGE (ML) INJECTION AS NEEDED
Status: DISCONTINUED | OUTPATIENT
Start: 2021-02-28 | End: 2021-03-02 | Stop reason: HOSPADM

## 2021-02-28 RX ADMIN — SODIUM CHLORIDE, PRESERVATIVE FREE 10 ML: 5 INJECTION INTRAVENOUS at 08:01

## 2021-02-28 RX ADMIN — LORAZEPAM 1 MG: 2 INJECTION INTRAMUSCULAR; INTRAVENOUS at 01:04

## 2021-02-28 RX ADMIN — ACETAMINOPHEN 650 MG: 650 SUPPOSITORY RECTAL at 06:30

## 2021-02-28 RX ADMIN — VANCOMYCIN HYDROCHLORIDE 1500 MG: 100 INJECTION, POWDER, LYOPHILIZED, FOR SOLUTION INTRAVENOUS at 07:38

## 2021-02-28 RX ADMIN — SODIUM CHLORIDE 500 ML: 9 INJECTION, SOLUTION INTRAVENOUS at 06:26

## 2021-02-28 RX ADMIN — SODIUM CHLORIDE, PRESERVATIVE FREE 10 ML: 5 INJECTION INTRAVENOUS at 21:46

## 2021-02-28 RX ADMIN — LIDOCAINE HYDROCHLORIDE 10 ML: 10 INJECTION, SOLUTION EPIDURAL; INFILTRATION; INTRACAUDAL; PERINEURAL at 02:45

## 2021-02-28 RX ADMIN — SODIUM CHLORIDE 75 ML/HR: 9 INJECTION, SOLUTION INTRAVENOUS at 07:09

## 2021-02-28 RX ADMIN — TAZOBACTAM SODIUM AND PIPERACILLIN SODIUM 3.38 G: 375; 3 INJECTION, SOLUTION INTRAVENOUS at 14:55

## 2021-02-28 RX ADMIN — SODIUM CHLORIDE 500 ML: 9 INJECTION, SOLUTION INTRAVENOUS at 08:01

## 2021-02-28 RX ADMIN — Medication 5 MG: at 21:46

## 2021-02-28 RX ADMIN — TAZOBACTAM SODIUM AND PIPERACILLIN SODIUM 3.38 G: 375; 3 INJECTION, SOLUTION INTRAVENOUS at 21:48

## 2021-02-28 RX ADMIN — TAZOBACTAM SODIUM AND PIPERACILLIN SODIUM 3.38 G: 375; 3 INJECTION, SOLUTION INTRAVENOUS at 06:42

## 2021-03-01 ENCOUNTER — APPOINTMENT (OUTPATIENT)
Dept: CT IMAGING | Facility: HOSPITAL | Age: 81
End: 2021-03-01

## 2021-03-01 LAB
ANION GAP SERPL CALCULATED.3IONS-SCNC: 4 MMOL/L (ref 5–15)
BACTERIA SPEC AEROBE CULT: NO GROWTH
BASOPHILS # BLD AUTO: 0.08 10*3/MM3 (ref 0–0.2)
BASOPHILS NFR BLD AUTO: 1.1 % (ref 0–1.5)
BUN SERPL-MCNC: 14 MG/DL (ref 8–23)
BUN/CREAT SERPL: 12 (ref 7–25)
CALCIUM SPEC-SCNC: 8.4 MG/DL (ref 8.6–10.5)
CHLORIDE SERPL-SCNC: 107 MMOL/L (ref 98–107)
CO2 SERPL-SCNC: 27 MMOL/L (ref 22–29)
CREAT SERPL-MCNC: 1.17 MG/DL (ref 0.76–1.27)
DEPRECATED RDW RBC AUTO: 57 FL (ref 37–54)
EOSINOPHIL # BLD AUTO: 0.81 10*3/MM3 (ref 0–0.4)
EOSINOPHIL NFR BLD AUTO: 10.8 % (ref 0.3–6.2)
ERYTHROCYTE [DISTWIDTH] IN BLOOD BY AUTOMATED COUNT: 15.2 % (ref 12.3–15.4)
GFR SERPL CREATININE-BSD FRML MDRD: 60 ML/MIN/1.73
GLUCOSE BLDC GLUCOMTR-MCNC: 137 MG/DL (ref 70–130)
GLUCOSE BLDC GLUCOMTR-MCNC: 155 MG/DL (ref 70–130)
GLUCOSE BLDC GLUCOMTR-MCNC: 96 MG/DL (ref 70–130)
GLUCOSE BLDC GLUCOMTR-MCNC: 99 MG/DL (ref 70–130)
GLUCOSE SERPL-MCNC: 98 MG/DL (ref 65–99)
HCT VFR BLD AUTO: 27.1 % (ref 37.5–51)
HGB BLD-MCNC: 8.3 G/DL (ref 13–17.7)
IMM GRANULOCYTES # BLD AUTO: 0.03 10*3/MM3 (ref 0–0.05)
IMM GRANULOCYTES NFR BLD AUTO: 0.4 % (ref 0–0.5)
LYMPHOCYTES # BLD AUTO: 0.9 10*3/MM3 (ref 0.7–3.1)
LYMPHOCYTES NFR BLD AUTO: 12 % (ref 19.6–45.3)
MCH RBC QN AUTO: 31.3 PG (ref 26.6–33)
MCHC RBC AUTO-ENTMCNC: 30.6 G/DL (ref 31.5–35.7)
MCV RBC AUTO: 102.3 FL (ref 79–97)
MONOCYTES # BLD AUTO: 0.82 10*3/MM3 (ref 0.1–0.9)
MONOCYTES NFR BLD AUTO: 11 % (ref 5–12)
NEUTROPHILS NFR BLD AUTO: 4.84 10*3/MM3 (ref 1.7–7)
NEUTROPHILS NFR BLD AUTO: 64.7 % (ref 42.7–76)
NRBC BLD AUTO-RTO: 0 /100 WBC (ref 0–0.2)
PLATELET # BLD AUTO: 164 10*3/MM3 (ref 140–450)
PMV BLD AUTO: 10.2 FL (ref 6–12)
POTASSIUM SERPL-SCNC: 3.9 MMOL/L (ref 3.5–5.2)
RBC # BLD AUTO: 2.65 10*6/MM3 (ref 4.14–5.8)
SODIUM SERPL-SCNC: 138 MMOL/L (ref 136–145)
WBC # BLD AUTO: 7.48 10*3/MM3 (ref 3.4–10.8)

## 2021-03-01 PROCEDURE — 25010000002 VANCOMYCIN 10 G RECONSTITUTED SOLUTION

## 2021-03-01 PROCEDURE — 85025 COMPLETE CBC W/AUTO DIFF WBC: CPT | Performed by: INTERNAL MEDICINE

## 2021-03-01 PROCEDURE — 80048 BASIC METABOLIC PNL TOTAL CA: CPT | Performed by: INTERNAL MEDICINE

## 2021-03-01 PROCEDURE — 82962 GLUCOSE BLOOD TEST: CPT

## 2021-03-01 PROCEDURE — 99233 SBSQ HOSP IP/OBS HIGH 50: CPT | Performed by: INTERNAL MEDICINE

## 2021-03-01 PROCEDURE — 92523 SPEECH SOUND LANG COMPREHEN: CPT

## 2021-03-01 PROCEDURE — 70450 CT HEAD/BRAIN W/O DYE: CPT

## 2021-03-01 PROCEDURE — 25010000002 PIPERACILLIN SOD-TAZOBACTAM PER 1 G: Performed by: NURSE PRACTITIONER

## 2021-03-01 PROCEDURE — 63710000001 INSULIN LISPRO (HUMAN) PER 5 UNITS: Performed by: NURSE PRACTITIONER

## 2021-03-01 RX ORDER — SODIUM CHLORIDE 9 MG/ML
100 INJECTION, SOLUTION INTRAVENOUS CONTINUOUS
Status: ACTIVE | OUTPATIENT
Start: 2021-03-01 | End: 2021-03-02

## 2021-03-01 RX ORDER — ACETAMINOPHEN 325 MG/1
650 TABLET ORAL EVERY 6 HOURS PRN
Status: DISCONTINUED | OUTPATIENT
Start: 2021-03-01 | End: 2021-03-02 | Stop reason: HOSPADM

## 2021-03-01 RX ADMIN — SODIUM CHLORIDE, PRESERVATIVE FREE 10 ML: 5 INJECTION INTRAVENOUS at 20:48

## 2021-03-01 RX ADMIN — SODIUM CHLORIDE, PRESERVATIVE FREE 10 ML: 5 INJECTION INTRAVENOUS at 08:48

## 2021-03-01 RX ADMIN — INSULIN LISPRO 2 UNITS: 100 INJECTION, SOLUTION INTRAVENOUS; SUBCUTANEOUS at 17:53

## 2021-03-01 RX ADMIN — SODIUM CHLORIDE 100 ML/HR: 9 INJECTION, SOLUTION INTRAVENOUS at 13:51

## 2021-03-01 RX ADMIN — Medication 5 MG: at 20:48

## 2021-03-01 RX ADMIN — SODIUM CHLORIDE 100 ML/HR: 9 INJECTION, SOLUTION INTRAVENOUS at 23:32

## 2021-03-01 RX ADMIN — VANCOMYCIN HYDROCHLORIDE 1250 MG: 100 INJECTION, POWDER, LYOPHILIZED, FOR SOLUTION INTRAVENOUS at 06:47

## 2021-03-01 RX ADMIN — ACETAMINOPHEN 650 MG: 325 TABLET ORAL at 12:22

## 2021-03-01 RX ADMIN — TAZOBACTAM SODIUM AND PIPERACILLIN SODIUM 3.38 G: 375; 3 INJECTION, SOLUTION INTRAVENOUS at 06:46

## 2021-03-01 RX ADMIN — ACETAMINOPHEN 650 MG: 325 TABLET ORAL at 20:48

## 2021-03-01 NOTE — PROGRESS NOTES
[facility] Medicine Services  PROGRESS NOTE    Patient Name: Dirk Miles  : 1940  MRN: [id number]    Date of Admission: 2021  Primary Care Physician: Provider, No Known    Subjective   Subjective     CC: f/u SDH    HPI: Up awake in bed with wife in room. Much more alert. Has a mild HA, but otherwise has no complaints.     ROS:  UTO due to AMS.    Objective   Objective     Vital Signs:   Temp:  [98 °F (36.7 °C)-99.1 °F (37.3 °C)] 98.6 °F (37 °C)  Heart Rate:  [77-94] 89  Resp:  [18] 18  BP: ()/(65-77) 97/65  Total (NIH Stroke Scale): 12     Physical Exam:  Constitutional: No acute distress, awake, alert  HENT: NCAT, mucous membranes moist, small lac over left eyebrow sutured.  Respiratory: Clear to auscultation bilaterally, respiratory effort normal   Cardiovascular: RRR, no murmurs, rubs, or gallops  Gastrointestinal: Positive bowel sounds, soft, nontender, nondistended  Musculoskeletal: No bilateral ankle edema  Psychiatric: Appropriate affect, cooperative  Neurologic: Oriented x 3, strength symmetric in all extremities, Cranial Nerves grossly intact to confrontation, speech clear  Skin: No rashes    Results Reviewed:  Results from last 7 days   Lab Units 21  0621  0621  0137   WBC 10*3/mm3 7.48 7.59 7.92   HEMOGLOBIN g/dL 8.3* 9.1* 9.2*   HEMATOCRIT % 27.1* 29.6* 29.2*   PLATELETS 10*3/mm3 164 203 200   INR   --  1.23* 1.29*   PROCALCITONIN ng/mL  --  0.15  --      Results from last 7 days   Lab Units 21  0621  0635 21  0137   SODIUM mmol/L 138 138 140   POTASSIUM mmol/L 3.9 4.7 4.6   CHLORIDE mmol/L 107 104 105   CO2 mmol/L 27.0 27.0 27.0   BUN mg/dL 14 15 15   CREATININE mg/dL 1.17 1.17 1.11   GLUCOSE mg/dL 98 101* 112*   CALCIUM mg/dL 8.4* 8.7 8.7   ALT (SGPT) U/L  --  10 11   AST (SGOT) U/L  --  13 17   TROPONIN T ng/mL  --  0.019  --      Estimated Creatinine Clearance: 50.1 mL/min (by C-G formula based on SCr of  1.17 mg/dL).    Microbiology Results Abnormal     Procedure Component Value - Date/Time    Urine Culture - Urine, Urine, Catheter [[id number]]  (Normal) Collected: 02/28/21 0243    Lab Status: Final result Specimen: Urine, Catheter Updated: 03/01/21 1054     Urine Culture No growth    Blood Culture - Blood, Arm, Right [[id number]] Collected: 02/28/21 0635    Lab Status: Preliminary result Specimen: Blood from Arm, Right Updated: 03/01/21 0715     Blood Culture No growth at 24 hours    Blood Culture - Blood, Hand, Right [[id number]] Collected: 02/28/21 0635    Lab Status: Preliminary result Specimen: Blood from Hand, Right Updated: 03/01/21 0715     Blood Culture No growth at 24 hours    COVID PRE-OP / PRE-PROCEDURE SCREENING ORDER (NO ISOLATION) - Swab, Nasopharynx [[id number]]  (Normal) Collected: 02/28/21 0338    Lab Status: Final result Specimen: Swab from Nasopharynx Updated: 02/28/21 0406    Narrative:      The following orders were created for panel order COVID PRE-OP / PRE-PROCEDURE SCREENING ORDER (NO ISOLATION) - Swab, Nasopharynx.  Procedure                               Abnormality         Status                     ---------                               -----------         ------                     COVID-19 and FLU A/B PCR...[[id number]]  Normal              Final result                 Please view results for these tests on the individual orders.    COVID-19 and FLU A/B PCR - Swab, Nasopharynx [[id number]]  (Normal) Collected: 02/28/21 0338    Lab Status: Final result Specimen: Swab from Nasopharynx Updated: 02/28/21 0406     COVID19 Not Detected     Influenza A PCR Not Detected     Influenza B PCR Not Detected    Narrative:      Fact sheet for providers: https://www.fda.gov/media/839795/download    Fact sheet for patients: https://www.fda.gov/media/476976/download    Test performed by PCR.        CT head personally reviewed showing stable appearing right sided SDH. Agree with interpretation.  Imaging  Results (Last 24 Hours)     Procedure Component Value Units Date/Time    CT Head Without Contrast [[id number]] Collected: 03/01/21 0810     Updated: 03/01/21 0836    Narrative:      EXAMINATION: CT HEAD WO CONTRAST- 03/01/2021     INDICATION: SDH; S06.7L7Q-Znoukvfcm subdural hemorrhage with loss of  consciousness of unspecified duration, initial encounter;  S01.112A-Laceration without foreign body of left eyelid and periocular  area, initial encounter; S51.012A-Laceration without foreign body of  left elbow, initial encounter      TECHNIQUE: CT head without intravenous contrast     The radiation dose reduction device was turned on for each scan per the  ALARA (As Low as Reasonably Achievable) protocol.     COMPARISON: CT head dated 02/28/2021     FINDINGS: Persistent low-attenuation areas left frontal and right  occipital lobes of prior insults stable from prior. 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 No acute hemorrhage within this fluid or  adjacent. No intraparenchymal hemorrhage or hydrocephalus. Globes and  orbits unremarkable. Paranasal sinuses and mastoid air cells demonstrate  mucus retention cyst right maxillary sinus otherwise grossly clear and  well-pneumatized. Calvarium intact.       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 No acute hemorrhage  within these fluid or adjacent. No intraparenchymal hemorrhage or  hydrocephalus.     D:  03/01/2021  E:  03/01/2021             Results for orders placed during the hospital encounter of 12/01/20   Adult Transthoracic Echo Complete W/ Cont if Necessary Per Protocol (With Agitated Saline)    Narrative · Left ventricular ejection fraction appears to be 61 -  65%. Left   ventricular systolic function is normal.  · Left atrial volume is moderately increased.  · Saline test results are negative.  · The right atrial cavity is mildly dilated.  · Moderate tricuspid valve regurgitation is present.  · Estimated right ventricular systolic pressure from tricuspid   regurgitation is moderately elevated (45-55 mmHg).          I have reviewed the medications:  Scheduled Meds:insulin lispro, 0-7 Units, Subcutaneous, 4x Daily With Meals & Nightly  sodium chloride, 10 mL, Intravenous, Q12H      Continuous Infusions:   PRN Meds:.•  acetaminophen  •  dextrose  •  dextrose  •  glucagon (human recombinant)  •  melatonin  •  [COMPLETED] Insert peripheral IV **AND** sodium chloride  •  sodium chloride    Assessment/Plan   Assessment & Plan     Active Hospital Problems    Diagnosis  POA   • **Subdural hematoma (CMS/HCC) [S06.5X9A]  Yes   • Fall [W19.XXXA]  Yes   • History of CVA (cerebrovascular accident) [Z86.73]  Not Applicable   • CAD (coronary artery disease) [I25.10]  Yes   • Fever [R50.9]  Yes   • Hypotension [I95.9]  Yes   • Anemia, chronic disease [D63.8]  Yes   • Dementia with behavioral disturbance (CMS/HCC) [F03.91]  Yes   • Hyperlipemia [E78.5]  Yes   • A-fib (CMS/HCC) [I48.91]  Yes   • Diabetes mellitus (CMS/HCC) [E11.9]  Yes   • Hypertension [I10]  Yes      Resolved Hospital Problems   No resolved problems to display.        Brief Hospital Course to date:  Dirk Miles is a 80 y.o. male w/ a hx of CAD, CVA (w/ residual left sided weakness), dementia, HTN, HLD, T2DM, chronic anemia, PAF, GERD who presented to the ED after fall. This is my first day assessing patient's active medical issues.     Subdural hematoma   Frequent Falls  -NS following. Repeat CT head this am stable, no NS intervention required. Felt to be hygroma vs chronic SDH.  -Neurology has seen and s/o.   -Continue to hold asa, resume at d/c.     Fever  Hypotension   -No evidence of infection. Suspect both are  related to recent covid vaccination. Stop IV antibiotics and monitor.   -Still with marginal BP, continue IVF today.      HTN  HLD  PAF  CAD  -BP still low. Continue holding Norvasc, Lipitor, Lasix, Lisinopril, Lopressor, given his recent FTT his BP may be overtreated anyway. Gradually resume at d/c.     T2DM   -Glucoses controlled. Continue SSI.     Anemia of chronic disease   -H/H fairly stable. Monitor periodically.     Dementia   -fall precautions    This patient's problems and plans were partially entered by my partner and updated as appropriate by me 03/01/21.     DVT prophylaxis:  Mechanical     CODE STATUS:   Code Status and Medical Interventions:   Ordered at: 02/28/21 0934     Limited Support to NOT Include:    Cardioversion/Defibrillation    Intubation     Level Of Support Discussed With:    Health Care Surrogate     Code Status:    No CPR     Medical Interventions (Level of Support Prior to Arrest):    Limited       Bhumika Mendze II, DO  03/01/21

## 2021-03-01 NOTE — PLAN OF CARE
Goal Outcome Evaluation:  Plan of Care Reviewed With: patient  Progress: improving  Outcome Summary: Patient orientation waxes and wanes through the shift but is easily re-oriented.  Patient eating and drinking with assistance from staff.  Continue with Q2 hour turns and pressure relief as noted by WOC.  No complaints of pain or new concerns voiced.

## 2021-03-01 NOTE — PLAN OF CARE
Goal Outcome Evaluation:  Plan of Care Reviewed With: patient  Progress: improving  Outcome Summary: Patient drowsy and somnolent early in the shift but awakens later in the shift and able to follow commands, oriented to self and hospital.  Patient talkative with staff and cooperative with care.  Patient complains of pain to left side with movement.  No new concerns voiced.

## 2021-03-01 NOTE — PROGRESS NOTES
"Nutrition Services    Patient Name:  Dirk Miles  YOB: 1940  MRN: [id number]  Admit Date:  2/27/2021                      Clinical Nutrition     Nutrition Assessment  Reason for Visit:   Identified at risk by screening criteria PI      Patient Name: Dirk Miles  YOB: 1940  MRN: [id number]  Date of Encounter: 02/28/21 21:45 EST  Admission date: 2/27/2021      Comments: Pt n/a at time of visit; see for pref of supplement.      Nutrition Assessment       Hospital Problem List    Subdural hematoma (CMS/HCC)    A-fib (CMS/HCC)    Hypertension    Diabetes mellitus (CMS/HCC)    Dementia with behavioral disturbance (CMS/HCC)    Hyperlipemia    Anemia, chronic disease    Fall    History of CVA (cerebrovascular accident)    CAD (coronary artery disease)    Fever    Hypotension      Applicable PMH/PSxH:     PMH: He  has a past medical history of A-fib (CMS/HCC), Acute CVA (cerebrovascular accident) (CMS/[facility]) (12/1/2020), Arthritis, B12 deficiency, Coronary artery disease, COVID-19, Diabetes mellitus (CMS/HCC), DNR (do not resuscitate), Dysphagia, Falls, GERD (gastroesophageal reflux disease), Hemiplegia (CMS/HCC), Hyperlipidemia, Hypertension, Insomnia, Pressure sore on buttocks, Stenosis of left carotid artery (1/10/2021), and Stroke (CMS/[facility]).   PSxH: He  has a past surgical history that includes Hernia repair and Cataract extraction, bilateral.       Reported/Observed/Food/Nutrition Related History:     Pt n/a at time of visit. Naz left w RN.      Anthropometrics     Height: 175.3 cm (69\")  Last filed wt: Weight: 70.3 kg (155 lb) (02/27/21 1931)  Weight Method: Estimated    BMI: BMI (Calculated): 22.9  Normal: 18.5-24.9kg/m2    Ideal Body Weight (IBW) (kg): 73.69    UBW: unk at this time bed wt in EMR of 162lb in January  Wt loss:  % of body wt:  Timeframe:     Labs reviewed   Yes.    Medications reviewed   Pertinent:  abx order for insulin      Current Nutrition Prescription     PO: " Diet Soft Texture; Chopped; Thin; Consistent Carbohydrate  Supplement: Boost Glucose Control 2x/da added per RD    Intake: insuffic data 25% x 1 meal.    Nutrition Diagnosis     2/28  Problem Increased nutrient needs   Etiology Skin breakdown   Signs/Symptoms PI         Nutrition Intervention     Follow treatment progress, Care plan reviewed, Menu provided, Supplement provided      Goal:   General: Nutrition support treatment  PO: Establish PO      Monitoring/Evaluation:   Per protocol, PO intake, Supplement intake, Pertinent labs, Skin status, Symptoms        Luz Cortés RD,   Time Spent: 20 min        Electronically signed by:  Luz Cortés RD  02/28/21 21:45 EST

## 2021-03-01 NOTE — NURSING NOTE
WOC consult for several skin concerns.     Patient has several skin tears from a series of falls-patient had stroke in December 2020 per spouse. Skin tears in various stages of healing. BLE and left elbow skin tears are still open-xeroform and foam dressing in place-this is the appropriate treatment-continue with POC-see wound care orders.     Patient also has possible healed pressure injury vs old shearing site on right gluteal-area is dry/blanching-Z guard applied.     All interventions in place and implemented. Just continue with good general skin care.     Will order low air loss bed via [employer] ([phone]) related to low mobility and [place] of 12.     WOC will continue to follow.

## 2021-03-01 NOTE — PROGRESS NOTES
Discharge Planning Assessment  [facility]     Patient Name: Dirk Miles  MRN: [id number]  Today's Date: 3/1/2021    Admit Date: 2/27/2021    Discharge Needs Assessment     Row Name 03/01/21 1139       Living Environment    Lives With  facility resident    Current Living Arrangements  extended care facility    Provides Primary Care For  no one, unable/limited ability to care for self    Able to Return to Prior Arrangements  yes       Transition Planning    Patient/Family Anticipates Transition to  long-term care facility    Patient/Family Anticipated Services at Transition      Transportation Anticipated  health plan transportation       Discharge Needs Assessment    Current Outpatient/Agency/Support Group  skilled nursing facility    Equipment Currently Used at Home  wheelchair;hospital bed    Concerns to be Addressed  discharge planning    Discharge Facility/Level of Care Needs  nursing facility, skilled    Current Discharge Risk  chronically ill;cognitively impaired;dependent with mobility/activities of daily living        Discharge Plan     Row Name 03/01/21 1141       Plan    Plan  [facility]    Patient/Family in Agreement with Plan  yes    Plan Comments  I met with Sarai, Mr. Miles's wife at the bedside. Mr. Miles lives at [facility] in a longterm care bed. I confirmed this with their admissions department. Prior to this admission, he was in a skilled rehab bed from a previous hospital admission. Mr. Miles is dependent with all care. He transfers out of the bed to a wheelchair at baseline with assist of 2 people. I notified their admissions department that he will likely be ready to discharge tomorrow. They state that they can reaccept him back into a skilled rehab bed. He will require ambulance transportation. I will have [facility] transport him back home tomorrow at the time of discharge. Nurse will need to call report to [phone]. The transfer summary  will be faxed to them at [phone].    Final Discharge Disposition Code  03 - skilled nursing facility (SNF)        Continued Care and Services - Admitted Since 2/27/2021     Destination Coordination complete    Service Provider Request Status Selected Services Address Phone Fax Patient Preferred    [facility]   Selected Intermediate Care [address] [place] [address] [phone] --            Selected Continued Care - Prior Encounters Includes selections from prior encounters from 11/29/2020 to 3/1/2021    Discharged on 1/15/2021 Admission date: 1/10/2021 - Discharge disposition: Skilled Nursing Facility (DC - External)    Destination     Service Provider Selected Services Address Phone Fax Patient Preferred    [facility]  Intermediate Care [address] [facility] [place] [address] [phone] --                Discharged on 12/9/2020 Admission date: 12/1/2020 - Discharge disposition: Rehab Facility or Unit (DC - External)    Destination     Service Provider Selected Services Address Phone Fax Patient Preferred    [facility]  Inpatient Rehabilitation [address] [facility] [address] [phone] --                      Demographic Summary    No documentation.       Functional Status     Row Name 03/01/21 1139       Functional Status, IADL    Medications  completely dependent    Meal Preparation  completely dependent    Housekeeping  completely dependent    Laundry  completely dependent    Shopping  completely dependent        Psychosocial    No documentation.       Abuse/Neglect    No documentation.       Legal    No documentation.       Substance Abuse    No documentation.       Patient Forms    No documentation.           Wesley Schmidt RN

## 2021-03-01 NOTE — PLAN OF CARE
Goal Outcome Evaluation:   CT scan this morning..melatonin 5mg given, pt.rested through shift. Remains afib on the monitor. VSS. NIH=12. Will cont.to monitor.

## 2021-03-01 NOTE — PLAN OF CARE
Goal Outcome Evaluation:  Plan of Care Reviewed With: (P) patient, spouse  Progress: (P) no change(initial eval)    SLP evaluation completed. Will sign-off as pt w/ history of prior CVA and dementia; speech, language, and cognition are functional for his environment. . Please see note for further details and recommendations.

## 2021-03-01 NOTE — PROGRESS NOTES
FOLLOW UP ENCOUNTER          CHIEF COMPLAINT::   Chronic subdural hematoma                        MEDICAL HISTORY SINCE LAST ENCOUNTER :       Review of the CT scan shows no significant progression.  He has significant previous cerebrovascular disease and large areas of encephalomalacia.  The extra cerebral fluid appears to be more of a hygroma or chronic subdural hematoma.  No intervention is warranted or indicated at this time.                                    ASSESSMENT/DISPOSITION :       1.  Neurosurgery will sign off and see PRN..

## 2021-03-01 NOTE — THERAPY EVALUATION
Acute Care - Speech Language Pathology Initial Evaluation  [facility]   Cognitive-Communication Evaluation     Patient Name: Dirk Miles  : 1940  MRN: [id number]  Today's Date: 3/1/2021               Admit Date: 2021     Visit Dx:    ICD-10-CM ICD-9-CM   1. Subdural hematoma (CMS/HCC)  S06.5X9A 432.1   2. Laceration of left eyebrow, initial encounter  S01.112A 873.42   3. Skin tear of left elbow without complication, initial encounter  S51.012A 881.01     Patient Active Problem List   Diagnosis   • A-fib (CMS/[facility])   • Hypertension   • Diabetes mellitus (CMS/HCC)   • Dementia with behavioral disturbance (CMS/HCC)   • Hyperlipemia   • Anemia, chronic disease   • COVID-19 virus detected   • Subdural hematoma (CMS/HCC)   • Fall   • History of CVA (cerebrovascular accident)   • CAD (coronary artery disease)   • Fever   • Hypotension     Past Medical History:   Diagnosis Date   • A-fib (CMS/[facility])    • Acute CVA (cerebrovascular accident) (CMS/HCC) 2020   • Arthritis    • B12 deficiency    • Coronary artery disease    • COVID-19    • Diabetes mellitus (CMS/HCC)    • DNR (do not resuscitate)    • Dysphagia    • Falls    • GERD (gastroesophageal reflux disease)    • Hemiplegia (CMS/HCC)    • Hyperlipidemia    • Hypertension    • Insomnia    • Pressure sore on buttocks    • Stenosis of left carotid artery 1/10/2021   • Stroke (CMS/[facility])      Past Surgical History:   Procedure Laterality Date   • CATARACT EXTRACTION, BILATERAL     • HERNIA REPAIR          SLP EVALUATION (last 72 hours)      SLP SLC Evaluation     Row Name 21 0940                   Communication Assessment/Intervention    Document Type  discharge evaluation/summary  (Pended)   -EN        Subjective Information  no complaints  (Pended)   -EN        Patient Observations  alert;cooperative;agree to therapy  (Pended)   -EN        Patient/Family/Caregiver Comments/Observations  Wife present   (Pended)   -EN        Care Plan Review   evaluation/treatment results reviewed;care plan/treatment goals reviewed;risks/benefits reviewed;current/potential barriers reviewed;patient/other agree to care plan  (Pended)   -EN        Care Plan Review, Other Participant(s)  spouse  (Pended)   -EN        Patient Effort  good  (Pended)   -EN        Symptoms Noted During/After Treatment  none  (Pended)   -EN           General Information    Patient Profile Reviewed  yes  (Pended)   -EN        Pertinent History Of Current Problem  Admitted after fall from wheelchair. Code stroke called in ED. CT no acute abnormalities. Old right temporal occipital and left frontal infarcts. Prior CVA and dementia.   (Pended)   -EN        Precautions/Limitations, Vision  vision impairment, left;other (see comments)  (Pended)  blind in left eye   -EN        Precautions/Limitations, Hearing  WFL;for purposes of eval  (Pended)   -EN        Prior Level of Function-Communication  cognitive-linguistic impairment;other (see comments)  (Pended)  dementia   -EN        Plans/Goals Discussed with  patient and family;agreed upon  (Pended)   -EN        Barriers to Rehab  cognitive status  (Pended)   -EN        Patient's Goals for Discharge  patient did not state  (Pended)   -EN        Family Goals for Discharge  family did not state  (Pended)   -EN           Pain    Additional Documentation  Pain Scale: FACES Pre/Post-Treatment (Group)  (Pended)   -EN           Pain Scale: FACES Pre/Post-Treatment    Pain: FACES Scale, Pretreatment  0-->no hurt  (Pended)   -EN        Posttreatment Pain Rating  0-->no hurt  (Pended)   -EN           Comprehension Assessment/Intervention    Comprehension Assessment/Intervention  Auditory Comprehension  (Pended)   -EN           Auditory Comprehension Assessment/Intervention    Auditory Comprehension (Communication)  WFL  (Pended)   -EN        Auditory Comprehension Communication, Comment  Needed some repetition of questions however appropriate responses once  answered.   (Pended)   -EN           Expression Assessment/Intervention    Expression Assessment/Intervention  verbal expression  (Pended)   -EN           Verbal Expression Assessment/Intervention    Verbal Expression  WFL  (Pended)   -EN        Verbal Expression, Comment  Pt w/ history of prior CVA and dementia; speech, language, and cognition are functional for his environment.   (Pended)   -EN           Oral Motor Structure and Function    Oral Motor Structure and Function  WFL  (Pended)   -EN           Oral Musculature and Cranial Nerve Assessment    Oral Motor General Assessment  WFL  (Pended)   -EN           Motor Speech Assessment/Intervention    Motor Speech Function  WFL  (Pended)   -EN        Motor Speech, Comment  Wife and pt report that speech sounds like baseline.   (Pended)   -EN           Cursory Voice Assessment/Intervention    Quality and Resonance (Voice)  WFL  (Pended)   -EN           Cognitive Assessment Intervention- SLP    Cognitive Function (Cognition)  WFL  (Pended)   -EN        Cognition, Comment  Pt w/ history of prior CVA and dementia; speech, language, and cognition are functional for his environment. Appears to be at baseline.   (Pended)   -EN           SLP Clinical Impressions    SLC Criteria for Skilled Therapy Interventions Met  no problems identified which require skilled intervention  (Pended)   -EN        Plan for Continued Treatment (SLP)  Sign off as pt w/ history of prior CVA and dementia; speech, language, and cognition are functional for his environment. Appears to be at baseline at this time.   (Pended)   -EN           Recommendations    Therapy Frequency (SLP SLC)  evaluation only  (Pended)   -EN           SLP Discharge Summary    Discharge Destination  SNF  (Pended)   -EN        Discharge Diagnostic Statement  Sign off as pt w/ history of prior CVA and dementia; speech, language, and cognition are functional for his environment. Appears to be at baseline at this time.    (Pended)   -EN        Progress Toward Achieving Short/long Term Goals  discharge on same date as initial evaluation  (Pended)   -EN        Reason for Discharge  all goals and outcomes met, no further needs identified  (Pended)   -EN          User Key  (r) = Recorded By, (t) = Taken By, (c) = Cosigned By    Initials Name Effective Dates    REJI Chinyere Styles, Speech Therapy Student 01/08/21 -              EDUCATION  The patient has been educated in the following areas:     Cognitive Impairment, Communication impairment     SLP Recommendation and Plan     Reason for Discharge: (P) all goals and outcomes met, no further needs identified     SLC Criteria for Skilled Therapy Interventions Met: (P) no problems identified which require skilled intervention        Plan for Continued Treatment (SLP): (P) Sign off as pt w/ history of prior CVA and dementia; speech, language, and cognition are functional for his environment. Appears to be at baseline at this time.          Time Calculation:     Time Calculation- SLP     Row Name 03/01/21 1116             Time Calculation- SLP    SLP Start Time  0940  (Pended)   -EN      SLP Received On  03/01/21  (Pended)   -EN        User Key  (r) = Recorded By, (t) = Taken By, (c) = Cosigned By    Initials Name Provider Type    Chinyere Calvin, Speech Therapy Student Speech Therapy Student          Therapy Charges for Today     Code Description Service Date Service Provider Modifiers Qty    [id number] HC ST EVAL SPEECH AND PROD W LANG  3 3/1/2021 Chinyere Styles, Speech Therapy Student GN 1            Patient was not wearing a face mask and did not exhibit coughing during this therapy encounter.  Procedure performed was aerosolizing, involved close contact (within 6 feet for at least 15 minutes or longer), and did not involve contact with infectious secretions or specimens.  Therapist used appropriate personal protective equipment including gloves, standard procedure mask and eye  protection.  Appropriate PPE was worn during the entire therapy session.  Hand hygiene was completed before and after therapy session.  Tami Calderon was also present during this encounter and the aforementioned applies to them, as well.            Chinyere Styles, Speech Therapy Student  3/1/2021

## 2021-03-02 VITALS
HEART RATE: 110 BPM | SYSTOLIC BLOOD PRESSURE: 116 MMHG | DIASTOLIC BLOOD PRESSURE: 88 MMHG | WEIGHT: 155 LBS | OXYGEN SATURATION: 93 % | BODY MASS INDEX: 22.96 KG/M2 | HEIGHT: 69 IN | TEMPERATURE: 98.6 F | RESPIRATION RATE: 18 BRPM
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
## 2021-03-02 PROBLEM — R50.9 FEVER: Status: RESOLVED | Noted: 2021-02-28 | Resolved: 2021-03-02

## 2021-03-02 PROBLEM — S06.5XAA SUBDURAL HEMATOMA (HCC): Status: RESOLVED | Noted: 2021-02-28 | Resolved: 2021-03-02

## 2021-03-02 PROBLEM — I95.9 HYPOTENSION: Status: RESOLVED | Noted: 2021-02-28 | Resolved: 2021-03-02

## 2021-03-02 LAB — GLUCOSE BLDC GLUCOMTR-MCNC: 112 MG/DL (ref 70–130)

## 2021-03-02 PROCEDURE — 82962 GLUCOSE BLOOD TEST: CPT

## 2021-03-02 PROCEDURE — 99239 HOSP IP/OBS DSCHRG MGMT >30: CPT | Performed by: INTERNAL MEDICINE

## 2021-03-02 RX ORDER — FUROSEMIDE 20 MG/1
20 TABLET ORAL DAILY PRN
Start: 2021-03-02

## 2021-03-02 RX ORDER — METOPROLOL TARTRATE 50 MG/1
50 TABLET, FILM COATED ORAL EVERY 12 HOURS SCHEDULED
Status: DISCONTINUED | OUTPATIENT
Start: 2021-03-02 | End: 2021-03-02 | Stop reason: HOSPADM

## 2021-03-02 RX ADMIN — ACETAMINOPHEN 650 MG: 325 TABLET ORAL at 11:27

## 2021-03-02 RX ADMIN — METOPROLOL TARTRATE 50 MG: 50 TABLET, FILM COATED ORAL at 10:51

## 2021-03-02 RX ADMIN — SODIUM CHLORIDE 100 ML/HR: 9 INJECTION, SOLUTION INTRAVENOUS at 09:28

## 2021-03-02 NOTE — DISCHARGE SUMMARY
[facility] Medicine Services  DISCHARGE SUMMARY    Patient Name: Dirk Miles  : 1940  MRN: [id number]    Date of Admission: 2021  7:30 PM  Date of Discharge:  3/2/2021  Primary Care Physician: Provider, No Known    Consults     Date and Time Order Name Status Description    2021 0611 Inpatient Neurology Consult Stroke Completed     2021 0608 Inpatient Neurosurgery Consult Completed           Hospital Course     Presenting Problem:   Subdural hematoma (CMS/HCC) [S06.5X9A]  Subdural hematoma (CMS/HCC) [S06.5X9A]    Active Hospital Problems    Diagnosis  POA   • History of CVA (cerebrovascular accident) [Z86.73]  Not Applicable   • CAD (coronary artery disease) [I25.10]  Yes   • Anemia, chronic disease [D63.8]  Yes   • Dementia with behavioral disturbance (CMS/HCC) [F03.91]  Yes   • Hyperlipemia [E78.5]  Yes   • A-fib (CMS/HCC) [I48.91]  Yes   • Diabetes mellitus (CMS/HCC) [E11.9]  Yes   • Hypertension [I10]  Yes      Resolved Hospital Problems    Diagnosis Date Resolved POA   • **Subdural hematoma (CMS/HCC) [S06.5X9A] 2021 Yes   • Fall [W19.XXXA] 2021 Yes   • Fever [R50.9] 2021 Yes   • Hypotension [I95.9] 2021 Yes          Hospital Course:  Dirk Miles is a 80 y.o. male w/ a hx of CAD, CVA (w/ residual left sided weakness), dementia, HTN, HLD, T2DM, chronic anemia, PAF, GERD who presented to the ED after fall.      Subdural hematoma   Left eyebrow lac  Frequent Falls  -NS followed study throughout stay. Upon arrival his CT head showed chronic SDH. Repeat CT head performed during stay remained stable and NS felt no ntervention was required. Felt to be hygroma vs chronic SDH.  -His left eyebrow was sutured in ED. Sutures need removed 3/5.  -Resume asa at d/c.     Fever, resolved  Hypotension   -Upon arrival patient had a one time fever but his extensive lab/imaging eval indicated no evidence of infection. He was observed off abx and had  no further fever. It is likely that his fever was related to his recent COVID vaccine.  -Patient's bp remained on low end throughout stay. Will resume only his PO metoprolol at d/c. His PO lisinopril and clonidine can be resumed later as needed.      Discharge Follow Up Recommendations for outpatient labs/diagnostics:   PCP in 1 week        Day of Discharge     HPI: Up awake in bed without wife in room. No concerns from staff. No complaints from patient.    Review of Systems  Gen- No fevers, chills  CV- No chest pain, palpitations  Resp- No cough, dyspnea  GI- No N/V/D, abd pain    Vital Signs:   Temp:  [98.4 °F (36.9 °C)-98.9 °F (37.2 °C)] 98.6 °F (37 °C)  Heart Rate:  [] 110  Resp:  [16-18] 18  BP: ()/(56-88) 116/88     Physical Exam:  Constitutional: No acute distress, awake, alert  HENT: NCAT, mucous membranes moist, left eyelid lac sutured  Respiratory: Clear to auscultation bilaterally, respiratory effort normal   Cardiovascular: RRR, no murmurs, rubs, or gallops  Gastrointestinal: Positive bowel sounds, soft, nontender, nondistended  Musculoskeletal: No bilateral ankle edema  Psychiatric: Pleasant  Neurologic: Interactive, appropriate.   Skin: No rashes    Pertinent  and/or Most Recent Results     LAB RESULTS:      Lab 03/01/21  0606 02/28/21  0635 02/28/21  0137   WBC 7.48 7.59 7.92   HEMOGLOBIN 8.3* 9.1* 9.2*   HEMATOCRIT 27.1* 29.6* 29.2*   PLATELETS 164 203 200   NEUTROS ABS 4.84 4.97 5.71   IMMATURE GRANS (ABS) 0.03 0.03 0.03   LYMPHS ABS 0.90 1.14 0.94   MONOS ABS 0.82 0.92* 0.88   EOS ABS 0.81* 0.47* 0.31   .3* 103.5* 102.5*   PROCALCITONIN  --  0.15  --    LACTATE  --  1.5  --    PROTIME  --  15.2* 15.8*   APTT  --  36.8  --          Lab 03/01/21  0606 02/28/21  0635 02/28/21  0137   SODIUM 138 138 140   POTASSIUM 3.9 4.7 4.6   CHLORIDE 107 104 105   CO2 27.0 27.0 27.0   ANION GAP 4.0* 7.0 8.0   BUN 14 15 15   CREATININE 1.17 1.17 1.11   GLUCOSE 98 101* 112*   CALCIUM 8.4* 8.7 8.7    MAGNESIUM  --  1.9  --    HEMOGLOBIN A1C  --  6.40*  --    TSH  --  1.960  --          Lab 02/28/21 0635 02/28/21  0137   TOTAL PROTEIN 6.0 5.9*   ALBUMIN 3.20* 3.10*   GLOBULIN 2.8 2.8   ALT (SGPT) 10 11   AST (SGOT) 13 17   BILIRUBIN 0.5 0.5   ALK PHOS 80 88         Lab 02/28/21 0635 02/28/21  0137   TROPONIN T 0.019  --    PROTIME 15.2* 15.8*   INR 1.23* 1.29*         Lab 02/28/21 0635   CHOLESTEROL 88   LDL CHOL 34   HDL CHOL 32*   TRIGLYCERIDES 124             Brief Urine Lab Results  (Last result in the past 365 days)      Color   Clarity   Blood   Leuk Est   Nitrite   Protein   CREAT   Urine HCG        02/28/21 0243 Yellow Clear Negative Negative Negative Negative             Microbiology Results (last 10 days)     Procedure Component Value - Date/Time    Blood Culture - Blood, Arm, Right [[id number]] Collected: 02/28/21 0635    Lab Status: Preliminary result Specimen: Blood from Arm, Right Updated: 03/02/21 0715     Blood Culture No growth at 2 days    Blood Culture - Blood, Hand, Right [[id number]] Collected: 02/28/21 0635    Lab Status: Preliminary result Specimen: Blood from Hand, Right Updated: 03/02/21 0715     Blood Culture No growth at 2 days    COVID PRE-OP / PRE-PROCEDURE SCREENING ORDER (NO ISOLATION) - Swab, Nasopharynx [[id number]]  (Normal) Collected: 02/28/21 0338    Lab Status: Final result Specimen: Swab from Nasopharynx Updated: 02/28/21 0406    Narrative:      The following orders were created for panel order COVID PRE-OP / PRE-PROCEDURE SCREENING ORDER (NO ISOLATION) - Swab, Nasopharynx.  Procedure                               Abnormality         Status                     ---------                               -----------         ------                     COVID-19 and FLU A/B PCR...[[id number]]  Normal              Final result                 Please view results for these tests on the individual orders.    COVID-19 and FLU A/B PCR - Swab, Nasopharynx [[id number]]  (Normal) Collected:  02/28/21 0338    Lab Status: Final result Specimen: Swab from Nasopharynx Updated: 02/28/21 0406     COVID19 Not Detected     Influenza A PCR Not Detected     Influenza B PCR Not Detected    Narrative:      Fact sheet for providers: https://www.fda.gov/media/876408/download    Fact sheet for patients: https://www.fda.gov/media/076354/download    Test performed by PCR.    Urine Culture - Urine, Urine, Catheter [[id number]]  (Normal) Collected: 02/28/21 0243    Lab Status: Final result Specimen: Urine, Catheter Updated: 03/01/21 1054     Urine Culture No growth          Xr Elbow 2 View Left    Result Date: 2/27/2021  CR Elbow 2 Vws LT INDICATION: Altered mental status. Open wound on left arm. Patient fell. COMPARISON: None available FINDINGS: 2 views of the left elbow.  There is evidence of soft tissue injury possibly with a skin tear. There is no acute fracture dislocation or radiopaque foreign body is suspected. No obvious joint effusion allowing for the skin tear.  No radiopaque  foreign body. Bones are demineralized.     Findings are consistent with a skin tear. No acute fracture dislocation or radiopaque foreign body is suspected Signer Name: Gisel Nova MD  Signed: 2/27/2021 8:48 PM  Workstation Name: [email or web address]  [facility]    Ct Head Without Contrast    Result Date: 3/1/2021  EXAMINATION: CT HEAD WO CONTRAST- 03/01/2021  INDICATION: SDH; S06.3Y4G-[place] subdural hemorrhage with loss of consciousness of unspecified duration, initial encounter; S01.112A-Laceration without foreign body of left eyelid and periocular area, initial encounter; S51.012A-Laceration without foreign body of left elbow, initial encounter  TECHNIQUE: CT head without intravenous contrast  The radiation dose reduction device was turned on for each scan per the ALARA (As Low as Reasonably Achievable) protocol.  COMPARISON: CT head dated 02/28/2021  FINDINGS: Persistent low-attenuation areas left frontal and right  occipital lobes of prior insults stable from prior. Stable size and prominence of a right cerebral convexity low to intermediate signal extra-axial fluid collection most consistent with late subacute to early chronic subdural hematoma. No acute hemorrhage within this fluid or adjacent. No intraparenchymal hemorrhage or hydrocephalus. Globes and orbits unremarkable. Paranasal sinuses and mastoid air cells demonstrate mucus retention cyst right maxillary sinus otherwise grossly clear and well-pneumatized. Calvarium intact.      Stable size and prominence of a right cerebral convexity low to intermediate signal extra-axial fluid collection most consistent with late subacute to early chronic subdural hematoma. No acute hemorrhage within these fluid or adjacent. No intraparenchymal hemorrhage or hydrocephalus.  D:  03/01/2021 E:  03/01/2021  This report was finalized on 3/1/2021 4:27 PM by Dr. Pavel Gaffney.      Ct Head Without Contrast    Result Date: 2/27/2021  HISTORY: Status post fall on concrete. Left forehead laceration and neck pain. TECHNIQUE: CT head without contrast. Radiation dose reduction techniques included automated exposure control or exposure modulation based on body size. Radiation audit for number of CT and nuclear cardiology exams performed in the last year: 16 COMPARISON: Head CT from 1/10/2021. FINDINGS: Study is severely motion degraded. Its performed 3 times in an effort to acquire diagnostic imaging. There is a new likely subacute subdural hematoma overlying the right frontal parietal lobe superolaterally. It measures up to about 9 mm in maximum width and results in mild local mass effect. It measures about 8 cm AP dimension. There is interval evolution of large right posterior cerebral artery distribution infarct with encephalomalacia now present. There is generalized atrophy. There is extensive white matter low-attenuation that is likely due to small vessel disease. There is an old cortical  insult with malacia at the left frontal lobe superolaterally. There is likely chronic lacunar disease in the bilateral basal ganglia and left cerebellar hemisphere. The recent right posterior cerebral artery distribution insult also results in malacic change at the posterior limb of the right internal capsule and posterior thalamus. There is no displaced calvarial fracture appreciated. This mild paranasal sinus disease. Allowing for the motion seen on each of the studies, no definite acute blood products are appreciated. There is no mid line shift. The basilar cisterns are patent.     #1 there is a interval, subacute appearing subdural hematoma overlying the right frontal lobe measuring up to about 9 mm in width with local mass effect but no midline shift. Please note that the study is severely motion degraded despite multiple repeats. No definite acute blood products are appreciated this limited study. 2. Evolution of previously documented right posterior cerebral artery distribution insult, now chronic. Extensive pre-existing sequelae of small vessel disease and an old left frontal cortical infarct redemonstrated. 3. NOTIFICATION: Critical Value/emergent results were called by telephone at the time of interpretation on 2/27/2021 10:04 PM to MD bruce who verbally acknowledged these results. Signer Name: Gisel Nova MD  Signed: 2/27/2021 10:10 PM  Workstation Name: [facility]  [facility]    Ct Cervical Spine Without Contrast    Result Date: 2/27/2021  CT Spine Cervical WO INDICATION: Status post fall on concrete. Left forehead laceration. Neck pain. TECHNIQUE: CT of the cervical spine without IV contrast. Coronal and sagittal reconstructions were obtained.  Radiation dose reduction techniques included automated exposure control or exposure modulation based on body size. Count of known CT and cardiac nuc med studies performed in previous 12 months: 14. COMPARISON: CT cervical spine  1/2/2021 FINDINGS: No fracture or malalignment. Arthritic changes atlantoaxial joint. Mild multilevel degenerative disc changes and mild multilevel facet arthropathy. Multilevel disc protrusions but no definite high-grade spinal or foraminal stenosis on this nonmyelographic exam. Paravertebral soft tissues unremarkable. Patchy groundglass opacities lung apices bilaterally which appear superimposed on chronic lung disease. Correlate for risk factors for pneumonitis.     No acute cervical spine abnormality. Mild multilevel degenerative disc changes and facet arthropathy. Small amount of groundglass opacity within the lung apices which appears superimposed on background diffuse cystic lung disease. Correlate for risk factors for acute pneumonitis. Signer Name: LONDON Mccoy MD  Signed: 2/27/2021 9:57 PM  Workstation Name: [facility]    Xr Chest 1 View    Result Date: 2/27/2021  CR Chest 1 Vw INDICATION: Weakness with frequent falls. COMPARISON:  1/10/2021 FINDINGS: Single portable AP view(s) of the chest.  Cardiomegaly with mild pulmonary vascular congestion. No interstitial or alveolar edema. No effusions or pneumothorax. No focal consolidation.     Stable cardiomegaly with mild pulmonary vascular congestion. No acute findings and no significant change from 1/10/2021. Signer Name: LONDON Mccoy MD  Signed: 2/27/2021 11:36 PM  Workstation Name: [facility]    Ct Head Without Contrast Stroke Protocol    Result Date: 2/28/2021  CT HEAD, NONCONTRAST, STROKE PROTOCOL, 2/28/2021 (06:16) HISTORY: 80-year-old male admitted to the hospital yesterday after head injury. Subacute appearing right frontal subdural hematoma was noted. The examination is repeated this morning for newly noted mental status changes. TECHNIQUE: CT imaging of the head without IV contrast. Radiation dose reduction techniques included automated exposure control. Radiation  audit for CT and nuclear cardiology exams in the last 12 months: 17. *  CT exam time, 06:16. *  Images on line in PACS for interpretation, 06:21. *  Stat verbal telephone report, 06:26. COMPARISON: *  CT head, 2/27/2021. FINDINGS: No new intracranial abnormality is demonstrated. A small subacute low-attenuation subdural hematoma overlying the right upper frontal lobe has not changed significantly in size or extent since yesterday accounting for differences in technique. No acute products of hemorrhage are identified. Chronic midline right temporal occipital infarct. Smaller chronic infarct in the left frontal lobe. Severe diffuse chronic small vessel type white matter changes. Moderate generalized cerebral volume loss. These findings are stable. No evidence of acute intracranial hemorrhage. No visible mass, mass effect, cerebral edema, new extra-axial fluid collection or progressive ventricular enlargement.     1. No acute intracranial abnormality. No significant change since yesterday. 2. Thin subacute or chronic right frontal subdural hematoma is unchanged. 3. Old right temporal occipital and left frontal infarcts. 4. Stable diffuse chronic changes as noted above. NOTIFICATION: Critical Value/emergent results were relayed from me to the ED treatment team by telephone through the CT technologist, , at 06:26 on 2/28/2021.. Signer Name: Quang Silva MD  Signed: 2/28/2021 6:29 AM  Workstation [facility]: [email or web address]  [facility]      Results for orders placed during the hospital encounter of 12/01/20   Duplex Carotid Ultrasound CAR    Narrative · Left internal carotid artery stenosis of >70%.  · Left vertebral artery is bidirectional.  · Right internal carotid artery stenosis of 50-69%.          Results for orders placed during the hospital encounter of 12/01/20   Duplex Carotid Ultrasound CAR    Narrative · Left internal carotid artery stenosis of >70%.  · Left vertebral artery is  bidirectional.  · Right internal carotid artery stenosis of 50-69%.          Results for orders placed during the hospital encounter of 12/01/20   Adult Transthoracic Echo Complete W/ Cont if Necessary Per Protocol (With Agitated Saline)    Narrative · Left ventricular ejection fraction appears to be 61 - 65%. Left   ventricular systolic function is normal.  · Left atrial volume is moderately increased.  · Saline test results are negative.  · The right atrial cavity is mildly dilated.  · Moderate tricuspid valve regurgitation is present.  · Estimated right ventricular systolic pressure from tricuspid   regurgitation is moderately elevated (45-55 mmHg).            Discharge Details        Discharge Medications      Changes to Medications      Instructions Start Date   atorvastatin 80 MG tablet  Commonly known as: LIPITOR  What changed: additional instructions   80 mg, Oral, Nightly      furosemide 20 MG tablet  Commonly known as: LASIX  What changed:   · when to take this  · reasons to take this   20 mg, Oral, Daily PRN, ON TUES AND SAT          Continue These Medications      Instructions Start Date   acetaminophen 325 MG tablet  Commonly known as: TYLENOL   650 mg, Oral, Every 6 Hours PRN      amLODIPine 5 MG tablet  Commonly known as: NORVASC   5 mg, Oral, Daily      ARTIFICIAL TEARS OP   Ophthalmic      aspirin 81 MG EC tablet   81 mg, Oral, Daily      baclofen 10 MG tablet  Commonly known as: LIORESAL   10 mg, Oral, 3 Times Daily PRN, For muscle spasms       cholecalciferol 25 MCG (1000 UT) tablet  Commonly known as: VITAMIN D3   2,000 Units, Oral, Daily      vitamin B-12 1000 MCG tablet  Commonly known as: CYANOCOBALAMIN   1,000 mcg, Oral, Daily      cyanocobalamin 1000 MCG/ML injection   1,000 mcg, Intramuscular, Weekly      docusate sodium 100 MG capsule  Commonly known as: COLACE   200 mg, Oral, 2 Times Daily      famotidine 20 MG tablet  Commonly known as: PEPCID   20 mg, Oral, 2 Times Daily       hydrocortisone 2.5 % cream   Topical, Every 12 Hours Scheduled      insulin aspart 100 UNIT/ML solution pen-injector sc pen  Commonly known as: novoLOG FLEXPEN   8 Units, Subcutaneous, As Needed      insulin lispro 100 UNIT/ML injection  Commonly known as: humaLOG   0-7 Units, Subcutaneous, 3 Times Daily Before Meals      ipratropium-albuterol 0.5-2.5 mg/3 ml nebulizer  Commonly known as: DUO-NEB   3 mL, Nebulization, Every 4 Hours PRN      melatonin 1 MG tablet   6 mg, Oral, Nightly      metoprolol tartrate 50 MG tablet  Commonly known as: LOPRESSOR   50 mg, Oral, Every 12 Hours Scheduled      NON FORMULARY   Pt can take meds crushed in pudding per Meyer Loera       O2  Commonly known as: OXYGEN   2 L/min, Inhalation, Daily PRN      ondansetron 4 MG tablet  Commonly known as: ZOFRAN   4 mg, Oral, Every 8 Hours PRN      sennosides-docusate 8.6-50 MG per tablet  Commonly known as: PERICOLACE   2 tablets, Oral, 2 Times Daily      vitamin C 250 MG tablet  Commonly known as: ASCORBIC ACID   250 mg, Oral, Daily         Stop These Medications    cloNIDine 0.1 MG tablet  Commonly known as: CATAPRES     lisinopril 2.5 MG tablet  Commonly known as: PRINIVIL,ZESTRIL            No Known Allergies      Discharge Disposition:  Skilled Nursing Facility (DC - External)    Diet:  Hospital:  Diet Order   Procedures   • Diet Soft Texture; Chopped; Thin; Consistent Carbohydrate            CODE STATUS:    Code Status and Medical Interventions:   Ordered at: 02/28/21 0934     Limited Support to NOT Include:    Cardioversion/Defibrillation    Intubation     Level Of Support Discussed With:    Health Care Surrogate     Code Status:    No CPR     Medical Interventions (Level of Support Prior to Arrest):    Limited       No future appointments.    Additional Instructions for the Follow-ups that You Need to Schedule     Discharge Follow-up with PCP   As directed       Currently Documented PCP:    Provider, No Known    PCP Phone Number:     None     Follow Up Details: 1 week hospital follow up                     Bhumika Mendez II, DO  03/02/21      Time Spent on Discharge:  I spent  34 minutes on this discharge activity which included: face-to-face encounter with the patient, reviewing the data in the system, coordination of the care with the nursing staff as well as consultants, documentation, and entering orders.

## 2021-03-02 NOTE — PLAN OF CARE
Problem: Adult Inpatient Plan of Care  Goal: Plan of Care Review  Outcome: Ongoing, Progressing  Flowsheets (Taken 3/2/2021 8794)  Plan of Care Reviewed With: patient  Outcome Summary: Pt orientation waxes and wanes throughout shift. Pt appears to be experiencing a new onset of hallucinations, Pt expressing that there's a red headed woman named Marissa sitting in his room. Pt also states that there are bicycles all along the wall. NP notified.  NIH=10. VSS. Afib on the monitor. Pt placed on specialty bed. No other concerns at this time.       no

## 2021-03-05 LAB
BACTERIA SPEC AEROBE CULT: NORMAL
BACTERIA SPEC AEROBE CULT: NORMAL

## 2021-07-28 ENCOUNTER — APPOINTMENT (OUTPATIENT)
Dept: GENERAL RADIOLOGY | Facility: HOSPITAL | Age: 81
End: 2021-07-28

## 2021-07-28 ENCOUNTER — APPOINTMENT (OUTPATIENT)
Dept: CT IMAGING | Facility: HOSPITAL | Age: 81
End: 2021-07-28

## 2021-07-28 ENCOUNTER — HOSPITAL ENCOUNTER (OUTPATIENT)
Facility: HOSPITAL | Age: 81
Setting detail: OBSERVATION
Discharge: NURSING, REHAB OR LONG-TERM CARE | End: 2021-07-30
Attending: STUDENT IN AN ORGANIZED HEALTH CARE EDUCATION/TRAINING PROGRAM | Admitting: FAMILY MEDICINE

## 2021-07-28 DIAGNOSIS — R29.898 WEAKNESS OF RIGHT LOWER EXTREMITY: Primary | ICD-10-CM

## 2021-07-28 DIAGNOSIS — R13.11 ORAL PHASE DYSPHAGIA: ICD-10-CM

## 2021-07-28 DIAGNOSIS — R29.898 WEAKNESS OF RIGHT FOOT: ICD-10-CM

## 2021-07-28 DIAGNOSIS — Z86.73 HISTORY OF CVA (CEREBROVASCULAR ACCIDENT): ICD-10-CM

## 2021-07-28 DIAGNOSIS — R41.841 COGNITIVE COMMUNICATION DEFICIT: ICD-10-CM

## 2021-07-28 DIAGNOSIS — I48.91 ATRIAL FIBRILLATION, UNSPECIFIED TYPE (HCC): ICD-10-CM

## 2021-07-28 LAB
ALBUMIN SERPL-MCNC: 4.5 G/DL (ref 3.5–5.2)
ALBUMIN/GLOB SERPL: 1.5 G/DL
ALP SERPL-CCNC: 73 U/L (ref 39–117)
ALT SERPL W P-5'-P-CCNC: 15 U/L (ref 1–41)
ANION GAP SERPL CALCULATED.3IONS-SCNC: 13 MMOL/L (ref 5–15)
APTT PPP: 31.4 SECONDS (ref 22–39)
AST SERPL-CCNC: 14 U/L (ref 1–40)
BASOPHILS # BLD AUTO: 0.1 10*3/MM3 (ref 0–0.2)
BASOPHILS NFR BLD AUTO: 1.4 % (ref 0–1.5)
BILIRUB SERPL-MCNC: 0.3 MG/DL (ref 0–1.2)
BILIRUB UR QL STRIP: NEGATIVE
BUN SERPL-MCNC: 18 MG/DL (ref 8–23)
BUN/CREAT SERPL: 15.7 (ref 7–25)
CALCIUM SPEC-SCNC: 9.8 MG/DL (ref 8.6–10.5)
CHLORIDE SERPL-SCNC: 100 MMOL/L (ref 98–107)
CLARITY UR: CLEAR
CO2 SERPL-SCNC: 26 MMOL/L (ref 22–29)
COLOR UR: YELLOW
CREAT SERPL-MCNC: 1.15 MG/DL (ref 0.76–1.27)
DEPRECATED RDW RBC AUTO: 49.6 FL (ref 37–54)
EOSINOPHIL # BLD AUTO: 0.89 10*3/MM3 (ref 0–0.4)
EOSINOPHIL NFR BLD AUTO: 12.6 % (ref 0.3–6.2)
ERYTHROCYTE [DISTWIDTH] IN BLOOD BY AUTOMATED COUNT: 14 % (ref 12.3–15.4)
GFR SERPL CREATININE-BSD FRML MDRD: 61 ML/MIN/1.73
GLOBULIN UR ELPH-MCNC: 3 GM/DL
GLUCOSE SERPL-MCNC: 124 MG/DL (ref 65–99)
GLUCOSE UR STRIP-MCNC: NEGATIVE MG/DL
HCT VFR BLD AUTO: 37.9 % (ref 37.5–51)
HGB BLD-MCNC: 12.6 G/DL (ref 13–17.7)
HGB UR QL STRIP.AUTO: NEGATIVE
HOLD SPECIMEN: NORMAL
IMM GRANULOCYTES # BLD AUTO: 0.04 10*3/MM3 (ref 0–0.05)
IMM GRANULOCYTES NFR BLD AUTO: 0.6 % (ref 0–0.5)
INR PPP: 1.1 (ref 0.85–1.16)
KETONES UR QL STRIP: NEGATIVE
LEUKOCYTE ESTERASE UR QL STRIP.AUTO: NEGATIVE
LYMPHOCYTES # BLD AUTO: 1.64 10*3/MM3 (ref 0.7–3.1)
LYMPHOCYTES NFR BLD AUTO: 23.1 % (ref 19.6–45.3)
MCH RBC QN AUTO: 32.1 PG (ref 26.6–33)
MCHC RBC AUTO-ENTMCNC: 33.2 G/DL (ref 31.5–35.7)
MCV RBC AUTO: 96.4 FL (ref 79–97)
MONOCYTES # BLD AUTO: 0.81 10*3/MM3 (ref 0.1–0.9)
MONOCYTES NFR BLD AUTO: 11.4 % (ref 5–12)
NEUTROPHILS NFR BLD AUTO: 3.61 10*3/MM3 (ref 1.7–7)
NEUTROPHILS NFR BLD AUTO: 50.9 % (ref 42.7–76)
NITRITE UR QL STRIP: NEGATIVE
NRBC BLD AUTO-RTO: 0 /100 WBC (ref 0–0.2)
PH UR STRIP.AUTO: 6 [PH] (ref 5–8)
PLATELET # BLD AUTO: 207 10*3/MM3 (ref 140–450)
PMV BLD AUTO: 10.6 FL (ref 6–12)
POTASSIUM SERPL-SCNC: 4.7 MMOL/L (ref 3.5–5.2)
PROT SERPL-MCNC: 7.5 G/DL (ref 6–8.5)
PROT UR QL STRIP: NEGATIVE
PROTHROMBIN TIME: 13.8 SECONDS (ref 11.4–14.4)
RBC # BLD AUTO: 3.93 10*6/MM3 (ref 4.14–5.8)
SODIUM SERPL-SCNC: 139 MMOL/L (ref 136–145)
SP GR UR STRIP: 1.01 (ref 1–1.03)
TROPONIN T SERPL-MCNC: <0.01 NG/ML (ref 0–0.03)
UROBILINOGEN UR QL STRIP: NORMAL
WBC # BLD AUTO: 7.09 10*3/MM3 (ref 3.4–10.8)
WHOLE BLOOD HOLD SPECIMEN: NORMAL

## 2021-07-28 PROCEDURE — 70496 CT ANGIOGRAPHY HEAD: CPT

## 2021-07-28 PROCEDURE — 73610 X-RAY EXAM OF ANKLE: CPT

## 2021-07-28 PROCEDURE — 80053 COMPREHEN METABOLIC PANEL: CPT | Performed by: STUDENT IN AN ORGANIZED HEALTH CARE EDUCATION/TRAINING PROGRAM

## 2021-07-28 PROCEDURE — 85610 PROTHROMBIN TIME: CPT | Performed by: STUDENT IN AN ORGANIZED HEALTH CARE EDUCATION/TRAINING PROGRAM

## 2021-07-28 PROCEDURE — 85730 THROMBOPLASTIN TIME PARTIAL: CPT | Performed by: STUDENT IN AN ORGANIZED HEALTH CARE EDUCATION/TRAINING PROGRAM

## 2021-07-28 PROCEDURE — 85652 RBC SED RATE AUTOMATED: CPT | Performed by: INTERNAL MEDICINE

## 2021-07-28 PROCEDURE — 99285 EMERGENCY DEPT VISIT HI MDM: CPT

## 2021-07-28 PROCEDURE — 73590 X-RAY EXAM OF LOWER LEG: CPT

## 2021-07-28 PROCEDURE — 70498 CT ANGIOGRAPHY NECK: CPT

## 2021-07-28 PROCEDURE — 84484 ASSAY OF TROPONIN QUANT: CPT | Performed by: STUDENT IN AN ORGANIZED HEALTH CARE EDUCATION/TRAINING PROGRAM

## 2021-07-28 PROCEDURE — 70450 CT HEAD/BRAIN W/O DYE: CPT

## 2021-07-28 PROCEDURE — 99214 OFFICE O/P EST MOD 30 MIN: CPT | Performed by: NURSE PRACTITIONER

## 2021-07-28 PROCEDURE — 0 IOPAMIDOL PER 1 ML: Performed by: STUDENT IN AN ORGANIZED HEALTH CARE EDUCATION/TRAINING PROGRAM

## 2021-07-28 PROCEDURE — 73560 X-RAY EXAM OF KNEE 1 OR 2: CPT

## 2021-07-28 PROCEDURE — 85025 COMPLETE CBC W/AUTO DIFF WBC: CPT | Performed by: STUDENT IN AN ORGANIZED HEALTH CARE EDUCATION/TRAINING PROGRAM

## 2021-07-28 PROCEDURE — 81003 URINALYSIS AUTO W/O SCOPE: CPT | Performed by: STUDENT IN AN ORGANIZED HEALTH CARE EDUCATION/TRAINING PROGRAM

## 2021-07-28 RX ORDER — SODIUM CHLORIDE 0.9 % (FLUSH) 0.9 %
10 SYRINGE (ML) INJECTION AS NEEDED
Status: DISCONTINUED | OUTPATIENT
Start: 2021-07-28 | End: 2021-07-30 | Stop reason: HOSPADM

## 2021-07-28 RX ADMIN — IOPAMIDOL 75 ML: 755 INJECTION, SOLUTION INTRAVENOUS at 21:29

## 2021-07-29 ENCOUNTER — APPOINTMENT (OUTPATIENT)
Dept: MRI IMAGING | Facility: HOSPITAL | Age: 81
End: 2021-07-29

## 2021-07-29 ENCOUNTER — APPOINTMENT (OUTPATIENT)
Dept: CARDIOLOGY | Facility: HOSPITAL | Age: 81
End: 2021-07-29

## 2021-07-29 PROBLEM — G45.9 TIA (TRANSIENT ISCHEMIC ATTACK): Status: ACTIVE | Noted: 2021-07-29

## 2021-07-29 PROBLEM — R29.898 WEAKNESS OF LEFT FOOT: Status: ACTIVE | Noted: 2021-07-29

## 2021-07-29 LAB
ANION GAP SERPL CALCULATED.3IONS-SCNC: 14 MMOL/L (ref 5–15)
ASCENDING AORTA: 3.6 CM
AV VENA CONTRACTA: 0.3 CM
BASOPHILS # BLD AUTO: 0.11 10*3/MM3 (ref 0–0.2)
BASOPHILS NFR BLD AUTO: 1.5 % (ref 0–1.5)
BH CV ECHO MEAS - AO MAX PG (FULL): 2.4 MMHG
BH CV ECHO MEAS - AO MAX PG: 5 MMHG
BH CV ECHO MEAS - AO MEAN PG (FULL): 1 MMHG
BH CV ECHO MEAS - AO MEAN PG: 2 MMHG
BH CV ECHO MEAS - AO ROOT AREA (BSA CORRECTED): 1.8
BH CV ECHO MEAS - AO ROOT AREA: 10.2 CM^2
BH CV ECHO MEAS - AO ROOT DIAM: 3.6 CM
BH CV ECHO MEAS - AO V2 MAX: 109 CM/SEC
BH CV ECHO MEAS - AO V2 MEAN: 67.8 CM/SEC
BH CV ECHO MEAS - AO V2 VTI: 14.5 CM
BH CV ECHO MEAS - ASC AORTA: 3.6 CM
BH CV ECHO MEAS - AVA(I,A): 3.4 CM^2
BH CV ECHO MEAS - AVA(I,D): 3.4 CM^2
BH CV ECHO MEAS - AVA(V,A): 2.3 CM^2
BH CV ECHO MEAS - AVA(V,D): 2.3 CM^2
BH CV ECHO MEAS - BSA(HAYCOCK): 2 M^2
BH CV ECHO MEAS - BSA: 2 M^2
BH CV ECHO MEAS - BZI_BMI: 26.7 KILOGRAMS/M^2
BH CV ECHO MEAS - BZI_METRIC_HEIGHT: 175.3 CM
BH CV ECHO MEAS - BZI_METRIC_WEIGHT: 82.1 KG
BH CV ECHO MEAS - EDV(CUBED): 64 ML
BH CV ECHO MEAS - EDV(MOD-SP2): 28.8 ML
BH CV ECHO MEAS - EDV(MOD-SP4): 25.4 ML
BH CV ECHO MEAS - EDV(TEICH): 70 ML
BH CV ECHO MEAS - EF(CUBED): 61.9 %
BH CV ECHO MEAS - EF(MOD-BP): 53.2 %
BH CV ECHO MEAS - EF(MOD-SP2): 50.3 %
BH CV ECHO MEAS - EF(MOD-SP4): 53.5 %
BH CV ECHO MEAS - EF(TEICH): 54 %
BH CV ECHO MEAS - ESV(CUBED): 24.4 ML
BH CV ECHO MEAS - ESV(MOD-SP2): 14.3 ML
BH CV ECHO MEAS - ESV(MOD-SP4): 11.8 ML
BH CV ECHO MEAS - ESV(TEICH): 32.2 ML
BH CV ECHO MEAS - FS: 27.5 %
BH CV ECHO MEAS - IVS/LVPW: 1.1
BH CV ECHO MEAS - IVSD: 1 CM
BH CV ECHO MEAS - LA DIMENSION: 3.5 CM
BH CV ECHO MEAS - LA/AO: 0.97
BH CV ECHO MEAS - LAD MAJOR: 6.8 CM
BH CV ECHO MEAS - LAT PEAK E' VEL: 10.6 CM/SEC
BH CV ECHO MEAS - LATERAL E/E' RATIO: 8.4
BH CV ECHO MEAS - LV DIASTOLIC VOL/BSA (35-75): 12.8 ML/M^2
BH CV ECHO MEAS - LV IVRT: 0.12 SEC
BH CV ECHO MEAS - LV MASS(C)D: 118.2 GRAMS
BH CV ECHO MEAS - LV MASS(C)DI: 59.7 GRAMS/M^2
BH CV ECHO MEAS - LV MAX PG: 2.6 MMHG
BH CV ECHO MEAS - LV MEAN PG: 1 MMHG
BH CV ECHO MEAS - LV SYSTOLIC VOL/BSA (12-30): 6 ML/M^2
BH CV ECHO MEAS - LV V1 MAX: 80.3 CM/SEC
BH CV ECHO MEAS - LV V1 MEAN: 55.3 CM/SEC
BH CV ECHO MEAS - LV V1 VTI: 15.9 CM
BH CV ECHO MEAS - LVIDD: 4 CM
BH CV ECHO MEAS - LVIDS: 2.9 CM
BH CV ECHO MEAS - LVLD AP2: 6.6 CM
BH CV ECHO MEAS - LVLD AP4: 5.8 CM
BH CV ECHO MEAS - LVLS AP2: 5.5 CM
BH CV ECHO MEAS - LVLS AP4: 5 CM
BH CV ECHO MEAS - LVOT AREA (M): 3.1 CM^2
BH CV ECHO MEAS - LVOT AREA: 3.1 CM^2
BH CV ECHO MEAS - LVOT DIAM: 2 CM
BH CV ECHO MEAS - LVPWD: 0.9 CM
BH CV ECHO MEAS - MED PEAK E' VEL: 6.6 CM/SEC
BH CV ECHO MEAS - MEDIAL E/E' RATIO: 13.4
BH CV ECHO MEAS - MV A MAX VEL: 31.3 CM/SEC
BH CV ECHO MEAS - MV DEC SLOPE: 557 CM/SEC^2
BH CV ECHO MEAS - MV DEC TIME: 0.19 SEC
BH CV ECHO MEAS - MV E MAX VEL: 88.7 CM/SEC
BH CV ECHO MEAS - MV E/A: 2.8
BH CV ECHO MEAS - MV MAX PG: 6.3 MMHG
BH CV ECHO MEAS - MV MEAN PG: 1 MMHG
BH CV ECHO MEAS - MV P1/2T MAX VEL: 125 CM/SEC
BH CV ECHO MEAS - MV P1/2T: 65.7 MSEC
BH CV ECHO MEAS - MV V2 MAX: 125 CM/SEC
BH CV ECHO MEAS - MV V2 MEAN: 48 CM/SEC
BH CV ECHO MEAS - MV V2 VTI: 28.3 CM
BH CV ECHO MEAS - MVA P1/2T LCG: 1.8 CM^2
BH CV ECHO MEAS - MVA(P1/2T): 3.3 CM^2
BH CV ECHO MEAS - MVA(VTI): 1.8 CM^2
BH CV ECHO MEAS - PA ACC TIME: 0.2 SEC
BH CV ECHO MEAS - PA MAX PG: 3.4 MMHG
BH CV ECHO MEAS - PA PR(ACCEL): -11.5 MMHG
BH CV ECHO MEAS - PA V2 MAX: 92.3 CM/SEC
BH CV ECHO MEAS - RAP SYSTOLE: 8 MMHG
BH CV ECHO MEAS - RVSP: 37 MMHG
BH CV ECHO MEAS - SI(AO): 74.5 ML/M^2
BH CV ECHO MEAS - SI(CUBED): 20 ML/M^2
BH CV ECHO MEAS - SI(LVOT): 25.2 ML/M^2
BH CV ECHO MEAS - SI(MOD-SP2): 7.3 ML/M^2
BH CV ECHO MEAS - SI(MOD-SP4): 6.9 ML/M^2
BH CV ECHO MEAS - SI(TEICH): 19.1 ML/M^2
BH CV ECHO MEAS - SV(AO): 147.6 ML
BH CV ECHO MEAS - SV(CUBED): 39.6 ML
BH CV ECHO MEAS - SV(LVOT): 50 ML
BH CV ECHO MEAS - SV(MOD-SP2): 14.5 ML
BH CV ECHO MEAS - SV(MOD-SP4): 13.6 ML
BH CV ECHO MEAS - SV(TEICH): 37.8 ML
BH CV ECHO MEAS - TAPSE (>1.6): 1 CM
BH CV ECHO MEAS - TR MAX PG: 29 MMHG
BH CV ECHO MEAS - TR MAX VEL: 271 CM/SEC
BH CV ECHO MEASUREMENTS AVERAGE E/E' RATIO: 10.31
BH CV VAS BP LEFT ARM: NORMAL MMHG
BH CV XLRA - RV BASE: 3.4 CM
BH CV XLRA - RV LENGTH: 6.3 CM
BH CV XLRA - RV MID: 3.1 CM
BH CV XLRA - TDI S': 15.1 CM/SEC
BUN SERPL-MCNC: 16 MG/DL (ref 8–23)
BUN/CREAT SERPL: 15.1 (ref 7–25)
CALCIUM SPEC-SCNC: 9.9 MG/DL (ref 8.6–10.5)
CHLORIDE SERPL-SCNC: 101 MMOL/L (ref 98–107)
CHOLEST SERPL-MCNC: 142 MG/DL (ref 0–200)
CO2 SERPL-SCNC: 22 MMOL/L (ref 22–29)
CREAT SERPL-MCNC: 1.06 MG/DL (ref 0.76–1.27)
CRP SERPL-MCNC: <0.3 MG/DL (ref 0–0.5)
DEPRECATED RDW RBC AUTO: 49.9 FL (ref 37–54)
EOSINOPHIL # BLD AUTO: 0.9 10*3/MM3 (ref 0–0.4)
EOSINOPHIL NFR BLD AUTO: 12 % (ref 0.3–6.2)
ERYTHROCYTE [DISTWIDTH] IN BLOOD BY AUTOMATED COUNT: 13.8 % (ref 12.3–15.4)
ERYTHROCYTE [SEDIMENTATION RATE] IN BLOOD: 27 MM/HR (ref 0–20)
GFR SERPL CREATININE-BSD FRML MDRD: 67 ML/MIN/1.73
GLUCOSE BLDC GLUCOMTR-MCNC: 110 MG/DL (ref 70–130)
GLUCOSE BLDC GLUCOMTR-MCNC: 115 MG/DL (ref 70–130)
GLUCOSE BLDC GLUCOMTR-MCNC: 122 MG/DL (ref 70–130)
GLUCOSE BLDC GLUCOMTR-MCNC: 133 MG/DL (ref 70–130)
GLUCOSE BLDC GLUCOMTR-MCNC: 219 MG/DL (ref 70–130)
GLUCOSE SERPL-MCNC: 124 MG/DL (ref 65–99)
HBA1C MFR BLD: 7.3 % (ref 4.8–5.6)
HCT VFR BLD AUTO: 40.9 % (ref 37.5–51)
HDLC SERPL-MCNC: 37 MG/DL (ref 40–60)
HGB BLD-MCNC: 13.2 G/DL (ref 13–17.7)
IMM GRANULOCYTES # BLD AUTO: 0.02 10*3/MM3 (ref 0–0.05)
IMM GRANULOCYTES NFR BLD AUTO: 0.3 % (ref 0–0.5)
IVRT: 116 MSEC
LDLC SERPL CALC-MCNC: 61 MG/DL (ref 0–100)
LDLC/HDLC SERPL: 1.33 {RATIO}
LEFT ATRIUM VOLUME INDEX: 46.8 ML/M^2
LEFT ATRIUM VOLUME: 92.6 ML
LV EF 2D ECHO EST: 55 %
LYMPHOCYTES # BLD AUTO: 1.16 10*3/MM3 (ref 0.7–3.1)
LYMPHOCYTES NFR BLD AUTO: 15.5 % (ref 19.6–45.3)
MCH RBC QN AUTO: 31.9 PG (ref 26.6–33)
MCHC RBC AUTO-ENTMCNC: 32.3 G/DL (ref 31.5–35.7)
MCV RBC AUTO: 98.8 FL (ref 79–97)
MONOCYTES # BLD AUTO: 0.81 10*3/MM3 (ref 0.1–0.9)
MONOCYTES NFR BLD AUTO: 10.8 % (ref 5–12)
MV VENA CONTRACTA: 0.4 CM
NEUTROPHILS NFR BLD AUTO: 4.48 10*3/MM3 (ref 1.7–7)
NEUTROPHILS NFR BLD AUTO: 59.9 % (ref 42.7–76)
NRBC BLD AUTO-RTO: 0 /100 WBC (ref 0–0.2)
PLATELET # BLD AUTO: 196 10*3/MM3 (ref 140–450)
PMV BLD AUTO: 9.8 FL (ref 6–12)
POTASSIUM SERPL-SCNC: 4.5 MMOL/L (ref 3.5–5.2)
RBC # BLD AUTO: 4.14 10*6/MM3 (ref 4.14–5.8)
SARS-COV-2 RDRP RESP QL NAA+PROBE: NORMAL
SODIUM SERPL-SCNC: 137 MMOL/L (ref 136–145)
TRIGL SERPL-MCNC: 279 MG/DL (ref 0–150)
TSH SERPL DL<=0.05 MIU/L-ACNC: 2.99 UIU/ML (ref 0.27–4.2)
VLDLC SERPL-MCNC: 44 MG/DL (ref 5–40)
WBC # BLD AUTO: 7.48 10*3/MM3 (ref 3.4–10.8)

## 2021-07-29 PROCEDURE — 63710000001 INSULIN LISPRO (HUMAN) PER 5 UNITS: Performed by: INTERNAL MEDICINE

## 2021-07-29 PROCEDURE — C9803 HOPD COVID-19 SPEC COLLECT: HCPCS

## 2021-07-29 PROCEDURE — 86140 C-REACTIVE PROTEIN: CPT | Performed by: INTERNAL MEDICINE

## 2021-07-29 PROCEDURE — G0378 HOSPITAL OBSERVATION PER HR: HCPCS

## 2021-07-29 PROCEDURE — 80048 BASIC METABOLIC PNL TOTAL CA: CPT | Performed by: INTERNAL MEDICINE

## 2021-07-29 PROCEDURE — 97110 THERAPEUTIC EXERCISES: CPT

## 2021-07-29 PROCEDURE — 97166 OT EVAL MOD COMPLEX 45 MIN: CPT

## 2021-07-29 PROCEDURE — 85025 COMPLETE CBC W/AUTO DIFF WBC: CPT | Performed by: INTERNAL MEDICINE

## 2021-07-29 PROCEDURE — 87635 SARS-COV-2 COVID-19 AMP PRB: CPT | Performed by: INTERNAL MEDICINE

## 2021-07-29 PROCEDURE — 99220 PR INITIAL OBSERVATION CARE/DAY 70 MINUTES: CPT | Performed by: INTERNAL MEDICINE

## 2021-07-29 PROCEDURE — 96374 THER/PROPH/DIAG INJ IV PUSH: CPT

## 2021-07-29 PROCEDURE — 83036 HEMOGLOBIN GLYCOSYLATED A1C: CPT | Performed by: INTERNAL MEDICINE

## 2021-07-29 PROCEDURE — 97162 PT EVAL MOD COMPLEX 30 MIN: CPT

## 2021-07-29 PROCEDURE — 80061 LIPID PANEL: CPT | Performed by: INTERNAL MEDICINE

## 2021-07-29 PROCEDURE — 25010000002 LORAZEPAM PER 2 MG: Performed by: FAMILY MEDICINE

## 2021-07-29 PROCEDURE — 93306 TTE W/DOPPLER COMPLETE: CPT | Performed by: INTERNAL MEDICINE

## 2021-07-29 PROCEDURE — 82962 GLUCOSE BLOOD TEST: CPT

## 2021-07-29 PROCEDURE — 96376 TX/PRO/DX INJ SAME DRUG ADON: CPT

## 2021-07-29 PROCEDURE — 84443 ASSAY THYROID STIM HORMONE: CPT | Performed by: INTERNAL MEDICINE

## 2021-07-29 PROCEDURE — 92523 SPEECH SOUND LANG COMPREHEN: CPT

## 2021-07-29 PROCEDURE — 97530 THERAPEUTIC ACTIVITIES: CPT

## 2021-07-29 PROCEDURE — 93306 TTE W/DOPPLER COMPLETE: CPT

## 2021-07-29 PROCEDURE — 92610 EVALUATE SWALLOWING FUNCTION: CPT

## 2021-07-29 PROCEDURE — 70551 MRI BRAIN STEM W/O DYE: CPT

## 2021-07-29 RX ORDER — ONDANSETRON 4 MG/1
4 TABLET, FILM COATED ORAL EVERY 8 HOURS PRN
Status: DISCONTINUED | OUTPATIENT
Start: 2021-07-29 | End: 2021-07-30 | Stop reason: HOSPADM

## 2021-07-29 RX ORDER — LORAZEPAM 2 MG/ML
1 INJECTION INTRAMUSCULAR EVERY 4 HOURS PRN
Status: DISCONTINUED | OUTPATIENT
Start: 2021-07-29 | End: 2021-07-30 | Stop reason: HOSPADM

## 2021-07-29 RX ORDER — ATORVASTATIN CALCIUM 40 MG/1
80 TABLET, FILM COATED ORAL NIGHTLY
Status: DISCONTINUED | OUTPATIENT
Start: 2021-07-29 | End: 2021-07-30 | Stop reason: HOSPADM

## 2021-07-29 RX ORDER — DEXTROSE MONOHYDRATE 25 G/50ML
25 INJECTION, SOLUTION INTRAVENOUS
Status: DISCONTINUED | OUTPATIENT
Start: 2021-07-29 | End: 2021-07-30 | Stop reason: HOSPADM

## 2021-07-29 RX ORDER — AMOXICILLIN 250 MG
2 CAPSULE ORAL 2 TIMES DAILY
Status: DISCONTINUED | OUTPATIENT
Start: 2021-07-29 | End: 2021-07-30 | Stop reason: HOSPADM

## 2021-07-29 RX ORDER — FAMOTIDINE 20 MG/1
20 TABLET, FILM COATED ORAL 2 TIMES DAILY
Status: DISCONTINUED | OUTPATIENT
Start: 2021-07-29 | End: 2021-07-30 | Stop reason: HOSPADM

## 2021-07-29 RX ORDER — NICOTINE POLACRILEX 4 MG
15 LOZENGE BUCCAL
Status: DISCONTINUED | OUTPATIENT
Start: 2021-07-29 | End: 2021-07-30 | Stop reason: HOSPADM

## 2021-07-29 RX ORDER — IPRATROPIUM BROMIDE AND ALBUTEROL SULFATE 2.5; .5 MG/3ML; MG/3ML
3 SOLUTION RESPIRATORY (INHALATION) EVERY 4 HOURS PRN
Status: DISCONTINUED | OUTPATIENT
Start: 2021-07-29 | End: 2021-07-30 | Stop reason: HOSPADM

## 2021-07-29 RX ORDER — ASPIRIN 300 MG/1
300 SUPPOSITORY RECTAL DAILY
Status: DISCONTINUED | OUTPATIENT
Start: 2021-07-29 | End: 2021-07-30 | Stop reason: HOSPADM

## 2021-07-29 RX ORDER — POLYVINYL ALCOHOL 14 MG/ML
1 SOLUTION/ DROPS OPHTHALMIC
Status: DISCONTINUED | OUTPATIENT
Start: 2021-07-29 | End: 2021-07-30 | Stop reason: HOSPADM

## 2021-07-29 RX ORDER — ASPIRIN 81 MG/1
81 TABLET, CHEWABLE ORAL DAILY
Status: DISCONTINUED | OUTPATIENT
Start: 2021-07-29 | End: 2021-07-30 | Stop reason: HOSPADM

## 2021-07-29 RX ORDER — SODIUM CHLORIDE 0.9 % (FLUSH) 0.9 %
10 SYRINGE (ML) INJECTION AS NEEDED
Status: DISCONTINUED | OUTPATIENT
Start: 2021-07-29 | End: 2021-07-30 | Stop reason: HOSPADM

## 2021-07-29 RX ORDER — SODIUM CHLORIDE 0.9 % (FLUSH) 0.9 %
10 SYRINGE (ML) INJECTION EVERY 12 HOURS SCHEDULED
Status: DISCONTINUED | OUTPATIENT
Start: 2021-07-29 | End: 2021-07-30 | Stop reason: HOSPADM

## 2021-07-29 RX ORDER — DOCUSATE SODIUM 100 MG/1
200 CAPSULE, LIQUID FILLED ORAL 2 TIMES DAILY
Status: DISCONTINUED | OUTPATIENT
Start: 2021-07-29 | End: 2021-07-30 | Stop reason: HOSPADM

## 2021-07-29 RX ORDER — ACETAMINOPHEN 325 MG/1
650 TABLET ORAL EVERY 6 HOURS PRN
Status: DISCONTINUED | OUTPATIENT
Start: 2021-07-29 | End: 2021-07-30 | Stop reason: HOSPADM

## 2021-07-29 RX ADMIN — DOCUSATE SODIUM 200 MG: 100 CAPSULE, LIQUID FILLED ORAL at 21:10

## 2021-07-29 RX ADMIN — ASPIRIN 81 MG: 81 TABLET, CHEWABLE ORAL at 09:43

## 2021-07-29 RX ADMIN — DOCUSATE SODIUM 200 MG: 100 CAPSULE, LIQUID FILLED ORAL at 09:43

## 2021-07-29 RX ADMIN — FAMOTIDINE 20 MG: 20 TABLET ORAL at 09:43

## 2021-07-29 RX ADMIN — ATORVASTATIN CALCIUM 80 MG: 40 TABLET, FILM COATED ORAL at 21:10

## 2021-07-29 RX ADMIN — FAMOTIDINE 20 MG: 20 TABLET ORAL at 21:10

## 2021-07-29 RX ADMIN — DOCUSATE SODIUM 50MG AND SENNOSIDES 8.6MG 2 TABLET: 8.6; 5 TABLET, FILM COATED ORAL at 09:43

## 2021-07-29 RX ADMIN — SODIUM CHLORIDE, PRESERVATIVE FREE 10 ML: 5 INJECTION INTRAVENOUS at 21:11

## 2021-07-29 RX ADMIN — SODIUM CHLORIDE, PRESERVATIVE FREE 10 ML: 5 INJECTION INTRAVENOUS at 09:42

## 2021-07-29 RX ADMIN — SODIUM CHLORIDE, PRESERVATIVE FREE 10 ML: 5 INJECTION INTRAVENOUS at 09:43

## 2021-07-29 RX ADMIN — INSULIN LISPRO 3 UNITS: 100 INJECTION, SOLUTION INTRAVENOUS; SUBCUTANEOUS at 13:18

## 2021-07-29 RX ADMIN — DOCUSATE SODIUM 50MG AND SENNOSIDES 8.6MG 2 TABLET: 8.6; 5 TABLET, FILM COATED ORAL at 21:10

## 2021-07-29 RX ADMIN — LORAZEPAM 1 MG: 2 INJECTION INTRAMUSCULAR; INTRAVENOUS at 23:33

## 2021-07-29 RX ADMIN — LORAZEPAM 1 MG: 2 INJECTION INTRAMUSCULAR; INTRAVENOUS at 18:27

## 2021-07-30 VITALS
HEIGHT: 69 IN | RESPIRATION RATE: 18 BRPM | HEART RATE: 80 BPM | SYSTOLIC BLOOD PRESSURE: 130 MMHG | TEMPERATURE: 98 F | DIASTOLIC BLOOD PRESSURE: 81 MMHG | BODY MASS INDEX: 26.42 KG/M2 | OXYGEN SATURATION: 99 % | WEIGHT: 178.35 LBS

## 2021-07-30 LAB
GLUCOSE BLDC GLUCOMTR-MCNC: 110 MG/DL (ref 70–130)
GLUCOSE BLDC GLUCOMTR-MCNC: 121 MG/DL (ref 70–130)
GLUCOSE BLDC GLUCOMTR-MCNC: 128 MG/DL (ref 70–130)
SARS-COV-2 RDRP RESP QL NAA+PROBE: NORMAL

## 2021-07-30 PROCEDURE — G0378 HOSPITAL OBSERVATION PER HR: HCPCS

## 2021-07-30 PROCEDURE — 87635 SARS-COV-2 COVID-19 AMP PRB: CPT | Performed by: FAMILY MEDICINE

## 2021-07-30 PROCEDURE — 82962 GLUCOSE BLOOD TEST: CPT

## 2021-07-30 PROCEDURE — 99217 PR OBSERVATION CARE DISCHARGE MANAGEMENT: CPT | Performed by: FAMILY MEDICINE

## 2021-07-30 PROCEDURE — 99212 OFFICE O/P EST SF 10 MIN: CPT | Performed by: NURSE PRACTITIONER

## 2021-07-30 RX ORDER — ATORVASTATIN CALCIUM 80 MG/1
80 TABLET, FILM COATED ORAL NIGHTLY
Qty: 30 TABLET | Refills: 0 | Status: SHIPPED | OUTPATIENT
Start: 2021-07-30

## 2021-07-30 RX ORDER — CLOPIDOGREL BISULFATE 75 MG/1
75 TABLET ORAL DAILY
Qty: 30 TABLET | Refills: 11 | Status: SHIPPED | OUTPATIENT
Start: 2021-07-30 | End: 2022-07-30

## 2021-07-30 RX ADMIN — ACETAMINOPHEN 650 MG: 325 TABLET ORAL at 10:22

## 2021-07-30 RX ADMIN — FAMOTIDINE 20 MG: 20 TABLET ORAL at 10:22

## 2021-07-30 RX ADMIN — DOCUSATE SODIUM 200 MG: 100 CAPSULE, LIQUID FILLED ORAL at 10:22

## 2021-07-30 RX ADMIN — SODIUM CHLORIDE, PRESERVATIVE FREE 10 ML: 5 INJECTION INTRAVENOUS at 10:22

## 2021-07-30 RX ADMIN — ASPIRIN 81 MG: 81 TABLET, CHEWABLE ORAL at 10:22

## 2021-07-30 RX ADMIN — DOCUSATE SODIUM 50MG AND SENNOSIDES 8.6MG 2 TABLET: 8.6; 5 TABLET, FILM COATED ORAL at 10:22

## 2021-08-03 ENCOUNTER — TELEPHONE (OUTPATIENT)
Dept: NEUROSURGERY | Facility: CLINIC | Age: 81
End: 2021-08-03

## 2021-08-03 NOTE — TELEPHONE ENCOUNTER
"Can we please call the patient in regards to what medications are \"wrong\" to see if we are the right service to be helping them with their questions. We have never seen this patient in office and has only been seen in consult in hospital.  "

## 2021-08-03 NOTE — TELEPHONE ENCOUNTER
Provider:  Brandon  Caller: Nick Aaron/ Benson Loera  Time of call:  11:12   Phone #:  [phone]  Surgery:  na  Surgery Date:  na  Last visit:   1-11-21 that was cancelled by patient  Next visit: rocio MAYS:         Reason for call:    Nick Loera called and wanted someone to call her back because someone had changed the patient's medicine and was causing problems. Could someone please call her back. Some of the medicine's was wrong. Thank you.

## 2021-08-03 NOTE — TELEPHONE ENCOUNTER
"I spoke with Nick and she could not \"recall\" who she was trying to contact.  She is going to call back if need in the future once she determines who the prescribing physician is.      "

## 2021-09-23 ENCOUNTER — OFFICE VISIT (OUTPATIENT)
Dept: NEUROLOGY | Facility: CLINIC | Age: 81
End: 2021-09-23

## 2021-09-23 VITALS
HEIGHT: 69 IN | BODY MASS INDEX: 26.33 KG/M2 | HEART RATE: 70 BPM | OXYGEN SATURATION: 96 % | DIASTOLIC BLOOD PRESSURE: 84 MMHG | SYSTOLIC BLOOD PRESSURE: 128 MMHG

## 2021-09-23 DIAGNOSIS — Z86.73 HISTORY OF CVA (CEREBROVASCULAR ACCIDENT): ICD-10-CM

## 2021-09-23 DIAGNOSIS — R29.898 WEAKNESS OF RIGHT FOOT: Chronic | ICD-10-CM

## 2021-09-23 DIAGNOSIS — M21.371 RIGHT FOOT DROP: Primary | ICD-10-CM

## 2021-09-23 PROCEDURE — 99214 OFFICE O/P EST MOD 30 MIN: CPT | Performed by: PSYCHIATRY & NEUROLOGY

## 2021-09-23 RX ORDER — LANOLIN ALCOHOL/MO/W.PET/CERES
3 CREAM (GRAM) TOPICAL NIGHTLY PRN
COMMUNITY
Start: 2021-01-01

## 2021-09-23 NOTE — PROGRESS NOTES
"Chief Complaint  Extremity Weakness    Subjective          Dirk Miles presents to [facility] NEUROLOGY     History of Present Illness    81 y.o. male referred by Dr Maryse Rebolledo for weakness of right foot.     August 2021 PT noticed  R foot drop.      Left sided weakness, L HH    Reviewed medical records:    Admitted [facility] 7/28/21 - 7/30/21 for right foot weakness.  PMH significant for history of R PCA stroke with residual left-sided deficits with post stroke hemorrhage (due to fall or hemorrhagic transformation), atrial fibrillation, dementia, diabetes mellitus type 2, HTN, HLD, CAD     Discharged on ASA/plavix.  Not a candidate for OAC.     MRI Brain, my review of films, extensive SVDz, R PCA infarct with hemosiderin staining.  No acute infarct    CTA H & N - 70% L ICA, 50% R Carotid bifurcation, decreased flow R PCA    Results for orders placed during the hospital encounter of 12/01/20    Duplex Carotid Ultrasound CAR    Interpretation Summary  · Left internal carotid artery stenosis of >70%.  · Left vertebral artery is bidirectional.  · Right internal carotid artery stenosis of 50-69%.       Results for orders placed during the hospital encounter of 07/28/21    Adult Transthoracic Echo Complete W/ Cont if Necessary Per Protocol (With Agitated Saline)    Interpretation Summary  · Left ventricular systolic function is normal. Estimated left ventricular EF = 60%.  · Left atrial volume is mildly increased.  · The cardiac valves are anatomically and functionally normal.  · Estimated right ventricular systolic pressure from tricuspid regurgitation is mildly elevated (35-45 mmHg).  · Saline test results are negative.       Objective   Vital Signs:   /84   Pulse 70   Ht 175.3 cm (69.02\")   SpO2 96%   BMI 26.33 kg/m²     Physical Exam  Eyes:      Extraocular Movements: EOM normal.      Pupils: Pupils are equal, round, and reactive to light.   Neurological:      Mental Status: He is " oriented to person, place, and time.      Deep Tendon Reflexes: Strength normal.      Reflex Scores:       Tricep reflexes are 3+ on the left side.       Bicep reflexes are 3+ on the left side.       Brachioradialis reflexes are 3+ on the left side.       Patellar reflexes are 3+ on the left side.       Achilles reflexes are 3+ on the left side.  Psychiatric:         Speech: Speech normal.          Neurologic Exam     Mental Status   Oriented to person, place, and time.   Attention: decreased. Concentration: decreased.   Speech: speech is normal   Level of consciousness: alert  Knowledge: consistent with education and poor.   Abnormal comprehension.     Cranial Nerves   Cranial nerves II through XII intact.     CN II   Visual acuity: decreased  Right visual field deficit: upper nasal and lower nasal quadrant(s)  Left visual field deficit: upper temporal and lower temporal quadrant(s)    CN III, IV, VI   Pupils are equal, round, and reactive to light.  Extraocular motions are normal.   Nystagmus: none   Diplopia: none  Ophthalmoparesis: none  Upgaze: normal  Downgaze: normal  Conjugate gaze: present    CN V   Facial sensation intact.   Right corneal reflex: normal  Left corneal reflex: normal    CN VII   Right facial weakness: none  Left facial weakness: peripheral    CN VIII   Hearing: intact    CN IX, X   Palate: symmetric  Right gag reflex: normal  Left gag reflex: normal    CN XI   Right sternocleidomastoid strength: normal  Left sternocleidomastoid strength: normal    CN XII   Tongue: not atrophic  Fasciculations: absent  Tongue deviation: none    Motor Exam   Muscle bulk: normal  Overall muscle tone: normal  Left arm tone: spastic  Left leg tone: increased    Strength   Strength 5/5 throughout.   Left deltoid: 3/5  Left biceps: 3/5  Left triceps: 3/5  Left wrist flexion: 3/5  Left wrist extension: 3/5  Left iliopsoas: 4/5  Left quadriceps: 4/5  Left hamstrin/5  Left glutei: 4/5  Left anterior tibial:  4/5  Left posterior tibial: 4/5  Left peroneal: 4/5  Left gastroc: 4/5    Sensory Exam   Left hemineglect      Gait, Coordination, and Reflexes     Gait  Gait: circumduction and shuffling    Tremor   Resting tremor: absent  Intention tremor: absent  Action tremor: absent    Reflexes   Reflexes 2+ except as noted.   Left brachioradialis: 3+  Left biceps: 3+  Left triceps: 3+  Left patellar: 3+  Left achilles: 3+     Result Review :   The following data was reviewed by: Daniel Carbajal MD on 09/23/2021:  Common labs    Common Labsle 3/1/21 3/1/21 7/28/21 7/28/21 7/29/21 7/29/21 7/29/21 7/29/21    0606 [id number] 1809 1809 0902 0902 0903 0903   Glucose  98  124 (A)    124 (A)   BUN  14  18    16   Creatinine  1.17  1.15    1.06   eGFR Non African Am  60 (A)  61    67   Sodium  138  139    137   Potassium  3.9  4.7    4.5   Chloride  107  100    101   Calcium  8.4 (A)  9.8    9.9   Albumin    4.50       Total Bilirubin    0.3       Alkaline Phosphatase    73       AST (SGOT)    14       ALT (SGPT)    15       WBC 7.48  7.09  7.48      Hemoglobin 8.3 (A)  12.6 (A)  13.2      Hematocrit 27.1 (A)  37.9  40.9      Platelets 164  207  196      Total Cholesterol       142    Triglycerides       279 (A)    HDL Cholesterol       37 (A)    LDL Cholesterol        61    Hemoglobin A1C      7.30 (A)     (A) Abnormal value            Data reviewed: Radiologic studies MRI, CTA H & N, Cardiology studies Echo and Recent hospitalization notes [facility]           Assessment and Plan    Diagnoses and all orders for this visit:    1. Right foot drop (Primary)  -     Nerve Conduction Test; Future    2. Weakness of right foot    3. History of CVA (cerebrovascular accident)  Assessment & Plan:  ASA/Plavix/statin        Follow Up   No follow-ups on file.  Patient was given instructions and counseling regarding his condition or for health maintenance advice. Please see specific information pulled into the AVS if appropriate.

## 2021-09-27 NOTE — PROGRESS NOTES
"Electrophysiology Clinic Consult     Dirk Miles  1940 09/29/21    DATE OF ADMISSION: (Not on file)  [facility] CARDIOLOGY    Provider, No Known  [facility] / [place] [id number]    Chief Complaint   Patient presents with   • Atrial Fibrillation     Referring: Dr Loera    Chief complaint: Atrial fibrillation/ischemic CVA with hemorrhagic transformation/ Subdural Hematoma     Problem list  1. Permanant Atrial Fibrillation  a. CHADSVASC: 5 (age3, HTN, CAD, DM)   b. Echo 7/29/2021: LVEF 60%, left atrium volume is mildly increased.  Cardiac valves are anatomically and functionally normal.  Saline results are negative.   c. Acceptable rate control - one EKG with junctional rhythm noted 12/2020   2. HTN  3. HLD  4. CAD  5. CVA  a. Right PCA stroke with residual left-sided deficits with post stroke hemorrhage due to fall or hemorrhagic transformation December 2020   b. 7/30/2021 brain MRI Severely advanced chronic small vessel ischemic disease along with prior/chronic insult right posterior cerebral artery distribution without acute infarction  c. Neurology recommendation for aspirin plus Plavix-no oral anticoagulation with NOAC recommended given recurrent falls  d. Hemiplegia and Hemiparesis following CVA.   6. Vascular Dementia  7. Frequent falls-resulting in numerous hospitalizations and injuries/facial laceration  8. Type 2 diabetes  9. Hearing Loss  10. Vitamin D deficiency  11. Surgical History  a. Cataracts surgery  b.       History of Present Illness:   Mr. Cavazos is a pleasant 81-year-old gentleman he has been referred for consideration of watchman placement after recent hospitalizations from CVA/ TIA. He has  history of atrial fibrillation, CVA and frequent falls which has taken him to the ER and results in a facial laceration in the past.  He states he has had Afib for the last decade and only taken ASA because he was told he was a \"free bleeder\".   He did well on ASA alone " however  in December 2020 patient was admitted at [facility] for large right PCA  stroke patient was initiated on Eliquis . While still admitted, after just 2 doses of Eliquis patient suffered a fall,  repeat brain imaging was performed revealing a small hemorrhage around the prior stroke site recommendations at that point were to hold Eliquis and proceed with aspirin alone.  He was readmitted in March 2021 after another fall resulting in a facial laceration-brain imaging during this admission did show a subdural hematoma.  Surgical intervention was not recommended.  With regards to anticoagulation aspirin 81 mg alone was continued.    He is here with his wife today to discuss watchman. They state he has not taken other blood thinners other than ASA and briefly Eliquis while in the hospital.     Allergies   Allergen Reactions   • Codeine Mental Status Change     hyper           Current Outpatient Medications:   •  acetaminophen (TYLENOL) 325 MG tablet, Take 650 mg by mouth Every 6 (Six) Hours As Needed for Mild Pain ., Disp: , Rfl:   •  Alum & Mag Hydroxide-Simeth (MAALOX ADVANCED PO), Take 1 tablet by mouth Every 8 (Eight) Hours As Needed., Disp: , Rfl:   •  aspirin 81 MG chewable tablet, Chew 81 mg Daily., Disp: , Rfl:   •  atorvastatin (LIPITOR) 80 MG tablet, Take 1 tablet by mouth Every Night., Disp: 30 tablet, Rfl: 0  •  baclofen (LIORESAL) 10 MG tablet, Take 1 tablet by mouth 3 (Three) Times a Day As Needed for Muscle Spasms. For muscle spasms, Disp: , Rfl:   •  bisacodyl (DULCOLAX) 10 MG suppository, Insert 10 mg into the rectum As Needed for Constipation., Disp: , Rfl:   •  Carboxymethylcellulose Sodium (ARTIFICIAL TEARS OP), Administer 2 drops to both eyes 2 (Two) Times a Day As Needed., Disp: , Rfl:   •  cholecalciferol (VITAMIN D3) 25 MCG (1000 UT) tablet, Take 2,000 Units by mouth Daily., Disp: , Rfl:   •  cloNIDine (CATAPRES) 0.1 MG tablet, Take 0.1 mg by mouth Every 8 (Eight) Hours As Needed for  High Blood Pressure., Disp: , Rfl:   •  insulin aspart (novoLOG FLEXPEN) 100 UNIT/ML solution pen-injector sc pen, Inject 8 Units under the skin into the appropriate area as directed As Needed (for fsbs > 400)., Disp: , Rfl:   •  ipratropium-albuterol (DUO-NEB) 0.5-2.5 mg/3 ml nebulizer, Take 3 mL by nebulization Every 4 (Four) Hours As Needed for Wheezing., Disp: , Rfl:   •  melatonin 3 MG tablet, Take 3 mg by mouth At Night As Needed., Disp: , Rfl:   •  metoprolol tartrate (LOPRESSOR) 50 MG tablet, Take 1 tablet by mouth Every 12 (Twelve) Hours., Disp: , Rfl:   •  NON FORMULARY, Pt can take meds crushed in pudding per Mitch Loera, Disp: , Rfl:   •  O2 (OXYGEN), Inhale 2 L/min Daily As Needed., Disp: , Rfl:   •  ondansetron (ZOFRAN) 4 MG tablet, Take 4 mg by mouth Every 8 (Eight) Hours As Needed for Nausea or Vomiting., Disp: , Rfl:   •  risperiDONE (risperDAL) 0.5 MG tablet, Take 0.5 mg by mouth At Night As Needed., Disp: , Rfl:   •  sennosides-docusate (PERICOLACE) 8.6-50 MG per tablet, Take 2 tablets by mouth 2 (Two) Times a Day., Disp: , Rfl:   •  vitamin B-12 (CYANOCOBALAMIN) 1000 MCG tablet, Take 1,000 mcg by mouth Daily., Disp: , Rfl:   •  amLODIPine (NORVASC) 5 MG tablet, Take 5 mg by mouth Daily., Disp: , Rfl:   •  clopidogrel (Plavix) 75 MG tablet, Take 1 tablet by mouth Daily., Disp: 30 tablet, Rfl: 11  •  cyanocobalamin 1000 MCG/ML injection, Inject 1 mL into the appropriate muscle as directed by prescriber 1 (One) Time Per Week., Disp: , Rfl:   •  docusate sodium (COLACE) 100 MG capsule, Take 200 mg by mouth 2 (Two) Times a Day., Disp: , Rfl:   •  famotidine (PEPCID) 20 MG tablet, Take 20 mg by mouth 2 (Two) Times a Day., Disp: , Rfl:   •  furosemide (LASIX) 20 MG tablet, Take 1 tablet by mouth Daily As Needed (soa, edema, weight gain > 3 pounds). ON TUES AND SAT, Disp: , Rfl:   •  hydrocortisone 2.5 % cream, Apply  topically to the appropriate area as directed Every 12 (Twelve) Hours., Disp: , Rfl:    •  insulin lispro (humaLOG) 100 UNIT/ML injection, Inject 0-7 Units under the skin into the appropriate area as directed 3 (Three) Times a Day Before Meals., Disp: , Rfl: 12  •  vitamin C (ASCORBIC ACID) 250 MG tablet, Take 250 mg by mouth Daily., Disp: , Rfl:     Social History     Socioeconomic History   • Marital status:      Spouse name: Not on file   • Number of children: Not on file   • Years of education: Not on file   • Highest education level: Not on file   Tobacco Use   • Smoking status: Former Smoker     Packs/day: 1.00     Types: Cigarettes   • Smokeless tobacco: Never Used   • Tobacco comment: QUIT 25YRS AGO    Vaping Use   • Vaping Use: Never used   Substance and Sexual Activity   • Alcohol use: Not Currently     Alcohol/week: 1.0 standard drinks     Types: 1 Cans of beer per week     Comment: 1-2 DRINKS A MONTH   • Drug use: Never   • Sexual activity: Defer       Family History   Problem Relation Age of Onset   • Cerebral aneurysm Mother    • Cancer Father    • Aneurysm Sister    • Colon cancer Sister    • Atrial fibrillation Brother    • Heart attack Brother    • Cancer Brother        REVIEW OF SYSTEMS:   CONST:  No weight loss, fever, chills,+ weakness or + fatigue.   HEENT:  + visual loss, +blurred vision, No  double vision, yellow sclerae.                   No hearing loss, congestion, sore throat.   SKIN:      No rashes, urticaria, ulcers, sores.     RESP:     No shortness of breath, hemoptysis, cough, sputum.   GI:           No anorexia, nausea, vomiting, diarrhea. No abdominal pain, melena.   :         No burning on urination, hematuria or increased frequency.  ENDO:    No diaphoresis, cold or heat intolerance. No polyuria or polydipsia.   NEURO:  No headache, dizziness, syncope, paralysis, ataxia, or parasthesias.                  No change in bowel or bladder control. No history of CVA/TIA  MUSC:    + muscle+ back pain, joint pain or stiffness.   HEME:    No anemia,+ bleeding  "subdural hematoma, + bruising.   PSYCH:  No history of depression, anxiety    Vitals:    09/29/21 1057   BP: 116/58   BP Location: Right arm   Patient Position: Sitting   Pulse: 70   SpO2: 96%   Weight: 81.6 kg (180 lb)   Height: 175.3 cm (69\")     Physical Exam:  GEN: Well nourished, well-developed, no acute distress- in wheelchair   HEENT: Normocephalic, atraumatic, PERRLA, moist mucous membranes  NECK: Supple, NO JVD, no thyromegaly, no lymphadenopathy  CARD: Irregular irregular rate and rhythm normal S1-S2.  No murmurs are appreciated.  PMI not appreciated  LUNGS: Clear to auscultation, normal respiratory effort  ABDOMEN: Soft, nontender, normal bowel sounds  EXTREMITIES: No gross deformities, no clubbing, cyanosis, or edema  SKIN: Warm, dry  NEURO: No focal deficits, alert and oriented x 3, left-sided weakness in left arm and left leg.  PSYCHIATRIC: Normal affect and mood      I personally viewed and interpreted the patient's EKG/Telemetry/lab data    Lab Results   Component Value Date    GLUCOSE 124 (H) 07/29/2021    CALCIUM 9.9 07/29/2021     07/29/2021    K 4.5 07/29/2021    CO2 22.0 07/29/2021     07/29/2021    BUN 16 07/29/2021    CREATININE 1.06 07/29/2021    EGFRIFNONA 67 07/29/2021    BCR 15.1 07/29/2021    ANIONGAP 14.0 07/29/2021     Lab Results   Component Value Date    WBC 7.48 07/29/2021    HGB 13.2 07/29/2021    HCT 40.9 07/29/2021    MCV 98.8 (H) 07/29/2021     07/29/2021     Lab Results   Component Value Date    INR 1.10 07/28/2021    INR 1.23 (H) 02/28/2021    INR 1.29 (H) 02/28/2021    PROTIME 13.8 07/28/2021    PROTIME 15.2 (H) 02/28/2021    PROTIME 15.8 (H) 02/28/2021     Lab Results   Component Value Date    TSH 2.990 07/29/2021             ECG 12 Lead    Date/Time: 9/29/2021 11:10 AM  Performed by: April Varela APRN  Authorized by: April Varela APRN   Comparison: not compared with previous ECG   Rhythm: atrial fibrillation  BPM: 102                ICD-10-CM " ICD-9-CM   1. Permanent atrial fibrillation (HCC)  I48.21 427.31   2. History of CVA (cerebrovascular accident)  Z86.73 V12.54   3. Essential hypertension  I10 401.9       Assessment and Plan:   1) Permanent trial fibrillation; asymptomatic in nature with adequate heart rate control.  -Elevated [id number] score of 5  -Has bled score 5 = 9.1 risk of bleeding   -Patient unfortunately has suffered 1 major stroke while on ASA alone as well as 1 TIA and 2 intracranial bleeding episodes.   -We will clarify with his facility if he is taking Plavix and they will call and let us know.    -patient and wife are concerned about even short anticoagulation and are not sure they want him to go through another procedure. Will have research satff give information for them to consider and they will let us know       2) CVA/TIA/subdural hematoma-  -followed by neurology  -Residual left sided deficits- wheelchair bound.   -Consideration for watchman per #1    Scribed for Jesus Leon MD by MARGARITA Zhu. 9/29/2021  12:03 EDT     I, Jesus Leon MD, personally performed the services described in this documentation as scribed by the above named individual in my presence, and it is both accurate and complete.  9/29/2021  12:03 EDT

## 2021-09-29 ENCOUNTER — OFFICE VISIT (OUTPATIENT)
Dept: CARDIOLOGY | Facility: CLINIC | Age: 81
End: 2021-09-29

## 2021-09-29 VITALS
DIASTOLIC BLOOD PRESSURE: 58 MMHG | HEIGHT: 69 IN | OXYGEN SATURATION: 96 % | SYSTOLIC BLOOD PRESSURE: 116 MMHG | WEIGHT: 180 LBS | BODY MASS INDEX: 26.66 KG/M2 | HEART RATE: 70 BPM

## 2021-09-29 DIAGNOSIS — I10 ESSENTIAL HYPERTENSION: ICD-10-CM

## 2021-09-29 DIAGNOSIS — Z86.73 HISTORY OF CVA (CEREBROVASCULAR ACCIDENT): ICD-10-CM

## 2021-09-29 DIAGNOSIS — I48.21 PERMANENT ATRIAL FIBRILLATION (HCC): Primary | ICD-10-CM

## 2021-09-29 PROCEDURE — 93000 ELECTROCARDIOGRAM COMPLETE: CPT | Performed by: NURSE PRACTITIONER

## 2021-09-29 PROCEDURE — 99204 OFFICE O/P NEW MOD 45 MIN: CPT | Performed by: INTERNAL MEDICINE

## 2021-09-29 RX ORDER — CLONIDINE HYDROCHLORIDE 0.1 MG/1
0.1 TABLET ORAL EVERY 8 HOURS PRN
COMMUNITY

## 2021-09-29 RX ORDER — BISACODYL 10 MG
10 SUPPOSITORY, RECTAL RECTAL AS NEEDED
COMMUNITY

## 2021-09-29 RX ORDER — ASPIRIN 81 MG/1
81 TABLET, CHEWABLE ORAL DAILY
COMMUNITY

## 2021-09-29 RX ORDER — RISPERIDONE 0.5 MG/1
0.5 TABLET ORAL NIGHTLY PRN
COMMUNITY

## 2021-10-08 ENCOUNTER — TELEPHONE (OUTPATIENT)
Dept: CARDIOLOGY | Facility: CLINIC | Age: 81
End: 2021-10-08

## 2021-10-08 NOTE — TELEPHONE ENCOUNTER
----- Message from Shayy Saab RN sent at 10/4/2021  1:51 PM EDT -----  Pts wife called back and stated he is on no blood thinners.  She also said they have also decided against a Watchman Device    I called his wife and spoke with her today. They do not want him on any blood thinners not even Plavix which has been recommended by Neurology in the past.  CVA risk given AF reviewed. We discussed watchman again and they decline the procedure at this time.  She does not wish to schedule follow up for him at this time and they will call if they wish to be seen in the future.     Electronically signed by MARGARITA Zhu, 10/08/21, 12:24 PM EDT.

## 2022-01-08 ENCOUNTER — HOSPITAL ENCOUNTER (EMERGENCY)
Facility: HOSPITAL | Age: 82
Discharge: NURSING, REHAB OR LONG-TERM CARE | End: 2022-01-08
Attending: EMERGENCY MEDICINE | Admitting: EMERGENCY MEDICINE

## 2022-01-08 VITALS
TEMPERATURE: 98.2 F | RESPIRATION RATE: 16 BRPM | SYSTOLIC BLOOD PRESSURE: 119 MMHG | HEART RATE: 64 BPM | OXYGEN SATURATION: 94 % | DIASTOLIC BLOOD PRESSURE: 86 MMHG | WEIGHT: 181 LBS | HEIGHT: 71 IN | BODY MASS INDEX: 25.34 KG/M2
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
## 2022-01-08 DIAGNOSIS — S61.412A SKIN TEAR OF LEFT HAND WITHOUT COMPLICATION, INITIAL ENCOUNTER: Primary | ICD-10-CM

## 2022-01-08 PROCEDURE — 99283 EMERGENCY DEPT VISIT LOW MDM: CPT

## 2022-01-08 NOTE — ED PROVIDER NOTES
Subjective   81-year-old male who presents for evaluation after a fall.  The patient reports that he was leaning forward out of his wheelchair to try to remove some electrical wires that were in the way of his path when he fell forward and landed on his left hand.  He has a history of stroke and therefore has limited use of his left hand.  He has an abrasion with a dressing applied to the left hand over the fourth and fifth dorsal aspect of the left hand.  No obvious signs of injury to his head.  He denies loss of consciousness.  Does not take anticoagulation.  He denies pain throughout his neck and back.  No pain throughout the bilateral upper arms, hips, bilateral legs, chest, or abdomen.  He denies recent fever, bodies, or chills.  No reported cough or shortness of breath.  No reported change in bowel or urinary function.  He is awake and alert and answers questions appropriately and provides an appropriate history.          Review of Systems   Constitutional: Negative for chills, fatigue and fever.   HENT: Negative for congestion, ear pain, postnasal drip, sinus pressure and sore throat.    Eyes: Negative for pain, redness and visual disturbance.   Respiratory: Negative for cough, chest tightness and shortness of breath.    Cardiovascular: Negative for chest pain, palpitations and leg swelling.   Gastrointestinal: Negative for abdominal pain, anal bleeding, blood in stool, diarrhea, nausea and vomiting.   Endocrine: Negative for polydipsia and polyuria.   Genitourinary: Negative for difficulty urinating, dysuria, frequency and urgency.   Musculoskeletal: Negative for arthralgias, back pain and neck pain.   Skin: Positive for wound. Negative for pallor and rash.   Allergic/Immunologic: Negative for environmental allergies and immunocompromised state.   Neurological: Negative for dizziness, weakness and headaches.   Hematological: Negative for adenopathy.   Psychiatric/Behavioral: Negative for confusion,  self-injury and suicidal ideas. The patient is not nervous/anxious.    All other systems reviewed and are negative.      Past Medical History:   Diagnosis Date   • A-fib (CMS/[facility])    • Acute CVA (cerebrovascular accident) (CMS/[facility]) 12/1/2020   • Arthritis    • B12 deficiency    • Coronary artery disease    • COVID-19    • Diabetes mellitus (CMS/[facility])    • DNR (do not resuscitate)    • Dysphagia    • Falls    • GERD (gastroesophageal reflux disease)    • Hemiplegia (CMS/[facility])    • Hyperlipidemia    • Hypertension    • Insomnia    • Pressure sore on buttocks    • Stenosis of left carotid artery 1/10/2021   • Stroke (CMS/[facility])     LEFT SIDE DEFICIT       Allergies   Allergen Reactions   • Codeine Mental Status Change     hyper       Past Surgical History:   Procedure Laterality Date   • CATARACT EXTRACTION, BILATERAL     • HERNIA REPAIR         Family History   Problem Relation Age of Onset   • Cerebral aneurysm Mother    • Cancer Father    • Aneurysm Sister    • Colon cancer Sister    • Atrial fibrillation Brother    • Heart attack Brother    • Cancer Brother        Social History     Socioeconomic History   • Marital status:    Tobacco Use   • Smoking status: Former Smoker     Packs/day: 1.00     Types: Cigarettes   • Smokeless tobacco: Never Used   • Tobacco comment: QUIT 25YRS AGO    Vaping Use   • Vaping Use: Never used   Substance and Sexual Activity   • Alcohol use: Not Currently     Alcohol/week: 1.0 standard drink     Types: 1 Cans of beer per week     Comment: 1-2 DRINKS A MONTH   • Drug use: Never   • Sexual activity: Defer           Objective   Physical Exam  Vitals and nursing note reviewed.   Constitutional:       General: He is not in acute distress.     Appearance: Normal appearance. He is well-developed. He is not toxic-appearing or diaphoretic.   HENT:      Head: Normocephalic and atraumatic.      Right Ear: External ear normal.      Left Ear: External ear normal.      Nose: Nose normal.   Eyes:       General: Lids are normal.      Pupils: Pupils are equal, round, and reactive to light.   Neck:      Trachea: No tracheal deviation.   Cardiovascular:      Rate and Rhythm: Normal rate and regular rhythm.      Pulses: No decreased pulses.      Heart sounds: Normal heart sounds. No murmur heard.  No friction rub. No gallop.    Pulmonary:      Effort: Pulmonary effort is normal. No respiratory distress.      Breath sounds: Normal breath sounds. No decreased breath sounds, wheezing, rhonchi or rales.   Abdominal:      General: Bowel sounds are normal.      Palpations: Abdomen is soft.      Tenderness: There is no abdominal tenderness. There is no guarding or rebound.   Musculoskeletal:         General: No deformity. Normal range of motion.      Cervical back: Normal range of motion and neck supple.   Lymphadenopathy:      Cervical: No cervical adenopathy.   Skin:     General: Skin is warm and dry.      Findings: Abrasion and wound present. No rash.          Neurological:      Mental Status: He is alert and oriented to person, place, and time.      Cranial Nerves: No cranial nerve deficit.      Sensory: No sensory deficit.   Psychiatric:         Speech: Speech normal.         Behavior: Behavior normal.         Thought Content: Thought content normal.         Judgment: Judgment normal.         Procedures           ED Course                                                 MDM  Number of Diagnoses or Management Options  Fall, initial encounter: new and requires workup  Skin tear of left hand without complication, initial encounter: new and requires workup  Diagnosis management comments: No signs of bony injury or fracture.  Mentation is normal and consistent with baseline.    The patient will be discharged with advised to keep the wound over the left hand clean with soap and water.    Take Tylenol or ibuprofen as needed for pain.    Follow-up with primary care physician as needed.       Amount and/or Complexity of Data  Reviewed  Review and summarize past medical records: yes  Independent visualization of images, tracings, or specimens: yes        Final diagnoses:   Skin tear of left hand without complication, initial encounter   Fall, initial encounter       ED Disposition  ED Disposition     ED Disposition Condition Comment    Discharge Stable           No follow-up provider specified.       Medication List      No changes were made to your prescriptions during this visit.          Vero Little MD  01/08/22 3008

## 2022-01-08 NOTE — DISCHARGE INSTRUCTIONS
The wound of the left hand clean with soap and water.    Return to the ER with any further concerns.

## 2022-11-18 NOTE — PROGRESS NOTES
Stroke Progress Note       Chief Complaint:  Fall     Subjective    Subjective     Subjective:  Had a fall yesterday     Review of Systems   Unable to asses due to patient condition     Objective      Temp:  [97.9 °F (36.6 °C)-99.1 °F (37.3 °C)] 97.9 °F (36.6 °C)  Heart Rate:  [62-77] 62  Resp:  [16-18] 18  BP: (102-186)/() 116/73        NEURO    MENTAL STATUS: AAOx2, memory not  intact, fund of knowledge not appropriate    LANG/SPEECH: Naming and repetition not  intact, fluent, does not follow 3-step commands    CRANIAL NERVES:  Pupillary, corneal, cough and gag intact     MOTOR:   Moves all extremities equally     REFLEXES: not done     SENSORY:     COORDINATION: Not assessed due to patient condition     STATION: Not assessed due to patient condition    GAIT: Not assessed due to patient condition  Results Review:    I reviewed the patient's new clinical results.    Lab Results (last 24 hours)     Procedure Component Value Units Date/Time    POC Glucose Once [[id number]]  (Normal) Collected: 12/08/20 1145    Specimen: Blood Updated: 12/08/20 1201     Glucose 124 mg/dL     Basic Metabolic Panel [[id number]]  (Abnormal) Collected: 12/08/20 0643    Specimen: Blood Updated: 12/08/20 0859     Glucose 127 mg/dL      BUN 24 mg/dL      Creatinine 1.30 mg/dL      Sodium 138 mmol/L      Potassium 4.7 mmol/L      Chloride 98 mmol/L      CO2 28.0 mmol/L      Calcium 9.8 mg/dL      eGFR Non African Amer 53 mL/min/1.73      BUN/Creatinine Ratio 18.5     Anion Gap 12.0 mmol/L     Narrative:      GFR Normal >60  Chronic Kidney Disease <60  Kidney Failure <15      Magnesium [[id number]]  (Normal) Collected: 12/08/20 0643    Specimen: Blood Updated: 12/08/20 0859     Magnesium 2.2 mg/dL     Phosphorus [[id number]]  (Normal) Collected: 12/08/20 0643    Specimen: Blood Updated: 12/08/20 0859     Phosphorus 4.0 mg/dL     TSH [[id number]]  (Normal) Collected: 12/08/20 0643    Specimen: Blood Updated: 12/08/20 0856     TSH 2.590 uIU/mL      CBC & Differential [[id number]]  (Abnormal) Collected: 12/08/20 0643    Specimen: Blood Updated: 12/08/20 0744    Narrative:      The following orders were created for panel order CBC & Differential.  Procedure                               Abnormality         Status                     ---------                               -----------         ------                     CBC Auto Differential[[id number]]        Abnormal            Final result                 Please view results for these tests on the individual orders.    CBC Auto Differential [[id number]]  (Abnormal) Collected: 12/08/20 0643    Specimen: Blood Updated: 12/08/20 0744     WBC 9.17 10*3/mm3      RBC 4.10 10*6/mm3      Hemoglobin 13.0 g/dL      Hematocrit 41.3 %      .7 fL      MCH 31.7 pg      MCHC 31.5 g/dL      RDW 12.8 %      RDW-SD 47.7 fl      MPV 10.7 fL      Platelets 214 10*3/mm3      Neutrophil % 70.9 %      Lymphocyte % 12.4 %      Monocyte % 12.2 %      Eosinophil % 3.2 %      Basophil % 1.0 %      Immature Grans % 0.3 %      Neutrophils, Absolute 6.50 10*3/mm3      Lymphocytes, Absolute 1.14 10*3/mm3      Monocytes, Absolute 1.12 10*3/mm3      Eosinophils, Absolute 0.29 10*3/mm3      Basophils, Absolute 0.09 10*3/mm3      Immature Grans, Absolute 0.03 10*3/mm3      nRBC 0.0 /100 WBC     POC Glucose Once [[id number]]  (Normal) Collected: 12/08/20 0722    Specimen: Blood Updated: 12/08/20 0725     Glucose 117 mg/dL     POC Glucose Once [[id number]]  (Abnormal) Collected: 12/07/20 2129    Specimen: Blood Updated: 12/07/20 2130     Glucose 141 mg/dL     POC Glucose Once [[id number]]  (Abnormal) Collected: 12/07/20 1701    Specimen: Blood Updated: 12/07/20 1703     Glucose 136 mg/dL         Ct Head Without Contrast    Result Date: 12/8/2020  Redemonstrated evolving acute right PCA territory infarct with hemorrhagic transformation, mildly increased in degree of hemorrhage from most recent comparison, with some increased localized mass  effect including leftward midline shift near the septum pellucidum.   This report was finalized on 12/8/2020 8:34 AM by Jose Narayanan.      Ct Head Without Contrast    Result Date: 12/7/2020  There is mixed subacute and chronic hemorrhage in the infarcted tissue bed centered in the right occipital lobe. This is new since the previous scan. The volume of acute hemorrhage is less than 30 cc. Overall the thickness of the area of encephalomalacia has increased by about 4 mm since the previous examination. Mass effect shows a slight increase. NOTIFICATION: Critical Value/emergent results were called by telephone at the time of interpretation on 12/7/2020 7:40 PM to the patient's nurse, who verbally acknowledged these results. She will be conveying the results to the nurse practitioner. Signer Name: Ray Perkins MD  Signed: 12/7/2020 7:40 PM  Workstation Name: [id number]  [facility]    Results for orders placed during the hospital encounter of 12/01/20   Adult Transthoracic Echo Complete W/ Cont if Necessary Per Protocol (With Agitated Saline)    Narrative · Left ventricular ejection fraction appears to be 61 - 65%. Left   ventricular systolic function is normal.  · Left atrial volume is moderately increased.  · Saline test results are negative.  · The right atrial cavity is mildly dilated.  · Moderate tricuspid valve regurgitation is present.  · Estimated right ventricular systolic pressure from tricuspid   regurgitation is moderately elevated (45-55 mmHg).                Assessment/Plan     Assessment/Plan:   80 yr old male with a PMH of a fib (not on anticoagulation), asymptomatic severe LICA stenosis, and new RPCA territory infarct that was recently started on anticoagulation with apixaban on 12/4/2020, subsequently had a fall on 12/7 and developed new hemorrhage in the right cerebral hemisphere.     1. Right intracerebral hemorrhage, possibly hemorraghic conversion complexed with fall  with being on AC.   -Hold AC for at least 7-10 days, repeat CT head today, if stable can start aspirin  -If stable for the next 24 hours, patient can be discharged to rehab.          2. Symptomatic left Internal carotid artery stenosis-Follow up with Dr. Johnson as planned.           Jemal Lawson MD  12/08/20  12:43 EST       resilient/elastic

## 2023-07-13 NOTE — DISCHARGE PLACEMENT REQUEST
"Dirk Camacho (80 y.o. Male)     Date of Birth Social Security Number Address Home Phone MRN    1940  [address] [place] [address] [phone] [id number]    Rastafarian Marital Status          Anabaptism        Admission Date Admission Type Admitting Provider Attending Provider Department, Room/Bed    2/27/21 Emergency Bhumika Mendez II, Bhumika Casiano II, DO [facility] 3E, S341/1    Discharge Date Discharge Disposition Discharge Destination         Skilled Nursing Facility (DC - External)              Attending Provider: Bhumika Mendez II, DO    Allergies: No Known Allergies    Isolation: None   Infection: COVID (History) (02/28/21)   Code Status: No CPR    Ht: 175.3 cm (69\")   Wt: 70.3 kg (155 lb)    Admission Cmt: None   Principal Problem: Subdural hematoma (CMS/HCC) [S06.5X9A]                 Active Insurance as of 2/27/2021     Primary Coverage     Payor Plan Insurance Group Employer/Plan Group    MEDICARE MEDICARE A & B      Payor Plan Address Payor Plan Phone Number Payor Plan Fax Number Effective Dates    [address] [phone]  4/1/2005 - None Entered    [facility] [address]       Subscriber Name Subscriber Birth Date Member ID       DIRK CAMACHO 1940 [id number]           Secondary Coverage     Payor Plan Insurance Group Employer/Plan Group    AARP MC SUP AARP HEALTH CARE OPTIONS      Payor Plan Address Payor Plan Phone Number Payor Plan Fax Number Effective Dates    [facility] [phone]  1/1/2020 - None Entered    [address]       [facility] [id number]       Subscriber Name Subscriber Birth Date Member ID       DIRK CAMACHO 1940 [id number]                 Emergency Contacts      (Rel.) Home Phone Work Phone Mobile Phone    Sarai Camacho (Spouse) [phone] -- [phone]    JdAmbrocio (Son) [phone] -- [phone]               Discharge Summary      Bhumika Mendez II, DO at 03/02/21 0930      "         [facility] Medicine Services  DISCHARGE SUMMARY    Patient Name: Dirk Miles  : 1940  MRN: [id number]    Date of Admission: 2021  7:30 PM  Date of Discharge:  3/2/2021  Primary Care Physician: Provider, No Known    Consults     Date and Time Order Name Status Description    2021 0611 Inpatient Neurology Consult Stroke Completed     2021 0608 Inpatient Neurosurgery Consult Completed           Hospital Course     Presenting Problem:   Subdural hematoma (CMS/HCC) [S06.5X9A]  Subdural hematoma (CMS/HCC) [S06.5X9A]    Active Hospital Problems    Diagnosis  POA   • History of CVA (cerebrovascular accident) [Z86.73]  Not Applicable   • CAD (coronary artery disease) [I25.10]  Yes   • Anemia, chronic disease [D63.8]  Yes   • Dementia with behavioral disturbance (CMS/HCC) [F03.91]  Yes   • Hyperlipemia [E78.5]  Yes   • A-fib (CMS/HCC) [I48.91]  Yes   • Diabetes mellitus (CMS/HCC) [E11.9]  Yes   • Hypertension [I10]  Yes      Resolved Hospital Problems    Diagnosis Date Resolved POA   • **Subdural hematoma (CMS/HCC) [S06.5X9A] 2021 Yes   • Fall [W19.XXXA] 2021 Yes   • Fever [R50.9] 2021 Yes   • Hypotension [I95.9] 2021 Yes          Hospital Course:  Dirk Miles is a 80 y.o. male w/ a hx of CAD, CVA (w/ residual left sided weakness), dementia, HTN, HLD, T2DM, chronic anemia, PAF, GERD who presented to the ED after fall.      Subdural hematoma   Left eyebrow lac  Frequent Falls  -NS followed study throughout stay. Upon arrival his CT head showed chronic SDH. Repeat CT head performed during stay remained stable and NS felt no ntervention was required. Felt to be hygroma vs chronic SDH.  -His left eyebrow was sutured in ED. Sutures need removed 3/5.  -Resume asa at d/c.     Fever, resolved  Hypotension   -Upon arrival patient had a one time fever but his extensive lab/imaging eval indicated no evidence of infection. He was observed off abx and  had no further fever. It is likely that his fever was related to his recent COVID vaccine.  -Patient's bp remained on low end throughout stay. Will resume only his PO metoprolol at d/c. His PO lisinopril and clonidine can be resumed later as needed.      Discharge Follow Up Recommendations for outpatient labs/diagnostics:   PCP in 1 week        Day of Discharge     HPI: Up awake in bed without wife in room. No concerns from staff. No complaints from patient.    Review of Systems  Gen- No fevers, chills  CV- No chest pain, palpitations  Resp- No cough, dyspnea  GI- No N/V/D, abd pain    Vital Signs:   Temp:  [98.4 °F (36.9 °C)-98.9 °F (37.2 °C)] 98.6 °F (37 °C)  Heart Rate:  [] 110  Resp:  [16-18] 18  BP: ()/(56-88) 116/88     Physical Exam:  Constitutional: No acute distress, awake, alert  HENT: NCAT, mucous membranes moist, left eyelid lac sutured  Respiratory: Clear to auscultation bilaterally, respiratory effort normal   Cardiovascular: RRR, no murmurs, rubs, or gallops  Gastrointestinal: Positive bowel sounds, soft, nontender, nondistended  Musculoskeletal: No bilateral ankle edema  Psychiatric: Pleasant  Neurologic: Interactive, appropriate.   Skin: No rashes    Pertinent  and/or Most Recent Results     LAB RESULTS:      Lab 03/01/21  0606 02/28/21  0635 02/28/21  0137   WBC 7.48 7.59 7.92   HEMOGLOBIN 8.3* 9.1* 9.2*   HEMATOCRIT 27.1* 29.6* 29.2*   PLATELETS 164 203 200   NEUTROS ABS 4.84 4.97 5.71   IMMATURE GRANS (ABS) 0.03 0.03 0.03   LYMPHS ABS 0.90 1.14 0.94   MONOS ABS 0.82 0.92* 0.88   EOS ABS 0.81* 0.47* 0.31   .3* 103.5* 102.5*   PROCALCITONIN  --  0.15  --    LACTATE  --  1.5  --    PROTIME  --  15.2* 15.8*   APTT  --  36.8  --          Lab 03/01/21  0606 02/28/21  0635 02/28/21  0137   SODIUM 138 138 140   POTASSIUM 3.9 4.7 4.6   CHLORIDE 107 104 105   CO2 27.0 27.0 27.0   ANION GAP 4.0* 7.0 8.0   BUN 14 15 15   CREATININE 1.17 1.17 1.11   GLUCOSE 98 101* 112*   CALCIUM 8.4* 8.7  8.7   MAGNESIUM  --  1.9  --    HEMOGLOBIN A1C  --  6.40*  --    TSH  --  1.960  --          Lab 02/28/21  0635 02/28/21  0137   TOTAL PROTEIN 6.0 5.9*   ALBUMIN 3.20* 3.10*   GLOBULIN 2.8 2.8   ALT (SGPT) 10 11   AST (SGOT) 13 17   BILIRUBIN 0.5 0.5   ALK PHOS 80 88         Lab 02/28/21 0635 02/28/21  0137   TROPONIN T 0.019  --    PROTIME 15.2* 15.8*   INR 1.23* 1.29*         Lab 02/28/21 0635   CHOLESTEROL 88   LDL CHOL 34   HDL CHOL 32*   TRIGLYCERIDES 124             Brief Urine Lab Results  (Last result in the past 365 days)      Color   Clarity   Blood   Leuk Est   Nitrite   Protein   CREAT   Urine HCG        02/28/21 0243 Yellow Clear Negative Negative Negative Negative             Microbiology Results (last 10 days)     Procedure Component Value - Date/Time    Blood Culture - Blood, Arm, Right [[id number]] Collected: 02/28/21 0635    Lab Status: Preliminary result Specimen: Blood from Arm, Right Updated: 03/02/21 0715     Blood Culture No growth at 2 days    Blood Culture - Blood, Hand, Right [[id number]] Collected: 02/28/21 0635    Lab Status: Preliminary result Specimen: Blood from Hand, Right Updated: 03/02/21 0715     Blood Culture No growth at 2 days    COVID PRE-OP / PRE-PROCEDURE SCREENING ORDER (NO ISOLATION) - Swab, Nasopharynx [[id number]]  (Normal) Collected: 02/28/21 0338    Lab Status: Final result Specimen: Swab from Nasopharynx Updated: 02/28/21 0406    Narrative:      The following orders were created for panel order COVID PRE-OP / PRE-PROCEDURE SCREENING ORDER (NO ISOLATION) - Swab, Nasopharynx.  Procedure                               Abnormality         Status                     ---------                               -----------         ------                     COVID-19 and FLU A/B PCR...[[id number]]  Normal              Final result                 Please view results for these tests on the individual orders.    COVID-19 and FLU A/B PCR - Swab, Nasopharynx [[id number]]  (Normal)  Collected: 02/28/21 0338    Lab Status: Final result Specimen: Swab from Nasopharynx Updated: 02/28/21 0406     COVID19 Not Detected     Influenza A PCR Not Detected     Influenza B PCR Not Detected    Narrative:      Fact sheet for providers: https://www.fda.gov/media/894460/download    Fact sheet for patients: https://www.fda.gov/media/777086/download    Test performed by PCR.    Urine Culture - Urine, Urine, Catheter [[id number]]  (Normal) Collected: 02/28/21 0243    Lab Status: Final result Specimen: Urine, Catheter Updated: 03/01/21 1054     Urine Culture No growth          Xr Elbow 2 View Left    Result Date: 2/27/2021  CR Elbow 2 Vws LT INDICATION: Altered mental status. Open wound on left arm. Patient fell. COMPARISON: None available FINDINGS: 2 views of the left elbow.  There is evidence of soft tissue injury possibly with a skin tear. There is no acute fracture dislocation or radiopaque foreign body is suspected. No obvious joint effusion allowing for the skin tear.  No radiopaque  foreign body. Bones are demineralized.     Findings are consistent with a skin tear. No acute fracture dislocation or radiopaque foreign body is suspected Signer Name: Gisel Nova MD  Signed: 2/27/2021 8:48 PM  Workstation Name: [email or web address]  [facility]    Ct Head Without Contrast    Result Date: 3/1/2021  EXAMINATION: CT HEAD WO CONTRAST- 03/01/2021  INDICATION: SDH; S06.4N8Z-Pnhaoghqt subdural hemorrhage with loss of consciousness of unspecified duration, initial encounter; S01.112A-Laceration without foreign body of left eyelid and periocular area, initial encounter; S51.012A-Laceration without foreign body of left elbow, initial encounter  TECHNIQUE: CT head without intravenous contrast  The radiation dose reduction device was turned on for each scan per the ALARA (As Low as Reasonably Achievable) protocol.  COMPARISON: CT head dated 02/28/2021  FINDINGS: Persistent low-attenuation areas left frontal  and right occipital lobes of prior insults stable from prior. Stable size and prominence of a right cerebral convexity low to intermediate signal extra-axial fluid collection most consistent with late subacute to early chronic subdural hematoma. No acute hemorrhage within this fluid or adjacent. No intraparenchymal hemorrhage or hydrocephalus. Globes and orbits unremarkable. Paranasal sinuses and mastoid air cells demonstrate mucus retention cyst right maxillary sinus otherwise grossly clear and well-pneumatized. Calvarium intact.      Stable size and prominence of a right cerebral convexity low to intermediate signal extra-axial fluid collection most consistent with late subacute to early chronic subdural hematoma. No acute hemorrhage within these fluid or adjacent. No intraparenchymal hemorrhage or hydrocephalus.  D:  03/01/2021 E:  03/01/2021  This report was finalized on 3/1/2021 4:27 PM by Dr. Pavel Gaffney.      Ct Head Without Contrast    Result Date: 2/27/2021  HISTORY: Status post fall on concrete. Left forehead laceration and neck pain. TECHNIQUE: CT head without contrast. Radiation dose reduction techniques included automated exposure control or exposure modulation based on body size. Radiation audit for number of CT and nuclear cardiology exams performed in the last year: 16 COMPARISON: Head CT from 1/10/2021. FINDINGS: Study is severely motion degraded. Its performed 3 times in an effort to acquire diagnostic imaging. There is a new likely subacute subdural hematoma overlying the right frontal parietal lobe superolaterally. It measures up to about 9 mm in maximum width and results in mild local mass effect. It measures about 8 cm AP dimension. There is interval evolution of large right posterior cerebral artery distribution infarct with encephalomalacia now present. There is generalized atrophy. There is extensive white matter low-attenuation that is likely due to small vessel disease. There is an old  MCV 92.8 82 - 102 FL    MCH 29.8 26.1 - 32.9 PG    MCHC 32.1 31.4 - 35.0 g/dL    RDW 13.4 11.9 - 14.6 %    Platelets 624 308 - 175 K/uL    MPV 9.1 (L) 9.4 - 12.3 FL    nRBC 0.00 0.0 - 0.2 K/uL    Differential Type AUTOMATED      Neutrophils % 83 (H) 43 - 78 %    Lymphocytes % 7 (L) 13 - 44 %    Monocytes % 7 4.0 - 12.0 %    Eosinophils % 2 0.5 - 7.8 %    Basophils % 1 0.0 - 2.0 %    Immature Granulocytes 0 0.0 - 5.0 %    Neutrophils Absolute 5.0 1.7 - 8.2 K/UL    Lymphocytes Absolute 0.4 (L) 0.5 - 4.6 K/UL    Monocytes Absolute 0.4 0.1 - 1.3 K/UL    Eosinophils Absolute 0.1 0.0 - 0.8 K/UL    Basophils Absolute 0.0 0.0 - 0.2 K/UL    Absolute Immature Granulocyte 0.0 0.0 - 0.5 K/UL   Comprehensive Metabolic Panel    Collection Time: 07/13/23 11:00 AM   Result Value Ref Range    Sodium 140 133 - 143 mmol/L    Potassium 4.1 3.5 - 5.1 mmol/L    Chloride 108 101 - 110 mmol/L    CO2 29 21 - 32 mmol/L    Anion Gap 3 2 - 11 mmol/L    Glucose 94 65 - 100 mg/dL    BUN 25 (H) 8 - 23 MG/DL    Creatinine 1.40 0.8 - 1.5 MG/DL    Est, Glom Filt Rate 51 (L) >60 ml/min/1.73m2    Calcium 8.8 8.3 - 10.4 MG/DL    Total Bilirubin 0.3 0.2 - 1.1 MG/DL    ALT 27 12 - 65 U/L    AST 24 15 - 37 U/L    Alk Phosphatase 55 50 - 136 U/L    Total Protein 6.4 6.3 - 8.2 g/dL    Albumin 2.9 (L) 3.2 - 4.6 g/dL    Globulin 3.5 2.8 - 4.5 g/dL    Albumin/Globulin Ratio 0.8 0.4 - 1.6     CK    Collection Time: 07/13/23 11:00 AM   Result Value Ref Range    Total CK 68 21 - 215 U/L   Magnesium    Collection Time: 07/13/23 11:00 AM   Result Value Ref Range    Magnesium 1.9 1.8 - 2.4 mg/dL       I have personally reviewed imaging studies:  CT HEAD WO CONTRAST    Result Date: 7/13/2023  EXAM: CT head without contrast. INDICATION: Difficulty ambulating since 2019. Shuffling when walking which is getting worse. COMPARISON: Head CT October 16, 2022. FINDINGS: Intracranial vascular calcifications are evident of the vertebral arteries.  Ventricles are normal in size cortical insult with malacia at the left frontal lobe superolaterally. There is likely chronic lacunar disease in the bilateral basal ganglia and left cerebellar hemisphere. The recent right posterior cerebral artery distribution insult also results in malacic change at the posterior limb of the right internal capsule and posterior thalamus. There is no displaced calvarial fracture appreciated. This mild paranasal sinus disease. Allowing for the motion seen on each of the studies, no definite acute blood products are appreciated. There is no mid line shift. The basilar cisterns are patent.     #1 there is a interval, subacute appearing subdural hematoma overlying the right frontal lobe measuring up to about 9 mm in width with local mass effect but no midline shift. Please note that the study is severely motion degraded despite multiple repeats. No definite acute blood products are appreciated this limited study. 2. Evolution of previously documented right posterior cerebral artery distribution insult, now chronic. Extensive pre-existing sequelae of small vessel disease and an old left frontal cortical infarct redemonstrated. 3. NOTIFICATION: Critical Value/emergent results were called by telephone at the time of interpretation on 2/27/2021 10:04 PM to MD bruce who verbally acknowledged these results. Signer Name: Gisel Nova MD  Signed: 2/27/2021 10:10 PM  Workstation Name: [facility]  [facility]    Ct Cervical Spine Without Contrast    Result Date: 2/27/2021  CT Spine Cervical WO INDICATION: Status post fall on concrete. Left forehead laceration. Neck pain. TECHNIQUE: CT of the cervical spine without IV contrast. Coronal and sagittal reconstructions were obtained.  Radiation dose reduction techniques included automated exposure control or exposure modulation based on body size. Count of known CT and cardiac nuc med studies performed in previous 12 months: 14. COMPARISON: CT cervical  spine 1/2/2021 FINDINGS: No fracture or malalignment. Arthritic changes atlantoaxial joint. Mild multilevel degenerative disc changes and mild multilevel facet arthropathy. Multilevel disc protrusions but no definite high-grade spinal or foraminal stenosis on this nonmyelographic exam. Paravertebral soft tissues unremarkable. Patchy groundglass opacities lung apices bilaterally which appear superimposed on chronic lung disease. Correlate for risk factors for pneumonitis.     No acute cervical spine abnormality. Mild multilevel degenerative disc changes and facet arthropathy. Small amount of groundglass opacity within the lung apices which appears superimposed on background diffuse cystic lung disease. Correlate for risk factors for acute pneumonitis. Signer Name: LONDON Mccoy MD  Signed: 2/27/2021 9:57 PM  Workstation Name: [facility]    Xr Chest 1 View    Result Date: 2/27/2021  CR Chest 1 Vw INDICATION: Weakness with frequent falls. COMPARISON:  1/10/2021 FINDINGS: Single portable AP view(s) of the chest.  Cardiomegaly with mild pulmonary vascular congestion. No interstitial or alveolar edema. No effusions or pneumothorax. No focal consolidation.     Stable cardiomegaly with mild pulmonary vascular congestion. No acute findings and no significant change from 1/10/2021. Signer Name: LONDON Mccoy MD  Signed: 2/27/2021 11:36 PM  Workstation Name: [facility]    Ct Head Without Contrast Stroke Protocol    Result Date: 2/28/2021  CT HEAD, NONCONTRAST, STROKE PROTOCOL, 2/28/2021 (06:16) HISTORY: 80-year-old male admitted to the hospital yesterday after head injury. Subacute appearing right frontal subdural hematoma was noted. The examination is repeated this morning for newly noted mental status changes. TECHNIQUE: CT imaging of the head without IV contrast. Radiation dose reduction techniques included automated exposure control.  Radiation audit for CT and nuclear cardiology exams in the last 12 months: 17. *  CT exam time, 06:16. *  Images on line in PACS for interpretation, 06:21. *  Stat verbal telephone report, 06:26. COMPARISON: *  CT head, 2/27/2021. FINDINGS: No new intracranial abnormality is demonstrated. A small subacute low-attenuation subdural hematoma overlying the right upper frontal lobe has not changed significantly in size or extent since yesterday accounting for differences in technique. No acute products of hemorrhage are identified. Chronic midline right temporal occipital infarct. Smaller chronic infarct in the left frontal lobe. Severe diffuse chronic small vessel type white matter changes. Moderate generalized cerebral volume loss. These findings are stable. No evidence of acute intracranial hemorrhage. No visible mass, mass effect, cerebral edema, new extra-axial fluid collection or progressive ventricular enlargement.     1. No acute intracranial abnormality. No significant change since yesterday. 2. Thin subacute or chronic right frontal subdural hematoma is unchanged. 3. Old right temporal occipital and left frontal infarcts. 4. Stable diffuse chronic changes as noted above. NOTIFICATION: Critical Value/emergent results were relayed from me to the ED treatment team by telephone through the CT technologistAshley, at 06:26 on 2/28/2021.. Signer Name: Quang Silva MD  Signed: 2/28/2021 6:29 AM  Workstation Name: [facility]  [facility]      Results for orders placed during the hospital encounter of 12/01/20   Duplex Carotid Ultrasound CAR    Narrative · Left internal carotid artery stenosis of >70%.  · Left vertebral artery is bidirectional.  · Right internal carotid artery stenosis of 50-69%.          Results for orders placed during the hospital encounter of 12/01/20   Duplex Carotid Ultrasound CAR    Narrative · Left internal carotid artery stenosis of >70%.  · Left vertebral  artery is bidirectional.  · Right internal carotid artery stenosis of 50-69%.          Results for orders placed during the hospital encounter of 12/01/20   Adult Transthoracic Echo Complete W/ Cont if Necessary Per Protocol (With Agitated Saline)    Narrative · Left ventricular ejection fraction appears to be 61 - 65%. Left   ventricular systolic function is normal.  · Left atrial volume is moderately increased.  · Saline test results are negative.  · The right atrial cavity is mildly dilated.  · Moderate tricuspid valve regurgitation is present.  · Estimated right ventricular systolic pressure from tricuspid   regurgitation is moderately elevated (45-55 mmHg).            Discharge Details        Discharge Medications      Changes to Medications      Instructions Start Date   atorvastatin 80 MG tablet  Commonly known as: LIPITOR  What changed: additional instructions   80 mg, Oral, Nightly      furosemide 20 MG tablet  Commonly known as: LASIX  What changed:   · when to take this  · reasons to take this   20 mg, Oral, Daily PRN, ON TUES AND SAT          Continue These Medications      Instructions Start Date   acetaminophen 325 MG tablet  Commonly known as: TYLENOL   650 mg, Oral, Every 6 Hours PRN      amLODIPine 5 MG tablet  Commonly known as: NORVASC   5 mg, Oral, Daily      ARTIFICIAL TEARS OP   Ophthalmic      aspirin 81 MG EC tablet   81 mg, Oral, Daily      baclofen 10 MG tablet  Commonly known as: LIORESAL   10 mg, Oral, 3 Times Daily PRN, For muscle spasms       cholecalciferol 25 MCG (1000 UT) tablet  Commonly known as: VITAMIN D3   2,000 Units, Oral, Daily      vitamin B-12 1000 MCG tablet  Commonly known as: CYANOCOBALAMIN   1,000 mcg, Oral, Daily      cyanocobalamin 1000 MCG/ML injection   1,000 mcg, Intramuscular, Weekly      docusate sodium 100 MG capsule  Commonly known as: COLACE   200 mg, Oral, 2 Times Daily      famotidine 20 MG tablet  Commonly known as: PEPCID   20 mg, Oral, 2 Times Daily       hydrocortisone 2.5 % cream   Topical, Every 12 Hours Scheduled      insulin aspart 100 UNIT/ML solution pen-injector sc pen  Commonly known as: novoLOG FLEXPEN   8 Units, Subcutaneous, As Needed      insulin lispro 100 UNIT/ML injection  Commonly known as: humaLOG   0-7 Units, Subcutaneous, 3 Times Daily Before Meals      ipratropium-albuterol 0.5-2.5 mg/3 ml nebulizer  Commonly known as: DUO-NEB   3 mL, Nebulization, Every 4 Hours PRN      melatonin 1 MG tablet   6 mg, Oral, Nightly      metoprolol tartrate 50 MG tablet  Commonly known as: LOPRESSOR   50 mg, Oral, Every 12 Hours Scheduled      NON FORMULARY   Pt can take meds crushed in pudding per Meyer Loera       O2  Commonly known as: OXYGEN   2 L/min, Inhalation, Daily PRN      ondansetron 4 MG tablet  Commonly known as: ZOFRAN   4 mg, Oral, Every 8 Hours PRN      sennosides-docusate 8.6-50 MG per tablet  Commonly known as: PERICOLACE   2 tablets, Oral, 2 Times Daily      vitamin C 250 MG tablet  Commonly known as: ASCORBIC ACID   250 mg, Oral, Daily         Stop These Medications    cloNIDine 0.1 MG tablet  Commonly known as: CATAPRES     lisinopril 2.5 MG tablet  Commonly known as: PRINIVIL,ZESTRIL            No Known Allergies      Discharge Disposition:  Skilled Nursing Facility (DC - External)    Diet:  Hospital:  Diet Order   Procedures   • Diet Soft Texture; Chopped; Thin; Consistent Carbohydrate            CODE STATUS:    Code Status and Medical Interventions:   Ordered at: 02/28/21 0934     Limited Support to NOT Include:    Cardioversion/Defibrillation    Intubation     Level Of Support Discussed With:    Health Care Surrogate     Code Status:    No CPR     Medical Interventions (Level of Support Prior to Arrest):    Limited       No future appointments.    Additional Instructions for the Follow-ups that You Need to Schedule     Discharge Follow-up with PCP   As directed       Currently Documented PCP:    Provider, No Known    PCP Phone Number:     None     Follow Up Details: 1 week hospital follow up                     Bhumika Mendez II, DO  03/02/21      Time Spent on Discharge:  I spent  34 minutes on this discharge activity which included: face-to-face encounter with the patient, reviewing the data in the system, coordination of the care with the nursing staff as well as consultants, documentation, and entering orders.          Electronically signed by Buhmika Mendez II, DO at 03/02/21 1038

## 2025-02-01 ENCOUNTER — APPOINTMENT (OUTPATIENT)
Dept: CT IMAGING | Facility: HOSPITAL | Age: 85
End: 2025-02-01
Payer: MEDICARE

## 2025-02-01 ENCOUNTER — APPOINTMENT (OUTPATIENT)
Dept: GENERAL RADIOLOGY | Facility: HOSPITAL | Age: 85
End: 2025-02-01
Payer: MEDICARE

## 2025-02-01 ENCOUNTER — HOSPITAL ENCOUNTER (OUTPATIENT)
Facility: HOSPITAL | Age: 85
Setting detail: OBSERVATION
Discharge: NURSING, REHAB OR LONG-TERM CARE | End: 2025-02-03
Attending: EMERGENCY MEDICINE | Admitting: INTERNAL MEDICINE
Payer: MEDICARE

## 2025-02-01 DIAGNOSIS — Z86.79 HISTORY OF SUBDURAL HEMATOMA: ICD-10-CM

## 2025-02-01 DIAGNOSIS — Z86.73 HISTORY OF CVA (CEREBROVASCULAR ACCIDENT): ICD-10-CM

## 2025-02-01 DIAGNOSIS — R53.1 GENERALIZED WEAKNESS: Primary | ICD-10-CM

## 2025-02-01 DIAGNOSIS — I65.22 STENOSIS OF LEFT CAROTID ARTERY: ICD-10-CM

## 2025-02-01 DIAGNOSIS — R41.841 COGNITIVE COMMUNICATION DEFICIT: ICD-10-CM

## 2025-02-01 LAB
ALT SERPL W P-5'-P-CCNC: 17 U/L (ref 1–41)
ANION GAP SERPL CALCULATED.3IONS-SCNC: 12 MMOL/L (ref 5–15)
APTT PPP: 29.1 SECONDS (ref 22–39)
AST SERPL-CCNC: 23 U/L (ref 1–40)
BASOPHILS # BLD AUTO: 0.09 10*3/MM3 (ref 0–0.2)
BASOPHILS NFR BLD AUTO: 1.2 % (ref 0–1.5)
BILIRUB UR QL STRIP: NEGATIVE
BUN BLDA-MCNC: 22 MG/DL (ref 8–26)
BUN SERPL-MCNC: 23 MG/DL (ref 8–23)
BUN/CREAT SERPL: 17.7 (ref 7–25)
CA-I BLDA-SCNC: 1.25 MMOL/L (ref 1.2–1.32)
CALCIUM SPEC-SCNC: 9.6 MG/DL (ref 8.6–10.5)
CHLORIDE BLDA-SCNC: 100 MMOL/L (ref 98–109)
CHLORIDE SERPL-SCNC: 97 MMOL/L (ref 98–107)
CLARITY UR: CLEAR
CO2 BLDA-SCNC: 27 MMOL/L (ref 24–29)
CO2 SERPL-SCNC: 28 MMOL/L (ref 22–29)
COLOR UR: YELLOW
CREAT BLDA-MCNC: 1.6 MG/DL (ref 0.6–1.3)
CREAT SERPL-MCNC: 1.3 MG/DL (ref 0.76–1.27)
DEPRECATED RDW RBC AUTO: 51.2 FL (ref 37–54)
EGFRCR SERPLBLD CKD-EPI 2021: 42.2 ML/MIN/1.73
EGFRCR SERPLBLD CKD-EPI 2021: 54.2 ML/MIN/1.73
EOSINOPHIL # BLD AUTO: 0.61 10*3/MM3 (ref 0–0.4)
EOSINOPHIL NFR BLD AUTO: 7.9 % (ref 0.3–6.2)
ERYTHROCYTE [DISTWIDTH] IN BLOOD BY AUTOMATED COUNT: 13.7 % (ref 12.3–15.4)
FLUAV SUBTYP SPEC NAA+PROBE: NOT DETECTED
FLUBV RNA ISLT QL NAA+PROBE: NOT DETECTED
GEN 5 1HR TROPONIN T REFLEX: 61 NG/L
GLUCOSE BLDC GLUCOMTR-MCNC: 122 MG/DL (ref 70–130)
GLUCOSE BLDC GLUCOMTR-MCNC: 124 MG/DL (ref 70–130)
GLUCOSE SERPL-MCNC: 133 MG/DL (ref 65–99)
GLUCOSE UR STRIP-MCNC: NEGATIVE MG/DL
HCT VFR BLD AUTO: 36.5 % (ref 37.5–51)
HCT VFR BLDA CALC: 35 % (ref 38–51)
HGB BLD-MCNC: 11.8 G/DL (ref 13–17.7)
HGB BLDA-MCNC: 11.9 G/DL (ref 12–17)
HGB UR QL STRIP.AUTO: NEGATIVE
HOLD SPECIMEN: NORMAL
IMM GRANULOCYTES # BLD AUTO: 0.04 10*3/MM3 (ref 0–0.05)
IMM GRANULOCYTES NFR BLD AUTO: 0.5 % (ref 0–0.5)
INR PPP: 1.1 (ref 0.89–1.12)
KETONES UR QL STRIP: NEGATIVE
LEUKOCYTE ESTERASE UR QL STRIP.AUTO: NEGATIVE
LYMPHOCYTES # BLD AUTO: 1.22 10*3/MM3 (ref 0.7–3.1)
LYMPHOCYTES NFR BLD AUTO: 15.8 % (ref 19.6–45.3)
MCH RBC QN AUTO: 32.9 PG (ref 26.6–33)
MCHC RBC AUTO-ENTMCNC: 32.3 G/DL (ref 31.5–35.7)
MCV RBC AUTO: 101.7 FL (ref 79–97)
MONOCYTES # BLD AUTO: 0.83 10*3/MM3 (ref 0.1–0.9)
MONOCYTES NFR BLD AUTO: 10.8 % (ref 5–12)
NEUTROPHILS NFR BLD AUTO: 4.93 10*3/MM3 (ref 1.7–7)
NEUTROPHILS NFR BLD AUTO: 63.8 % (ref 42.7–76)
NITRITE UR QL STRIP: NEGATIVE
NRBC BLD AUTO-RTO: 0 /100 WBC (ref 0–0.2)
PH UR STRIP.AUTO: 7.5 [PH] (ref 5–8)
PLATELET # BLD AUTO: 172 10*3/MM3 (ref 140–450)
PMV BLD AUTO: 10.2 FL (ref 6–12)
POTASSIUM BLDA-SCNC: 4.7 MMOL/L (ref 3.5–4.9)
POTASSIUM SERPL-SCNC: 4.7 MMOL/L (ref 3.5–5.2)
PROT UR QL STRIP: NEGATIVE
PROTHROMBIN TIME: 14.3 SECONDS (ref 12.2–14.5)
RBC # BLD AUTO: 3.59 10*6/MM3 (ref 4.14–5.8)
SARS-COV-2 RNA RESP QL NAA+PROBE: NOT DETECTED
SODIUM BLD-SCNC: 139 MMOL/L (ref 138–146)
SODIUM SERPL-SCNC: 137 MMOL/L (ref 136–145)
SP GR UR STRIP: 1.04 (ref 1–1.03)
TROPONIN T % DELTA: 0
TROPONIN T NUMERIC DELTA: 0 NG/L
TROPONIN T SERPL HS-MCNC: 61 NG/L
UROBILINOGEN UR QL STRIP: ABNORMAL
WBC NRBC COR # BLD AUTO: 7.72 10*3/MM3 (ref 3.4–10.8)
WHOLE BLOOD HOLD COAG: NORMAL
WHOLE BLOOD HOLD SPECIMEN: NORMAL

## 2025-02-01 PROCEDURE — 85025 COMPLETE CBC W/AUTO DIFF WBC: CPT | Performed by: EMERGENCY MEDICINE

## 2025-02-01 PROCEDURE — 71045 X-RAY EXAM CHEST 1 VIEW: CPT

## 2025-02-01 PROCEDURE — 87636 SARSCOV2 & INF A&B AMP PRB: CPT | Performed by: EMERGENCY MEDICINE

## 2025-02-01 PROCEDURE — 80047 BASIC METABLC PNL IONIZED CA: CPT

## 2025-02-01 PROCEDURE — 70498 CT ANGIOGRAPHY NECK: CPT

## 2025-02-01 PROCEDURE — 84450 TRANSFERASE (AST) (SGOT): CPT | Performed by: EMERGENCY MEDICINE

## 2025-02-01 PROCEDURE — 93005 ELECTROCARDIOGRAM TRACING: CPT | Performed by: EMERGENCY MEDICINE

## 2025-02-01 PROCEDURE — 96360 HYDRATION IV INFUSION INIT: CPT

## 2025-02-01 PROCEDURE — 25510000001 IOPAMIDOL PER 1 ML: Performed by: EMERGENCY MEDICINE

## 2025-02-01 PROCEDURE — 81003 URINALYSIS AUTO W/O SCOPE: CPT | Performed by: EMERGENCY MEDICINE

## 2025-02-01 PROCEDURE — 25810000003 SODIUM CHLORIDE 0.9 % SOLUTION: Performed by: INTERNAL MEDICINE

## 2025-02-01 PROCEDURE — 84460 ALANINE AMINO (ALT) (SGPT): CPT | Performed by: EMERGENCY MEDICINE

## 2025-02-01 PROCEDURE — 99204 OFFICE O/P NEW MOD 45 MIN: CPT

## 2025-02-01 PROCEDURE — 36415 COLL VENOUS BLD VENIPUNCTURE: CPT

## 2025-02-01 PROCEDURE — 80048 BASIC METABOLIC PNL TOTAL CA: CPT | Performed by: EMERGENCY MEDICINE

## 2025-02-01 PROCEDURE — G0378 HOSPITAL OBSERVATION PER HR: HCPCS

## 2025-02-01 PROCEDURE — 70450 CT HEAD/BRAIN W/O DYE: CPT

## 2025-02-01 PROCEDURE — 72192 CT PELVIS W/O DYE: CPT

## 2025-02-01 PROCEDURE — 85610 PROTHROMBIN TIME: CPT | Performed by: EMERGENCY MEDICINE

## 2025-02-01 PROCEDURE — 82948 REAGENT STRIP/BLOOD GLUCOSE: CPT

## 2025-02-01 PROCEDURE — 84484 ASSAY OF TROPONIN QUANT: CPT | Performed by: EMERGENCY MEDICINE

## 2025-02-01 PROCEDURE — 85730 THROMBOPLASTIN TIME PARTIAL: CPT | Performed by: EMERGENCY MEDICINE

## 2025-02-01 PROCEDURE — 0042T HC CT CEREBRAL PERFUSION W/WO CONTRAST: CPT

## 2025-02-01 PROCEDURE — 85014 HEMATOCRIT: CPT

## 2025-02-01 PROCEDURE — 99285 EMERGENCY DEPT VISIT HI MDM: CPT

## 2025-02-01 PROCEDURE — 70496 CT ANGIOGRAPHY HEAD: CPT

## 2025-02-01 PROCEDURE — 96361 HYDRATE IV INFUSION ADD-ON: CPT

## 2025-02-01 PROCEDURE — 87086 URINE CULTURE/COLONY COUNT: CPT | Performed by: INTERNAL MEDICINE

## 2025-02-01 RX ORDER — NICOTINE POLACRILEX 4 MG
15 LOZENGE BUCCAL
Status: DISCONTINUED | OUTPATIENT
Start: 2025-02-01 | End: 2025-02-03 | Stop reason: HOSPADM

## 2025-02-01 RX ORDER — SODIUM CHLORIDE 0.9 % (FLUSH) 0.9 %
10 SYRINGE (ML) INJECTION EVERY 12 HOURS SCHEDULED
Status: DISCONTINUED | OUTPATIENT
Start: 2025-02-01 | End: 2025-02-03 | Stop reason: HOSPADM

## 2025-02-01 RX ORDER — ACETAMINOPHEN 325 MG/1
650 TABLET ORAL EVERY 4 HOURS PRN
Status: DISCONTINUED | OUTPATIENT
Start: 2025-02-01 | End: 2025-02-03 | Stop reason: HOSPADM

## 2025-02-01 RX ORDER — BISACODYL 5 MG/1
5 TABLET, DELAYED RELEASE ORAL DAILY PRN
Status: DISCONTINUED | OUTPATIENT
Start: 2025-02-01 | End: 2025-02-03 | Stop reason: HOSPADM

## 2025-02-01 RX ORDER — ATORVASTATIN CALCIUM 40 MG/1
40 TABLET, FILM COATED ORAL NIGHTLY
Status: DISCONTINUED | OUTPATIENT
Start: 2025-02-01 | End: 2025-02-03 | Stop reason: HOSPADM

## 2025-02-01 RX ORDER — SODIUM CHLORIDE 9 MG/ML
75 INJECTION, SOLUTION INTRAVENOUS CONTINUOUS
Status: ACTIVE | OUTPATIENT
Start: 2025-02-01 | End: 2025-02-02

## 2025-02-01 RX ORDER — DIVALPROEX SODIUM 125 MG/1
125 CAPSULE, COATED PELLETS ORAL 2 TIMES DAILY
COMMUNITY

## 2025-02-01 RX ORDER — BISACODYL 10 MG
10 SUPPOSITORY, RECTAL RECTAL DAILY PRN
Status: DISCONTINUED | OUTPATIENT
Start: 2025-02-01 | End: 2025-02-03 | Stop reason: HOSPADM

## 2025-02-01 RX ORDER — DEXTROSE MONOHYDRATE 25 G/50ML
25 INJECTION, SOLUTION INTRAVENOUS
Status: DISCONTINUED | OUTPATIENT
Start: 2025-02-01 | End: 2025-02-03 | Stop reason: HOSPADM

## 2025-02-01 RX ORDER — LANOLIN ALCOHOL/MO/W.PET/CERES
1000 CREAM (GRAM) TOPICAL DAILY
Status: DISCONTINUED | OUTPATIENT
Start: 2025-02-02 | End: 2025-02-03 | Stop reason: HOSPADM

## 2025-02-01 RX ORDER — CLOPIDOGREL BISULFATE 75 MG/1
300 TABLET ORAL ONCE
Status: DISCONTINUED | OUTPATIENT
Start: 2025-02-01 | End: 2025-02-03 | Stop reason: HOSPADM

## 2025-02-01 RX ORDER — ONDANSETRON 4 MG/1
4 TABLET, ORALLY DISINTEGRATING ORAL EVERY 12 HOURS PRN
COMMUNITY

## 2025-02-01 RX ORDER — SERTRALINE HYDROCHLORIDE 25 MG/1
25 TABLET, FILM COATED ORAL DAILY
COMMUNITY

## 2025-02-01 RX ORDER — POLYETHYLENE GLYCOL 3350 17 G/17G
17 POWDER, FOR SOLUTION ORAL DAILY PRN
Status: DISCONTINUED | OUTPATIENT
Start: 2025-02-01 | End: 2025-02-03 | Stop reason: HOSPADM

## 2025-02-01 RX ORDER — ACETAMINOPHEN 160 MG/5ML
650 SOLUTION ORAL EVERY 4 HOURS PRN
Status: DISCONTINUED | OUTPATIENT
Start: 2025-02-01 | End: 2025-02-03 | Stop reason: HOSPADM

## 2025-02-01 RX ORDER — TRAZODONE HYDROCHLORIDE 50 MG/1
50 TABLET, FILM COATED ORAL NIGHTLY
Status: DISCONTINUED | OUTPATIENT
Start: 2025-02-01 | End: 2025-02-03 | Stop reason: HOSPADM

## 2025-02-01 RX ORDER — ATORVASTATIN CALCIUM 10 MG/1
10 TABLET, FILM COATED ORAL NIGHTLY
COMMUNITY

## 2025-02-01 RX ORDER — SODIUM CHLORIDE 9 MG/ML
40 INJECTION, SOLUTION INTRAVENOUS AS NEEDED
Status: DISCONTINUED | OUTPATIENT
Start: 2025-02-01 | End: 2025-02-03 | Stop reason: HOSPADM

## 2025-02-01 RX ORDER — HYDRALAZINE HYDROCHLORIDE 10 MG/1
10 TABLET, FILM COATED ORAL 2 TIMES DAILY PRN
COMMUNITY

## 2025-02-01 RX ORDER — POLYETHYLENE GLYCOL 3350 17 G/17G
17 POWDER, FOR SOLUTION ORAL 3 TIMES WEEKLY
COMMUNITY

## 2025-02-01 RX ORDER — METFORMIN HYDROCHLORIDE 750 MG/1
750 TABLET, EXTENDED RELEASE ORAL 2 TIMES DAILY
COMMUNITY

## 2025-02-01 RX ORDER — BACLOFEN 10 MG/1
5 TABLET ORAL 2 TIMES DAILY
Status: DISCONTINUED | OUTPATIENT
Start: 2025-02-01 | End: 2025-02-03 | Stop reason: HOSPADM

## 2025-02-01 RX ORDER — DIVALPROEX SODIUM 125 MG/1
125 CAPSULE, COATED PELLETS ORAL 2 TIMES DAILY
Status: DISCONTINUED | OUTPATIENT
Start: 2025-02-01 | End: 2025-02-03 | Stop reason: HOSPADM

## 2025-02-01 RX ORDER — BACLOFEN 5 MG/1
5 TABLET ORAL 2 TIMES DAILY
COMMUNITY

## 2025-02-01 RX ORDER — CLOPIDOGREL BISULFATE 75 MG/1
75 TABLET ORAL DAILY
Status: CANCELLED | OUTPATIENT
Start: 2025-02-02

## 2025-02-01 RX ORDER — IBUPROFEN 600 MG/1
1 TABLET ORAL
Status: DISCONTINUED | OUTPATIENT
Start: 2025-02-01 | End: 2025-02-03 | Stop reason: HOSPADM

## 2025-02-01 RX ORDER — SERTRALINE HYDROCHLORIDE 25 MG/1
25 TABLET, FILM COATED ORAL DAILY
Status: DISCONTINUED | OUTPATIENT
Start: 2025-02-02 | End: 2025-02-03 | Stop reason: HOSPADM

## 2025-02-01 RX ORDER — ASPIRIN 300 MG/1
300 SUPPOSITORY RECTAL DAILY
Status: DISCONTINUED | OUTPATIENT
Start: 2025-02-02 | End: 2025-02-03 | Stop reason: HOSPADM

## 2025-02-01 RX ORDER — ASPIRIN 81 MG/1
81 TABLET, CHEWABLE ORAL DAILY
Status: DISCONTINUED | OUTPATIENT
Start: 2025-02-02 | End: 2025-02-03 | Stop reason: HOSPADM

## 2025-02-01 RX ORDER — AMOXICILLIN 250 MG
2 CAPSULE ORAL 2 TIMES DAILY PRN
Status: DISCONTINUED | OUTPATIENT
Start: 2025-02-01 | End: 2025-02-03 | Stop reason: HOSPADM

## 2025-02-01 RX ORDER — SODIUM CHLORIDE 0.9 % (FLUSH) 0.9 %
10 SYRINGE (ML) INJECTION AS NEEDED
Status: DISCONTINUED | OUTPATIENT
Start: 2025-02-01 | End: 2025-02-03 | Stop reason: HOSPADM

## 2025-02-01 RX ORDER — INSULIN LISPRO 100 [IU]/ML
2-7 INJECTION, SOLUTION INTRAVENOUS; SUBCUTANEOUS
Status: DISCONTINUED | OUTPATIENT
Start: 2025-02-01 | End: 2025-02-03 | Stop reason: HOSPADM

## 2025-02-01 RX ORDER — ACETAMINOPHEN 650 MG/1
650 SUPPOSITORY RECTAL EVERY 4 HOURS PRN
Status: DISCONTINUED | OUTPATIENT
Start: 2025-02-01 | End: 2025-02-03 | Stop reason: HOSPADM

## 2025-02-01 RX ORDER — TRAZODONE HYDROCHLORIDE 50 MG/1
50 TABLET, FILM COATED ORAL NIGHTLY
COMMUNITY

## 2025-02-01 RX ORDER — ASPIRIN 325 MG
325 TABLET, DELAYED RELEASE (ENTERIC COATED) ORAL ONCE
Status: DISCONTINUED | OUTPATIENT
Start: 2025-02-01 | End: 2025-02-03 | Stop reason: HOSPADM

## 2025-02-01 RX ORDER — BACLOFEN 10 MG/1
10 TABLET ORAL NIGHTLY
COMMUNITY

## 2025-02-01 RX ORDER — IOPAMIDOL 755 MG/ML
150 INJECTION, SOLUTION INTRAVASCULAR
Status: COMPLETED | OUTPATIENT
Start: 2025-02-01 | End: 2025-02-01

## 2025-02-01 RX ORDER — METOPROLOL TARTRATE 25 MG/1
37.5 TABLET, FILM COATED ORAL 2 TIMES DAILY
COMMUNITY

## 2025-02-01 RX ADMIN — SODIUM CHLORIDE 75 ML/HR: 9 INJECTION, SOLUTION INTRAVENOUS at 16:55

## 2025-02-01 RX ADMIN — TRAZODONE HYDROCHLORIDE 50 MG: 50 TABLET ORAL at 21:42

## 2025-02-01 RX ADMIN — Medication 10 ML: at 21:42

## 2025-02-01 RX ADMIN — ATORVASTATIN CALCIUM 40 MG: 40 TABLET, FILM COATED ORAL at 21:41

## 2025-02-01 RX ADMIN — IOPAMIDOL 115 ML: 755 INJECTION, SOLUTION INTRAVENOUS at 12:40

## 2025-02-01 RX ADMIN — POLYVINYL ALCOHOL, POVIDONE 1 DROP: 14; 6 SOLUTION/ DROPS OPHTHALMIC at 21:40

## 2025-02-01 RX ADMIN — Medication 10 ML: at 16:51

## 2025-02-01 RX ADMIN — DIVALPROEX SODIUM 125 MG: 125 CAPSULE ORAL at 21:40

## 2025-02-01 RX ADMIN — BACLOFEN 5 MG: 10 TABLET ORAL at 21:40

## 2025-02-01 NOTE — CONSULTS
Stroke Consult Note    Patient Name: Dirk Miles   MRN: [id number]  Age: 84 y.o.  Sex: male  : 1940    Primary Care Physician: Provider, No Known  Referring Physician:  MD Annmarie    TIME STROKE TEAM CALLED: 1219 EST     TIME PATIENT SEEN: 1222 EST    Handedness: Right  Race:      Chief Complaint/Reason for Consultation: Slurred speech    HPI: Dirk Miles is a 84-year-old male with past medical history of CVA (chronic right PCA infarct, chronic left frontal lobe) (left-sided weakness deficit), ICH (right cerebral hemisphere, hemorrhagic conversion complexed with fall while on OAC), A-fib (not on OAC), dementia, T2DM, HTN, HLD, CAD presented to [facility] emergency department via EMS for slurred speech that has resolved prior to arrival to emergency department. Per EMS last known well was around 11 AM.  However according to his wife he has been less interactive since yesterday, she found him somewhat slumped over in his chair which is unusual for him.  At baseline he uses a wheelchair as he has significant left lower extremity weakness and is unable to walk.  CT head showed chronic right PCA territory infarct, chronic left frontal lobe infarct and extensive white matter hypodensities that could be suggestive of small vessel ischemic change and/or chronic infarcts, however no acute intracranial findings.  CTA head and neck shows chronic occlusion of the right PCA at the distal P2 segment, as well as nonocclusive thrombus of the left ICA, however no acute LVO.  CT perfusion shows no core infarct or ischemic tissue at risk, there is a tiny perfusion deficit associated with chronic right PCA infarct.  He will be admitted to the hospitalist service for further evaluation.    Case and imaging discussed with Dr. Lawson (vascular neurologist). Initially, a loading dose of both aspirin and Plavix were ordered for patient in the setting of left ICA thrombus. Dr. Lawson went to the  bedside and spoke with patient's family.  Spouse at the bedside refuses Plavix and heparin drip out of concern for bleeding.  Patient does have a poor baseline function and would not be a candidate for interventional therapy.  Spouse is okay to continue aspirin at this time, and possibly reconsider if MRI is positive for AIS.    Review of Systems   Neurological:  Positive for weakness. Negative for dizziness, facial asymmetry, light-headedness, numbness and headaches.   All other systems reviewed and are negative.     Past Medical History:   Diagnosis Date    A-fib     Acute CVA (cerebrovascular accident) 12/1/2020    Arthritis     B12 deficiency     Coronary artery disease     COVID-19     Diabetes mellitus     DNR (do not resuscitate)     Dysphagia     Falls     GERD (gastroesophageal reflux disease)     Hemiplegia     Hyperlipidemia     Hypertension     Insomnia     Pressure sore on buttocks     Stenosis of left carotid artery 1/10/2021    Stroke     LEFT SIDE DEFICIT     Past Surgical History:   Procedure Laterality Date    CATARACT EXTRACTION, BILATERAL      HERNIA REPAIR       Family History   Problem Relation Age of Onset    Cerebral aneurysm Mother     Cancer Father     Aneurysm Sister     Colon cancer Sister     Atrial fibrillation Brother     Heart attack Brother     Cancer Brother      Social History     Socioeconomic History    Marital status:    Tobacco Use    Smoking status: Former     Current packs/day: 1.00     Types: Cigarettes    Smokeless tobacco: Never    Tobacco comments:     QUIT 25YRS AGO    Vaping Use    Vaping status: Never Used   Substance and Sexual Activity    Alcohol use: Not Currently     Alcohol/week: 1.0 standard drink of alcohol     Types: 1 Cans of beer per week     Comment: 1-2 DRINKS A MONTH    Drug use: Never    Sexual activity: Defer     Allergies   Allergen Reactions    Codeine Mental Status Change     hyper     Prior to Admission medications    Medication Sig Start Date  End Date Taking? Authorizing Provider   acetaminophen (TYLENOL) 325 MG tablet Take 650 mg by mouth Every 6 (Six) Hours As Needed for Mild Pain .    Contreras Stein MD   Alum & Mag Hydroxide-Simeth (MAALOX ADVANCED PO) Take 1 tablet by mouth Every 8 (Eight) Hours As Needed.    Contreras Stein MD   amLODIPine (NORVASC) 5 MG tablet Take 5 mg by mouth Daily.    Contreras Stein MD   aspirin 81 MG chewable tablet Chew 81 mg Daily.    Contreras Stein MD   atorvastatin (LIPITOR) 80 MG tablet Take 1 tablet by mouth Every Night. 7/30/21   Maryse Rebolledo DO   baclofen (LIORESAL) 10 MG tablet Take 1 tablet by mouth 3 (Three) Times a Day As Needed for Muscle Spasms. For muscle spasms 1/15/21   Dari Vargas MD   bisacodyl (DULCOLAX) 10 MG suppository Insert 10 mg into the rectum As Needed for Constipation.    Contreras Stein MD   Carboxymethylcellulose Sodium (ARTIFICIAL TEARS OP) Administer 2 drops to both eyes 2 (Two) Times a Day As Needed.    Contreras Stein MD   cholecalciferol (VITAMIN D3) 25 MCG (1000 UT) tablet Take 2,000 Units by mouth Daily.    Contreras Stein MD   cloNIDine (CATAPRES) 0.1 MG tablet Take 0.1 mg by mouth Every 8 (Eight) Hours As Needed for High Blood Pressure.    Contreras Stein MD   cyanocobalamin 1000 MCG/ML injection Inject 1 mL into the appropriate muscle as directed by prescriber 1 (One) Time Per Week. 12/15/20   Ashley Odom DNP, MARGARITA   docusate sodium (COLACE) 100 MG capsule Take 200 mg by mouth 2 (Two) Times a Day.    Contreras Stein MD   famotidine (PEPCID) 20 MG tablet Take 20 mg by mouth 2 (Two) Times a Day.    Contreras Stein MD   furosemide (LASIX) 20 MG tablet Take 1 tablet by mouth Daily As Needed (soa, edema, weight gain > 3 pounds). ON TUES AND SAT 3/2/21   Bhumika Mendez II,    hydrocortisone 2.5 % cream Apply  topically to the appropriate area as directed Every 12 (Twelve) Hours. 12/9/20   Ashley Odom DNP,  MARGARITA   insulin aspart (novoLOG FLEXPEN) 100 UNIT/ML solution pen-injector sc pen Inject 8 Units under the skin into the appropriate area as directed As Needed (for fsbs > 400).    Contreras Stein MD   insulin lispro (humaLOG) 100 UNIT/ML injection Inject 0-7 Units under the skin into the appropriate area as directed 3 (Three) Times a Day Before Meals. 12/9/20   Ashley Odom DNP, APRN   ipratropium-albuterol (DUO-NEB) 0.5-2.5 mg/3 ml nebulizer Take 3 mL by nebulization Every 4 (Four) Hours As Needed for Wheezing.    Contreras Stein MD   melatonin 3 MG tablet Take 3 mg by mouth At Night As Needed. 1/1/21   Contreras Stein MD   metoprolol tartrate (LOPRESSOR) 50 MG tablet Take 1 tablet by mouth Every 12 (Twelve) Hours. 12/9/20   Ashley Odom DNP, APRN   NON FORMULARY Pt can take meds crushed in pudding per Meyer Loera    Contreras Stein MD   O2 (OXYGEN) Inhale 2 L/min Daily As Needed.    Contreras Stein MD   ondansetron (ZOFRAN) 4 MG tablet Take 4 mg by mouth Every 8 (Eight) Hours As Needed for Nausea or Vomiting.    Contreras Stein MD   risperiDONE (risperDAL) 0.5 MG tablet Take 0.5 mg by mouth At Night As Needed.    Contreras Setin MD   sennosides-docusate (PERICOLACE) 8.6-50 MG per tablet Take 2 tablets by mouth 2 (Two) Times a Day. 12/9/20   Ashley Odom DNP, APRN   vitamin B-12 (CYANOCOBALAMIN) 1000 MCG tablet Take 1,000 mcg by mouth Daily.    Contreras Stein MD   vitamin C (ASCORBIC ACID) 250 MG tablet Take 250 mg by mouth Daily.    Contreras Stein MD            Neurological Exam  Mental Status  Awake and alert. Oriented only to person. Oriented to person, place, and time. Speech is normal. Language is fluent with no aphasia.    Cranial Nerves  CN II: Visual fields full to confrontation.  CN III, IV, VI: Extraocular movements intact bilaterally. Pupils equal round and reactive to light bilaterally.  CN V: Facial sensation is normal.  CN VII: Full  and symmetric facial movement.  CN VIII: Hearing appears intact.    Motor    LUE 2/5, chronic contraction of left hand-this is baseline  RUE 4/5  LLE 3/5-this is his baseline  RLE/5  .    Sensory  Sensation is intact to light touch, pinprick, vibration and proprioception in all four extremities.    Coordination    No dysmetria out of proportion to weakness noted.    Gait   Normal gait.  Not observed.      Physical Exam  Vitals and nursing note reviewed.   Constitutional:       General: He is awake.   HENT:      Head: Normocephalic.   Eyes:      Extraocular Movements: Extraocular movements intact.      Pupils: Pupils are equal, round, and reactive to light.   Cardiovascular:      Rate and Rhythm: Normal rate and regular rhythm.      Pulses: Normal pulses.      Heart sounds: Normal heart sounds.   Pulmonary:      Effort: Pulmonary effort is normal. No respiratory distress.      Breath sounds: Normal breath sounds.   Musculoskeletal:      Cervical back: Normal range of motion.   Skin:     General: Skin is warm and dry.      Capillary Refill: Capillary refill takes less than 2 seconds.   Neurological:      Mental Status: He is alert and oriented to person, place, and time.      Motor: Weakness present.      Gait: Gait is intact.   Psychiatric:         Attention and Perception: Attention normal.         Speech: Speech normal.         Behavior: Behavior is cooperative.         Acute Stroke Data    Thrombolytic Inclusion / Exclusion Criteria    Time: 12:28 EST  Person Administering Scale: MARGARITA Cox    Inclusion Criteria  [x]   18 years of age or greater   []   Onset of symptoms < 4.5 hours before beginning treatment (stroke onset = time patient was last seen well or without symptoms).   []   Diagnosis of acute ischemic stroke causing measurable disabling deficit (Complete Hemianopia, Any Aphasia, Visual or Sensory Extinction, Any weakness limiting sustained effort against gravity)   []   Any remaining deficit  considered potentially disabling in view of patient and practitioner   Exclusion criteria (Do not proceed with Alteplase if any are checked under exclusion criteria)  [x]   Onset unknown or GREATER than 4.5 hours   []   ICH on CT/MRI   []   CT demonstrates hypodensity representing acute or subacute infarct   []   Significant head trauma or prior stroke in the previous 3 months   []   Symptoms suggestive of subarachnoid hemorrhage   []   History of un-ruptured intracranial aneurysm GREATER than 10 mm   []   Recent intracranial or intraspinal surgery within the last 3 months   []   Arterial puncture at a non-compressible site in the previous 7 days   []   Active internal bleeding   []   Acute bleeding tendency   []   Platelet count LESS than 100,000 for known hematological diseases such as leukemia, thrombocytopenia or chronic cirrhosis   []   Current use of anticoagulant with INR GREATER than 1.7 or PT GREATER than 15 seconds, aPTT GREATER than 40 seconds   []   Heparin received within 48 hours, resulting in abnormally elevated aPTT GREATER than upper limit of normal   []   Current use of direct thrombin inhibitors or direct factor Xa inhibitors in the past 48 hours   []   Elevated blood pressure refractory to treatment (systolic GREATER than 185 mm/Hg or diastolic  GREATER than 110 mm/Hg   []   Suspected infective endocarditis and aortic arch dissection   []   Current use of therapeutic treatment dose of low-molecular-weight heparin (LMWH) within the previous 24 hours   []   Structural GI malignancy or bleed   Relative exclusion for all patients  [x]   Only minor non-disabling symptoms   []   Pregnancy   []   Seizure at onset with postictal residual neurological impairments   []   Major surgery or previous trauma within past 14 days   []   History of previous spontaneous ICH, intracranial neoplasm, or AV malformation   []   Postpartum (within previous 14 days)   []   Recent GI or urinary tract hemorrhage (within  previous 21 days)   []   Recent acute MI (within previous 3 months)   []   History of un-ruptured intracranial aneurysm LESS than 10 mm   []   History of ruptured intracranial aneurysm   []   Blood glucose LESS than 50 mg/dL (2.7 mmol/L)   []   Dural puncture within the last 7 days   []   Known GREATER than 10 cerebral microbleeds   Additional exclusions for patients with symptoms onset between 3 and 4.5 hours.  []   Age > 80.   []   On any anticoagulants regardless of INR  >>> Warfarin (Coumadin), Heparin, Enoxaparin (Lovenox), fondaparinux (Arixtra), bivalirudin (Angiomax), Argatroban, dabigatran (Pradaxa), rivaroxaban (Xarelto), or apixaban (Eliquis)   []   Severe stroke (NIHSS > 25).   []   History of BOTH diabetes and previous ischemic stroke.   []   The risks and benefits have been discussed with the patient or family related to the administration of IV thrombolytic therapy for stroke symptoms.   []   I have discussed and reviewed the patient's case and imaging with the attending prior to IV thrombolytic therapy.   N/A Time IV thrombolytic administered       Hospital Meds:  Scheduled-    Infusions-     PRNs-   sodium chloride    Functional Status Prior to Current Stroke/[place] Score: 4    NIH Stroke Scale  Time: 12:28 EST  Person Administering Scale: MARGARITA Cox    Interval: baseline  1a. Level of Consciousness: 0-->Alert, keenly responsive  1b. LOC Questions: 1-->Answers one question correctly  1c. LOC Commands: 0-->Performs both tasks correctly  2. Best Gaze: 0-->Normal  3. Visual: 0-->No visual loss  4. Facial Palsy: 0-->Normal symmetrical movements  5a. Motor Arm, Left: 2-->Some effort against gravity, limb cannot get to or maintain (if cued) 90 (or 45) degrees, drifts down to bed, but has some effort against gravity (believed to be baseline)  5b. Motor Arm, Right: 0-->No drift, limb holds 90 (or 45) degrees for full 10 secs  6a. Motor Leg, Left: 1-->Drift, leg falls by the end of the 5-sec period  but does not hit bed (believed to be baseline)  6b. Motor Leg, Right: 0-->No drift, leg holds 30 degree position for full 5 secs  7. Limb Ataxia: 0-->Absent  8. Sensory: 0-->Normal, no sensory loss  9. Best Language: 0-->No aphasia, normal  10. Dysarthria: 0-->Normal  11. Extinction and Inattention (formerly Neglect): 0-->No abnormality    Total (NIH Stroke Scale): 4      Results Reviewed:  I have personally reviewed current lab, radiology, and data.     Results for orders placed during the hospital encounter of 07/28/21    Adult Transthoracic Echo Complete W/ Cont if Necessary Per Protocol (With Agitated Saline)    Interpretation Summary  · Left ventricular systolic function is normal. Estimated left ventricular EF = 60%.  · Left atrial volume is mildly increased.  · The cardiac valves are anatomically and functionally normal.  · Estimated right ventricular systolic pressure from tricuspid regurgitation is mildly elevated (35-45 mmHg).  · Saline test results are negative.   CT Angiogram Head w AI Analysis of LVO    Result Date: 2/1/2025  Impression: No core infarct or ischemic tissue at risk. No abrupt cut off/large vessel occlusion. Chronic occlusion of the right PCA at the distal P2 segment. Short segment moderate to severe stenosis of the proximal left cervical ICA on the basis of eccentric calcified and noncalcified atherosclerotic plaque, with stenosis of approximately 80% involving a segment measuring 8 mm in length. This appears similar  compared to prior CTA from 2021. 2 mm posteriorly-directed infundibulum at the right carotid terminus. There is evidence of emphysema. Correlate with patient history and risk factors and please assess if the patient meets criteria for routine low dose CT lung cancer screening. Electronically Signed: Vicente Castellanos MD  2/1/2025 1:06 PM EST  Workstation ID: [id number]    CT Angiogram Neck    Result Date: 2/1/2025  Impression: No core infarct or ischemic tissue at risk. No  abrupt cut off/large vessel occlusion. Chronic occlusion of the right PCA at the distal P2 segment. Short segment moderate to severe stenosis of the proximal left cervical ICA on the basis of eccentric calcified and noncalcified atherosclerotic plaque, with stenosis of approximately 80% involving a segment measuring 8 mm in length. This appears similar  compared to prior CTA from 2021. 2 mm posteriorly-directed infundibulum at the right carotid terminus. There is evidence of emphysema. Correlate with patient history and risk factors and please assess if the patient meets criteria for routine low dose CT lung cancer screening. Electronically Signed: Vicente Castellanos MD  2/1/2025 1:06 PM EST  Workstation ID: [id number]        CT CEREBRAL PERFUSION WITH & WITHOUT CONTRAST    Result Date: 2/1/2025  Impression: No core infarct or ischemic tissue at risk. No abrupt cut off/large vessel occlusion. Chronic occlusion of the right PCA at the distal P2 segment. Short segment moderate to severe stenosis of the proximal left cervical ICA on the basis of eccentric calcified and noncalcified atherosclerotic plaque, with stenosis of approximately 80% involving a segment measuring 8 mm in length. This appears similar  compared to prior CTA from 2021. 2 mm posteriorly-directed infundibulum at the right carotid terminus. There is evidence of emphysema. Correlate with patient history and risk factors and please assess if the patient meets criteria for routine low dose CT lung cancer screening. Electronically Signed: Vicente Castellanos MD  2/1/2025 1:06 PM EST  Workstation ID: [id number]    CT Pelvis Without Contrast    Result Date: 2/1/2025  Impression: No acute fracture or traumatic malalignment. Evaluation of hip pain in the setting of recent trauma should begin with x-ray, not CT, per ACR appropriateness criteria. Electronically Signed: Vicente Castellanos MD  2/1/2025 12:45 PM EST  Workstation ID: [id number]    CT Head Without Contrast Stroke  Protocol    Result Date: 2/1/2025  Impression: No acute intracranial findings. Similar chronic and senescent changes which includes large chronic right PCA territory infarct and multiple additional scattered chronic supratentorial and infratentorial infarcts and white matter changes suggestive of chronic small vessel ischemic change. Electronically Signed: Vicente Castellanos MD  2/1/2025 12:32 PM EST  Workstation ID: [id number]     WBC   Date Value Ref Range Status   02/01/2025 7.72 3.40 - 10.80 10*3/mm3 Final     RBC   Date Value Ref Range Status   02/01/2025 3.59 (L) 4.14 - 5.80 10*6/mm3 Final     Hemoglobin   Date Value Ref Range Status   02/01/2025 11.9 (L) 12.0 - 17.0 g/dL Final     Comment:     Serial Number: [id number]:  [id number]   02/01/2025 11.8 (L) 13.0 - 17.7 g/dL Final     Hematocrit   Date Value Ref Range Status   02/01/2025 35 (L) 38 - 51 % Final   02/01/2025 36.5 (L) 37.5 - 51.0 % Final     MCV   Date Value Ref Range Status   02/01/2025 101.7 (H) 79.0 - 97.0 fL Final     MCH   Date Value Ref Range Status   02/01/2025 32.9 26.6 - 33.0 pg Final     MCHC   Date Value Ref Range Status   02/01/2025 32.3 31.5 - 35.7 g/dL Final     RDW   Date Value Ref Range Status   02/01/2025 13.7 12.3 - 15.4 % Final     RDW-SD   Date Value Ref Range Status   02/01/2025 51.2 37.0 - 54.0 fl Final     MPV   Date Value Ref Range Status   02/01/2025 10.2 6.0 - 12.0 fL Final     Platelets   Date Value Ref Range Status   02/01/2025 172 140 - 450 10*3/mm3 Final     Neutrophil %   Date Value Ref Range Status   02/01/2025 63.8 42.7 - 76.0 % Final     Lymphocyte %   Date Value Ref Range Status   02/01/2025 15.8 (L) 19.6 - 45.3 % Final     Monocyte %   Date Value Ref Range Status   02/01/2025 10.8 5.0 - 12.0 % Final     Eosinophil %   Date Value Ref Range Status   02/01/2025 7.9 (H) 0.3 - 6.2 % Final     Basophil %   Date Value Ref Range Status   02/01/2025 1.2 0.0 - 1.5 % Final     Immature Grans %   Date Value Ref Range Status    02/01/2025 0.5 0.0 - 0.5 % Final     Neutrophils, Absolute   Date Value Ref Range Status   02/01/2025 4.93 1.70 - 7.00 10*3/mm3 Final     Lymphocytes, Absolute   Date Value Ref Range Status   02/01/2025 1.22 0.70 - 3.10 10*3/mm3 Final     Monocytes, Absolute   Date Value Ref Range Status   02/01/2025 0.83 0.10 - 0.90 10*3/mm3 Final     Eosinophils, Absolute   Date Value Ref Range Status   02/01/2025 0.61 (H) 0.00 - 0.40 10*3/mm3 Final     Basophils, Absolute   Date Value Ref Range Status   02/01/2025 0.09 0.00 - 0.20 10*3/mm3 Final     Immature Grans, Absolute   Date Value Ref Range Status   02/01/2025 0.04 0.00 - 0.05 10*3/mm3 Final     nRBC   Date Value Ref Range Status   02/01/2025 0.0 0.0 - 0.2 /100 WBC Final     Lab Results   Component Value Date    GLUCOSE 133 (H) 02/01/2025    BUN 23 02/01/2025    CREATININE 1.60 (H) 02/01/2025     02/01/2025    K 4.7 02/01/2025    CL 97 (L) 02/01/2025    CALCIUM 9.6 02/01/2025    PROTEINTOT 6.6 01/08/2022    ALBUMIN 4.10 01/08/2022    ALT 17 02/01/2025    AST 23 02/01/2025    ALKPHOS 56 01/08/2022    BILITOT 0.5 01/08/2022    GLOB 2.5 01/08/2022    AGRATIO 1.6 01/08/2022    BCR 17.7 02/01/2025    ANIONGAP 12.0 02/01/2025    EGFR 42.2 (L) 02/01/2025         Assessment/Plan:    This is a 84-year-old male with past medical history of CVA (chronic right PCA infarct, chronic left frontal lobe) (left-sided weakness deficit), ICH (right cerebral hemisphere, hemorrhagic conversion complexed with fall while on OAC), A-fib (not on OAC), dementia, T2DM, HTN, HLD, CAD presented to [facility] emergency department via EMS for slurred speech that has resolved prior to arrival to emergency department. Per EMS last known well was around 11 AM. CT head showed chronic right PCA territory infarct, chronic left frontal lobe infarct and extensive white matter hypodensities that could be suggestive of small vessel ischemic change and/or chronic infarcts, however no acute  intracranial findings. CTA head and neck shows chronic occlusion of the right PCA at the distal P2 segment, as well as nonocclusive thrombus of the left ICA, however no acute LVO.  CT perfusion shows no core infarct or ischemic tissue at risk, there is a tiny perfusion deficit associated with chronic right PCA infarct.  He will be admitted to the hospitalist service for further evaluation.    Antiplatelet PTA: Aspirin 81 mg  Anticoagulant PTA: None        1.Left ICA thrombus  Suspect could have new CVA in the setting of slurred speech and altered mental status  -TIA/CVA order set without  thrombolytic therapy has been initiated  -NPO until bedside nursing dysphagia screen completed  -MRI brain pending  -TTE pending  -A1c and lipid panel in AM  -Meds: Continue aspirin 81 mg for now  -Spouse refusing Plavix and heparin out of concern for risk of bleeding, possible reevaluate if MRI is positive for AIS  -Activity as tolerated, fall risk precautions  -PT/OT/SLP evaluation  -Infectious workup via primary team    2.  Essential hypertension  -Allow autoregulation of blood pressure for adequate cerebral blood flow, goal SBP<220    3.  Hyperlipidemia  -Lipid panel in AM  -Atorvastatin 40 mg nightly for now, reevaluate after lipid panel results      Plan of care was discussed with Dr. Lawson (vascular neurologist), patient and patient's wife.  Stroke neurology will continue to follow.  Please call with any questions or concerns.  Thank you for this consult.     MARGARITA Cox  February 1, 2025  12:28 EST

## 2025-02-01 NOTE — H&P
[facility] Medicine Services  HISTORY AND PHYSICAL    Patient Name: Dirk Miles  : 1940  MRN: [id number]  Primary Care Physician: Provider, No Known  Date of admission: 2025      Subjective   Subjective     Chief Complaint:  Weakness    HPI:  Dirk Miles is a 84 y.o. 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  The patient has been less interactive, according to his spouse, since yesterday.  Today she found him somewhat slumped over in a chair which is unusual for him as he as he is usually engaged in activities at his skilled nursing facility.  At baseline he is unable to walk due to left lower extremity weakness.  Spouse denies recent patient fever, chills, cough, shortness of breath, nausea, vomiting, abdominal pain, loose stool or dysuria.  She says Mr. Miles ate well yesterday at lunch.      Personal History     Past Medical History:   Diagnosis Date    A-fib     Acute CVA (cerebrovascular accident) 2020    Arthritis     B12 deficiency     Coronary artery disease     COVID-19     Diabetes mellitus     DNR (do not resuscitate)     Dysphagia     Falls     GERD (gastroesophageal reflux disease)     Hemiplegia     Hyperlipidemia     Hypertension     Insomnia     Pressure sore on buttocks     Stenosis of left carotid artery 1/10/2021    Stroke     LEFT SIDE DEFICIT           Past Surgical History:   Procedure Laterality Date    CATARACT EXTRACTION, BILATERAL      HERNIA REPAIR         Family History: family history includes Aneurysm in his sister; Atrial fibrillation in his brother; Cancer in his brother and father; Cerebral aneurysm in his mother; Colon cancer in his sister; Heart attack in his brother.     Social History:  reports that he  has quit smoking. His smoking use included cigarettes. He has never used smokeless tobacco. He reports that he does not currently use alcohol after a past usage of about 1.0 standard drink of alcohol per week. He reports that he does not use drugs.  Social History     Social History Narrative    Not on file       Medications:  Available home medication information reviewed.  Alum & Mag Hydroxide-Simeth, Baclofen, Carboxymethylcellulose Sodium, Divalproex Sodium, NON FORMULARY, O2, acetaminophen, amLODIPine, aspirin, atorvastatin, baclofen, bisacodyl, cholecalciferol, cloNIDine, cyanocobalamin, docusate sodium, famotidine, furosemide, hydrALAZINE, hydrocortisone, insulin aspart, insulin lispro, ipratropium-albuterol, melatonin, metFORMIN ER, metoprolol tartrate, ondansetron, risperiDONE, sennosides-docusate, vitamin B-12, and vitamin C    Allergies   Allergen Reactions    Codeine Mental Status Change     hyper       Objective   Objective     Vital Signs:   Temp:  [98.4 °F (36.9 °C)] 98.4 °F (36.9 °C)  Heart Rate:  [62-64] 62  Resp:  [18] 18  BP: (141-155)/(74-94) 155/75  Total (NIH Stroke Scale): 7    Physical Exam   Appears chronically deconditioned, in bed, however no acute distress.  Mucous membranes moist  RRR during exam with subsequent irregular rhythm noted on monitor   Breath sounds slightly diminished bilaterally  Abdomen soft   diminished on left  Face and smile grossly symmetric  EOMI  Leg lift on the left slightly weaker than on the right  Awake, often disengaged while history obtained, however answers direct questions and knows city  Slightly flat affect      Result Review:  I have personally reviewed the results from the time of this admission to 2/1/2025 14:28 EST and agree with these findings:  [x]  Laboratory list / accordion  []  Microbiology  [x]  Radiology  [x]  EKG/Telemetry   []  Cardiology/Vascular   []  Pathology  [x]  Old records  []  Other:  Most notable findings include:       LAB  RESULTS:      Lab 02/01/25  1232 02/01/25  1231   WBC  --  7.72   HEMOGLOBIN  --  11.8*   HEMOGLOBIN, POC 11.9*  --    HEMATOCRIT  --  36.5*   HEMATOCRIT POC 35*  --    PLATELETS  --  172   NEUTROS ABS  --  4.93   IMMATURE GRANS (ABS)  --  0.04   LYMPHS ABS  --  1.22   MONOS ABS  --  0.83   EOS ABS  --  0.61*   MCV  --  101.7*   PROTIME  --  14.3   INR  --  1.10   APTT  --  29.1         Lab 02/01/25  1232 02/01/25  1231   SODIUM  --  137   POTASSIUM  --  4.7   CHLORIDE  --  97*   CO2  --  28.0   ANION GAP  --  12.0   BUN  --  23   CREATININE 1.60* 1.30*   EGFR 42.2* 54.2*   GLUCOSE  --  133*   CALCIUM  --  9.6         Lab 02/01/25  1231   ALT (SGPT) 17   AST (SGOT) 23         Lab 02/01/25  1231   HSTROP T 61*                     Microbiology Results (last 10 days)       ** No results found for the last 240 hours. **            CT Angiogram Head w AI Analysis of LVO    Result Date: 2/1/2025  CT CEREBRAL PERFUSION W WO CONTRAST, CT ANGIOGRAM NECK, CT ANGIOGRAM HEAD W AI ANALYSIS OF LVO Date of Exam: 2/1/2025 12:29 PM EST Indication: Neuro Deficit, acute, Stroke suspected Neuro deficit, acute stroke suspected.  Comparison: Concurrent noncontrast head CT, CTA head and neck 7/28/2021 Technique: Axial CT images of the brain were obtained prior to and after the administration of 115 mL Isovue-370. Core blood volume, core blood flow, mean transit time, and Tmax images were obtained utilizing the Rapid software protocol. A limited CT angiogram of the head was also performed to measure the blood vessel density. CTA of the head and neck was performed after the uneventful intravenous administration of above contrast. Reconstructed coronal and sagittal images were also obtained. In addition, a 3-D volume rendered image was created for interpretation. Automated exposure control and iterative reconstruction methods were used. The radiation dose reduction device was turned on for each scan per the ALARA (As Low as Reasonably  Achievable) protocol. Findings: CT cerebral perfusion: No core infarct or ischemic tissue at risk. There is a tiny perfusion defect associated with a chronic right PCA infarct. There is mild patchy hypoperfusion in the watershed areas, nonspecific. CTA head and neck: No abrupt cut off/large vessel occlusion. Chronic occlusion of the right PCA at the distal P2 segment. There is short segment stenosis of the proximal left cervical ICA on the basis of eccentric calcified noncalcified atherosclerotic plaque, with moderate to severe stenosis of approximately 80% involving a segment measuring 8 mm in length (series 7 image 206-231, series 904 image 13). This is similar compared to prior CTA. Heavy atherosclerosis at the left subclavian artery origin. There is atherosclerosis of the aortic arch. 2 mm posteriorly-directed infundibulum at the right carotid terminus (series 7 image 360-364). Emphysema in the upper lungs. The patient is edentulous. No acute or suspicious bony findings.     Impression: Impression: No core infarct or ischemic tissue at risk. No abrupt cut off/large vessel occlusion. Chronic occlusion of the right PCA at the distal P2 segment. Short segment moderate to severe stenosis of the proximal left cervical ICA on the basis of eccentric calcified and noncalcified atherosclerotic plaque, with stenosis of approximately 80% involving a segment measuring 8 mm in length. This appears similar  compared to prior CTA from 2021. 2 mm posteriorly-directed infundibulum at the right carotid terminus. There is evidence of emphysema. Correlate with patient history and risk factors and please assess if the patient meets criteria for routine low dose CT lung cancer screening. Electronically Signed: Vicente Castellanos MD  2/1/2025 1:06 PM EST  Workstation ID: [id number]    CT Angiogram Neck    Result Date: 2/1/2025  CT CEREBRAL PERFUSION W WO CONTRAST, CT ANGIOGRAM NECK, CT ANGIOGRAM HEAD W AI ANALYSIS OF LVO Date of Exam:  2/1/2025 12:29 PM EST Indication: Neuro Deficit, acute, Stroke suspected Neuro deficit, acute stroke suspected.  Comparison: Concurrent noncontrast head CT, CTA head and neck 7/28/2021 Technique: Axial CT images of the brain were obtained prior to and after the administration of 115 mL Isovue-370. Core blood volume, core blood flow, mean transit time, and Tmax images were obtained utilizing the Rapid software protocol. A limited CT angiogram of the head was also performed to measure the blood vessel density. CTA of the head and neck was performed after the uneventful intravenous administration of above contrast. Reconstructed coronal and sagittal images were also obtained. In addition, a 3-D volume rendered image was created for interpretation. Automated exposure control and iterative reconstruction methods were used. The radiation dose reduction device was turned on for each scan per the ALARA (As Low as Reasonably Achievable) protocol. Findings: CT cerebral perfusion: No core infarct or ischemic tissue at risk. There is a tiny perfusion defect associated with a chronic right PCA infarct. There is mild patchy hypoperfusion in the watershed areas, nonspecific. CTA head and neck: No abrupt cut off/large vessel occlusion. Chronic occlusion of the right PCA at the distal P2 segment. There is short segment stenosis of the proximal left cervical ICA on the basis of eccentric calcified noncalcified atherosclerotic plaque, with moderate to severe stenosis of approximately 80% involving a segment measuring 8 mm in length (series 7 image 206-231, series 904 image 13). This is similar compared to prior CTA. Heavy atherosclerosis at the left subclavian artery origin. There is atherosclerosis of the aortic arch. 2 mm posteriorly-directed infundibulum at the right carotid terminus (series 7 image 360-364). Emphysema in the upper lungs. The patient is edentulous. No acute or suspicious bony findings.     Impression: Impression:  No core infarct or ischemic tissue at risk. No abrupt cut off/large vessel occlusion. Chronic occlusion of the right PCA at the distal P2 segment. Short segment moderate to severe stenosis of the proximal left cervical ICA on the basis of eccentric calcified and noncalcified atherosclerotic plaque, with stenosis of approximately 80% involving a segment measuring 8 mm in length. This appears similar  compared to prior CTA from 2021. 2 mm posteriorly-directed infundibulum at the right carotid terminus. There is evidence of emphysema. Correlate with patient history and risk factors and please assess if the patient meets criteria for routine low dose CT lung cancer screening. Electronically Signed: Vicente Castellanos MD  2/1/2025 1:06 PM EST  Workstation ID: [id number]    CT CEREBRAL PERFUSION WITH & WITHOUT CONTRAST    Result Date: 2/1/2025  CT CEREBRAL PERFUSION W WO CONTRAST, CT ANGIOGRAM NECK, CT ANGIOGRAM HEAD W AI ANALYSIS OF LVO Date of Exam: 2/1/2025 12:29 PM EST Indication: Neuro Deficit, acute, Stroke suspected Neuro deficit, acute stroke suspected.  Comparison: Concurrent noncontrast head CT, CTA head and neck 7/28/2021 Technique: Axial CT images of the brain were obtained prior to and after the administration of 115 mL Isovue-370. Core blood volume, core blood flow, mean transit time, and Tmax images were obtained utilizing the Rapid software protocol. A limited CT angiogram of the head was also performed to measure the blood vessel density. CTA of the head and neck was performed after the uneventful intravenous administration of above contrast. Reconstructed coronal and sagittal images were also obtained. In addition, a 3-D volume rendered image was created for interpretation. Automated exposure control and iterative reconstruction methods were used. The radiation dose reduction device was turned on for each scan per the ALARA (As Low as Reasonably Achievable) protocol. Findings: CT cerebral perfusion: No core  infarct or ischemic tissue at risk. There is a tiny perfusion defect associated with a chronic right PCA infarct. There is mild patchy hypoperfusion in the watershed areas, nonspecific. CTA head and neck: No abrupt cut off/large vessel occlusion. Chronic occlusion of the right PCA at the distal P2 segment. There is short segment stenosis of the proximal left cervical ICA on the basis of eccentric calcified noncalcified atherosclerotic plaque, with moderate to severe stenosis of approximately 80% involving a segment measuring 8 mm in length (series 7 image 206-231, series 904 image 13). This is similar compared to prior CTA. Heavy atherosclerosis at the left subclavian artery origin. There is atherosclerosis of the aortic arch. 2 mm posteriorly-directed infundibulum at the right carotid terminus (series 7 image 360-364). Emphysema in the upper lungs. The patient is edentulous. No acute or suspicious bony findings.     Impression: Impression: No core infarct or ischemic tissue at risk. No abrupt cut off/large vessel occlusion. Chronic occlusion of the right PCA at the distal P2 segment. Short segment moderate to severe stenosis of the proximal left cervical ICA on the basis of eccentric calcified and noncalcified atherosclerotic plaque, with stenosis of approximately 80% involving a segment measuring 8 mm in length. This appears similar  compared to prior CTA from 2021. 2 mm posteriorly-directed infundibulum at the right carotid terminus. There is evidence of emphysema. Correlate with patient history and risk factors and please assess if the patient meets criteria for routine low dose CT lung cancer screening. Electronically Signed: Vicente Castellanos MD  2/1/2025 1:06 PM EST  Workstation ID: [id number]    XR Chest 1 View    Result Date: 2/1/2025  XR CHEST 1 VW Date of Exam: 2/1/2025 12:24 PM EST Indication: Acute Stroke Protocol (onset < 12 hrs) Comparison: Chest x-ray 2/27/2021 Findings: Cardiomegaly. Lungs normally  expanded. Radiopacities overlying the patient could be outside or within the patient zipper artifact upper chest. Linear artifact over the left heart. No pneumothorax or pleural effusion or focal pulmonary parenchymal opacity. Osteopenia. Degenerative changes of the acromioclavicular joints and glenohumeral joints.     Impression: Impression: No acute cardiopulmonary abnormality. Cardiomegaly. Probable overlying artifacts. Cannot exclude radiopaque foreign body overlying the left aspect of heart. Electronically Signed: Damaris Trotter MD  2/1/2025 1:01 PM EST  Workstation ID: [id number]    CT Pelvis Without Contrast    Result Date: 2/1/2025  CT PELVIS WO CONTRAST Date of Exam: 2/1/2025 12:31 PM EST Indication: fall 2 days ago, left hip pain. Comparison: CT pelvis 1/10/2021 Technique: Axial CT images were obtained of the pelvis without contrast administration.  Reconstructed coronal and sagittal images were also obtained. Automated exposure control and iterative construction methods were used. Findings: No obvious superficial contusion or intrapelvic hematoma. No acute fracture or traumatic malalignment. Chronic ossicle is noted at the superolateral aspect of the left hip likely reflecting degenerative labral ossification or os acetabuli. There are symmetric mild to moderate osteoarthritic changes of the hip joints. There is degenerative disc disease at L5-S1. The SI joints and pubic symphysis are congruent. There is sigmoid diverticulosis without diverticulitis. There is heavy atherosclerosis of the abdominal aorta and its branches. No acute or suspicious findings within the pelvis or partially imaged lower abdomen.     Impression: Impression: No acute fracture or traumatic malalignment. Evaluation of hip pain in the setting of recent trauma should begin with x-ray, not CT, per ACR appropriateness criteria. Electronically Signed: Vicente Castellanos MD  2/1/2025 12:45 PM EST  Workstation ID: [id number]    CT Head Without  Contrast Stroke Protocol    Result Date: 2/1/2025  CT HEAD WO CONTRAST STROKE PROTOCOL Date of Exam: 2/1/2025 12:22 PM EST Indication: Neuro deficit, acute, stroke suspected Neuro Deficit, acute, Stroke suspected. Comparison: Head CT 7/28/2021, brain MRI 7/29/2021 Technique: Axial CT images were obtained of the head without contrast administration.  Reconstructed coronal images were also obtained. Automated exposure control and iterative construction methods were used. Scan Time: 12:23 p.m. 2/1/2025 Results discussed with stroke navigator by Dr. Vicente Castellanos via telephone at 12:28 p.m. 2/1/2025. Findings: Redemonstration of chronic right PCA infarct. Redemonstration of chronic infarct in the left frontal lobe. There are extensive subcortical and periventricular white matter hypodensities suggestive of chronic small vessel ischemic change and/or chronic infarcts, overall appearing similar compared to prior head CT. No evidence of new/acute large territory infarct. There are small chronic infarcts in the left cerebellum similar to prior. No acute intracranial hemorrhage. No extra-axial collections. No midline shift or herniation. Normal size and configuration of the ventricles. There is intracranial atherosclerosis. Normal appearance of the orbits. There is a mucous retention cyst in the right maxillary sinus with otherwise clear paranasal sinuses. The mastoid air cells are clear. No acute or suspicious bony findings.     Impression: Impression: No acute intracranial findings. Similar chronic and senescent changes which includes large chronic right PCA territory infarct and multiple additional scattered chronic supratentorial and infratentorial infarcts and white matter changes suggestive of chronic small vessel ischemic change. Electronically Signed: Vicente Castellanos MD  2/1/2025 12:32 PM EST  Workstation ID: [id number]     Results for orders placed during the hospital encounter of 07/28/21    Adult Transthoracic Echo  Complete W/ Cont if Necessary Per Protocol (With Agitated Saline)    Interpretation Summary  · Left ventricular systolic function is normal. Estimated left ventricular EF = 60%.  · Left atrial volume is mildly increased.  · The cardiac valves are anatomically and functionally normal.  · Estimated right ventricular systolic pressure from tricuspid regurgitation is mildly elevated (35-45 mmHg).  · Saline test results are negative.      Assessment & Plan   Assessment & Plan       Weakness        Weakness  History of CVA with residual left hemiparesis  Left ICA stenosis  -Stroke neurology follows  -MRI brain pending  -Continue aspirin  -Check UA and flu/COVID swab for alternative etiology patient weakness  -PT/OT  -fall precautions  -delirium precautions    Renal insufficiency  - creatinine 1.3 to 1.6, baseline 1.0 to 1.3  - gentle IV fluids overnight  - bmp am    HTN  - permissive HTN    HLD  -statin    CAD  Atrial fibrillation  - asa  - not on AC due to history of SDH    Dementia    DMII  - check A1c  -SSI     Elevated troponin  - in setting of renal insufficiency  - no current chest pain  - will obtain repeat troponin      VTE Prophylaxis:  Mechanical VTE prophylaxis orders are present.          CODE STATUS:    Code Status and Medical Interventions: No CPR (Do Not Attempt to Resuscitate); Limited Support; No intubation (DNI)   Ordered at: 02/01/25 1428     Medical Intervention Limits:    No intubation (DNI)     Level Of Support Discussed With:    Next of Kin (If No Surrogate)    Health Care Surrogate     Code Status (Patient has no pulse and is not breathing):    No CPR (Do Not Attempt to Resuscitate)     Medical Interventions (Patient has pulse or is breathing):    Limited Support       Expected Discharge   Expected discharge date/ time has not been documented.     Dillon Ellis MD  02/01/25

## 2025-02-01 NOTE — ED NOTES
Dirk Miles    Nursing Report ED to Floor:  Mental status: alert and oriented to self only, hx vascular dementia  Ambulatory status: unsure, has not ambulated here  Oxygen Therapy:  room air  Cardiac Rhythm: a-fib, has hx of it  Admitted from: ED, comes from [facility]  Safety Concerns:  falls risk  Precautions: n/a  Social Issues: n/a  ED Room #:  31    ED Nurse Phone Extension - [phone] or may call [phone].      HPI:   Chief Complaint   Patient presents with    Speech Problem       Past Medical History:  Past Medical History:   Diagnosis Date    A-fib     Acute CVA (cerebrovascular accident) 12/1/2020    Arthritis     B12 deficiency     Coronary artery disease     COVID-19     Diabetes mellitus     DNR (do not resuscitate)     Dysphagia     Falls     GERD (gastroesophageal reflux disease)     Hemiplegia     Hyperlipidemia     Hypertension     Insomnia     Pressure sore on buttocks     Stenosis of left carotid artery 1/10/2021    Stroke     LEFT SIDE DEFICIT        Past Surgical History:  Past Surgical History:   Procedure Laterality Date    CATARACT EXTRACTION, BILATERAL      HERNIA REPAIR          Admitting Doctor:   Dillon Ellis MD    Consulting Provider(s):  Consults       Date and Time Order Name Status Description    2/1/2025 12:20 PM Inpatient Neurology Consult Stroke               Admitting Diagnosis:   The primary encounter diagnosis was Generalized weakness. Diagnoses of Stenosis of left carotid artery, History of CVA (cerebrovascular accident), and History of subdural hematoma were also pertinent to this visit.    Most Recent Vitals:   Vitals:    02/01/25 1330 02/01/25 1400 02/01/25 1430 02/01/25 1500   BP: 141/85 155/75 148/75 147/80   BP Location:       Patient Position:       Pulse: 62 62 70 76   Resp:       Temp:       TempSrc:       SpO2: 94% 94% 94% 96%   Weight:       Height:           Active LDAs/IV Access:   Lines, Drains & Airways       Active LDAs       Name Placement date  Placement time Site Days    Peripheral IV 02/01/25 1223 Posterior;Right Hand 02/01/25  1223  Hand  less than 1    Peripheral IV 02/01/25 1230 Right Antecubital 02/01/25  1230  Antecubital  less than 1                    Labs (abnormal labs have a star):   Labs Reviewed   CBC WITH AUTO DIFFERENTIAL - Abnormal; Notable for the following components:       Result Value    RBC 3.59 (*)     Hemoglobin 11.8 (*)     Hematocrit 36.5 (*)     .7 (*)     Lymphocyte % 15.8 (*)     Eosinophil % 7.9 (*)     Eosinophils, Absolute 0.61 (*)     All other components within normal limits   URINALYSIS W/ MICROSCOPIC IF INDICATED (NO CULTURE) - Abnormal; Notable for the following components:    Specific Gravity, UA 1.039 (*)     All other components within normal limits    Narrative:     Urine microscopic not indicated.   BASIC METABOLIC PANEL - Abnormal; Notable for the following components:    Glucose 133 (*)     Creatinine 1.30 (*)     Chloride 97 (*)     eGFR 54.2 (*)     All other components within normal limits    Narrative:     GFR Categories in Chronic Kidney Disease (CKD)      GFR Category          GFR (mL/min/1.73)    Interpretation  G1                     90 or greater         Normal or high (1)  G2                      60-89                Mild decrease (1)  G3a                   45-59                Mild to moderate decrease  G3b                   30-44                Moderate to severe decrease  G4                    15-29                Severe decrease  G5                    14 or less           Kidney failure          (1)In the absence of evidence of kidney disease, neither GFR category G1 or G2 fulfill the criteria for CKD.    eGFR calculation 2021 CKD-EPI creatinine equation, which does not include race as a factor   TROPONIN - Abnormal; Notable for the following components:    HS Troponin T 61 (*)     All other components within normal limits    Narrative:     High Sensitive Troponin T Reference Range:  <14.0  ng/L- Negative Female for AMI  <22.0 ng/L- Negative Male for AMI  >=14 - Abnormal Female indicating possible myocardial injury.  >=22 - Abnormal Male indicating possible myocardial injury.   Clinicians would have to utilize clinical acumen, EKG, Troponin, and serial changes to determine if it is an Acute Myocardial Infarction or myocardial injury due to an underlying chronic condition.        POCT CHEM 8 - Abnormal; Notable for the following components:    Creatinine 1.60 (*)     Hemoglobin 11.9 (*)     Hematocrit 35 (*)     eGFR 42.2 (*)     All other components within normal limits   COVID-19 AND FLU A/B, NP SWAB IN TRANSPORT MEDIA 1 HR TAT - Normal    Narrative:     Fact sheet for providers: https://www.fda.gov/media/426152/download    Fact sheet for patients: https://www.fda.gov/media/937511/download    Test performed by PCR.   PROTIME-INR - Normal   APTT - Normal    Narrative:     PTT = The equivalent PTT values for the therapeutic range of heparin levels at 0.3 to 0.5 U/ml are 60 to 70 seconds.   AST - Normal   ALT - Normal   COVID PRE-OP / PRE-PROCEDURE SCREENING ORDER (NO ISOLATION)    Narrative:     The following orders were created for panel order COVID PRE-OP / PRE-PROCEDURE SCREENING ORDER (NO ISOLATION) - Swab, Nasopharynx.  Procedure                               Abnormality         Status                     ---------                               -----------         ------                     COVID-19 and FLU A/B PCR...[[id number]]  Normal              Final result                 Please view results for these tests on the individual orders.   RAINBOW DRAW    Narrative:     The following orders were created for panel order Walker Draw.  Procedure                               Abnormality         Status                     ---------                               -----------         ------                     Green Top (Gel)[[id number]]                                  Final result                Lavender Top[[id number]]                                     Final result               Gold Top - SST[[id number]]                                   Final result               Marinelli Top[[id number]]                                         Final result               Light Blue Top[[id number]]                                   Final result                 Please view results for these tests on the individual orders.   HIGH SENSITIVITIY TROPONIN T 1HR   URINALYSIS W/ CULTURE IF INDICATED   POCT CHEM 8   POCT GLUCOSE FINGERSTICK   POCT GLUCOSE FINGERSTICK   POCT GLUCOSE FINGERSTICK   POCT GLUCOSE FINGERSTICK   CBC AND DIFFERENTIAL    Narrative:     The following orders were created for panel order CBC & Differential.  Procedure                               Abnormality         Status                     ---------                               -----------         ------                     CBC Auto Differential[[id number]]        Abnormal            Final result                 Please view results for these tests on the individual orders.   GREEN TOP   LAVENDER TOP   GOLD TOP - SST   GRAY TOP   LIGHT BLUE TOP       Meds Given in ED:   Medications   sodium chloride 0.9 % flush 10 mL (has no administration in time range)   clopidogrel (PLAVIX) tablet 300 mg (300 mg Oral Not Given 2/1/25 1432)   aspirin EC tablet 325 mg (325 mg Oral Not Given 2/1/25 1433)   sodium chloride 0.9 % flush 10 mL (has no administration in time range)   sodium chloride 0.9 % flush 10 mL (has no administration in time range)   sodium chloride 0.9 % infusion 40 mL (has no administration in time range)   acetaminophen (TYLENOL) tablet 650 mg (has no administration in time range)     Or   acetaminophen (TYLENOL) 160 MG/5ML oral solution 650 mg (has no administration in time range)     Or   acetaminophen (TYLENOL) suppository 650 mg (has no administration in time range)   sennosides-docusate (PERICOLACE) 8.6-50 MG per tablet 2 tablet (has no administration  in time range)     And   polyethylene glycol (MIRALAX) packet 17 g (has no administration in time range)     And   bisacodyl (DULCOLAX) EC tablet 5 mg (has no administration in time range)     And   bisacodyl (DULCOLAX) suppository 10 mg (has no administration in time range)   sodium chloride 0.9 % infusion (has no administration in time range)   dextrose (GLUTOSE) oral gel 15 g (has no administration in time range)   dextrose (D50W) (25 g/50 mL) IV injection 25 g (has no administration in time range)   glucagon (GLUCAGEN) injection 1 mg (has no administration in time range)   Insulin Lispro (humaLOG) injection 2-7 Units (has no administration in time range)   iopamidol (ISOVUE-370) 76 % injection 150 mL (115 mL Intravenous Given 2/1/25 1240)     sodium chloride, 75 mL/hr         Last NIH score:  Interval: baseline  1a. Level of Consciousness: 0-->Alert, keenly responsive  1b. LOC Questions: 1-->Answers one question correctly  1c. LOC Commands: 0-->Performs both tasks correctly  2. Best Gaze: 1-->Partial gaze palsy, gaze is abnormal in one or both eyes, but forced deviation or total gaze paresis is not present  3. Visual: 1-->Partial hemianopia  4. Facial Palsy: 0-->Normal symmetrical movements  5a. Motor Arm, Left: 1-->Drift, limb holds 90 (or 45) degrees, but drifts down before full 10 seconds, does not hit bed or other support  5b. Motor Arm, Right: 0-->No drift, limb holds 90 (or 45) degrees for full 10 secs  6a. Motor Leg, Left: 1-->Drift, leg falls by the end of the 5-sec period but does not hit bed  6b. Motor Leg, Right: 0-->No drift, leg holds 30 degree position for full 5 secs  7. Limb Ataxia: 1-->Present in one limb  8. Sensory: 1-->Mild-to-moderate sensory loss, patient feels pinprick is less sharp or is dull on the affected side, or there is a loss of superficial pain with pinprick, but patient is aware of being touched (prior stroke deficit)  9. Best Language: 0-->No aphasia, normal  10. Dysarthria:  0-->Normal  11. Extinction and Inattention (formerly Neglect): 0-->No abnormality    Total (NIH Stroke Scale): 7     Dysphagia screening results:  Patient Factors Component (Dysphagia:Stroke or Rule-out)  Best Eye Response: 4-->(E4) spontaneous (02/01/25 1328)  Best Motor Response: 6-->(M6) obeys commands (02/01/25 1328)  Best Verbal Response: 4-->(V4) confused (02/01/25 1328)  Beverly Coma Scale Score: 14 (02/01/25 1328)  Is there Facial Asymmetry/Weakness?: No (02/01/25 1328)  Is there Tongue Asymmetry/Weakness?: No (02/01/25 1328)  Is there Palatal Asymmetry/Weakness?: No (02/01/25 1328)  Patient Assessment Result: Pass - Proceed to Water Test (02/01/25 1328)     Alli Coma Scale:  No data recorded     CIWA:        Restraint Type:            Isolation Status:  No active isolations

## 2025-02-01 NOTE — ED PROVIDER NOTES
Subjective   History of Present Illness  Mr. Miles is brought by EMS from his facility with slurred speech.  They were told that he had had sudden onset of slurred speech and left upper extremity weakness today.  They were told the left upper extremity weakness is new.  Family reported that he fell 2 days ago.  Has had hip pain since then.  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  He is wheelchair-bound.       Family member arrives later and tells me that she noticed that he was slumping over and had difficulty maintaining upright posture yesterday.  She tells me he was mentally sharp and talkative however.  She tells me at this point his speech seems back to baseline.      Review of Systems    Past Medical History:   Diagnosis Date    A-fib     Acute CVA (cerebrovascular accident) 12/1/2020    Arthritis     B12 deficiency     Coronary artery disease     COVID-19     Diabetes mellitus     DNR (do not resuscitate)     Dysphagia     Falls     GERD (gastroesophageal reflux disease)     Hemiplegia     Hyperlipidemia     Hypertension     Insomnia     Pressure sore on buttocks     Stenosis of left carotid artery 1/10/2021    Stroke     LEFT SIDE DEFICIT       Allergies   Allergen Reactions    Codeine Mental Status Change     hyper       Past Surgical History:   Procedure Laterality Date    CATARACT EXTRACTION, BILATERAL      HERNIA REPAIR         Family History   Problem Relation Age of Onset    Cerebral aneurysm Mother     Cancer Father     Aneurysm Sister     Colon cancer Sister     Atrial fibrillation Brother     Heart attack Brother     Cancer Brother        Social History     Socioeconomic History    Marital status:    Tobacco Use    Smoking status: Former     Current packs/day: 1.00     Types: Cigarettes    Smokeless tobacco: Never    Tobacco comments:     QUIT 25YRS AGO    Vaping Use    Vaping status: Never Used   Substance and Sexual Activity    Alcohol use: Not Currently      Alcohol/week: 1.0 standard drink of alcohol     Types: 1 Cans of beer per week     Comment: 1-2 DRINKS A MONTH    Drug use: Never    Sexual activity: Defer           Objective   Physical Exam  Vitals and nursing note reviewed.   Constitutional:       General: He is not in acute distress.     Appearance: Normal appearance.   HENT:      Head: Normocephalic and atraumatic.      Nose: Nose normal. No congestion or rhinorrhea.   Eyes:      General: No scleral icterus.     Conjunctiva/sclera: Conjunctivae normal.   Neck:      Comments: No JVD   Cardiovascular:      Rate and Rhythm: Normal rate and regular rhythm.      Heart sounds: No murmur heard.     No friction rub.   Pulmonary:      Effort: Pulmonary effort is normal.      Breath sounds: Normal breath sounds. No wheezing or rales.   Abdominal:      General: Bowel sounds are normal.      Palpations: Abdomen is soft.      Tenderness: There is no abdominal tenderness. There is no guarding or rebound.   Musculoskeletal:         General: No tenderness.      Cervical back: Normal range of motion and neck supple.      Right lower leg: No edema.      Left lower leg: No edema.   Skin:     General: Skin is warm and dry.      Coloration: Skin is not pale.      Findings: No erythema.   Neurological:      Mental Status: He is alert.      Motor: Weakness present.      Coordination: Coordination abnormal.      Comments: He is awake and alert.  Speech is fluent and content appropriate.  Has weakness and possibly a little bit of contracture of the left upper extremity.  Symmetric in the lower extremities   Psychiatric:         Mood and Affect: Mood normal.         Behavior: Behavior normal.         Thought Content: Thought content normal.         Procedures           ED Course  ED Course as of 02/01/25 1404   Sat Feb 01, 2025   1241 Reviewed CT scan at bedside.  Shows large area of encephalomalacia in the right parietal and posterior parietal area.  Speech is now fluent.  Have  conferred with stroke navigator.  Thrombolysis not indicated as symptoms have improved. [DT]   1308 EKG shows atrial fibrillation with a rate of 74.  Review of his records indicates that he has permanent atrial fibrillation.  Not anticoagulated due to history of subdural hematoma. [DT]   1309 Saw him emergently on arrival in the CT scan as the result of a code stroke page.  Reviewed his CAT scan at bedside.  Interviewed paramedics.  Reviewed prior records. [DT]   1348 Family member is at bedside now.  She tells me she noticed yesterday that he was slumped over and had a hard time sitting upright.  She tells me his speech seems at baseline currently, seems a little sleepier than usual.  Have conferred with stroke team.  They have concern about left carotid stenosis.  They asked that he be admitted and started on aspirin and Plavix which they have ordered. [DT]      ED Course User Index  [DT] Raul Anguiano MD                                                       Medical Decision Making  Problems Addressed:  Generalized weakness: complicated acute illness or injury that poses a threat to life or bodily functions  History of CVA (cerebrovascular accident): chronic illness or injury  History of subdural hematoma: chronic illness or injury  Stenosis of left carotid artery: complicated acute illness or injury    Amount and/or Complexity of Data Reviewed  Independent Historian: caregiver and EMS  External Data Reviewed: radiology and notes.  Labs: ordered. Decision-making details documented in ED Course.  Radiology: ordered. Decision-making details documented in ED Course.  ECG/medicine tests: ordered. Decision-making details documented in ED Course.    Risk  Prescription drug management.  Decision regarding hospitalization.        Final diagnoses:   Generalized weakness   Stenosis of left carotid artery   History of CVA (cerebrovascular accident)   History of subdural hematoma       ED Disposition  ED Disposition        ED Disposition   Decision to Admit    Condition   --    Comment   Level of Care: Telemetry [5]   Diagnosis: Weakness [436877]                 No follow-up provider specified.       Medication List      No changes were made to your prescriptions during this visit.            Raul Anguiano MD  02/01/25 [id number]

## 2025-02-01 NOTE — LETTER
EMS Transport Request  For use at [facility], [place], [place], [place], and [place] only   Patient Name: Dirk Miles : 1940   Weight:82.1 kg (181 lb) Pick-up Location: [facility] BLS/ALS: BLS/ALS: BLS   Insurance: MEDICARE Auth End Date:    Pre-Cert #: D/C Summary complete:    Destination: Other [facility]   Contact Precautions: None   Equipment (O2, Fluids, etc.): None   Arrive By Date/Time: This afternoon Stretcher/WC: Stretcher   CM Requesting: Tessie Marinelli, MSW Ext: [phone]   Notes/Medical Necessity: Dementia     ______________________________________________________________________    *Only 2 patient bags OR 1 carry-on size bag are permitted.  Wheelchairs and walkers CANNOT transported with the patient. Acknowledge: Yes

## 2025-02-02 ENCOUNTER — APPOINTMENT (OUTPATIENT)
Dept: CARDIOLOGY | Facility: HOSPITAL | Age: 85
End: 2025-02-02
Payer: MEDICARE

## 2025-02-02 ENCOUNTER — APPOINTMENT (OUTPATIENT)
Dept: MRI IMAGING | Facility: HOSPITAL | Age: 85
End: 2025-02-02
Payer: MEDICARE

## 2025-02-02 LAB
ANION GAP SERPL CALCULATED.3IONS-SCNC: 15 MMOL/L (ref 5–15)
BACTERIA SPEC AEROBE CULT: NORMAL
BUN SERPL-MCNC: 16 MG/DL (ref 8–23)
BUN/CREAT SERPL: 14.8 (ref 7–25)
CALCIUM SPEC-SCNC: 9.4 MG/DL (ref 8.6–10.5)
CHLORIDE SERPL-SCNC: 100 MMOL/L (ref 98–107)
CHOLEST SERPL-MCNC: 137 MG/DL (ref 0–200)
CO2 SERPL-SCNC: 23 MMOL/L (ref 22–29)
CREAT SERPL-MCNC: 1.08 MG/DL (ref 0.76–1.27)
DEPRECATED RDW RBC AUTO: 52.4 FL (ref 37–54)
EGFRCR SERPLBLD CKD-EPI 2021: 67.7 ML/MIN/1.73
ERYTHROCYTE [DISTWIDTH] IN BLOOD BY AUTOMATED COUNT: 13.8 % (ref 12.3–15.4)
GLUCOSE BLDC GLUCOMTR-MCNC: 156 MG/DL (ref 70–130)
GLUCOSE BLDC GLUCOMTR-MCNC: 177 MG/DL (ref 70–130)
GLUCOSE BLDC GLUCOMTR-MCNC: 188 MG/DL (ref 70–130)
GLUCOSE SERPL-MCNC: 170 MG/DL (ref 65–99)
HBA1C MFR BLD: 7 % (ref 4.8–5.6)
HCT VFR BLD AUTO: 38.2 % (ref 37.5–51)
HDLC SERPL-MCNC: 29 MG/DL (ref 40–60)
HGB BLD-MCNC: 12.1 G/DL (ref 13–17.7)
LDLC SERPL CALC-MCNC: 62 MG/DL (ref 0–100)
LDLC/HDLC SERPL: 1.73 {RATIO}
MCH RBC QN AUTO: 32.8 PG (ref 26.6–33)
MCHC RBC AUTO-ENTMCNC: 31.7 G/DL (ref 31.5–35.7)
MCV RBC AUTO: 103.5 FL (ref 79–97)
PLATELET # BLD AUTO: 151 10*3/MM3 (ref 140–450)
PMV BLD AUTO: 10.1 FL (ref 6–12)
POTASSIUM SERPL-SCNC: 4.9 MMOL/L (ref 3.5–5.2)
RBC # BLD AUTO: 3.69 10*6/MM3 (ref 4.14–5.8)
SODIUM SERPL-SCNC: 138 MMOL/L (ref 136–145)
TRIGL SERPL-MCNC: 289 MG/DL (ref 0–150)
VLDLC SERPL-MCNC: 46 MG/DL (ref 5–40)
WBC NRBC COR # BLD AUTO: 8.01 10*3/MM3 (ref 3.4–10.8)

## 2025-02-02 PROCEDURE — 97166 OT EVAL MOD COMPLEX 45 MIN: CPT

## 2025-02-02 PROCEDURE — 99214 OFFICE O/P EST MOD 30 MIN: CPT | Performed by: STUDENT IN AN ORGANIZED HEALTH CARE EDUCATION/TRAINING PROGRAM

## 2025-02-02 PROCEDURE — G0378 HOSPITAL OBSERVATION PER HR: HCPCS

## 2025-02-02 PROCEDURE — 92523 SPEECH SOUND LANG COMPREHEN: CPT

## 2025-02-02 PROCEDURE — 82948 REAGENT STRIP/BLOOD GLUCOSE: CPT

## 2025-02-02 PROCEDURE — 97162 PT EVAL MOD COMPLEX 30 MIN: CPT

## 2025-02-02 PROCEDURE — 70551 MRI BRAIN STEM W/O DYE: CPT

## 2025-02-02 PROCEDURE — 80048 BASIC METABOLIC PNL TOTAL CA: CPT | Performed by: INTERNAL MEDICINE

## 2025-02-02 PROCEDURE — 80061 LIPID PANEL: CPT

## 2025-02-02 PROCEDURE — 63710000001 INSULIN LISPRO (HUMAN) PER 5 UNITS: Performed by: INTERNAL MEDICINE

## 2025-02-02 PROCEDURE — 83036 HEMOGLOBIN GLYCOSYLATED A1C: CPT

## 2025-02-02 PROCEDURE — 85027 COMPLETE CBC AUTOMATED: CPT | Performed by: INTERNAL MEDICINE

## 2025-02-02 RX ORDER — AMLODIPINE BESYLATE 5 MG/1
5 TABLET ORAL DAILY
Status: DISCONTINUED | OUTPATIENT
Start: 2025-02-02 | End: 2025-02-02

## 2025-02-02 RX ORDER — AMLODIPINE BESYLATE 2.5 MG/1
2.5 TABLET ORAL DAILY
Status: DISCONTINUED | OUTPATIENT
Start: 2025-02-02 | End: 2025-02-03 | Stop reason: HOSPADM

## 2025-02-02 RX ORDER — METOPROLOL TARTRATE 25 MG/1
25 TABLET, FILM COATED ORAL 2 TIMES DAILY
Status: DISCONTINUED | OUTPATIENT
Start: 2025-02-02 | End: 2025-02-02

## 2025-02-02 RX ADMIN — BACLOFEN 5 MG: 10 TABLET ORAL at 10:21

## 2025-02-02 RX ADMIN — Medication 12.5 MG: at 22:02

## 2025-02-02 RX ADMIN — INSULIN LISPRO 2 UNITS: 100 INJECTION, SOLUTION INTRAVENOUS; SUBCUTANEOUS at 22:14

## 2025-02-02 RX ADMIN — INSULIN LISPRO 2 UNITS: 100 INJECTION, SOLUTION INTRAVENOUS; SUBCUTANEOUS at 12:39

## 2025-02-02 RX ADMIN — ATORVASTATIN CALCIUM 40 MG: 40 TABLET, FILM COATED ORAL at 22:03

## 2025-02-02 RX ADMIN — TRAZODONE HYDROCHLORIDE 50 MG: 50 TABLET ORAL at 22:03

## 2025-02-02 RX ADMIN — CYANOCOBALAMIN TAB 1000 MCG 1000 MCG: 1000 TAB at 10:20

## 2025-02-02 RX ADMIN — DIVALPROEX SODIUM 125 MG: 125 CAPSULE ORAL at 22:02

## 2025-02-02 RX ADMIN — ASPIRIN 81 MG CHEWABLE TABLET 81 MG: 81 TABLET CHEWABLE at 10:21

## 2025-02-02 RX ADMIN — SERTRALINE HYDROCHLORIDE 25 MG: 25 TABLET ORAL at 10:20

## 2025-02-02 RX ADMIN — AMLODIPINE BESYLATE 2.5 MG: 2.5 TABLET ORAL at 16:46

## 2025-02-02 RX ADMIN — BACLOFEN 5 MG: 10 TABLET ORAL at 22:03

## 2025-02-02 RX ADMIN — Medication 10 ML: at 22:09

## 2025-02-02 RX ADMIN — POLYVINYL ALCOHOL, POVIDONE 1 DROP: 14; 6 SOLUTION/ DROPS OPHTHALMIC at 22:02

## 2025-02-02 RX ADMIN — DIVALPROEX SODIUM 125 MG: 125 CAPSULE ORAL at 10:20

## 2025-02-02 NOTE — THERAPY EVALUATION
Patient Name: Dirk Miles  : 1940    MRN: [id number]                              Today's Date: 2025       Admit Date: 2025    Visit Dx:     ICD-10-CM ICD-9-CM   1. Generalized weakness  R53.1 780.79   2. Stenosis of left carotid artery  I65.22 433.10   3. History of CVA (cerebrovascular accident)  Z86.73 V12.54   4. History of subdural hematoma  Z86.79 V12.59   5. Cognitive communication deficit  R41.841 799.52     Patient Active Problem List   Diagnosis    A-fib    Hypertension    Diabetes mellitus    Dementia with behavioral disturbance    Hyperlipemia    Stenosis of left carotid artery    Anemia, chronic disease    COVID-19 virus detected    History of CVA (cerebrovascular accident)    CAD (coronary artery disease)    Weakness of right foot    Weakness     Past Medical History:   Diagnosis Date    A-fib     Acute CVA (cerebrovascular accident) 2020    Arthritis     B12 deficiency     Coronary artery disease     COVID-19     Diabetes mellitus     DNR (do not resuscitate)     Dysphagia     Falls     GERD (gastroesophageal reflux disease)     Hemiplegia     Hyperlipidemia     Hypertension     Insomnia     Pressure sore on buttocks     Stenosis of left carotid artery 1/10/2021    Stroke     LEFT SIDE DEFICIT     Past Surgical History:   Procedure Laterality Date    CATARACT EXTRACTION, BILATERAL      HERNIA REPAIR        General Information       Row Name 25 1524          OT Time and Intention    Document Type evaluation  -AJ     Mode of Treatment occupational therapy  -AJ     Patient Effort excellent  -AJ       Row Name 25 1524          General Information    Patient Profile Reviewed yes  -AJ     Prior Level of Function max assist:;all household mobility;transfer;w/c or scooter;bed mobility;ADL's  Pt lives at the [facility], transfers to w/c at baseline and requires help with all ADL's apart from bathing.  -AJ     Existing Precautions/Restrictions fall;other (see comments)  -AJ      Barriers to Rehab medically complex;previous functional deficit;cognitive status  -       Row Name 02/02/25 1524          Living Environment    People in Home facility resident  -       Row Name 02/02/25 1524          Cognition    Orientation Status (Cognition) oriented to;person;time;verbal cues/prompts needed for orientation;place  -       Row Name 02/02/25 1524          Safety Issues/Impairments Affecting Functional Mobility    Safety Issues Affecting Function (Mobility) insight into deficits/self-awareness;problem-solving;judgment;safety precaution awareness;sequencing abilities;safety precautions follow-through/compliance  -     Impairments Affecting Function (Mobility) balance;cognition;endurance/activity tolerance;coordination;motor control;motor planning;postural/trunk control;strength;visual/perceptual  -     Cognitive Impairments, Mobility Safety/Performance awareness, need for assistance;safety precaution awareness;safety precaution follow-through;insight into deficits/self-awareness;sequencing abilities;judgment;problem-solving/reasoning  -               User Key  (r) = Recorded By, (t) = Taken By, (c) = Cosigned By      Initials Name Provider Type    AJ Lydia Alicia OT Occupational Therapist                     Mobility/ADL's       Row Name 02/02/25 1527          Bed Mobility    Bed Mobility supine-sit  -AJ     Supine-Sit Dawson (Bed Mobility) moderate assist (50% patient effort);2 person assist  -AJ     Bed Mobility, Safety Issues impaired trunk control for bed mobility;decreased use of arms for pushing/pulling;decreased use of legs for bridging/pushing  -     Assistive Device (Bed Mobility) head of bed elevated;bed rails  -     Comment, (Bed Mobility) Cues for sequencing, physical assist to bring trunk to sitting position.  -       Row Name 02/02/25 1527          Transfers    Transfers bed-chair transfer;sit-stand transfer  -       Row Name 02/02/25 1527           Bed-Chair Transfer    Bed-Chair Martin (Transfers) maximum assist (25% patient effort);2 person assist  -     Assistive Device (Bed-Chair Transfers) other (see comments)  BUE support  -     Comment, (Bed-Chair Transfer) Cues for sequencing. Pt demo'd difficulty weightshift to take side steps to chair.  -       Row Name 02/02/25 1527          Sit-Stand Transfer    Sit-Stand Martin (Transfers) maximum assist (25% patient effort);2 person assist  -AJ     Assistive Device (Sit-Stand Transfers) other (see comments)  BUE support  -     Comment, (Sit-Stand Transfer) x2 attempts to stand from EOB. Pt required cues and B knee/foot blocking to successfull stand.  -       Row Name 02/02/25 1527          Activities of Daily Living    BADL Assessment/Intervention lower body dressing;grooming  -[facility] Name 02/02/25 1527          Lower Body Dressing Assessment/Training    Martin Level (Lower Body Dressing) don;socks;dependent (less than 25% patient effort)  -     Position (Lower Body Dressing) supine  -[facility] Name 02/02/25 1527          Grooming Assessment/Training    Martin Level (Grooming) wash face, hands;set up;verbal cues  -     Position (Grooming) supported sitting  -     Comment, (Grooming) Cues for task completion.  -               User Key  (r) = Recorded By, (t) = Taken By, (c) = Cosigned By      Initials Name Provider Type    Lydia Villasenor OT Occupational Therapist                   Obj/Interventions       Row Name 02/02/25 1531          Sensory Assessment (Somatosensory)    Sensory Assessment (Somatosensory) right-sided sensation intact;left UE  -     Left UE Sensory Assessment general sensation;light touch awareness;light touch localization;impaired  -       Row Name 02/02/25 1531          Vision Assessment/Intervention    Visual Processing Deficit ben-inattention/neglect, right  -       Row Name 02/02/25 1531          Range of Motion Comprehensive     General Range of Motion upper extremity range of motion deficits identified  -     Comment, General Range of Motion RUE WFL, LUE limited shoulder PROM to ~40 degrees of flexion.  -       Row Name 02/02/25 1531          Strength Comprehensive (MMT)    General Manual Muscle Testing (MMT) Assessment upper extremity strength deficits identified  -     Comment, General Manual Muscle Testing (MMT) Assessment RUE grossly +4/5, LUE grossly 2/5  -       Row Name 02/02/25 1531          Balance    Balance Assessment sitting static balance;sitting dynamic balance;standing static balance;sit to stand dynamic balance;standing dynamic balance  -     Static Sitting Balance moderate assist;1-person assist;verbal cues;non-verbal cues (demo/gesture)  -     Dynamic Sitting Balance moderate assist;1-person assist  -AJ     Position, Sitting Balance unsupported;sitting edge of bed  -     Sit to Stand Dynamic Balance maximum assist;2-person assist  -AJ     Static Standing Balance maximum assist;2-person assist  -AJ     Dynamic Standing Balance maximum assist;2-person assist;verbal cues;non-verbal cues (demo/gesture)  -     Position/Device Used, Standing Balance supported  -     Balance Interventions sitting;standing;sit to stand;supported;static;dynamic;occupation based/functional task  -               User Key  (r) = Recorded By, (t) = Taken By, (c) = Cosigned By      Initials Name Provider Type    Lydia Villasenor OT Occupational Therapist                   Goals/Plan       Row Name 02/02/25 1541          Bed Mobility Goal 1 (OT)    Activity/Assistive Device (Bed Mobility Goal 1, OT) sit to supine;supine to sit  -     Re[place]olds Level/Cues Needed (Bed Mobility Goal 1, OT) supervision required  -     Time Frame (Bed Mobility Goal 1, OT) long term goal (LTG);10 days  -     Progress/Outcomes (Bed Mobility Goal 1, OT) new goal  -       Row Name 02/02/25 1541          Transfer Goal 1 (OT)    Activity/Assistive  Device (Transfer Goal 1, OT) sit-to-stand/stand-to-sit;bed-to-chair/chair-to-bed;toilet  -     [place] Level/Cues Needed (Transfer Goal 1, OT) moderate assist (50-74% patient effort)  -     Time Frame (Transfer Goal 1, OT) long term goal (LTG);10 days  -     Progress/Outcome (Transfer Goal 1, OT) new goal  -       Row Name 02/02/25 1541          Therapy Assessment/Plan (OT)    Planned Therapy Interventions (OT) activity tolerance training;adaptive equipment training;BADL retraining;functional balance retraining;IADL retraining;occupation/activity based interventions;patient/caregiver education/training;transfer/mobility retraining;strengthening exercise;passive ROM/stretching;ROM/therapeutic exercise  -               User Key  (r) = Recorded By, (t) = Taken By, (c) = Cosigned By      Initials Name Provider Type    Lydia Villasenor, COSMO Occupational Therapist                   Clinical Impression       Row Name 02/02/25 1533          Pain Assessment    Pretreatment Pain Rating 0/10 - no pain  -     Posttreatment Pain Rating 0/10 - no pain  -       Row Name 02/02/25 1533          Plan of Care Review    Plan of Care Reviewed With patient;spouse  -     Progress no change  -     Outcome Evaluation OT eval complete. Pt presents near fxl baseline with ADLs, however is below fxl baseline with fxl transfers, limited by L side weakness, poor activity tolerance and safety awareness, and impaired balance. Pt would benefit from ongoing skilled OT services to progress to PLOF. Rec d/c to SNF.  -       Row Name 02/02/25 1534          Therapy Assessment/Plan (OT)    Patient/Family Therapy Goal Statement (OT) Return to PLOF  -     Rehab Potential (OT) good  -     Criteria for Skilled Therapeutic Interventions Met (OT) yes;meets criteria;skilled treatment is necessary  -     Therapy Frequency (OT) daily  -AJ     Predicted Duration of Therapy Intervention (OT) 10 days  -       Row Name 02/02/25 2753           Therapy Plan Review/Discharge Plan (OT)    Anticipated Discharge Disposition (OT) skilled nursing facility  -       Row Name 02/02/25 1537          Vital Signs    Pre Systolic BP Rehab 155  -AJ     Pre Treatment Diastolic BP 82  -AJ     Post Systolic BP Rehab 154  -AJ     Post Treatment Diastolic   -AJ     Pretreatment Heart Rate (beats/min) 79  -AJ     Pre SpO2 (%) 95  -AJ     O2 Delivery Pre Treatment room air  -AJ     O2 Delivery Intra Treatment room air  -AJ     O2 Delivery Post Treatment room air  -AJ     Pre Patient Position Supine  -AJ     Intra Patient Position Standing  -AJ     Post Patient Position Sitting  -AJ       Row Name 02/02/25 1537          Positioning and Restraints    Pre-Treatment Position in bed  -AJ     Post Treatment Position chair  -AJ     In Chair notified nsg;reclined;sitting;call light within reach;encouraged to call for assist;exit alarm on;with family/caregiver;legs elevated;waffle cushion;on mechanical lift sling  -AJ               User Key  (r) = Recorded By, (t) = Taken By, (c) = Cosigned By      Initials Name Provider Type    Lydia Villasenor, COSMO Occupational Therapist                   Outcome Measures       Row Name 02/02/25 1542          How much help from another is currently needed...    Putting on and taking off regular lower body clothing? 1  -AJ     Bathing (including washing, rinsing, and drying) 1  -AJ     Toileting (which includes using toilet bed pan or urinal) 1  -AJ     Putting on and taking off regular upper body clothing 2  -AJ     Taking care of personal grooming (such as brushing teeth) 2  -AJ     Eating meals 3  -AJ     AM-PAC 6 Clicks Score (OT) 10  -AJ       Row Name 02/02/25 1521 02/02/25 0800       How much help from another person do you currently need...    Turning from your back to your side while in flat bed without using bedrails? 2  -KR 3  -SB    Moving from lying on back to sitting on the side of a flat bed without bedrails? 2  -KR 2   -SB    Moving to and from a bed to a chair (including a wheelchair)? 2  -KR 2  -SB    Standing up from a chair using your arms (e.g., wheelchair, bedside chair)? 2  -KR 2  -SB    Climbing 3-5 steps with a railing? 1  -KR 1  -SB    To walk in hospital room? 1  -KR 1  -SB    AM-PAC 6 Clicks Score (PT) 10  -KR 11  -SB    Highest Level of Mobility Goal 4 --> Transfer to chair/commode  -KR 4 --> Transfer to chair/commode  -SB      Row Name 02/02/25 1542 02/02/25 1521       Modified [place] Scale    Pre-Stroke Modified [place] Scale 6 - Unable to determine (UTD) from the medical record documentation  -AJ 6 - Unable to determine (UTD) from the medical record documentation  -KR    Modified [place] Scale 5 - Severe disability.  Bedridden, incontinent, and requiring constant nursing care and attention.  -AJ 5 - Severe disability.  Bedridden, incontinent, and requiring constant nursing care and attention.  -KR      Row Name 02/02/25 1542 02/02/25 1521       Functional Assessment    Outcome Measure Options AM-PAC 6 Clicks Daily Activity (OT);Modified [place]  -AJ AM-PAC 6 Clicks Basic Mobility (PT);Modified [place]  -KR              User Key  (r) = Recorded By, (t) = Taken By, (c) = Cosigned By      Initials Name Provider Type    Pearl Barrios, RN Registered Nurse    Masha Borges, PT Physical Therapist    Lydia Villasenor, OT Occupational Therapist                    Occupational Therapy Education       Title: PT OT SLP Therapies (In Progress)       Topic: Occupational Therapy (In Progress)       Point: ADL training (In Progress)       Description:   Instruct learner(s) on proper safety adaptation and remediation techniques during self care or transfers.   Instruct in proper use of assistive devices.                  Learning Progress Summary            Patient Acceptance, E, NR by ADOLFO at 2/2/2025 1542   Significant Other Acceptance, E, NR by AJ at 2/2/2025 1542                      Point: Home exercise program (Not  Started)       Description:   Instruct learner(s) on appropriate technique for monitoring, assisting and/or progressing therapeutic exercises/activities.                  Learner Progress:  Not documented in this visit.              Point: Precautions (In Progress)       Description:   Instruct learner(s) on prescribed precautions during self-care and functional transfers.                  Learning Progress Summary            Patient Acceptance, E, NR by ADOLFO at 2/2/2025 1542   Significant Other Acceptance, E, NR by ADOLFO at 2/2/2025 1542                      Point: Body mechanics (In Progress)       Description:   Instruct learner(s) on proper positioning and spine alignment during self-care, functional mobility activities and/or exercises.                  Learning Progress Summary            Patient Acceptance, E, NR by ADOLFO at 2/2/2025 1542   Significant Other Acceptance, E, NR by ADOLFO at 2/2/2025 1542                                      User Key       Initials Effective Dates Name Provider Type Discipline    ADOLFO 08/26/24 -  Lydia Alicia OT Occupational Therapist OT                  OT Recommendation and Plan  Planned Therapy Interventions (OT): activity tolerance training, adaptive equipment training, BADL retraining, functional balance retraining, IADL retraining, occupation/activity based interventions, patient/caregiver education/training, transfer/mobility retraining, strengthening exercise, passive ROM/stretching, ROM/therapeutic exercise  Therapy Frequency (OT): daily  Plan of Care Review  Plan of Care Reviewed With: patient, spouse  Progress: no change  Outcome Evaluation: OT eval complete. Pt presents near fxl baseline with ADLs, however is below fxl baseline with fxl transfers, limited by L side weakness, poor activity tolerance and safety awareness, and impaired balance. Pt would benefit from ongoing skilled OT services to progress to PLOF. Rec d/c to SNF.     Time Calculation:   Evaluation Complexity  (OT)  Review Occupational Profile/Medical/Therapy History Complexity: expanded/moderate complexity  Assessment, Occupational Performance/Identification of Deficit Complexity: 3-5 performance deficits  Clinical Decision Making Complexity (OT): detailed assessment/moderate complexity  Overall Complexity of Evaluation (OT): moderate complexity     Time Calculation- OT       Row Name 02/02/25 1543             Time Calculation- OT    OT Start Time 1401  -AJ      OT Received On 02/02/25  -AJ      OT Goal Re-Cert Due Date 02/12/25  -AJ         Untimed Charges    OT Eval/Re-eval Minutes 50  -AJ         Total Minutes    Untimed Charges Total Minutes 50  -AJ       Total Minutes 50  -AJ                User Key  (r) = Recorded By, (t) = Taken By, (c) = Cosigned By      Initials Name Provider Type    Lydia Villasenor OT Occupational Therapist                  Therapy Charges for Today       Code Description Service Date Service Provider Modifiers Qty    [id number] HC OT EVAL MOD COMPLEXITY 4 2/2/2025 Lydia Alicia OT GO 1                 Lydia Alicia OT  2/2/2025

## 2025-02-02 NOTE — PROGRESS NOTES
[facility] Medicine Services  PROGRESS NOTE    Patient Name: Dirk Miles  : 1940  MRN: [id number]    Date of Admission: 2025  Primary Care Physician: Provider, No Known    Subjective   Subjective     CC:  weakness    HPI:  No complaints today.  Confused regarding place      Objective   Objective     Vital Signs:   Temp:  [97.7 °F (36.5 °C)-98 °F (36.7 °C)] 97.8 °F (36.6 °C)  Heart Rate:  [59-85] 85  Resp:  [16] 16  BP: (127-177)/(65-91) 155/82     Physical Exam:  Non toxic, in bed  MM moist  irregular  Breath sounds clear  Abdomen soft  Awake, speech clear, confused  Slightly flat affect    Results Reviewed:  LAB RESULTS:      Lab 25  1201 25  1535 25  1232 25  1231   WBC 8.01  --   --  7.72   HEMOGLOBIN 12.1*  --   --  11.8*   HEMOGLOBIN, POC  --   --  11.9*  --    HEMATOCRIT 38.2  --   --  36.5*   HEMATOCRIT POC  --   --  35*  --    PLATELETS 151  --   --  172   NEUTROS ABS  --   --   --  4.93   IMMATURE GRANS (ABS)  --   --   --  0.04   LYMPHS ABS  --   --   --  1.22   MONOS ABS  --   --   --  0.83   EOS ABS  --   --   --  0.61*   .5*  --   --  101.7*   PROTIME  --   --   --  14.3   APTT  --   --   --  29.1   HSTROP T  --  61*  --  61*         Lab 25  1201 25  1232 25  1231   SODIUM 138  --  137   POTASSIUM 4.9  --  4.7   CHLORIDE 100  --  97*   CO2 23.0  --  28.0   ANION GAP 15.0  --  12.0   BUN 16  --  23   CREATININE 1.08 1.60* 1.30*   EGFR 67.7 42.2* 54.2*   GLUCOSE 170*  --  133*   CALCIUM 9.4  --  9.6   HEMOGLOBIN A1C 7.00*  --   --          Lab 25  1231   ALT (SGPT) 17   AST (SGOT) 23         Lab 25  1535 25  1231   HSTROP T 61* 61*   PROTIME  --  14.3   INR  --  1.10         Lab 25  1201   CHOLESTEROL 137   LDL CHOL 62   HDL CHOL 29*   TRIGLYCERIDES 289*             Brief Urine Lab Results  (Last result in the past 365 days)        Color   Clarity   Blood   Leuk Est   Nitrite   Protein    CREAT   Urine HCG        02/01/25 1425 Yellow   Clear   Negative   Negative   Negative   Negative                   Microbiology Results Abnormal       None            CT Angiogram Head w AI Analysis of LVO    Result Date: 2/1/2025  CT CEREBRAL PERFUSION W WO CONTRAST, CT ANGIOGRAM NECK, CT ANGIOGRAM HEAD W AI ANALYSIS OF LVO Date of Exam: 2/1/2025 12:29 PM EST Indication: Neuro Deficit, acute, Stroke suspected Neuro deficit, acute stroke suspected.  Comparison: Concurrent noncontrast head CT, CTA head and neck 7/28/2021 Technique: Axial CT images of the brain were obtained prior to and after the administration of 115 mL Isovue-370. Core blood volume, core blood flow, mean transit time, and Tmax images were obtained utilizing the Rapid software protocol. A limited CT angiogram of the head was also performed to measure the blood vessel density. CTA of the head and neck was performed after the uneventful intravenous administration of above contrast. Reconstructed coronal and sagittal images were also obtained. In addition, a 3-D volume rendered image was created for interpretation. Automated exposure control and iterative reconstruction methods were used. The radiation dose reduction device was turned on for each scan per the ALARA (As Low as Reasonably Achievable) protocol. Findings: CT cerebral perfusion: No core infarct or ischemic tissue at risk. There is a tiny perfusion defect associated with a chronic right PCA infarct. There is mild patchy hypoperfusion in the watershed areas, nonspecific. CTA head and neck: No abrupt cut off/large vessel occlusion. Chronic occlusion of the right PCA at the distal P2 segment. There is short segment stenosis of the proximal left cervical ICA on the basis of eccentric calcified noncalcified atherosclerotic plaque, with moderate to severe stenosis of approximately 80% involving a segment measuring 8 mm in length (series 7 image 206-231, series 904 image 13). This is similar  compared to prior CTA. Heavy atherosclerosis at the left subclavian artery origin. There is atherosclerosis of the aortic arch. 2 mm posteriorly-directed infundibulum at the right carotid terminus (series 7 image 360-364). Emphysema in the upper lungs. The patient is edentulous. No acute or suspicious bony findings.     Impression: Impression: No core infarct or ischemic tissue at risk. No abrupt cut off/large vessel occlusion. Chronic occlusion of the right PCA at the distal P2 segment. Short segment moderate to severe stenosis of the proximal left cervical ICA on the basis of eccentric calcified and noncalcified atherosclerotic plaque, with stenosis of approximately 80% involving a segment measuring 8 mm in length. This appears similar  compared to prior CTA from 2021. 2 mm posteriorly-directed infundibulum at the right carotid terminus. There is evidence of emphysema. Correlate with patient history and risk factors and please assess if the patient meets criteria for routine low dose CT lung cancer screening. Electronically Signed: Vicente Castellanos MD  2/1/2025 1:06 PM EST  Workstation ID: [id number]    CT Angiogram Neck    Result Date: 2/1/2025  CT CEREBRAL PERFUSION W WO CONTRAST, CT ANGIOGRAM NECK, CT ANGIOGRAM HEAD W AI ANALYSIS OF LVO Date of Exam: 2/1/2025 12:29 PM EST Indication: Neuro Deficit, acute, Stroke suspected Neuro deficit, acute stroke suspected.  Comparison: Concurrent noncontrast head CT, CTA head and neck 7/28/2021 Technique: Axial CT images of the brain were obtained prior to and after the administration of 115 mL Isovue-370. Core blood volume, core blood flow, mean transit time, and Tmax images were obtained utilizing the Rapid software protocol. A limited CT angiogram of the head was also performed to measure the blood vessel density. CTA of the head and neck was performed after the uneventful intravenous administration of above contrast. Reconstructed coronal and sagittal images were also  obtained. In addition, a 3-D volume rendered image was created for interpretation. Automated exposure control and iterative reconstruction methods were used. The radiation dose reduction device was turned on for each scan per the ALARA (As Low as Reasonably Achievable) protocol. Findings: CT cerebral perfusion: No core infarct or ischemic tissue at risk. There is a tiny perfusion defect associated with a chronic right PCA infarct. There is mild patchy hypoperfusion in the watershed areas, nonspecific. CTA head and neck: No abrupt cut off/large vessel occlusion. Chronic occlusion of the right PCA at the distal P2 segment. There is short segment stenosis of the proximal left cervical ICA on the basis of eccentric calcified noncalcified atherosclerotic plaque, with moderate to severe stenosis of approximately 80% involving a segment measuring 8 mm in length (series 7 image 206-231, series 904 image 13). This is similar compared to prior CTA. Heavy atherosclerosis at the left subclavian artery origin. There is atherosclerosis of the aortic arch. 2 mm posteriorly-directed infundibulum at the right carotid terminus (series 7 image 360-364). Emphysema in the upper lungs. The patient is edentulous. No acute or suspicious bony findings.     Impression: Impression: No core infarct or ischemic tissue at risk. No abrupt cut off/large vessel occlusion. Chronic occlusion of the right PCA at the distal P2 segment. Short segment moderate to severe stenosis of the proximal left cervical ICA on the basis of eccentric calcified and noncalcified atherosclerotic plaque, with stenosis of approximately 80% involving a segment measuring 8 mm in length. This appears similar  compared to prior CTA from 2021. 2 mm posteriorly-directed infundibulum at the right carotid terminus. There is evidence of emphysema. Correlate with patient history and risk factors and please assess if the patient meets criteria for routine low dose CT lung cancer  screening. Electronically Signed: Vicente Castellanos MD  2/1/2025 1:06 PM EST  Workstation ID: [id number]    CT CEREBRAL PERFUSION WITH & WITHOUT CONTRAST    Result Date: 2/1/2025  CT CEREBRAL PERFUSION W WO CONTRAST, CT ANGIOGRAM NECK, CT ANGIOGRAM HEAD W AI ANALYSIS OF LVO Date of Exam: 2/1/2025 12:29 PM EST Indication: Neuro Deficit, acute, Stroke suspected Neuro deficit, acute stroke suspected.  Comparison: Concurrent noncontrast head CT, CTA head and neck 7/28/2021 Technique: Axial CT images of the brain were obtained prior to and after the administration of 115 mL Isovue-370. Core blood volume, core blood flow, mean transit time, and Tmax images were obtained utilizing the Rapid software protocol. A limited CT angiogram of the head was also performed to measure the blood vessel density. CTA of the head and neck was performed after the uneventful intravenous administration of above contrast. Reconstructed coronal and sagittal images were also obtained. In addition, a 3-D volume rendered image was created for interpretation. Automated exposure control and iterative reconstruction methods were used. The radiation dose reduction device was turned on for each scan per the ALARA (As Low as Reasonably Achievable) protocol. Findings: CT cerebral perfusion: No core infarct or ischemic tissue at risk. There is a tiny perfusion defect associated with a chronic right PCA infarct. There is mild patchy hypoperfusion in the watershed areas, nonspecific. CTA head and neck: No abrupt cut off/large vessel occlusion. Chronic occlusion of the right PCA at the distal P2 segment. There is short segment stenosis of the proximal left cervical ICA on the basis of eccentric calcified noncalcified atherosclerotic plaque, with moderate to severe stenosis of approximately 80% involving a segment measuring 8 mm in length (series 7 image 206-231, series 904 image 13). This is similar compared to prior CTA. Heavy atherosclerosis at the left  subclavian artery origin. There is atherosclerosis of the aortic arch. 2 mm posteriorly-directed infundibulum at the right carotid terminus (series 7 image 360-364). Emphysema in the upper lungs. The patient is edentulous. No acute or suspicious bony findings.     Impression: Impression: No core infarct or ischemic tissue at risk. No abrupt cut off/large vessel occlusion. Chronic occlusion of the right PCA at the distal P2 segment. Short segment moderate to severe stenosis of the proximal left cervical ICA on the basis of eccentric calcified and noncalcified atherosclerotic plaque, with stenosis of approximately 80% involving a segment measuring 8 mm in length. This appears similar  compared to prior CTA from 2021. 2 mm posteriorly-directed infundibulum at the right carotid terminus. There is evidence of emphysema. Correlate with patient history and risk factors and please assess if the patient meets criteria for routine low dose CT lung cancer screening. Electronically Signed: Vicente Castellanos MD  2/1/2025 1:06 PM EST  Workstation ID: [id number]    XR Chest 1 View    Result Date: 2/1/2025  XR CHEST 1 VW Date of Exam: 2/1/2025 12:24 PM EST Indication: Acute Stroke Protocol (onset < 12 hrs) Comparison: Chest x-ray 2/27/2021 Findings: Cardiomegaly. Lungs normally expanded. Radiopacities overlying the patient could be outside or within the patient zipper artifact upper chest. Linear artifact over the left heart. No pneumothorax or pleural effusion or focal pulmonary parenchymal opacity. Osteopenia. Degenerative changes of the acromioclavicular joints and glenohumeral joints.     Impression: Impression: No acute cardiopulmonary abnormality. Cardiomegaly. Probable overlying artifacts. Cannot exclude radiopaque foreign body overlying the left aspect of heart. Electronically Signed: Damaris Trotter MD  2/1/2025 1:01 PM EST  Workstation ID: [id number]    CT Pelvis Without Contrast    Result Date: 2/1/2025  CT PELVIS WO CONTRAST Date  of Exam: 2/1/2025 12:31 PM EST Indication: fall 2 days ago, left hip pain. Comparison: CT pelvis 1/10/2021 Technique: Axial CT images were obtained of the pelvis without contrast administration.  Reconstructed coronal and sagittal images were also obtained. Automated exposure control and iterative construction methods were used. Findings: No obvious superficial contusion or intrapelvic hematoma. No acute fracture or traumatic malalignment. Chronic ossicle is noted at the superolateral aspect of the left hip likely reflecting degenerative labral ossification or os acetabuli. There are symmetric mild to moderate osteoarthritic changes of the hip joints. There is degenerative disc disease at L5-S1. The SI joints and pubic symphysis are congruent. There is sigmoid diverticulosis without diverticulitis. There is heavy atherosclerosis of the abdominal aorta and its branches. No acute or suspicious findings within the pelvis or partially imaged lower abdomen.     Impression: Impression: No acute fracture or traumatic malalignment. Evaluation of hip pain in the setting of recent trauma should begin with x-ray, not CT, per ACR appropriateness criteria. Electronically Signed: Vicente Castellanos MD  2/1/2025 12:45 PM EST  Workstation ID: [id number]    CT Head Without Contrast Stroke Protocol    Result Date: 2/1/2025  CT HEAD WO CONTRAST STROKE PROTOCOL Date of Exam: 2/1/2025 12:22 PM EST Indication: Neuro deficit, acute, stroke suspected Neuro Deficit, acute, Stroke suspected. Comparison: Head CT 7/28/2021, brain MRI 7/29/2021 Technique: Axial CT images were obtained of the head without contrast administration.  Reconstructed coronal images were also obtained. Automated exposure control and iterative construction methods were used. Scan Time: 12:23 p.m. 2/1/2025 Results discussed with stroke navigator by Dr. Vicente Castellanos via telephone at 12:28 p.m. 2/1/2025. Findings: Redemonstration of chronic right PCA infarct. Redemonstration  of chronic infarct in the left frontal lobe. There are extensive subcortical and periventricular white matter hypodensities suggestive of chronic small vessel ischemic change and/or chronic infarcts, overall appearing similar compared to prior head CT. No evidence of new/acute large territory infarct. There are small chronic infarcts in the left cerebellum similar to prior. No acute intracranial hemorrhage. No extra-axial collections. No midline shift or herniation. Normal size and configuration of the ventricles. There is intracranial atherosclerosis. Normal appearance of the orbits. There is a mucous retention cyst in the right maxillary sinus with otherwise clear paranasal sinuses. The mastoid air cells are clear. No acute or suspicious bony findings.     Impression: Impression: No acute intracranial findings. Similar chronic and senescent changes which includes large chronic right PCA territory infarct and multiple additional scattered chronic supratentorial and infratentorial infarcts and white matter changes suggestive of chronic small vessel ischemic change. Electronically Signed: Vicente Castellanos MD  2/1/2025 12:32 PM EST  Workstation ID: [id number]     Results for orders placed during the hospital encounter of 07/28/21    Adult Transthoracic Echo Complete W/ Cont if Necessary Per Protocol (With Agitated Saline)    Interpretation Summary  · Left ventricular systolic function is normal. Estimated left ventricular EF = 60%.  · Left atrial volume is mildly increased.  · The cardiac valves are anatomically and functionally normal.  · Estimated right ventricular systolic pressure from tricuspid regurgitation is mildly elevated (35-45 mmHg).  · Saline test results are negative.      Current medications:  Scheduled Meds:aspirin, 81 mg, Oral, Daily   Or  aspirin, 300 mg, Rectal, Daily  aspirin, 325 mg, Oral, Once  atorvastatin, 40 mg, Oral, Nightly  Baclofen, 5 mg, Oral, BID  clopidogrel, 300 mg, Oral, Once  Divalproex  Sodium, 125 mg, Oral, BID  insulin lispro, 2-7 Units, Subcutaneous, 4x Daily AC & at Bedtime  Polyvinyl Alcohol-Povidone PF, 1 drop, Both Eyes, BID  sertraline, 25 mg, Oral, Daily  sodium chloride, 10 mL, Intravenous, Q12H  sodium chloride, 10 mL, Intravenous, Q12H  traZODone, 50 mg, Oral, Nightly  vitamin B-12, 1,000 mcg, Oral, Daily      Continuous Infusions:   PRN Meds:.  acetaminophen **OR** acetaminophen **OR** acetaminophen    senna-docusate sodium **AND** polyethylene glycol **AND** bisacodyl **AND** bisacodyl    dextrose    dextrose    glucagon (human recombinant)    sodium chloride    sodium chloride    sodium chloride    sodium chloride    Assessment & Plan   Assessment & Plan     Active Hospital Problems    Diagnosis  POA    **Weakness [R53.1]  Yes      Resolved Hospital Problems   No resolved problems to display.        Brief Hospital Course to date:  Dirk Miles is a 84 y.o. 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  The patient had been less interactive, according to his spouse, since the day before admission.  The following day she found him somewhat slumped over in a chair which is unusual for him.  At baseline he is unable to walk due to left lower extremity weakness, but spouse said he is usually engaged in activities at his skilled nursing facility.  Spouse denied recent patient fever, chills, cough, shortness of breath, nausea, vomiting, abdominal pain, loose stool or dysuria.         Weakness  History of CVA with residual left hemiparesis  Left ICA stenosis  -Stroke neurology follows  -MRI brain pending  -Continue aspirin  -COVID/influenza swab negative, UA bland, CXR no infiltrate  -PT/OT  -fall precautions  -delirium precautions     Renal insufficiency  - creatinine  1.3 to 1.6, baseline 1.0 to 1.3  - gentle IV fluids   - creatinine improved, 1.08     HTN  - resume low dose norvasc and metoprolol     HLD  -statin  -LDL 62     CAD  Atrial fibrillation  - asa  - not on AC due to history of SDH     Dementia     DMII  - A1c 7.0  -SSI      Elevated troponin  - in setting of renal insufficiency  - no current chest pain  - troponin flat      Expected Discharge Location and Transportation:   Expected Discharge   Expected Discharge Date: 2/3/2025; Expected Discharge Time:      VTE Prophylaxis:  Mechanical VTE prophylaxis orders are present.         AM-PAC 6 Clicks Score (PT): 11 (02/02/25 0800)    CODE STATUS:   Code Status and Medical Interventions: No CPR (Do Not Attempt to Resuscitate); Limited Support; No intubation (DNI)   Ordered at: 02/01/25 1428     Medical Intervention Limits:    No intubation (DNI)     Level Of Support Discussed With:    Next of Kin (If No Surrogate)    Health Care Surrogate     Code Status (Patient has no pulse and is not breathing):    No CPR (Do Not Attempt to Resuscitate)     Medical Interventions (Patient has pulse or is breathing):    Limited Support       Dillon Ellis MD  02/02/25

## 2025-02-02 NOTE — THERAPY EVALUATION
Acute Care - Speech Language Pathology Initial Evaluation  [facility]  Cognitive-Communication Evaluation       Patient Name: Dirk Miles  : 1940  MRN: [id number]  Today's Date: 2025               Admit Date: 2025     Visit Dx:    ICD-10-CM ICD-9-CM   1. Generalized weakness  R53.1 780.79   2. Stenosis of left carotid artery  I65.22 433.10   3. History of CVA (cerebrovascular accident)  Z86.73 V12.54   4. History of subdural hematoma  Z86.79 V12.59   5. Cognitive communication deficit  R41.841 799.52     Patient Active Problem List   Diagnosis    A-fib    Hypertension    Diabetes mellitus    Dementia with behavioral disturbance    Hyperlipemia    Stenosis of left carotid artery    Anemia, chronic disease    COVID-19 virus detected    History of CVA (cerebrovascular accident)    CAD (coronary artery disease)    Weakness of right foot    Weakness     Past Medical History:   Diagnosis Date    A-fib     Acute CVA (cerebrovascular accident) 2020    Arthritis     B12 deficiency     Coronary artery disease     COVID-19     Diabetes mellitus     DNR (do not resuscitate)     Dysphagia     Falls     GERD (gastroesophageal reflux disease)     Hemiplegia     Hyperlipidemia     Hypertension     Insomnia     Pressure sore on buttocks     Stenosis of left carotid artery 1/10/2021    Stroke     LEFT SIDE DEFICIT     Past Surgical History:   Procedure Laterality Date    CATARACT EXTRACTION, BILATERAL      HERNIA REPAIR         SLP Recommendation and Plan  SLP Diagnosis: baseline, cognitive-linguistic disorder (25)  SLP Diagnosis Comments: Wife reports that SLP at SNF plans to tx attention and mild L neglect 2/2 vision loss upon return to facility. No acute SLP needs as pt is currently at his baseline. Pt and wife in agreement. (25)           [facility] Criteria for Skilled Therapy Interventions Met: no problems identified which require skilled intervention (25)  Anticipated  Discharge Disposition (SLP): No further SLP services warranted (02/02/25 0940)                                         Progress: improving (02/02/25 1023)      SLP EVALUATION (Last 72 Hours)       SLP SLC Evaluation       Row Name 02/02/25 0940                   Communication Assessment/Intervention    Document Type evaluation  -RS        Subjective Information no complaints  -RS        Patient Observations alert;cooperative;agree to therapy  -RS        Patient/Family/Caregiver Comments/Observations Wife present  -RS        Patient Effort excellent  -RS        Comment Pt is extremely pleasant  -RS        Symptoms Noted During/After Treatment none  -RS        Symptoms Noted, Comment Wife reports excellent tolerance of breakfast with no s/sx aspiration  -RS           General Information    Patient Profile Reviewed yes  -RS        Precautions/Limitations, Vision vision impairment, left;other (see comments)  blind in L eye  -RS        Precautions/Limitations, Hearing WFL;for purposes of eval  -RS        Prior Level of Function-Communication cognitive-linguistic impairment;other (see comments)  Pt w mild cog linguistic deficits dated back to 2020  -RS        Plans/Goals Discussed with patient;agreed upon  -RS        Barriers to Rehab none identified  -RS        Patient's Goals for Discharge other (see comments)  Pt is looking forward to going to the [facility] on Valentines Day  -RS        Family Goals for Discharge patient able to return to previous activities/roles  -RS           Pain    Pretreatment Pain Rating 0/10 - no pain  -RS        Posttreatment Pain Rating 0/10 - no pain  -RS           Comprehension Assessment/Intervention    Comprehension Assessment/Intervention Auditory Comprehension  -RS           Auditory Comprehension Assessment/Intervention    Auditory Comprehension (Communication) WFL  -RS        Narrative Discourse WFL  -RS        Auditory Comprehension Communication, Comment Wife reports intermittently  slow processing which is baseline from remote infarct  -RS           Expression Assessment/Intervention    Expression Assessment/Intervention verbal expression  -RS           Verbal Expression Assessment/Intervention    Verbal Expression WFL  -RS        Conversational Discourse/Fluency WFL  -RS           Oral Motor Structure and Function    Oral Motor Structure and Function WNL  -RS        Dentition Assessment upper dentures/partial in place  -RS        Mucosal Quality moist, healthy  -RS           Oral Musculature and Cranial Nerve Assessment    Oral Motor General Assessment WFL  -RS           Motor Speech Assessment/Intervention    Motor Speech Function WFL  -RS        Conversational Speech (Communication) WFL  -RS        Speech intelligibility 100%;in quiet environment;in connected speech;with unfamiliar listener  -RS           Cursory Voice Assessment/Intervention    Quality and Resonance (Voice) WFL  -RS           Cognitive Assessment Intervention- SLP    Cognition, Comment Wife reports that pt's cognitive linguistic skills are currently at his baseline.  -RS           SLP Evaluation Clinical Impressions    SLP Diagnosis baseline;cognitive-linguistic disorder  -RS        SLP Diagnosis Comments Wife reports that SLP at [facility] plans to tx attention and mild L neglect 2/2 vision loss upon return to facility. No acute SLP needs as pt is currently at his baseline. Pt and wife in agreement.  -RS        [facility] [place] for Skilled [place] Interventions Met no problems identified which require skilled intervention  -RS           Recommendations    Anticipated Discharge Disposition (SLP) No further SLP services warranted  -RS                  User Key  (r) = Recorded By, (t) = Taken By, (c) = Cosigned By      Initials Name Effective Dates    Gumaro Fontana MS CCC-SLP 09/14/23 -                        EDUCATION  The patient has been educated in the following areas:     Cognitive Impairment.                        Time  Calculation:      Time Calculation- SLP       Row Name 02/02/25 1023             Time Calculation- SLP    SLP Start Time 0940  -RS      SLP Received On 02/02/25  -RS         Untimed Charges    [id number] Eval Speech and Production w/ Language Minutes 40  -RS         Total Minutes    Untimed Charges Total Minutes 40  -RS       Total Minutes 40  -RS                User Key  (r) = Recorded By, (t) = Taken By, (c) = Cosigned By      Initials Name Provider Type    RS Gumaro Mason MS CCC-SLP Speech and Language Pathologist                    Therapy Charges for Today       Code Description Service Date Service Provider Modifiers Qty    [id number]  ST EVAL SPEECH AND PROD W LANG  3 2/2/2025 Gumaro Mason MS CCC-SLP GN 1                       Gumaro Mason MS CCC-SLP  2/2/2025

## 2025-02-02 NOTE — PLAN OF CARE
Goal Outcome Evaluation:  Plan of Care Reviewed With: patient, spouse        Progress: no change  Outcome Evaluation: OT eval complete. Pt presents near fxl baseline with ADLs, however is below fxl baseline with fxl transfers, limited by L side weakness, poor activity tolerance and safety awareness, and impaired balance. Pt would benefit from ongoing skilled OT services to progress to PLOF. Rec d/c to SNF.    Anticipated Discharge Disposition (OT): skilled nursing facility

## 2025-02-02 NOTE — PLAN OF CARE
Goal Outcome Evaluation:  Plan of Care Reviewed With: patient, spouse        Progress: improving       Anticipated Discharge Disposition (SLP): No further SLP services warranted    SLP Diagnosis: baseline, cognitive-linguistic disorder (02/02/25 0919)

## 2025-02-02 NOTE — PLAN OF CARE
Goal Outcome Evaluation:  Plan of Care Reviewed With: patient, spouse           Outcome Evaluation: Pt presents with L weakness and inattention, as well as decreased balance, endurance, and safety awareness contributing to bed mobility and transfer deficits. Pt will benefit from PT to address these deficits. PT rec return to SNF upon dc.    Anticipated Discharge Disposition (PT): skilled nursing facility

## 2025-02-02 NOTE — PLAN OF CARE
Problem: Adult Inpatient Plan of Care  Goal: Plan of Care Review  Outcome: Progressing  Goal: Patient-Specific Goal (Individualized)  Outcome: Progressing  Goal: Absence of Hospital-Acquired Illness or Injury  Outcome: Progressing  Intervention: Identify and Manage Fall Risk  Recent Flowsheet Documentation  Taken 2/2/2025 0630 by France Cantu RN  Safety Promotion/Fall Prevention:   activity supervised   fall prevention program maintained   clutter free environment maintained   assistive device/personal items within reach   safety round/check completed   nonskid shoes/slippers when out of bed  Taken 2/2/2025 0430 by France Cantu RN  Safety Promotion/Fall Prevention:   activity supervised   fall prevention program maintained   clutter free environment maintained   assistive device/personal items within reach   safety round/check completed   nonskid shoes/slippers when out of bed  Taken 2/2/2025 0230 by France Cantu RN  Safety Promotion/Fall Prevention:   activity supervised   fall prevention program maintained   clutter free environment maintained   assistive device/personal items within reach   safety round/check completed   nonskid shoes/slippers when out of bed  Taken 2/2/2025 0030 by France Cantu RN  Safety Promotion/Fall Prevention:   activity supervised   fall prevention program maintained   clutter free environment maintained   assistive device/personal items within reach   safety round/check completed   nonskid shoes/slippers when out of bed  Taken 2/1/2025 2230 by France Cantu, RN  Safety Promotion/Fall Prevention:   activity supervised   fall prevention program maintained   clutter free environment maintained   assistive device/personal items within reach   safety round/check completed   nonskid shoes/slippers when out of bed  Taken 2/1/2025 1930 by France Cantu, RN  Safety Promotion/Fall Prevention:   activity supervised   fall prevention program maintained   clutter free  environment maintained   assistive device/personal items within reach   safety round/check completed   nonskid shoes/slippers when out of bed  Intervention: Prevent Skin Injury  Recent Flowsheet Documentation  Taken 2/2/2025 0630 by France Cantu RN  Body Position: supine  Skin Protection:   incontinence pads utilized   silicone foam dressing in place  Taken 2/2/2025 0430 by France Cantu RN  Body Position:   side-lying   right  Skin Protection:   incontinence pads utilized   silicone foam dressing in place  Taken 2/2/2025 0230 by France Cantu RN  Body Position:   side-lying   left  Skin Protection:   silicone foam dressing in place   incontinence pads utilized  Taken 2/2/2025 0030 by France Cantu RN  Body Position: supine  Skin Protection:   incontinence pads utilized   silicone foam dressing in place  Taken 2/1/2025 2230 by France Cnatu RN  Body Position:   side-lying   right  Skin Protection: incontinence pads utilized  Taken 2/1/2025 1930 by France Cantu RN  Body Position: supine  Skin Protection: incontinence pads utilized  Intervention: Prevent and Manage VTE (Venous Thromboembolism) Risk  Recent Flowsheet Documentation  Taken 2/1/2025 1930 by France Cantu RN  VTE Prevention/Management:   bilateral   compression stockings on  Intervention: Prevent Infection  Recent Flowsheet Documentation  Taken 2/2/2025 0630 by France Cantu RN  Infection Prevention:   hand hygiene promoted   personal protective equipment utilized   single patient room provided   rest/sleep promoted  Taken 2/2/2025 0430 by France Cantu RN  Infection Prevention:   hand hygiene promoted   personal protective equipment utilized   single patient room provided   rest/sleep promoted  Taken 2/2/2025 0230 by France Cantu RN  Infection Prevention:   hand hygiene promoted   personal protective equipment utilized   single patient room provided   rest/sleep promoted  Taken 2/2/2025 0030 by France Cantu  RN  Infection Prevention:   hand hygiene promoted   personal protective equipment utilized   single patient room provided   rest/sleep promoted  Taken 2/1/2025 2230 by France Cantu RN  Infection Prevention:   hand hygiene promoted   personal protective equipment utilized   single patient room provided   rest/sleep promoted  Taken 2/1/2025 1930 by France Cantu RN  Infection Prevention:   hand hygiene promoted   personal protective equipment utilized   single patient room provided   rest/sleep promoted  Goal: Optimal Comfort and Wellbeing  Outcome: Progressing  Goal: Readiness for Transition of Care  Outcome: Progressing  Intervention: Mutually Develop Transition Plan  Recent Flowsheet Documentation  Taken 2/1/2025 2003 by France Cantu RN  Transportation Anticipated: health plan transportation  Patient/Family Anticipated Services at Transition: skilled nursing  Patient/Family Anticipates Transition to: long-term care facility     Problem: Fall Injury Risk  Goal: Absence of Fall and Fall-Related Injury  Outcome: Progressing  Intervention: Identify and Manage Contributors  Recent Flowsheet Documentation  Taken 2/2/2025 0630 by France Cantu RN  Medication Review/Management: medications reviewed  Taken 2/2/2025 0430 by France Cantu RN  Medication Review/Management: medications reviewed  Taken 2/2/2025 0230 by France Cantu RN  Medication Review/Management: medications reviewed  Taken 2/2/2025 0030 by France Cantu RN  Medication Review/Management: medications reviewed  Taken 2/1/2025 2230 by France Cantu RN  Medication Review/Management: medications reviewed  Taken 2/1/2025 1930 by France Cantu RN  Medication Review/Management: medications reviewed  Intervention: Promote Injury-Free Environment  Recent Flowsheet Documentation  Taken 2/2/2025 0630 by France Cantu, RN  Safety Promotion/Fall Prevention:   activity supervised   fall prevention program maintained   clutter free  environment maintained   assistive device/personal items within reach   safety round/check completed   nonskid shoes/slippers when out of bed  Taken 2/2/2025 0430 by France Cantu, RN  Safety Promotion/Fall Prevention:   activity supervised   fall prevention program maintained   clutter free environment maintained   assistive device/personal items within reach   safety round/check completed   nonskid shoes/slippers when out of bed  Taken 2/2/2025 0230 by France Cantu RN  Safety Promotion/Fall Prevention:   activity supervised   fall prevention program maintained   clutter free environment maintained   assistive device/personal items within reach   safety round/check completed   nonskid shoes/slippers when out of bed  Taken 2/2/2025 0030 by France Cantu RN  Safety Promotion/Fall Prevention:   activity supervised   fall prevention program maintained   clutter free environment maintained   assistive device/personal items within reach   safety round/check completed   nonskid shoes/slippers when out of bed  Taken 2/1/2025 2230 by France Cantu RN  Safety Promotion/Fall Prevention:   activity supervised   fall prevention program maintained   clutter free environment maintained   assistive device/personal items within reach   safety round/check completed   nonskid shoes/slippers when out of bed  Taken 2/1/2025 1930 by France Cantu, RN  Safety Promotion/Fall Prevention:   activity supervised   fall prevention program maintained   clutter free environment maintained   assistive device/personal items within reach   safety round/check completed   nonskid shoes/slippers when out of bed     Problem: Stroke, Ischemic (Includes Transient Ischemic Attack)  Goal: Optimal Coping  Outcome: Progressing  Goal: Effective Bowel Elimination  Outcome: Progressing  Goal: Optimal Cerebral Tissue Perfusion  Outcome: Progressing  Goal: Optimal Cognitive Function  Outcome: Progressing  Goal: Optimal Nutrition  Intake  Outcome: Progressing  Goal: Effective Oxygenation and Ventilation  Outcome: Progressing  Intervention: Optimize Oxygenation and Ventilation  Recent Flowsheet Documentation  Taken 2/2/2025 0630 by France Cantu RN  Head of Bed (HOB) Positioning: HOB elevated  Taken 2/2/2025 0430 by France Cantu RN  Head of Bed (HOB) Positioning: HOB elevated  Taken 2/2/2025 0230 by France Cantu RN  Head of Bed (HOB) Positioning: HOB elevated  Taken 2/2/2025 0030 by France Cantu RN  Head of Bed (HOB) Positioning: HOB elevated  Taken 2/1/2025 2230 by France Cantu RN  Head of Bed (HOB) Positioning: HOB elevated  Taken 2/1/2025 1930 by France Cantu RN  Head of Bed ([facility]) Positioning: HOB elevated  Goal: Safe and Effective Swallow  Outcome: Progressing  Goal: Effective Urinary Elimination  Outcome: Progressing     Problem: Skin Injury Risk Increased  Goal: Skin Health and Integrity  Outcome: Progressing  Intervention: Optimize Skin Protection  Recent Flowsheet Documentation  Taken 2/2/2025 0630 by France Cantu RN  Pressure Reduction Techniques:   frequent weight shift encouraged   heels elevated off bed  Head of Bed (HOB) Positioning: [facility] elevated  Pressure Reduction Devices:   positioning supports utilized   foam padding utilized  Skin Protection:   incontinence pads utilized   silicone foam dressing in place  Taken 2/2/2025 0430 by France Cantu RN  Pressure Reduction Techniques:   frequent weight shift encouraged   heels elevated off bed  Head of Bed (HOB) Positioning: [facility] elevated  Pressure Reduction Devices:   positioning supports utilized   foam padding utilized  Skin Protection:   incontinence pads utilized   silicone foam dressing in place  Taken 2/2/2025 0230 by France Cantu RN  Pressure Reduction Techniques:   frequent weight shift encouraged   heels elevated off bed  Head of Bed (HOB) Positioning: HOB elevated  Pressure Reduction Devices:   positioning supports utilized   foam  padding utilized  Skin Protection:   silicone foam dressing in place   incontinence pads utilized  Taken 2/2/2025 0030 by France Cantu RN  Pressure Reduction Techniques:   frequent weight shift encouraged   heels elevated off bed  Head of Bed (HOB) Positioning: [facility] elevated  Pressure Reduction Devices:   positioning supports utilized   foam padding utilized  Skin Protection:   incontinence pads utilized   silicone foam dressing in place  Taken 2/1/2025 2230 by France Cantu RN  Pressure Reduction Techniques: frequent weight shift encouraged  Head of Bed (HOB) Positioning: [facility] elevated  Pressure Reduction Devices: positioning supports utilized  Skin Protection: incontinence pads utilized  Taken 2/1/2025 1930 by France Cantu RN  Pressure Reduction Techniques:   frequent weight shift encouraged   heels elevated off bed  Head of Bed ([facility]) Positioning: [facility] elevated  Pressure Reduction Devices:   positioning supports utilized   foam padding utilized  Skin Protection: incontinence pads utilized   Goal Outcome Evaluation:         NIH 9; Afib on tele; RA; NS continues infusing at 75mL/hr. Will continue to monitor.

## 2025-02-02 NOTE — THERAPY EVALUATION
Patient Name: Dirk Miles  : 1940    MRN: [id number]                              Today's Date: 2025       Admit Date: 2025    Visit Dx:     ICD-10-CM ICD-9-CM   1. Generalized weakness  R53.1 780.79   2. Stenosis of left carotid artery  I65.22 433.10   3. History of CVA (cerebrovascular accident)  Z86.73 V12.54   4. History of subdural hematoma  Z86.79 V12.59   5. Cognitive communication deficit  R41.841 799.52     Patient Active Problem List   Diagnosis    A-fib    Hypertension    Diabetes mellitus    Dementia with behavioral disturbance    Hyperlipemia    Stenosis of left carotid artery    Anemia, chronic disease    COVID-19 virus detected    History of CVA (cerebrovascular accident)    CAD (coronary artery disease)    Weakness of right foot    Weakness     Past Medical History:   Diagnosis Date    A-fib     Acute CVA (cerebrovascular accident) 2020    Arthritis     B12 deficiency     Coronary artery disease     COVID-19     Diabetes mellitus     DNR (do not resuscitate)     Dysphagia     Falls     GERD (gastroesophageal reflux disease)     Hemiplegia     Hyperlipidemia     Hypertension     Insomnia     Pressure sore on buttocks     Stenosis of left carotid artery 1/10/2021    Stroke     LEFT SIDE DEFICIT     Past Surgical History:   Procedure Laterality Date    CATARACT EXTRACTION, BILATERAL      HERNIA REPAIR        General Information       Row Name 25 1508          Physical Therapy Time and Intention    Document Type evaluation  -KR     Mode of Treatment physical therapy  -KR       Row Name 25 1508          General Information    Patient Profile Reviewed yes  -KR     Prior Level of Function max assist:;bed mobility;transfer;dependent:;driving;w/c or scooter;ADL's  maxA stand pivot to manual wc at baseline  -KR     Existing Precautions/Restrictions fall;other (see comments)  remote CVA with residual L weakness  -KR     Barriers to Rehab medically complex;previous functional  deficit;cognitive status  -KR       Row Name 02/02/25 1508          Living Environment    People in Home facility resident  -KR       Row Name 02/02/25 1508          Cognition    Orientation Status (Cognition) oriented to;person;time;verbal cues/prompts needed for orientation;place  -KR       Row Name 02/02/25 1508          Safety Issues/Impairments Affecting Functional Mobility    Safety Issues Affecting Function (Mobility) insight into deficits/self-awareness;problem-solving;safety precaution awareness;sequencing abilities;safety precautions follow-through/compliance  -KR     Impairments Affecting Function (Mobility) balance;cognition;endurance/activity tolerance;strength;sensation/sensory awareness;visual/perceptual;postural/trunk control;motor planning  -KR     Cognitive Impairments, Mobility Safety/Performance awareness, need for assistance;insight into deficits/self-awareness;problem-solving/reasoning;judgment;sequencing abilities;safety precaution follow-through;safety precaution awareness;attention  -KR     Comment, Safety Issues/Impairments (Mobility) decreased L attention  -KR               User Key  (r) = Recorded By, (t) = Taken By, (c) = Cosigned By      Initials Name Provider Type    KR Masha Garcia PT Physical Therapist                   Mobility       Row Name 02/02/25 1514          Bed Mobility    Bed Mobility supine-sit  -KR     Supine-Sit [place] (Bed Mobility) moderate assist (50% patient effort);2 person assist  -KR     Assistive Device (Bed Mobility) head of bed elevated;bed rails  -KR     Comment, (Bed Mobility) cues for LE progression off bed  -KR       Row Name 02/02/25 1514          Bed-Chair Transfer    Bed-Chair [place] (Transfers) maximum assist (25% patient effort);2 person assist  -KR     Assistive Device (Bed-Chair Transfers) other (see comments)  BUE support  -KR       Row Name 02/02/25 1514          Sit-Stand Transfer    Sit-Stand [place] (Transfers) maximum  assist (25% patient effort);2 person assist  -KR     Assistive Device (Sit-Stand Transfers) other (see comments)  BUE support  -KR     Comment, (Sit-Stand Transfer) 2x from bed with cues for RALPH and hand placement. Blocking at B feet to prevent sliding.  -KR       Row Name 02/02/25 1514          Gait/Stairs (Locomotion)    [place] Level (Gait) unable to assess  -KR               User Key  (r) = Recorded By, (t) = Taken By, (c) = Cosigned By      Initials Name Provider Type    Masha Borges, PT Physical Therapist                   Obj/Interventions       Row Name 02/02/25 1517          Range of Motion Comprehensive    General Range of Motion no range of motion deficits identified  -KR       Row Name 02/02/25 1517          Strength Comprehensive (MMT)    General Manual Muscle Testing (MMT) Assessment lower extremity strength deficits identified  -KR     Comment, General Manual Muscle Testing (MMT) Assessment unable to formally assess d/t command following, however pt demos at least 3+/5 grossly in RLE. 1/5 DF LLE, L knee extension 2/5.  -       Row Name 02/02/25 1517          Balance    Balance Assessment sitting static balance;standing static balance;standing dynamic balance  -KR     Static Sitting Balance moderate assist;1-person assist;verbal cues;non-verbal cues (demo/gesture)  heavy R lateral lean, able to improve slightly with cues for R hand placement  -KR     Position, Sitting Balance unsupported;sitting edge of bed  -KR     Static Standing Balance maximum assist;2-person assist  -KR     Dynamic Standing Balance maximum assist;2-person assist;non-verbal cues (demo/gesture);verbal cues  -KR     Position/Device Used, Standing Balance supported;other (see comments)  BUE support  -KR     Balance Interventions sitting;standing;sit to stand;supported;static;dynamic  -KR       Row Name 02/02/25 1517          Sensory Assessment (Somatosensory)    Left LE Sensory Assessment general sensation;light touch  awareness;light touch localization;proprioception;absent  -KR     Right LE Sensory Assessment general sensation;light touch awareness;proprioception;light touch localization;intact  -KR               User Key  (r) = Recorded By, (t) = Taken By, (c) = Cosigned By      Initials Name Provider Type    Masha Borges, PT Physical Therapist                   Goals/Plan       Row Name 02/02/25 1520          Bed Mobility Goal 1 (PT)    Activity/Assistive Device (Bed Mobility Goal 1, PT) bed mobility activities, all  -KR     [place] Level/Cues Needed (Bed Mobility Goal 1, PT) moderate assist (50-74% patient effort)  -KR     Time Frame (Bed Mobility Goal 1, PT) short term goal (STG);4 days  -KR       Row Name 02/02/25 1520          Transfer Goal 1 (PT)    Activity/Assistive Device (Transfer Goal 1, PT) sit-to-stand/stand-to-sit;bed-to-chair/chair-to-bed  -KR     [place] Level/Cues Needed (Transfer Goal 1, PT) maximum assist (25-49% patient effort)  -KR     Time Frame (Transfer Goal 1, PT) long term goal (LTG);1 week  -KR       Row Name 02/02/25 1520          Balance Goal 1 (PT)    Activity/Assistive Device (Balance Goal) sitting static balance  -KR     [place] Level/Cues Needed (Balance Goal 1, PT) contact guard required  -KR     Time Frame (Balance Goal 1, PT) long-term goal (LTG);5-7 days  -KR       Row Name 02/02/25 1520          Therapy Assessment/Plan (PT)    Planned Therapy Interventions (PT) balance training;bed mobility training;home exercise program;neuromuscular re-education;patient/family education;postural re-education;transfer training;stretching;strengthening;stair training;ROM (range of motion);motor coordination training;wheelchair management/propulsion training  -KR               User Key  (r) = Recorded By, (t) = Taken By, (c) = Cosigned By      Initials Name Provider Type    Masha Borges PT Physical Therapist                   Clinical Impression       Row Name 02/02/25 1906           Pain    Pretreatment Pain Rating 0/10 - no pain  -KR     Posttreatment Pain Rating 0/10 - no pain  -KR       Row Name 02/02/25 1518          Plan of Care Review    Plan of Care Reviewed With patient;spouse  -KR     Outcome Evaluation Pt presents with L weakness and inattention, as well as decreased balance, endurance, and safety awareness contributing to bed mobility and transfer deficits. Pt will benefit from PT to address these deficits. PT rec return to SNF upon dc.  -KR       Row Name 02/02/25 1518          Therapy Assessment/Plan (PT)    Patient/Family Therapy Goals Statement (PT) [facility]  -KR     Rehab Potential (PT) fair  -KR     Criteria for Skilled Interventions Met (PT) skilled treatment is necessary;yes  -KR     Therapy Frequency (PT) daily  -KR     Predicted Duration of Therapy Intervention (PT) 1 week  -KR       [facility] Name 02/02/25 1518          Vital Signs    Pre Systolic BP Rehab 155  -KR     Pre Treatment Diastolic BP 82  -KR     Post Systolic BP Rehab 154  -KR     Post Treatment Diastolic   -KR     Pre Patient Position Supine  -KR     Intra Patient Position Standing  -KR     Post Patient Position Sitting  -KR       [facility] Name 02/02/25 1518          Positioning and Restraints    Pre-Treatment Position in bed  -KR     Post Treatment Position chair  -KR     In Chair notified nsg;reclined;call light within reach;encouraged to call for assist;exit alarm on;waffle cushion;on mechanical lift sling;legs elevated;with family/caregiver;heels elevated;LUE elevated  -KR               User Key  (r) = Recorded By, (t) = Taken By, (c) = Cosigned By      Initials Name Provider Type    Masha Borges, PT Physical Therapist                   Outcome Measures       Row Name 02/02/25 1521 02/02/25 0800       How much help from another person do you currently need...    Turning from your back to your side while in flat bed without using bedrails? 2  -KR 3  -SB    Moving from lying on back to  sitting on the side of a flat bed without bedrails? 2  -KR 2  -SB    Moving to and from a bed to a chair (including a wheelchair)? 2  -KR 2  -SB    Standing up from a chair using your arms (e.g., wheelchair, bedside chair)? 2  -KR 2  -SB    Climbing 3-5 steps with a railing? 1  -KR 1  -SB    To walk in hospital room? 1  -KR 1  -SB    AM-PAC 6 Clicks Score (PT) 10  -KR 11  -SB    Highest Level of Mobility Goal 4 --> Transfer to chair/commode  -KR 4 --> Transfer to chair/commode  -SB      Row Name 02/02/25 1521          Modified Peter Scale    Pre-Stroke Modified Peter Scale 6 - Unable to determine (UTD) from the medical record documentation  -KR     Modified Wisdom Scale 5 - Severe disability.  Bedridden, incontinent, and requiring constant nursing care and attention.  -KR       Row Name 02/02/25 1521          Functional Assessment    Outcome Measure Options AM-PAC 6 Clicks Basic Mobility (PT);Modified [place]  -KR               User Key  (r) = Recorded By, (t) = Taken By, (c) = Cosigned By      Initials Name Provider Type    Pearl Barrios RN Registered Nurse    Masha Borges, SIRENA Physical Therapist                                 Physical Therapy Education       Title: PT OT SLP Therapies (In Progress)       Topic: Physical Therapy (In Progress)       Point: Mobility training (Done)       Learning Progress Summary            Patient Acceptance, E, NR,VU by [place] at 2/2/2025 1521   Family Acceptance, E, NR,VU by [place] at 2/2/2025 1521                      Point: Home exercise program (Not Started)       Learner Progress:  Not documented in this visit.              Point: Body mechanics (Done)       Learning Progress Summary            Patient Acceptance, E, NR,VU by [place] at 2/2/2025 1521   Family Acceptance, E, NR,VU by [place] at 2/2/2025 1521                      Point: Precautions (Done)       Learning Progress Summary            Patient Acceptance, E, NR,VU by [place] at 2/2/2025 1521   Family Acceptance, E, NR,VU by  KR at 2/2/2025 1521                                      User Key       Initials Effective Dates Name Provider Type Discipline    KR 12/30/22 -  Masha Garcia PT Physical Therapist PT                  PT Recommendation and Plan  Planned Therapy Interventions (PT): balance training, bed mobility training, home exercise program, neuromuscular re-education, patient/family education, postural re-education, transfer training, stretching, strengthening, stair training, ROM (range of motion), motor coordination training, wheelchair management/propulsion training  Outcome Evaluation: Pt presents with L weakness and inattention, as well as decreased balance, endurance, and safety awareness contributing to bed mobility and transfer deficits. Pt will benefit from PT to address these deficits. PT rec return to SNF upon dc.     Time Calculation:   PT Evaluation Complexity  History, PT Evaluation Complexity: 3 or more personal factors and/or comorbidities  Examination of Body Systems (PT Eval Complexity): total of 4 or more elements  Clinical Presentation (PT Evaluation Complexity): evolving  Clinical Decision Making (PT Evaluation Complexity): moderate complexity  Overall Complexity (PT Evaluation Complexity): moderate complexity     PT Charges       Row Name 02/02/25 1522             Time Calculation    Start Time 1409  -KR      PT Received On 02/02/25  -KR      PT Goal Re-Cert Due Date 02/12/25  -KR         Untimed Charges    PT Eval/Re-eval Minutes 50  -KR         Total Minutes    Untimed Charges Total Minutes 50  -KR       Total Minutes 50  -KR                User Key  (r) = Recorded By, (t) = Taken By, (c) = Cosigned By      Initials Name Provider Type    KR Masha Garcia PT Physical Therapist                  Therapy Charges for Today       Code Description Service Date Service Provider Modifiers Qty    [id number] HC PT EVAL MOD COMPLEXITY 4 2/2/2025 Masha Garcia PT GP 1            PT G-Codes  Outcome Measure  Options: AM-PAC 6 Clicks Basic Mobility (PT), Modified [place]  AM-PAC 6 Clicks Score (PT): 10  Modified [place] Scale: 5 - Severe disability.  Bedridden, incontinent, and requiring constant nursing care and attention.  PT Discharge Summary  Anticipated Discharge Disposition (PT): skilled nursing facility    Masha Garcia, PT  2/2/2025

## 2025-02-02 NOTE — PROGRESS NOTES
Stroke Progress Note       Chief Complaint: Altered mentation    Subjective    Subjective     Subjective:  Mentation is improving, patient wife was feeding at the bedside today    Objective      Temp:  [97.7 °F (36.5 °C)-98 °F (36.7 °C)] 97.8 °F (36.6 °C)  Heart Rate:  [59-85] 85  Resp:  [16] 16  BP: (127-177)/(65-91) 155/82      Results Review:    I reviewed the patient's new clinical results.    Lab Results (last 24 hours)       Procedure Component Value Units Date/Time    Hemoglobin A1c [[id number]]  (Abnormal) Collected: 02/02/25 1201    Specimen: Blood Updated: 02/02/25 1255     Hemoglobin A1C 7.00 %     Narrative:      Hemoglobin A1C Ranges:    Increased Risk for Diabetes  5.7% to 6.4%  Diabetes                     >= 6.5%  Diabetic Goal                < 7.0%    Basic Metabolic Panel [[id number]]  (Abnormal) Collected: 02/02/25 1201    Specimen: Blood Updated: 02/02/25 1237     Glucose 170 mg/dL      BUN 16 mg/dL      Creatinine 1.08 mg/dL      Sodium 138 mmol/L      Potassium 4.9 mmol/L      Comment: Slight hemolysis detected by analyzer. Result may be falsely elevated.        Chloride 100 mmol/L      CO2 23.0 mmol/L      Calcium 9.4 mg/dL      BUN/Creatinine Ratio 14.8     Anion Gap 15.0 mmol/L      eGFR 67.7 mL/min/1.73     Narrative:      GFR Categories in Chronic Kidney Disease (CKD)      GFR Category          GFR (mL/min/1.73)    Interpretation  G1                     90 or greater         Normal or high (1)  G2                      60-89                Mild decrease (1)  G3a                   45-59                Mild to moderate decrease  G3b                   30-44                Moderate to severe decrease  G4                    15-29                Severe decrease  G5                    14 or less           Kidney failure          (1)In the absence of evidence of kidney disease, neither GFR category G1 or G2 fulfill the criteria for CKD.    eGFR calculation 2021 CKD-EPI creatinine equation, which does  not include race as a factor    Lipid Panel [[id number]]  (Abnormal) Collected: 02/02/25 1201    Specimen: Blood Updated: 02/02/25 1237     Total Cholesterol 137 mg/dL      Triglycerides 289 mg/dL      HDL Cholesterol 29 mg/dL      LDL Cholesterol  62 mg/dL      VLDL Cholesterol 46 mg/dL      LDL/HDL Ratio 1.73    Narrative:      Cholesterol Reference Ranges  (U.S. Department of Health and Human Services ATP III Classifications)    Desirable          <200 mg/dL  Borderline High    200-239 mg/dL  High Risk          >240 mg/dL      Triglyceride Reference Ranges  (U.S. Department of Health and Human Services ATP III Classifications)    Normal           <150 mg/dL  Borderline High  150-199 mg/dL  High             200-499 mg/dL  Very High        >500 mg/dL    HDL Reference Ranges  (U.S. Department of Health and Human Services ATP III Classifications)    Low     <40 mg/dl (major risk factor for CHD)  High    >60 mg/dl ('negative' risk factor for CHD)        LDL Reference Ranges  (U.S. Department of Health and Human Services ATP III Classifications)    Optimal          <100 mg/dL  Near Optimal     100-129 mg/dL  Borderline High  130-159 mg/dL  High             160-189 mg/dL  Very High        >189 mg/dL    LDL is calculated using the NIH LDL-C calculation.      CBC (No Diff) [[id number]]  (Abnormal) Collected: 02/02/25 1201    Specimen: Blood Updated: 02/02/25 1210     WBC 8.01 10*3/mm3      RBC 3.69 10*6/mm3      Hemoglobin 12.1 g/dL      Hematocrit 38.2 %      .5 fL      MCH 32.8 pg      MCHC 31.7 g/dL      RDW 13.8 %      RDW-SD 52.4 fl      MPV 10.1 fL      Platelets 151 10*3/mm3     POC Glucose Once [[id number]]  (Abnormal) Collected: 02/02/25 1137    Specimen: Blood Updated: 02/02/25 1141     Glucose 177 mg/dL     Urine Culture - Urine, Urine, Clean Catch [[id number]] Collected: 02/01/25 1425    Specimen: Urine, Clean Catch Updated: 02/02/25 1021     Urine Culture 50,000 CFU/mL Normal Urogenital Jonna    Narrative:       Colonization of the urinary tract without infection is common. Treatment is discouraged unless the patient is symptomatic, pregnant, or undergoing an invasive urologic procedure.    POC Glucose Once [[id number]]  (Abnormal) Collected: 02/02/25 0707    Specimen: Blood Updated: 02/02/25 0708     Glucose 156 mg/dL     POC Glucose Once [[id number]]  (Normal) Collected: 02/01/25 2050    Specimen: Blood Updated: 02/01/25 2052     Glucose 124 mg/dL     High Sensitivity Troponin T 1Hr [[id number]]  (Abnormal) Collected: 02/01/25 1535    Specimen: Blood Updated: 02/01/25 1613     HS Troponin T 61 ng/L      Troponin T Numeric Delta 0 ng/L      Troponin T % Delta 0    Narrative:      High Sensitive Troponin T Reference Range:  <14.0 ng/L- Negative Female for AMI  <22.0 ng/L- Negative Male for AMI  >=14 - Abnormal Female indicating possible myocardial injury.  >=22 - Abnormal Male indicating possible myocardial injury.   Clinicians would have to utilize clinical acumen, EKG, Troponin, and serial changes to determine if it is an Acute Myocardial Infarction or myocardial injury due to an underlying chronic condition.         COVID PRE-OP / PRE-PROCEDURE SCREENING ORDER (NO ISOLATION) - Swab, Nasopharynx [[id number]]  (Normal) Collected: 02/01/25 1426    Specimen: Swab from Nasopharynx Updated: 02/01/25 1502    Narrative:      The following orders were created for panel order COVID PRE-OP / PRE-PROCEDURE SCREENING ORDER (NO ISOLATION) - Swab, Nasopharynx.  Procedure                               Abnormality         Status                     ---------                               -----------         ------                     COVID-19 and FLU A/B PCR...[[id number]]  Normal              Final result                 Please view results for these tests on the individual orders.    COVID-19 and FLU A/B PCR, 1 HR TAT - Swab, Nasopharynx [[id number]]  (Normal) Collected: 02/01/25 1426    Specimen: Swab from Nasopharynx Updated: 02/01/25  1502     COVID19 Not Detected     Influenza A PCR Not Detected     Influenza B PCR Not Detected    Narrative:      Fact sheet for providers: https://www.fda.gov/media/343476/download    Fact sheet for patients: https://www.fda.gov/media/761064/download    Test performed by PCR.    Urinalysis With Microscopic If Indicated (No Culture) - Urine, Clean Catch [[id number]]  (Abnormal) Collected: 02/01/25 1425    Specimen: Urine, Clean Catch Updated: 02/01/25 1443     Color, UA Yellow     Appearance, UA Clear     pH, UA 7.5     Specific Gravity, UA 1.039     Glucose, UA Negative     Ketones, UA Negative     Bilirubin, UA Negative     Blood, UA Negative     Protein, UA Negative     Leuk Esterase, UA Negative     Nitrite, UA Negative     Urobilinogen, UA 0.2 E.U./dL    Narrative:      Urine microscopic not indicated.    High Sensitivity Troponin T [[id number]]  (Abnormal) Collected: 02/01/25 1231    Specimen: Blood Updated: 02/01/25 1411     HS Troponin T 61 ng/L     Narrative:      High Sensitive Troponin T Reference Range:  <14.0 ng/L- Negative Female for AMI  <22.0 ng/L- Negative Male for AMI  >=14 - Abnormal Female indicating possible myocardial injury.  >=22 - Abnormal Male indicating possible myocardial injury.   Clinicians would have to utilize clinical acumen, EKG, Troponin, and serial changes to determine if it is an Acute Myocardial Infarction or myocardial injury due to an underlying chronic condition.               CT Angiogram Head w AI Analysis of LVO    Result Date: 2/1/2025  Impression: No core infarct or ischemic tissue at risk. No abrupt cut off/large vessel occlusion. Chronic occlusion of the right PCA at the distal P2 segment. Short segment moderate to severe stenosis of the proximal left cervical ICA on the basis of eccentric calcified and noncalcified atherosclerotic plaque, with stenosis of approximately 80% involving a segment measuring 8 mm in length. This appears similar  compared to prior CTA from  2021. 2 mm posteriorly-directed infundibulum at the right carotid terminus. There is evidence of emphysema. Correlate with patient history and risk factors and please assess if the patient meets criteria for routine low dose CT lung cancer screening. Electronically Signed: Vicente Castellanos MD  2/1/2025 1:06 PM EST  Workstation ID: [id number]    CT Angiogram Neck    Result Date: 2/1/2025  Impression: No core infarct or ischemic tissue at risk. No abrupt cut off/large vessel occlusion. Chronic occlusion of the right PCA at the distal P2 segment. Short segment moderate to severe stenosis of the proximal left cervical ICA on the basis of eccentric calcified and noncalcified atherosclerotic plaque, with stenosis of approximately 80% involving a segment measuring 8 mm in length. This appears similar  compared to prior CTA from 2021. 2 mm posteriorly-directed infundibulum at the right carotid terminus. There is evidence of emphysema. Correlate with patient history and risk factors and please assess if the patient meets criteria for routine low dose CT lung cancer screening. Electronically Signed: Vicente Castellanos MD  2/1/2025 1:06 PM EST  Workstation ID: [id number]    CT CEREBRAL PERFUSION WITH & WITHOUT CONTRAST    Result Date: 2/1/2025  Impression: No core infarct or ischemic tissue at risk. No abrupt cut off/large vessel occlusion. Chronic occlusion of the right PCA at the distal P2 segment. Short segment moderate to severe stenosis of the proximal left cervical ICA on the basis of eccentric calcified and noncalcified atherosclerotic plaque, with stenosis of approximately 80% involving a segment measuring 8 mm in length. This appears similar  compared to prior CTA from 2021. 2 mm posteriorly-directed infundibulum at the right carotid terminus. There is evidence of emphysema. Correlate with patient history and risk factors and please assess if the patient meets criteria for routine low dose CT lung cancer screening.  Electronically Signed: Vicente Castellanos MD  2/1/2025 1:06 PM EST  Workstation ID: [id number]    XR Chest 1 View    Result Date: 2/1/2025  Impression: No acute cardiopulmonary abnormality. Cardiomegaly. Probable overlying artifacts. Cannot exclude radiopaque foreign body overlying the left aspect of heart. Electronically Signed: Damaris Trotter MD  2/1/2025 1:01 PM EST  Workstation ID: [id number]    CT Pelvis Without Contrast    Result Date: 2/1/2025  Impression: No acute fracture or traumatic malalignment. Evaluation of hip pain in the setting of recent trauma should begin with x-ray, not CT, per ACR appropriateness criteria. Electronically Signed: Vicente Castellanos MD  2/1/2025 12:45 PM EST  Workstation ID: [id number]    CT Head Without Contrast Stroke Protocol    Result Date: 2/1/2025  Impression: No acute intracranial findings. Similar chronic and senescent changes which includes large chronic right PCA territory infarct and multiple additional scattered chronic supratentorial and infratentorial infarcts and white matter changes suggestive of chronic small vessel ischemic change. Electronically Signed: Vicente Castellanos MD  2/1/2025 12:32 PM EST  Workstation ID: [id number]   Results for orders placed during the hospital encounter of 07/28/21    Adult Transthoracic Echo Complete W/ Cont if Necessary Per Protocol (With Agitated Saline)    Interpretation Summary  · Left ventricular systolic function is normal. Estimated left ventricular EF = 60%.  · Left atrial volume is mildly increased.  · The cardiac valves are anatomically and functionally normal.  · Estimated right ventricular systolic pressure from tricuspid regurgitation is mildly elevated (35-45 mmHg).  · Saline test results are negative.            Assessment/Plan     Assessment/Plan:  84-year-old with prior history of right PCA stroke, residual left-sided weakness, who lives in a [place] nursing home had an acute state and mentation lasting more than 24 hours.  On  evaluation the emergency department, patient mentation was improving.    CTAs were done which showed left ICA stenosis which is unchanged from 2021.    Likely differential new acute ischemic event of the brain versus metabolic encephalopathy    Patient family wife at the bedside adamantly refuses for the patient to be on any other antithrombotic agent like Plavix or heparin or Eliquis.    MRI brain pending to rule out any new strokes.  If there is no evidence of new infarct, patient can be discharged back to [facility] on aspirin 81 daily.        Please call neurology for any questions.    Jemal Lawson MD  02/02/25  13:04 EST

## 2025-02-03 ENCOUNTER — APPOINTMENT (OUTPATIENT)
Dept: CARDIOLOGY | Facility: HOSPITAL | Age: 85
End: 2025-02-03
Payer: MEDICARE

## 2025-02-03 VITALS
WEIGHT: 181 LBS | TEMPERATURE: 98.5 F | SYSTOLIC BLOOD PRESSURE: 132 MMHG | HEIGHT: 71 IN | OXYGEN SATURATION: 98 % | HEART RATE: 67 BPM | RESPIRATION RATE: 16 BRPM | DIASTOLIC BLOOD PRESSURE: 67 MMHG | BODY MASS INDEX: 25.34 KG/M2

## 2025-02-03 LAB
AV MEAN PRESS GRAD SYS DOP V1V2: 2.7 MMHG
AV VMAX SYS DOP: 108.3 CM/SEC
BH CV ECHO MEAS - AI P1/2T: 476.8 MSEC
BH CV ECHO MEAS - AO MAX PG: 4.7 MMHG
BH CV ECHO MEAS - AO ROOT DIAM: 3.4 CM
BH CV ECHO MEAS - AO V2 VTI: 21.5 CM
BH CV ECHO MEAS - AVA(I,D): 3 CM2
BH CV ECHO MEAS - EDV(CUBED): 85.2 ML
BH CV ECHO MEAS - EDV(MOD-SP2): 49 ML
BH CV ECHO MEAS - EDV(MOD-SP4): 84.4 ML
BH CV ECHO MEAS - EF(MOD-SP2): 53.5 %
BH CV ECHO MEAS - EF(MOD-SP4): 61.4 %
BH CV ECHO MEAS - ESV(CUBED): 32.8 ML
BH CV ECHO MEAS - ESV(MOD-SP2): 22.8 ML
BH CV ECHO MEAS - ESV(MOD-SP4): 32.6 ML
BH CV ECHO MEAS - FS: 27.3 %
BH CV ECHO MEAS - IVS/LVPW: 1.17 CM
BH CV ECHO MEAS - IVSD: 1.4 CM
BH CV ECHO MEAS - LA DIMENSION: 5.5 CM
BH CV ECHO MEAS - LAT PEAK E' VEL: 8.7 CM/SEC
BH CV ECHO MEAS - LV DIASTOLIC VOL/BSA (35-75): 42.2 CM2
BH CV ECHO MEAS - LV MASS(C)D: 215.1 GRAMS
BH CV ECHO MEAS - LV MAX PG: 3.3 MMHG
BH CV ECHO MEAS - LV MEAN PG: 2 MMHG
BH CV ECHO MEAS - LV SYSTOLIC VOL/BSA (12-30): 16.3 CM2
BH CV ECHO MEAS - LV V1 MAX: 91.2 CM/SEC
BH CV ECHO MEAS - LV V1 VTI: 18.4 CM
BH CV ECHO MEAS - LVIDD: 4.4 CM
BH CV ECHO MEAS - LVIDS: 3.2 CM
BH CV ECHO MEAS - LVOT AREA: 3.5 CM2
BH CV ECHO MEAS - LVOT DIAM: 2.1 CM
BH CV ECHO MEAS - LVPWD: 1.2 CM
BH CV ECHO MEAS - MED PEAK E' VEL: 7.7 CM/SEC
BH CV ECHO MEAS - MV DEC SLOPE: 854.5 CM/SEC2
BH CV ECHO MEAS - MV DEC TIME: 0.15 SEC
BH CV ECHO MEAS - MV E MAX VEL: 111 CM/SEC
BH CV ECHO MEAS - MV MAX PG: 8.8 MMHG
BH CV ECHO MEAS - MV MEAN PG: 2.33 MMHG
BH CV ECHO MEAS - MV P1/2T: 52.1 MSEC
BH CV ECHO MEAS - MV V2 VTI: 30.4 CM
BH CV ECHO MEAS - MVA(P1/2T): 4.2 CM2
BH CV ECHO MEAS - MVA(VTI): 2.1 CM2
BH CV ECHO MEAS - PA ACC TIME: 0.1 SEC
BH CV ECHO MEAS - RAP SYSTOLE: 8 MMHG
BH CV ECHO MEAS - RVSP: 41 MMHG
BH CV ECHO MEAS - SV(LVOT): 63.7 ML
BH CV ECHO MEAS - SV(MOD-SP2): 26.2 ML
BH CV ECHO MEAS - SV(MOD-SP4): 51.8 ML
BH CV ECHO MEAS - SVI(LVOT): 31.9 ML/M2
BH CV ECHO MEAS - SVI(MOD-SP2): 13.1 ML/M2
BH CV ECHO MEAS - SVI(MOD-SP4): 25.9 ML/M2
BH CV ECHO MEAS - TAPSE (>1.6): 1.46 CM
BH CV ECHO MEAS - TR MAX PG: 32.5 MMHG
BH CV ECHO MEAS - TR MAX VEL: 277.3 CM/SEC
BH CV ECHO MEASUREMENTS AVERAGE E/E' RATIO: 13.54
BH CV XLRA - TDI S': 9.7 CM/SEC
GLUCOSE BLDC GLUCOMTR-MCNC: 178 MG/DL (ref 70–130)
GLUCOSE BLDC GLUCOMTR-MCNC: 181 MG/DL (ref 70–130)
LEFT ATRIUM VOLUME INDEX: 52 ML/M2
LV EF BIPLANE MOD: 56.3 %

## 2025-02-03 PROCEDURE — 93306 TTE W/DOPPLER COMPLETE: CPT | Performed by: INTERNAL MEDICINE

## 2025-02-03 PROCEDURE — 82948 REAGENT STRIP/BLOOD GLUCOSE: CPT

## 2025-02-03 PROCEDURE — 93306 TTE W/DOPPLER COMPLETE: CPT

## 2025-02-03 PROCEDURE — G0378 HOSPITAL OBSERVATION PER HR: HCPCS

## 2025-02-03 PROCEDURE — 63710000001 INSULIN LISPRO (HUMAN) PER 5 UNITS: Performed by: INTERNAL MEDICINE

## 2025-02-03 PROCEDURE — 99213 OFFICE O/P EST LOW 20 MIN: CPT

## 2025-02-03 RX ADMIN — INSULIN LISPRO 2 UNITS: 100 INJECTION, SOLUTION INTRAVENOUS; SUBCUTANEOUS at 12:46

## 2025-02-03 RX ADMIN — DIVALPROEX SODIUM 125 MG: 125 CAPSULE ORAL at 08:41

## 2025-02-03 RX ADMIN — ASPIRIN 81 MG CHEWABLE TABLET 81 MG: 81 TABLET CHEWABLE at 08:40

## 2025-02-03 RX ADMIN — Medication 12.5 MG: at 08:41

## 2025-02-03 RX ADMIN — Medication 10 ML: at 08:42

## 2025-02-03 RX ADMIN — POLYVINYL ALCOHOL, POVIDONE 1 DROP: 14; 6 SOLUTION/ DROPS OPHTHALMIC at 10:00

## 2025-02-03 RX ADMIN — INSULIN LISPRO 2 UNITS: 100 INJECTION, SOLUTION INTRAVENOUS; SUBCUTANEOUS at 08:40

## 2025-02-03 RX ADMIN — SERTRALINE HYDROCHLORIDE 25 MG: 25 TABLET ORAL at 08:41

## 2025-02-03 RX ADMIN — BACLOFEN 5 MG: 10 TABLET ORAL at 08:41

## 2025-02-03 RX ADMIN — AMLODIPINE BESYLATE 2.5 MG: 2.5 TABLET ORAL at 08:41

## 2025-02-03 RX ADMIN — CYANOCOBALAMIN TAB 1000 MCG 1000 MCG: 1000 TAB at 08:40

## 2025-02-03 NOTE — PLAN OF CARE
Goal Outcome Evaluation:              Outcome Evaluation: VSS. Afib on tele (chronic). Pt denies c/o's today, states he's glad to go home. Spouse at bedside, helpfl & supportive. Discharge to [facility] this afternoon via ambulance.

## 2025-02-03 NOTE — PLAN OF CARE
Problem: Adult Inpatient Plan of Care  Goal: Plan of Care Review  Outcome: Progressing  Goal: Patient-Specific Goal (Individualized)  Outcome: Progressing  Goal: Absence of Hospital-Acquired Illness or Injury  Outcome: Progressing  Intervention: Identify and Manage Fall Risk  Recent Flowsheet Documentation  Taken 2/3/2025 0428 by France Cantu RN  Safety Promotion/Fall Prevention:   activity supervised   fall prevention program maintained   clutter free environment maintained   assistive device/personal items within reach   safety round/check completed   nonskid shoes/slippers when out of bed  Taken 2/3/2025 0230 by France Cantu RN  Safety Promotion/Fall Prevention:   activity supervised   fall prevention program maintained   clutter free environment maintained   assistive device/personal items within reach   safety round/check completed   nonskid shoes/slippers when out of bed  Taken 2/3/2025 0018 by France Cantu, RN  Safety Promotion/Fall Prevention:   activity supervised   fall prevention program maintained   clutter free environment maintained   assistive device/personal items within reach   safety round/check completed   nonskid shoes/slippers when out of bed  Taken 2/2/2025 2200 by France Cantu RN  Safety Promotion/Fall Prevention:   activity supervised   fall prevention program maintained   clutter free environment maintained   assistive device/personal items within reach   safety round/check completed   nonskid shoes/slippers when out of bed  Taken 2/2/2025 1920 by France Cantu, RN  Safety Promotion/Fall Prevention:   activity supervised   fall prevention program maintained   clutter free environment maintained   assistive device/personal items within reach   safety round/check completed   nonskid shoes/slippers when out of bed  Intervention: Prevent Skin Injury  Recent Flowsheet Documentation  Taken 2/3/2025 0428 by France Cantu RN  Body Position:   side-lying   left  Skin  Protection:   incontinence pads utilized   silicone foam dressing in place  Taken 2/3/2025 0230 by France Cantu RN  Body Position:   side-lying   right  Skin Protection:   incontinence pads utilized   silicone foam dressing in place  Taken 2/3/2025 0018 by France Cantu RN  Body Position:   turned   left  Skin Protection:   incontinence pads utilized   silicone foam dressing in place   pulse oximeter probe site changed  Taken 2/2/2025 2300 by France Cantu RN  Body Position:   turned   right  Taken 2/2/2025 2200 by France Cantu RN  Body Position:   side-lying   left  Skin Protection:   incontinence pads utilized   silicone foam dressing in place  Taken 2/2/2025 1920 by France Cantu RN  Body Position:   side-lying   right  Intervention: Prevent Infection  Recent Flowsheet Documentation  Taken 2/3/2025 0428 by France Cantu RN  Infection Prevention:   hand hygiene promoted   personal protective equipment utilized   single patient room provided   rest/sleep promoted  Taken 2/3/2025 0230 by France Cantu RN  Infection Prevention:   hand hygiene promoted   personal protective equipment utilized   single patient room provided   rest/sleep promoted  Taken 2/3/2025 0018 by France Cantu RN  Infection Prevention:   hand hygiene promoted   personal protective equipment utilized   single patient room provided   rest/sleep promoted  Taken 2/2/2025 2200 by France Cantu RN  Infection Prevention:   hand hygiene promoted   personal protective equipment utilized   single patient room provided   rest/sleep promoted  Taken 2/2/2025 1920 by France Cantu RN  Infection Prevention:   hand hygiene promoted   personal protective equipment utilized   single patient room provided   rest/sleep promoted  Goal: Optimal Comfort and Wellbeing  Outcome: Progressing  Goal: Readiness for Transition of Care  Outcome: Progressing     Problem: Fall Injury Risk  Goal: Absence of Fall and Fall-Related  Injury  Outcome: Progressing  Intervention: Identify and Manage Contributors  Recent Flowsheet Documentation  Taken 2/3/2025 0428 by France Cantu, RN  Medication Review/Management: medications reviewed  Taken 2/3/2025 0230 by France Cantu RN  Medication Review/Management: medications reviewed  Taken 2/3/2025 0018 by France Cantu RN  Medication Review/Management: medications reviewed  Taken 2/2/2025 2200 by France Cantu RN  Medication Review/Management: medications reviewed  Taken 2/2/2025 1920 by France Cantu RN  Medication Review/Management: medications reviewed  Intervention: Promote Injury-Free Environment  Recent Flowsheet Documentation  Taken 2/3/2025 0428 by France Cantu RN  Safety Promotion/Fall Prevention:   activity supervised   fall prevention program maintained   clutter free environment maintained   assistive device/personal items within reach   safety round/check completed   nonskid shoes/slippers when out of bed  Taken 2/3/2025 0230 by France Cantu RN  Safety Promotion/Fall Prevention:   activity supervised   fall prevention program maintained   clutter free environment maintained   assistive device/personal items within reach   safety round/check completed   nonskid shoes/slippers when out of bed  Taken 2/3/2025 0018 by France Cantu RN  Safety Promotion/Fall Prevention:   activity supervised   fall prevention program maintained   clutter free environment maintained   assistive device/personal items within reach   safety round/check completed   nonskid shoes/slippers when out of bed  Taken 2/2/2025 2200 by France Cantu RN  Safety Promotion/Fall Prevention:   activity supervised   fall prevention program maintained   clutter free environment maintained   assistive device/personal items within reach   safety round/check completed   nonskid shoes/slippers when out of bed  Taken 2/2/2025 1920 by France Cantu RN  Safety Promotion/Fall Prevention:   activity  supervised   fall prevention program maintained   clutter free environment maintained   assistive device/personal items within reach   safety round/check completed   nonskid shoes/slippers when out of bed     Problem: Stroke, Ischemic (Includes Transient Ischemic Attack)  Goal: Optimal Coping  Outcome: Progressing  Goal: Effective Bowel Elimination  Outcome: Progressing  Goal: Optimal Cerebral Tissue Perfusion  Outcome: Progressing  Goal: Optimal Cognitive Function  Outcome: Progressing  Intervention: Optimize Cognitive Function  Recent Flowsheet Documentation  Taken 2/2/2025 2200 by France Cantu RN  Reorientation Measures: reorientation provided  Goal: Optimal Nutrition Intake  Outcome: Progressing  Goal: Effective Oxygenation and Ventilation  Outcome: Progressing  Intervention: Optimize Oxygenation and Ventilation  Recent Flowsheet Documentation  Taken 2/3/2025 0428 by France Cantu RN  Head of Bed (HOB) Positioning: HOB elevated  Taken 2/3/2025 0230 by France Cantu RN  Head of Bed (HOB) Positioning: HOB elevated  Taken 2/3/2025 0018 by rFance Cantu RN  Head of Bed (HOB) Positioning: HOB elevated  Taken 2/2/2025 2200 by France Cantu RN  Head of Bed (HOB) Positioning: HOB elevated  Taken 2/2/2025 1920 by France Cantu RN  Head of Bed (HOB) Positioning: HOB elevated  Goal: Safe and Effective Swallow  Outcome: Progressing  Goal: Effective Urinary Elimination  Outcome: Progressing     Problem: Skin Injury Risk Increased  Goal: Skin Health and Integrity  Outcome: Progressing  Intervention: Optimize Skin Protection  Recent Flowsheet Documentation  Taken 2/3/2025 0428 by France Cantu RN  Pressure Reduction Techniques:   frequent weight shift encouraged   heels elevated off bed  Head of Bed (HOB) Positioning: HOB elevated  Pressure Reduction Devices:   positioning supports utilized   foam padding utilized  Skin Protection:   incontinence pads utilized   silicone foam dressing in  place  Taken 2/3/2025 0230 by France Cantu RN  Pressure Reduction Techniques:   frequent weight shift encouraged   heels elevated off bed  Head of Bed (HOB) Positioning: HOB elevated  Pressure Reduction Devices:   positioning supports utilized   foam padding utilized  Skin Protection:   incontinence pads utilized   silicone foam dressing in place  Taken 2/3/2025 0018 by France Cantu RN  Pressure Reduction Techniques:   frequent weight shift encouraged   heels elevated off bed  Head of Bed (HOB) Positioning: [facility] elevated  Pressure Reduction Devices:   positioning supports utilized   foam padding utilized  Skin Protection:   incontinence pads utilized   silicone foam dressing in place   pulse oximeter probe site changed  Taken 2/2/2025 2200 by France Cantu RN  Pressure Reduction Techniques:   frequent weight shift encouraged   heels elevated off bed  Head of Bed (HOB) Positioning: [facility] elevated  Pressure Reduction Devices:   positioning supports utilized   foam padding utilized  Skin Protection:   incontinence pads utilized   silicone foam dressing in place  Taken 2/2/2025 1920 by France Cantu RN  Head of Bed ([facility]) Positioning: HOB elevated     Problem: Comorbidity Management  Goal: Blood Glucose Level Within Target Range  Outcome: Progressing  Intervention: Monitor and Manage Glycemia  Recent Flowsheet Documentation  Taken 2/3/2025 0428 by France Cantu RN  Medication Review/Management: medications reviewed  Taken 2/3/2025 0230 by France Cantu RN  Medication Review/Management: medications reviewed  Taken 2/3/2025 0018 by France Cantu RN  Medication Review/Management: medications reviewed  Taken 2/2/2025 2200 by France Cantu RN  Medication Review/Management: medications reviewed  Taken 2/2/2025 1920 by France Cantu RN  Medication Review/Management: medications reviewed  Goal: Blood Pressure in Desired Range  Outcome: Progressing  Intervention: Maintain Blood Pressure  Management  Recent Flowsheet Documentation  Taken 2/3/2025 0428 by France Cantu, RN  Medication Review/Management: medications reviewed  Taken 2/3/2025 0230 by France Cantu, RN  Medication Review/Management: medications reviewed  Taken 2/3/2025 0018 by France Cantu, RN  Medication Review/Management: medications reviewed  Taken 2/2/2025 2200 by France Cantu, RN  Medication Review/Management: medications reviewed  Taken 2/2/2025 1920 by France Cantu, RN  Medication Review/Management: medications reviewed   Goal Outcome Evaluation:         No neuro changes overnight. Placed 2L o2 on pt while sleeping to maintain o2 sat >90%. Will continue to monitor.

## 2025-02-03 NOTE — CASE MANAGEMENT/SOCIAL WORK
Case Management Discharge Note      Final Note: MSW verified with Shilpi with the [facility] that pt is able to return today. Ambulance is scheduled with [facility] for 3:00pm. PCS form uploaded electronically. Discharge summary will be pulled from [facility] per Shilpi with the [facility]. Number to call report is [phone]. Pharmacy changed to the facility's pharmacy in [facility] as well.    Provided Post Acute Provider List?: N/A  Provided Post Acute Provider Quality & Resource List?: N/A    Selected Continued Care - Admitted Since 2/1/2025       Destination Coordination complete.      Service Provider Services Address Phone Fax Patient Preferred    [facility] [address], [facility] [address] [phone] --              Durable Medical Equipment    No services have been selected for the patient.                Dialysis/Infusion    No services have been selected for the patient.                Home Medical Care    No services have been selected for the patient.                Therapy    No services have been selected for the patient.                Community Resources    No services have been selected for the patient.                Community & DME    No services have been selected for the patient.                         Final Discharge Disposition Code: 04 - intermediate care facility

## 2025-02-03 NOTE — CASE MANAGEMENT/SOCIAL WORK
Discharge Planning Assessment  [facility]     Patient Name: Dirk Miles  MRN: [id number]  Today's Date: 2/3/2025    Admit Date: 2/1/2025    Plan: [facility]   Discharge Needs Assessment    No documentation.                  Discharge Plan       Row Name 02/03/25 1117       Plan    Plan [facility]    Patient/Family in Agreement with Plan yes    Provided Post Acute Provider List? N/A    Provided Post Acute Provider Quality & Resource List? N/A    Plan Comments MSW spoke with pt and pt's wife at bedside. Pt lives at the [facility] in longterm care. Pt's wife reports pt as been there about 4 years now. Plan is to return to the [facility] and pt's wife reports pt will need an ambulance for transportation home. MSW called and left a voicemail with Shilpi with the [facility] to verify pt can return.    Final Discharge Disposition Code 04 - intermediate care facility                  Continued Care and Services - Admitted Since 2/1/2025       Destination       Service Provider Request Status Services Address Phone Fax Patient Preferred    [facility] - No Request Sent [facility] [address] [facility] [address] [phone] --                  Expected Discharge Date and Time       Expected Discharge Date Expected Discharge Time    Feb 3, 2025            Demographic Summary       Row Name 02/03/25 1116       General Information    Reason for Consult discharge planning                   Functional Status       Row Name 02/03/25 1116       Functional Status, IADL    Medications assistive equipment and person    Meal Preparation assistive equipment and person    Housekeeping assistive equipment and person    Laundry assistive equipment and person    Shopping assistive equipment and person                   Psychosocial    No documentation.                  Abuse/Neglect    No documentation.                  Legal    No documentation.                   Substance Abuse    No documentation.                  Patient Forms    No documentation.                     LOUIE Wetzel

## 2025-02-03 NOTE — DISCHARGE INSTR - DIET
Low cholesterol, low fat, no added salt, consistent carbohydrates, thin liquids.  Needs assistance with feeding.  
140

## 2025-02-03 NOTE — DISCHARGE SUMMARY
[facility] Medicine Services  DISCHARGE SUMMARY    Patient Name: Dirk Miles  : 1940  MRN: [id number]    Date of Admission: 2025 12:19 PM  Date of Discharge: 2/3/2025  Primary Care Physician: Provider, No Known    Consults       Date and Time Order Name Status Description    2025 12:20 PM Inpatient Neurology Consult Stroke Completed             Hospital Course     Presenting Problem: Mental status changes, lethargy    Active Hospital Problems    Diagnosis  POA    **Weakness [R53.1]  Yes      Resolved Hospital Problems   No resolved problems to display.          Hospital Course:  Dirk Miles is a 84 y.o. 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    At baseline he is unable to walk due to left lower extremity weakness, but spouse said he is usually engaged in activities at his skilled nursing facility.  His mental status is back to baseline and interactive with staff.     Weakness  History of CVA with residual left hemiparesis  Left ICA stenosis  -Stroke neurology evaluated  -MRI brain with limited eval due to motion artifact.  Showed remote right PCA infarct  -Continue aspirin  -COVID/influenza swab negative  -UA bland  -CXR no infiltrate     CKD  -Renal function overall stable     HTN  -Continue low dose norvasc and metoprolol with hold parameters     HLD  -Continue statin        CAD  Atrial fibrillation  - asa  - not on AC due to history of SDH     Dementia     DMII  - A1c 7.0      Discharge Follow Up Recommendations for outpatient labs/diagnostics:  Back to [facility] for long-term care    Day of Discharge     HPI:   Patient back to baseline mental status per wife at bedside, wants him to go back to [facility] as soon as possible    Review of  Systems  Gen- No fevers, chills  CV- No chest pain, palpitations  Resp- No cough, dyspnea  GI- No N/V/D, abd pain      Vital Signs:   Temp:  [97.5 °F (36.4 °C)-98.5 °F (36.9 °C)] 98.5 °F (36.9 °C)  Heart Rate:  [57-89] 77  Resp:  [16-18] 16  BP: (132-168)/() 132/67  Flow (L/min) (Oxygen Therapy):  [2] 2      Physical Exam:  Constitutional: No acute distress, in good spirits  HENT: NCAT, mucous membranes moist  Respiratory: Clear to auscultation bilaterally, respiratory effort normal   Gastrointestinal: Soft, nontender, nondistended  Musculoskeletal: No bilateral ankle edema  Psychiatric: Appropriate affect, cooperative  Neurologic: Oriented x 2-3, speech clear  Skin: No rashes      Pertinent  and/or Most Recent Results     LAB RESULTS:      Lab 02/02/25  1201 02/01/25  1232 02/01/25  1231   WBC 8.01  --  7.72   HEMOGLOBIN 12.1*  --  11.8*   HEMOGLOBIN, POC  --  11.9*  --    HEMATOCRIT 38.2  --  36.5*   HEMATOCRIT POC  --  35*  --    PLATELETS 151  --  172   NEUTROS ABS  --   --  4.93   IMMATURE GRANS (ABS)  --   --  0.04   LYMPHS ABS  --   --  1.22   MONOS ABS  --   --  0.83   EOS ABS  --   --  0.61*   .5*  --  101.7*   PROTIME  --   --  14.3   APTT  --   --  29.1         Lab 02/02/25  1201 02/01/25  1232 02/01/25  1231   SODIUM 138  --  137   POTASSIUM 4.9  --  4.7   CHLORIDE 100  --  97*   CO2 23.0  --  28.0   ANION GAP 15.0  --  12.0   BUN 16  --  23   CREATININE 1.08 1.60* 1.30*   EGFR 67.7 42.2* 54.2*   GLUCOSE 170*  --  133*   CALCIUM 9.4  --  9.6   HEMOGLOBIN A1C 7.00*  --   --          Lab 02/01/25  1231   ALT (SGPT) 17   AST (SGOT) 23         Lab 02/01/25  1535 02/01/25  1231   HSTROP T 61* 61*   PROTIME  --  14.3   INR  --  1.10         Lab 02/02/25  1201   CHOLESTEROL 137   LDL CHOL 62   HDL CHOL 29*   TRIGLYCERIDES 289*             Brief Urine Lab Results  (Last result in the past 365 days)        Color   Clarity   Blood   Leuk Est   Nitrite   Protein   CREAT   Urine HCG        02/01/25 1425  Yellow   Clear   Negative   Negative   Negative   Negative                 Microbiology Results (last 10 days)       Procedure Component Value - Date/Time    COVID PRE-OP / PRE-PROCEDURE SCREENING ORDER (NO ISOLATION) - Swab, Nasopharynx [[id number]]  (Normal) Collected: 02/01/25 1426    Lab Status: Final result Specimen: Swab from Nasopharynx Updated: 02/01/25 1502    Narrative:      The following orders were created for panel order COVID PRE-OP / PRE-PROCEDURE SCREENING ORDER (NO ISOLATION) - Swab, Nasopharynx.  Procedure                               Abnormality         Status                     ---------                               -----------         ------                     COVID-19 and FLU A/B PCR...[[id number]]  Normal              Final result                 Please view results for these tests on the individual orders.    COVID-19 and FLU A/B PCR, 1 HR TAT - Swab, Nasopharynx [[id number]]  (Normal) Collected: 02/01/25 1426    Lab Status: Final result Specimen: Swab from Nasopharynx Updated: 02/01/25 1502     COVID19 Not Detected     Influenza A PCR Not Detected     Influenza B PCR Not Detected    Narrative:      Fact sheet for providers: https://www.fda.gov/media/266112/download    Fact sheet for patients: https://www.fda.gov/media/942174/download    Test performed by PCR.    Urine Culture - Urine, Urine, Clean Catch [[id number]] Collected: 02/01/25 1425    Lab Status: Final result Specimen: Urine, Clean Catch Updated: 02/02/25 1021     Urine Culture 50,000 CFU/mL Normal Urogenital Jonna    Narrative:      Colonization of the urinary tract without infection is common. Treatment is discouraged unless the patient is symptomatic, pregnant, or undergoing an invasive urologic procedure.            MRI Brain Without Contrast    Result Date: 2/2/2025  MRI BRAIN WO CONTRAST Date of Exam: 2/2/2025 3:50 PM EST Indication: Stroke, follow up slurred speech, hx of multiple strokes.  Comparison: Noncontrast head CT and  CTA/CT perfusion dated 2/1/2025, brain MRI dated 7/29/2021 Technique:  Routine multiplanar/multisequence sequence images of the brain were obtained without contrast administration. FINDINGS: Examination is significantly limited due to motion artifact. Foci of T2/FLAIR signal hyperintensity are seen within the bilateral hemispheric white matter. There is encephalomalacia centered within the right occipital lobe. There is asymmetric SWI signal  hypointensity associated with the body of the right lateral ventricle which may be related to calcifications or chronic blood products. Midline structures appear unremarkable. No significant mass effect, intracranial hemorrhage, or hydrocephalus is identified. Diffusion-weighted sequences demonstrate no acute infarct.The visualized intracranial flow-voids appear unremarkable. Mucous retention cyst within the right maxillary sinus. Bilateral lens prostheses. The visualized superficial soft tissues and cervical spine demonstrate no significant abnormality.     1.Examination is significantly limited due to motion artifact. 2.Chronic ischemic changes and diffuse cortical atrophy. Evidence of a remote right PCA territory infarct. 3.No diffusion restriction is identified to suggest acute infarct. Electronically Signed: Jacob Poe MD  2/2/2025 5:15 PM EST  Workstation ID: [id number]    CT Angiogram Head w AI Analysis of LVO    Result Date: 2/1/2025  CT CEREBRAL PERFUSION W WO CONTRAST, CT ANGIOGRAM NECK, CT ANGIOGRAM HEAD W AI ANALYSIS OF LVO Date of Exam: 2/1/2025 12:29 PM EST Indication: Neuro Deficit, acute, Stroke suspected Neuro deficit, acute stroke suspected.  Comparison: Concurrent noncontrast head CT, CTA head and neck 7/28/2021 Technique: Axial CT images of the brain were obtained prior to and after the administration of 115 mL Isovue-370. Core blood volume, core blood flow, mean transit time, and Tmax images were obtained utilizing the Rapid software protocol. A limited  CT angiogram of the head was also performed to measure the blood vessel density. CTA of the head and neck was performed after the uneventful intravenous administration of above contrast. Reconstructed coronal and sagittal images were also obtained. In addition, a 3-D volume rendered image was created for interpretation. Automated exposure control and iterative reconstruction methods were used. The radiation dose reduction device was turned on for each scan per the ALARA (As Low as Reasonably Achievable) protocol. Findings: CT cerebral perfusion: No core infarct or ischemic tissue at risk. There is a tiny perfusion defect associated with a chronic right PCA infarct. There is mild patchy hypoperfusion in the watershed areas, nonspecific. CTA head and neck: No abrupt cut off/large vessel occlusion. Chronic occlusion of the right PCA at the distal P2 segment. There is short segment stenosis of the proximal left cervical ICA on the basis of eccentric calcified noncalcified atherosclerotic plaque, with moderate to severe stenosis of approximately 80% involving a segment measuring 8 mm in length (series 7 image 206-231, series 904 image 13). This is similar compared to prior CTA. Heavy atherosclerosis at the left subclavian artery origin. There is atherosclerosis of the aortic arch. 2 mm posteriorly-directed infundibulum at the right carotid terminus (series 7 image 360-364). Emphysema in the upper lungs. The patient is edentulous. No acute or suspicious bony findings.     Impression: No core infarct or ischemic tissue at risk. No abrupt cut off/large vessel occlusion. Chronic occlusion of the right PCA at the distal P2 segment. Short segment moderate to severe stenosis of the proximal left cervical ICA on the basis of eccentric calcified and noncalcified atherosclerotic plaque, with stenosis of approximately 80% involving a segment measuring 8 mm in length. This appears similar  compared to prior CTA from 2021. 2 mm  posteriorly-directed infundibulum at the right carotid terminus. There is evidence of emphysema. Correlate with patient history and risk factors and please assess if the patient meets criteria for routine low dose CT lung cancer screening. Electronically Signed: Vicente Castellanos MD  2/1/2025 1:06 PM EST  Workstation ID: [id number]    CT Angiogram Neck    Result Date: 2/1/2025  CT CEREBRAL PERFUSION W WO CONTRAST, CT ANGIOGRAM NECK, CT ANGIOGRAM HEAD W AI ANALYSIS OF LVO Date of Exam: 2/1/2025 12:29 PM EST Indication: Neuro Deficit, acute, Stroke suspected Neuro deficit, acute stroke suspected.  Comparison: Concurrent noncontrast head CT, CTA head and neck 7/28/2021 Technique: Axial CT images of the brain were obtained prior to and after the administration of 115 mL Isovue-370. Core blood volume, core blood flow, mean transit time, and Tmax images were obtained utilizing the Rapid software protocol. A limited CT angiogram of the head was also performed to measure the blood vessel density. CTA of the head and neck was performed after the uneventful intravenous administration of above contrast. Reconstructed coronal and sagittal images were also obtained. In addition, a 3-D volume rendered image was created for interpretation. Automated exposure control and iterative reconstruction methods were used. The radiation dose reduction device was turned on for each scan per the ALARA (As Low as Reasonably Achievable) protocol. Findings: CT cerebral perfusion: No core infarct or ischemic tissue at risk. There is a tiny perfusion defect associated with a chronic right PCA infarct. There is mild patchy hypoperfusion in the watershed areas, nonspecific. CTA head and neck: No abrupt cut off/large vessel occlusion. Chronic occlusion of the right PCA at the distal P2 segment. There is short segment stenosis of the proximal left cervical ICA on the basis of eccentric calcified noncalcified atherosclerotic plaque, with moderate to severe  stenosis of approximately 80% involving a segment measuring 8 mm in length (series 7 image 206-231, series 904 image 13). This is similar compared to prior CTA. Heavy atherosclerosis at the left subclavian artery origin. There is atherosclerosis of the aortic arch. 2 mm posteriorly-directed infundibulum at the right carotid terminus (series 7 image 360-364). Emphysema in the upper lungs. The patient is edentulous. No acute or suspicious bony findings.     Impression: No core infarct or ischemic tissue at risk. No abrupt cut off/large vessel occlusion. Chronic occlusion of the right PCA at the distal P2 segment. Short segment moderate to severe stenosis of the proximal left cervical ICA on the basis of eccentric calcified and noncalcified atherosclerotic plaque, with stenosis of approximately 80% involving a segment measuring 8 mm in length. This appears similar  compared to prior CTA from 2021. 2 mm posteriorly-directed infundibulum at the right carotid terminus. There is evidence of emphysema. Correlate with patient history and risk factors and please assess if the patient meets criteria for routine low dose CT lung cancer screening. Electronically Signed: Vicente Castellanos MD  2/1/2025 1:06 PM EST  Workstation ID: [id number]    CT CEREBRAL PERFUSION WITH & WITHOUT CONTRAST    Result Date: 2/1/2025  CT CEREBRAL PERFUSION W WO CONTRAST, CT ANGIOGRAM NECK, CT ANGIOGRAM HEAD W AI ANALYSIS OF LVO Date of Exam: 2/1/2025 12:29 PM EST Indication: Neuro Deficit, acute, Stroke suspected Neuro deficit, acute stroke suspected.  Comparison: Concurrent noncontrast head CT, CTA head and neck 7/28/2021 Technique: Axial CT images of the brain were obtained prior to and after the administration of 115 mL Isovue-370. Core blood volume, core blood flow, mean transit time, and Tmax images were obtained utilizing the Rapid software protocol. A limited CT angiogram of the head was also performed to measure the blood vessel density. CTA of  the head and neck was performed after the uneventful intravenous administration of above contrast. Reconstructed coronal and sagittal images were also obtained. In addition, a 3-D volume rendered image was created for interpretation. Automated exposure control and iterative reconstruction methods were used. The radiation dose reduction device was turned on for each scan per the ALARA (As Low as Reasonably Achievable) protocol. Findings: CT cerebral perfusion: No core infarct or ischemic tissue at risk. There is a tiny perfusion defect associated with a chronic right PCA infarct. There is mild patchy hypoperfusion in the watershed areas, nonspecific. CTA head and neck: No abrupt cut off/large vessel occlusion. Chronic occlusion of the right PCA at the distal P2 segment. There is short segment stenosis of the proximal left cervical ICA on the basis of eccentric calcified noncalcified atherosclerotic plaque, with moderate to severe stenosis of approximately 80% involving a segment measuring 8 mm in length (series 7 image 206-231, series 904 image 13). This is similar compared to prior CTA. Heavy atherosclerosis at the left subclavian artery origin. There is atherosclerosis of the aortic arch. 2 mm posteriorly-directed infundibulum at the right carotid terminus (series 7 image 360-364). Emphysema in the upper lungs. The patient is edentulous. No acute or suspicious bony findings.     Impression: No core infarct or ischemic tissue at risk. No abrupt cut off/large vessel occlusion. Chronic occlusion of the right PCA at the distal P2 segment. Short segment moderate to severe stenosis of the proximal left cervical ICA on the basis of eccentric calcified and noncalcified atherosclerotic plaque, with stenosis of approximately 80% involving a segment measuring 8 mm in length. This appears similar  compared to prior CTA from 2021. 2 mm posteriorly-directed infundibulum at the right carotid terminus. There is evidence of  emphysema. Correlate with patient history and risk factors and please assess if the patient meets criteria for routine low dose CT lung cancer screening. Electronically Signed: Vicente Castellanos MD  2/1/2025 1:06 PM EST  Workstation ID: [id number]    XR Chest 1 View    Result Date: 2/1/2025  XR CHEST 1 VW Date of Exam: 2/1/2025 12:24 PM EST Indication: Acute Stroke Protocol (onset < 12 hrs) Comparison: Chest x-ray 2/27/2021 Findings: Cardiomegaly. Lungs normally expanded. Radiopacities overlying the patient could be outside or within the patient zipper artifact upper chest. Linear artifact over the left heart. No pneumothorax or pleural effusion or focal pulmonary parenchymal opacity. Osteopenia. Degenerative changes of the acromioclavicular joints and glenohumeral joints.     Impression: No acute cardiopulmonary abnormality. Cardiomegaly. Probable overlying artifacts. Cannot exclude radiopaque foreign body overlying the left aspect of heart. Electronically Signed: Damaris Trotter MD  2/1/2025 1:01 PM EST  Workstation ID: [id number]    CT Pelvis Without Contrast    Result Date: 2/1/2025  CT PELVIS WO CONTRAST Date of Exam: 2/1/2025 12:31 PM EST Indication: fall 2 days ago, left hip pain. Comparison: CT pelvis 1/10/2021 Technique: Axial CT images were obtained of the pelvis without contrast administration.  Reconstructed coronal and sagittal images were also obtained. Automated exposure control and iterative construction methods were used. Findings: No obvious superficial contusion or intrapelvic hematoma. No acute fracture or traumatic malalignment. Chronic ossicle is noted at the superolateral aspect of the left hip likely reflecting degenerative labral ossification or os acetabuli. There are symmetric mild to moderate osteoarthritic changes of the hip joints. There is degenerative disc disease at L5-S1. The SI joints and pubic symphysis are congruent. There is sigmoid diverticulosis without diverticulitis. There is heavy  atherosclerosis of the abdominal aorta and its branches. No acute or suspicious findings within the pelvis or partially imaged lower abdomen.     Impression: No acute fracture or traumatic malalignment. Evaluation of hip pain in the setting of recent trauma should begin with x-ray, not CT, per ACR appropriateness criteria. Electronically Signed: Vicente Castellanos MD  2/1/2025 12:45 PM EST  Workstation ID: [id number]    CT Head Without Contrast Stroke Protocol    Result Date: 2/1/2025  CT HEAD WO CONTRAST STROKE PROTOCOL Date of Exam: 2/1/2025 12:22 PM EST Indication: Neuro deficit, acute, stroke suspected Neuro Deficit, acute, Stroke suspected. Comparison: Head CT 7/28/2021, brain MRI 7/29/2021 Technique: Axial CT images were obtained of the head without contrast administration.  Reconstructed coronal images were also obtained. Automated exposure control and iterative construction methods were used. Scan Time: 12:23 p.m. 2/1/2025 Results discussed with stroke navigator by Dr. Vicente Castellanos via telephone at 12:28 p.m. 2/1/2025. Findings: Redemonstration of chronic right PCA infarct. Redemonstration of chronic infarct in the left frontal lobe. There are extensive subcortical and periventricular white matter hypodensities suggestive of chronic small vessel ischemic change and/or chronic infarcts, overall appearing similar compared to prior head CT. No evidence of new/acute large territory infarct. There are small chronic infarcts in the left cerebellum similar to prior. No acute intracranial hemorrhage. No extra-axial collections. No midline shift or herniation. Normal size and configuration of the ventricles. There is intracranial atherosclerosis. Normal appearance of the orbits. There is a mucous retention cyst in the right maxillary sinus with otherwise clear paranasal sinuses. The mastoid air cells are clear. No acute or suspicious bony findings.     Impression: No acute intracranial findings. Similar chronic and  senescent changes which includes large chronic right PCA territory infarct and multiple additional scattered chronic supratentorial and infratentorial infarcts and white matter changes suggestive of chronic small vessel ischemic change. Electronically Signed: Vicente Castellanos MD  2/1/2025 12:32 PM EST  Workstation ID: [id number]     Results for orders placed during the hospital encounter of 12/01/20    Duplex Carotid Ultrasound CAR    Interpretation Summary  · Left internal carotid artery stenosis of >70%.  · Left vertebral artery is bidirectional.  · Right internal carotid artery stenosis of 50-69%.      Results for orders placed during the hospital encounter of 12/01/20    Duplex Carotid Ultrasound CAR    Interpretation Summary  · Left internal carotid artery stenosis of >70%.  · Left vertebral artery is bidirectional.  · Right internal carotid artery stenosis of 50-69%.      Results for orders placed during the hospital encounter of 02/01/25    Adult Transthoracic Echo Complete W/ Cont if Necessary Per Protocol (With Agitated Saline)    Interpretation Summary    Left ventricular systolic function is normal. Calculated left ventricular EF = 56.3%    Left ventricular wall thickness is consistent with hypertrophy.    Left ventricular diastolic function was normal.    The left atrial cavity is moderately dilated.    Left atrial volume is severely increased.    There is calcification of the aortic valve.    Estimated right ventricular systolic pressure from tricuspid regurgitation is mildly elevated (35-45 mmHg). Calculated right ventricular systolic pressure from tricuspid regurgitation is 41 mmHg.      Plan for Follow-up of Pending Labs/Results:     Discharge Details        Discharge Medications        Continue These Medications        Instructions Start Date   acetaminophen 325 MG tablet  Commonly known as: TYLENOL   650 mg, Oral, 2 Times Daily, Scheduled + PRN dose if needed      amLODIPine 5 MG tablet  Commonly  known as: NORVASC   5 mg, Daily      aspirin 81 MG chewable tablet   81 mg, Daily      atorvastatin 10 MG tablet  Commonly known as: LIPITOR   10 mg, Nightly      Baclofen 5 MG tablet  Commonly known as: LIORESAL   5 mg, 2 Times Daily      baclofen 10 MG tablet  Commonly known as: LIORESAL   10 mg, Nightly      Divalproex Sodium 125 MG capsule  Commonly known as: DEPAKOTE SPRINKLE   125 mg, 2 Times Daily      furosemide 20 MG tablet  Commonly known as: LASIX   20 mg, Oral, Daily PRN, ON TUES AND SAT       hydrALAZINE 10 MG tablet  Commonly known as: APRESOLINE   10 mg, 2 Times Daily PRN      metFORMIN  MG 24 hr tablet  Commonly known as: GLUCOPHAGE-XR   750 mg, 2 Times Daily      metoprolol tartrate 25 MG tablet  Commonly known as: LOPRESSOR   37.5 mg, 2 Times Daily      ondansetron ODT 4 MG disintegrating tablet  Commonly known as: ZOFRAN-ODT   4 mg, Every 12 Hours PRN      polyethylene glycol 17 g packet  Commonly known as: MIRALAX   17 g, 3 Times Weekly      Polyvinyl Alcohol-Povidone PF 1.4-0.6 % ophthalmic solution  Commonly known as: ARTIFICIAL TEARS   1 drop, 2 Times Daily      sertraline 25 MG tablet  Commonly known as: ZOLOFT   25 mg, Daily      traZODone 50 MG tablet  Commonly known as: DESYREL   50 mg, Nightly      vitamin B-12 1000 MCG tablet  Commonly known as: CYANOCOBALAMIN   1,000 mcg, Daily      Vitamin D3 50 MCG (2000 UT) tablet   2,000 Units, Daily               Allergies   Allergen Reactions    Codeine Mental Status Change     hyper         Discharge Disposition:  Skilled Nursing Facility (DC - External)    Diet:  Hospital:  Diet Order   Procedures    Diet: Diabetic, Cardiac; Healthy Heart (2-3 Na+); Consistent Carbohydrate; Fluid Consistency: Thin (IDDSI 0)            Activity:      Restrictions or Other Recommendations:       CODE STATUS:    Code Status and Medical Interventions: No CPR (Do Not Attempt to Resuscitate); Limited Support; No intubation (DNI)   Ordered at: 02/01/25 5835      Medical Intervention Limits:    No intubation (DNI)     Level Of Support Discussed With:    Next of Kin (If No Surrogate)    Health Care Surrogate     Code Status (Patient has no pulse and is not breathing):    No CPR (Do Not Attempt to Resuscitate)     Medical Interventions (Patient has pulse or is breathing):    Limited Support       Future Appointments   Date Time Provider Department Center   2/3/2025  3:00 PM MED [facility]  [facility]       Additional Instructions for the Follow-ups that You Need to Schedule       Discharge Follow-up with PCP   As directed       Currently Documented PCP:    Provider, No Known    PCP Phone Number:    None     Follow Up Details: at [place]                      Felicita Berger DO  02/03/25      Time Spent on Discharge:  I spent  35  minutes on this discharge activity which included: face-to-face encounter with the patient, reviewing the data in the system, coordination of the care with the nursing staff as well as consultants, documentation, and entering orders.

## 2025-02-03 NOTE — NURSING NOTE
WOC consulted for skin tears to arms.    Please follow standing orders for management of skin tears.    Cleanse wounds with normal saline.  Apply multilayer Xeroform to the skin tear.  Wrap wound with rolled gauze.  Secure with stockinette dressing.  Avoid adhesives directly to patient's skin to prevent further skin tears from occurring.      Inflate waffle overlay mattress if not already completed.  Turn patient every 2 hours.  Apply Allevyn dressings to sacrum and heels.  Follow pressure injury prevention protocol.    WOC will sign off.    Dipesh Wilkes RN, BSN, CWOCN  Wound, Ostomy and Continence (WOC) Department  [facility]

## 2025-02-04 NOTE — PROGRESS NOTES
Stroke Progress Note       Chief Complaint: Altered mentation    Subjective    Subjective     Subjective:    Patient examined sitting upright in bed with wife at bedside.  Patient does have some baseline dementia, however wife states that currently she feels he is at his baseline level of function.  Discussed results of MRI which were negative for acute ischemic stroke with patient and wife.  Given negative stroke workup and return to baseline, wife is requesting discharge back to [facility].  I believe this is appropriate.  I did discuss with the wife briefly patient's diagnosis of A-fib not currently on anticoagulation.  Wife stated that following his prior right PCA stroke, Eliquis was initiated the following day and the patient had a hemorrhagic conversion.  Due to this she is refusing any current anticoagulation or addition of Plavix/heparin.  I did discuss possibility that Eliquis was initiated too quickly following stroke however at this time patient and family are deferring any additional antiplatelet or anticoagulation.  No additional questions or concerns    Review of Systems  Gen- No fevers, chills  CV- No chest pain, palpitations  Resp- No cough, dyspnea  GI- No N/V/D, abd pain    Objective      Temp:  [97.5 °F (36.4 °C)-98.5 °F (36.9 °C)] 98.5 °F (36.9 °C)  Heart Rate:  [65-89] 67  Resp:  [16-18] 16  BP: (132-150)/(67-96) 132/67    Neurological Exam  Mental Status  Awake. Oriented only to person. Speech is normal. Language is fluent with no aphasia.    Cranial Nerves  CN II: Right visual acuity: Normal. Patient is blind in left eye.  No visual field deficit appreciated for right eye.  CN III, IV, VI: Extraocular movements intact bilaterally. Normal lids and orbits bilaterally.  CN V:  Right: Facial sensation is normal.  Left: Facial sensation is normal on the left.  CN VII:  Right: There is no facial weakness.  Left: There is no facial weakness.    Motor  Normal muscle bulk throughout. No fasciculations  present. Normal muscle tone. Strength is 5/5 in all four extremities except as noted.  LUE 2/5, chronic contraction of left hand-this is baseline  RUE 4/5  LLE 3/5-this is his baseline  RLE/5  .    Sensory  Light touch abnormality: Decreased sensation noted to left upper extremity and bilateral lower extremities.     Coordination  Right: Finger-to-nose abnormality:Left: Finger-to-nose abnormality:  Some dysmetria to right and left finger-nose testing possibly due to some visual deficits.    Gait    Deferred.     Interval: baseline  1a. Level of Consciousness: 0-->Alert, keenly responsive  1b. LOC Questions: 1-->Answers one question correctly  1c. LOC Commands: 0-->Performs both tasks correctly  2. Best Gaze: 1-->Partial gaze palsy, gaze is abnormal in one or both eyes, but forced deviation or total gaze paresis is not present  3. Visual: 1-->Partial hemianopia  4. Facial Palsy: 0-->Normal symmetrical movements  5a. Motor Arm, Left: 1-->Drift, limb holds 90 (or 45) degrees, but drifts down before full 10 seconds, does not hit bed or other support  5b. Motor Arm, Right: 0-->No drift, limb holds 90 (or 45) degrees for full 10 secs  6a. Motor Leg, Left: 1-->Drift, leg falls by the end of the 5-sec period but does not hit bed  6b. Motor Leg, Right: 1-->Drift, leg falls by the end of the 5-sec period but does not hit bed  7. Limb Ataxia: 2-->Present in two limbs  8. Sensory: 2-->Severe to total sensory loss, patient is not aware of being touched in the face, arm, and leg  9. Best Language: 0-->No aphasia, normal  10. Dysarthria: 0-->Normal  11. Extinction and Inattention (formerly Neglect): 0-->No abnormality    Total (NIH Stroke Scale): 10     Results Review:    I reviewed the patient's new clinical results.    Lab Results (last 24 hours)       Procedure Component Value Units Date/Time    POC Glucose Once [[id number]]  (Abnormal) Collected: 02/03/25 1119    Specimen: Blood Updated: 02/03/25 1133     Glucose 181 mg/dL      POC Glucose Once [[id number]]  (Abnormal) Collected: 02/03/25 0728    Specimen: Blood Updated: 02/03/25 0745     Glucose 178 mg/dL     POC Glucose Once [[id number]]  (Abnormal) Collected: 02/02/25 2003    Specimen: Blood Updated: 02/02/25 2005     Glucose 188 mg/dL           CBC w/diff          2/1/2025    12:31 2/1/2025    12:32 2/2/2025    12:01   CBC w/Diff   WBC 7.72   8.01    RBC 3.59   3.69    Hemoglobin 11.8  11.9  12.1    Hematocrit 36.5  35  38.2    .7   103.5    MCH 32.9   32.8    MCHC 32.3   31.7    RDW 13.7   13.8    Platelets 172   151    Neutrophil Rel % 63.8      Immature Granulocyte Rel % 0.5      Lymphocyte Rel % 15.8      Monocyte Rel % 10.8      Eosinophil Rel % 7.9      Basophil Rel % 1.2         Lab Results   Component Value Date    GLUCOSE 170 (H) 02/02/2025    BUN 16 02/02/2025    CREATININE 1.08 02/02/2025     02/02/2025    K 4.9 02/02/2025     02/02/2025    CALCIUM 9.4 02/02/2025    PROTEINTOT 6.6 01/08/2022    ALBUMIN 4.10 01/08/2022    ALT 17 02/01/2025    AST 23 02/01/2025    ALKPHOS 56 01/08/2022    BILITOT 0.5 01/08/2022    GLOB 2.5 01/08/2022    AGRATIO 1.6 01/08/2022    BCR 14.8 02/02/2025    ANIONGAP 15.0 02/02/2025    EGFR 67.7 02/02/2025          Lab 02/02/25  1201   HEMOGLOBIN A1C 7.00*      Lipid Panel          2/2/2025    12:01   Lipid Panel   Total Cholesterol 137    Triglycerides 289    HDL Cholesterol 29    VLDL Cholesterol 46    LDL Cholesterol  62    LDL/HDL Ratio 1.73         MRI Brain Without Contrast    Result Date: 2/2/2025  1.Examination is significantly limited due to motion artifact. 2.Chronic ischemic changes and diffuse cortical atrophy. Evidence of a remote right PCA territory infarct. 3.No diffusion restriction is identified to suggest acute infarct. Electronically Signed: Jacob Poe MD  2/2/2025 5:15 PM EST  Workstation ID: [id number]   Results for orders placed during the hospital encounter of 02/01/25    Adult Transthoracic Echo Complete W/  Cont if Necessary Per Protocol (With Agitated Saline)    Interpretation Summary    Left ventricular systolic function is normal. Calculated left ventricular EF = 56.3%    Left ventricular wall thickness is consistent with hypertrophy.    Left ventricular diastolic function was normal.    The left atrial cavity is moderately dilated.    Left atrial volume is severely increased.    There is calcification of the aortic valve.    Estimated right ventricular systolic pressure from tricuspid regurgitation is mildly elevated (35-45 mmHg). Calculated right ventricular systolic pressure from tricuspid regurgitation is 41 mmHg.    Assessment/Plan     Assessment/Plan:  84-year-old with prior history of right PCA stroke, residual left-sided weakness, who lives in a [facility] nursing home had an acute state and mentation lasting more than 24 hours.  On evaluation the emergency department, patient mentation was improving.    # History of CVA with residual left hemiparesis  # Altered mental status, resolved to baseline  # AFIB  -Suspected episode of metabolic encephalopathy.   -MRI without evidence of acute ischemic stroke  -Left ICA stenosis unchanged from 2021 on CTAs  -Continue aspirin 81 mg  -Patient family wife at the bedside adamantly refuses for the patient to be on any other antithrombotic agent like Plavix or heparin or Eliquis.  -No additional stroke workup warranted    # Hyperlipidemia  -LDL 62  -Continue home statin    # Hypertension  -May normalize blood pressure goals at this time  -Continue home antihypertensives  -Please avoid hypotension    Discussed the importance of medication compliance Aspirin 81mg daily and lifestyle modifications adequate control of blood pressure, adequate control of cholesterol (goal LDL <70), adequate control of glucose (<140, A1c goal <7), and implementation of healthy diet to help reduce the risk of future cerebrovascular events.  Also discussed the signs symptoms that would warrant the  patient return back to the emergency department including unilateral weakness, unilateral numbness, visual disturbances, loss of balance, speech difficulties, and/or a sudden severe headache.      Disposition: Discharge to assisted living facility    Neurology stroke will sign off at this time.  Plan of care discussed with Dr. Leal and Dr. Berger.  Please call with any questions or concerns    Dain Espinoza PA-C  02/03/25  19:45 EST

## 2025-02-05 LAB
QT INTERVAL: 436 MS
QTC INTERVAL: 483 MS

## 2025-03-30 ENCOUNTER — APPOINTMENT (OUTPATIENT)
Dept: CT IMAGING | Facility: HOSPITAL | Age: 85
End: 2025-03-30
Payer: MEDICARE

## 2025-03-30 ENCOUNTER — APPOINTMENT (OUTPATIENT)
Dept: GENERAL RADIOLOGY | Facility: HOSPITAL | Age: 85
End: 2025-03-30
Payer: MEDICARE

## 2025-03-30 ENCOUNTER — APPOINTMENT (OUTPATIENT)
Dept: CARDIOLOGY | Facility: HOSPITAL | Age: 85
End: 2025-03-30
Payer: MEDICARE

## 2025-03-30 ENCOUNTER — HOSPITAL ENCOUNTER (INPATIENT)
Facility: HOSPITAL | Age: 85
LOS: 4 days | Discharge: NURSING, REHAB OR LONG-TERM CARE | End: 2025-04-03
Attending: EMERGENCY MEDICINE | Admitting: INTERNAL MEDICINE
Payer: MEDICARE

## 2025-03-30 DIAGNOSIS — R09.02 HYPOXIA: ICD-10-CM

## 2025-03-30 DIAGNOSIS — I50.9 ACUTE ON CHRONIC CONGESTIVE HEART FAILURE, UNSPECIFIED HEART FAILURE TYPE: Primary | ICD-10-CM

## 2025-03-30 DIAGNOSIS — R41.0 CONFUSION: ICD-10-CM

## 2025-03-30 DIAGNOSIS — I95.9 HYPOTENSION, UNSPECIFIED HYPOTENSION TYPE: ICD-10-CM

## 2025-03-30 DIAGNOSIS — J18.9 PNEUMONIA OF RIGHT UPPER LOBE DUE TO INFECTIOUS ORGANISM: ICD-10-CM

## 2025-03-30 DIAGNOSIS — N28.9 RENAL INSUFFICIENCY: ICD-10-CM

## 2025-03-30 DIAGNOSIS — K80.20 CALCULUS OF GALLBLADDER WITHOUT CHOLECYSTITIS WITHOUT OBSTRUCTION: ICD-10-CM

## 2025-03-30 PROBLEM — F03.90 DEMENTIA: Status: ACTIVE | Noted: 2025-03-30

## 2025-03-30 PROBLEM — I50.33 ACUTE ON CHRONIC DIASTOLIC CHF (CONGESTIVE HEART FAILURE): Status: ACTIVE | Noted: 2025-03-30

## 2025-03-30 PROBLEM — I50.33 ACUTE ON CHRONIC DIASTOLIC HEART FAILURE: Status: ACTIVE | Noted: 2025-03-30

## 2025-03-30 LAB
ALBUMIN SERPL-MCNC: 4.3 G/DL (ref 3.5–5.2)
ALBUMIN/GLOB SERPL: 1.5 G/DL
ALP SERPL-CCNC: 59 U/L (ref 39–117)
ALT SERPL W P-5'-P-CCNC: 10 U/L (ref 1–41)
ANION GAP SERPL CALCULATED.3IONS-SCNC: 14 MMOL/L (ref 5–15)
AST SERPL-CCNC: 16 U/L (ref 1–40)
B PARAPERT DNA SPEC QL NAA+PROBE: NOT DETECTED
B PERT DNA SPEC QL NAA+PROBE: NOT DETECTED
BACTERIA UR QL AUTO: ABNORMAL /HPF
BASOPHILS # BLD AUTO: 0.05 10*3/MM3 (ref 0–0.2)
BASOPHILS NFR BLD AUTO: 0.6 % (ref 0–1.5)
BILIRUB SERPL-MCNC: 0.5 MG/DL (ref 0–1.2)
BILIRUB UR QL STRIP: NEGATIVE
BUN SERPL-MCNC: 19 MG/DL (ref 8–23)
BUN/CREAT SERPL: 13 (ref 7–25)
C PNEUM DNA NPH QL NAA+NON-PROBE: NOT DETECTED
CALCIUM SPEC-SCNC: 9.4 MG/DL (ref 8.6–10.5)
CHLORIDE SERPL-SCNC: 100 MMOL/L (ref 98–107)
CLARITY UR: CLEAR
CO2 SERPL-SCNC: 24 MMOL/L (ref 22–29)
COLOR UR: YELLOW
CREAT SERPL-MCNC: 1.46 MG/DL (ref 0.76–1.27)
D DIMER PPP FEU-MCNC: 0.79 MCGFEU/ML (ref 0–0.84)
DEPRECATED RDW RBC AUTO: 54.6 FL (ref 37–54)
EGFRCR SERPLBLD CKD-EPI 2021: 47.1 ML/MIN/1.73
EOSINOPHIL # BLD AUTO: 0.2 10*3/MM3 (ref 0–0.4)
EOSINOPHIL NFR BLD AUTO: 2.3 % (ref 0.3–6.2)
ERYTHROCYTE [DISTWIDTH] IN BLOOD BY AUTOMATED COUNT: 14.5 % (ref 12.3–15.4)
FLUAV SUBTYP SPEC NAA+PROBE: NOT DETECTED
FLUBV RNA ISLT QL NAA+PROBE: NOT DETECTED
GEN 5 1HR TROPONIN T REFLEX: 69 NG/L
GLOBULIN UR ELPH-MCNC: 2.9 GM/DL
GLUCOSE BLDC GLUCOMTR-MCNC: 136 MG/DL (ref 70–130)
GLUCOSE BLDC GLUCOMTR-MCNC: 146 MG/DL (ref 70–130)
GLUCOSE SERPL-MCNC: 137 MG/DL (ref 65–99)
GLUCOSE UR STRIP-MCNC: NEGATIVE MG/DL
HADV DNA SPEC NAA+PROBE: NOT DETECTED
HCOV 229E RNA SPEC QL NAA+PROBE: NOT DETECTED
HCOV HKU1 RNA SPEC QL NAA+PROBE: NOT DETECTED
HCOV NL63 RNA SPEC QL NAA+PROBE: NOT DETECTED
HCOV OC43 RNA SPEC QL NAA+PROBE: NOT DETECTED
HCT VFR BLD AUTO: 31.7 % (ref 37.5–51)
HGB BLD-MCNC: 10 G/DL (ref 13–17.7)
HGB UR QL STRIP.AUTO: NEGATIVE
HMPV RNA NPH QL NAA+NON-PROBE: NOT DETECTED
HOLD SPECIMEN: NORMAL
HPIV1 RNA ISLT QL NAA+PROBE: NOT DETECTED
HPIV2 RNA SPEC QL NAA+PROBE: NOT DETECTED
HPIV3 RNA NPH QL NAA+PROBE: NOT DETECTED
HPIV4 P GENE NPH QL NAA+PROBE: NOT DETECTED
HYALINE CASTS UR QL AUTO: ABNORMAL /LPF
IMM GRANULOCYTES # BLD AUTO: 0.08 10*3/MM3 (ref 0–0.05)
IMM GRANULOCYTES NFR BLD AUTO: 0.9 % (ref 0–0.5)
KETONES UR QL STRIP: ABNORMAL
LEUKOCYTE ESTERASE UR QL STRIP.AUTO: ABNORMAL
LYMPHOCYTES # BLD AUTO: 0.51 10*3/MM3 (ref 0.7–3.1)
LYMPHOCYTES NFR BLD AUTO: 5.9 % (ref 19.6–45.3)
M PNEUMO IGG SER IA-ACNC: NOT DETECTED
MAGNESIUM SERPL-MCNC: 1.6 MG/DL (ref 1.6–2.4)
MCH RBC QN AUTO: 32.7 PG (ref 26.6–33)
MCHC RBC AUTO-ENTMCNC: 31.5 G/DL (ref 31.5–35.7)
MCV RBC AUTO: 103.6 FL (ref 79–97)
MONOCYTES # BLD AUTO: 0.66 10*3/MM3 (ref 0.1–0.9)
MONOCYTES NFR BLD AUTO: 7.6 % (ref 5–12)
NEUTROPHILS NFR BLD AUTO: 7.15 10*3/MM3 (ref 1.7–7)
NEUTROPHILS NFR BLD AUTO: 82.7 % (ref 42.7–76)
NITRITE UR QL STRIP: NEGATIVE
NRBC BLD AUTO-RTO: 0 /100 WBC (ref 0–0.2)
NT-PROBNP SERPL-MCNC: 2297 PG/ML (ref 0–1800)
PH UR STRIP.AUTO: <=5 [PH] (ref 5–8)
PLATELET # BLD AUTO: 164 10*3/MM3 (ref 140–450)
PMV BLD AUTO: 10.7 FL (ref 6–12)
POTASSIUM SERPL-SCNC: 4.9 MMOL/L (ref 3.5–5.2)
PROCALCITONIN SERPL-MCNC: 0.09 NG/ML (ref 0–0.25)
PROT SERPL-MCNC: 7.2 G/DL (ref 6–8.5)
PROT UR QL STRIP: ABNORMAL
QT INTERVAL: 478 MS
QT INTERVAL: 498 MS
QTC INTERVAL: 457 MS
QTC INTERVAL: 467 MS
RBC # BLD AUTO: 3.06 10*6/MM3 (ref 4.14–5.8)
RBC # UR STRIP: ABNORMAL /HPF
REF LAB TEST METHOD: ABNORMAL
RHINOVIRUS RNA SPEC NAA+PROBE: NOT DETECTED
RSV RNA NPH QL NAA+NON-PROBE: NOT DETECTED
SARS-COV-2 RNA NPH QL NAA+NON-PROBE: NOT DETECTED
SODIUM SERPL-SCNC: 138 MMOL/L (ref 136–145)
SP GR UR STRIP: 1.02 (ref 1–1.03)
SQUAMOUS #/AREA URNS HPF: ABNORMAL /HPF
TROPONIN T % DELTA: -9
TROPONIN T NUMERIC DELTA: -7 NG/L
TROPONIN T SERPL HS-MCNC: 76 NG/L
TSH SERPL DL<=0.05 MIU/L-ACNC: 3.88 UIU/ML (ref 0.27–4.2)
UROBILINOGEN UR QL STRIP: ABNORMAL
VALPROATE SERPL-MCNC: 29.9 MCG/ML (ref 50–125)
WBC # UR STRIP: ABNORMAL /HPF
WBC NRBC COR # BLD AUTO: 8.65 10*3/MM3 (ref 3.4–10.8)
WHOLE BLOOD HOLD COAG: NORMAL
WHOLE BLOOD HOLD SPECIMEN: NORMAL

## 2025-03-30 PROCEDURE — 0202U NFCT DS 22 TRGT SARS-COV-2: CPT | Performed by: EMERGENCY MEDICINE

## 2025-03-30 PROCEDURE — 70450 CT HEAD/BRAIN W/O DYE: CPT

## 2025-03-30 PROCEDURE — 82948 REAGENT STRIP/BLOOD GLUCOSE: CPT

## 2025-03-30 PROCEDURE — 80164 ASSAY DIPROPYLACETIC ACD TOT: CPT | Performed by: EMERGENCY MEDICINE

## 2025-03-30 PROCEDURE — 84443 ASSAY THYROID STIM HORMONE: CPT | Performed by: EMERGENCY MEDICINE

## 2025-03-30 PROCEDURE — 25010000002 PIPERACILLIN SOD-TAZOBACTAM PER 1 G: Performed by: INTERNAL MEDICINE

## 2025-03-30 PROCEDURE — 80053 COMPREHEN METABOLIC PANEL: CPT | Performed by: EMERGENCY MEDICINE

## 2025-03-30 PROCEDURE — 84484 ASSAY OF TROPONIN QUANT: CPT | Performed by: EMERGENCY MEDICINE

## 2025-03-30 PROCEDURE — 81001 URINALYSIS AUTO W/SCOPE: CPT | Performed by: EMERGENCY MEDICINE

## 2025-03-30 PROCEDURE — 99291 CRITICAL CARE FIRST HOUR: CPT

## 2025-03-30 PROCEDURE — 94799 UNLISTED PULMONARY SVC/PX: CPT

## 2025-03-30 PROCEDURE — 74176 CT ABD & PELVIS W/O CONTRAST: CPT

## 2025-03-30 PROCEDURE — 83880 ASSAY OF NATRIURETIC PEPTIDE: CPT | Performed by: EMERGENCY MEDICINE

## 2025-03-30 PROCEDURE — 94640 AIRWAY INHALATION TREATMENT: CPT

## 2025-03-30 PROCEDURE — 25010000002 PIPERACILLIN SOD-TAZOBACTAM PER 1 G: Performed by: EMERGENCY MEDICINE

## 2025-03-30 PROCEDURE — 84145 PROCALCITONIN (PCT): CPT | Performed by: EMERGENCY MEDICINE

## 2025-03-30 PROCEDURE — 93005 ELECTROCARDIOGRAM TRACING: CPT | Performed by: EMERGENCY MEDICINE

## 2025-03-30 PROCEDURE — 83735 ASSAY OF MAGNESIUM: CPT | Performed by: EMERGENCY MEDICINE

## 2025-03-30 PROCEDURE — 85379 FIBRIN DEGRADATION QUANT: CPT | Performed by: EMERGENCY MEDICINE

## 2025-03-30 PROCEDURE — 93308 TTE F-UP OR LMTD: CPT | Performed by: INTERNAL MEDICINE

## 2025-03-30 PROCEDURE — 36415 COLL VENOUS BLD VENIPUNCTURE: CPT

## 2025-03-30 PROCEDURE — 93308 TTE F-UP OR LMTD: CPT

## 2025-03-30 PROCEDURE — 71045 X-RAY EXAM CHEST 1 VIEW: CPT

## 2025-03-30 PROCEDURE — 85025 COMPLETE CBC W/AUTO DIFF WBC: CPT | Performed by: EMERGENCY MEDICINE

## 2025-03-30 RX ORDER — TRAZODONE HYDROCHLORIDE 50 MG/1
50 TABLET ORAL NIGHTLY
Status: DISCONTINUED | OUTPATIENT
Start: 2025-03-30 | End: 2025-04-03 | Stop reason: HOSPADM

## 2025-03-30 RX ORDER — ONDANSETRON 2 MG/ML
4 INJECTION INTRAMUSCULAR; INTRAVENOUS EVERY 6 HOURS PRN
Status: DISCONTINUED | OUTPATIENT
Start: 2025-03-30 | End: 2025-04-03 | Stop reason: HOSPADM

## 2025-03-30 RX ORDER — ACETAMINOPHEN 650 MG/1
650 SUPPOSITORY RECTAL EVERY 4 HOURS PRN
Status: DISCONTINUED | OUTPATIENT
Start: 2025-03-30 | End: 2025-04-03 | Stop reason: HOSPADM

## 2025-03-30 RX ORDER — BACLOFEN 10 MG/1
10 TABLET ORAL NIGHTLY
Status: DISCONTINUED | OUTPATIENT
Start: 2025-03-30 | End: 2025-04-03 | Stop reason: HOSPADM

## 2025-03-30 RX ORDER — ATORVASTATIN CALCIUM 10 MG/1
10 TABLET, FILM COATED ORAL NIGHTLY
Status: DISCONTINUED | OUTPATIENT
Start: 2025-03-30 | End: 2025-04-03 | Stop reason: HOSPADM

## 2025-03-30 RX ORDER — IBUPROFEN 600 MG/1
1 TABLET ORAL
Status: DISCONTINUED | OUTPATIENT
Start: 2025-03-30 | End: 2025-04-03 | Stop reason: HOSPADM

## 2025-03-30 RX ORDER — INSULIN LISPRO 100 [IU]/ML
2-7 INJECTION, SOLUTION INTRAVENOUS; SUBCUTANEOUS
Status: DISCONTINUED | OUTPATIENT
Start: 2025-03-30 | End: 2025-04-03 | Stop reason: HOSPADM

## 2025-03-30 RX ORDER — ALBUTEROL SULFATE 0.83 MG/ML
2.5 SOLUTION RESPIRATORY (INHALATION) EVERY 6 HOURS PRN
Status: DISCONTINUED | OUTPATIENT
Start: 2025-03-30 | End: 2025-04-03 | Stop reason: HOSPADM

## 2025-03-30 RX ORDER — IPRATROPIUM BROMIDE AND ALBUTEROL SULFATE 2.5; .5 MG/3ML; MG/3ML
3 SOLUTION RESPIRATORY (INHALATION)
Status: DISCONTINUED | OUTPATIENT
Start: 2025-03-30 | End: 2025-04-03

## 2025-03-30 RX ORDER — ACETAMINOPHEN 325 MG/1
650 TABLET ORAL EVERY 4 HOURS PRN
Status: DISCONTINUED | OUTPATIENT
Start: 2025-03-30 | End: 2025-04-03 | Stop reason: HOSPADM

## 2025-03-30 RX ORDER — SODIUM CHLORIDE 0.9 % (FLUSH) 0.9 %
10 SYRINGE (ML) INJECTION AS NEEDED
Status: DISCONTINUED | OUTPATIENT
Start: 2025-03-30 | End: 2025-04-03 | Stop reason: HOSPADM

## 2025-03-30 RX ORDER — BISACODYL 5 MG/1
5 TABLET, DELAYED RELEASE ORAL DAILY PRN
Status: DISCONTINUED | OUTPATIENT
Start: 2025-03-30 | End: 2025-04-03 | Stop reason: HOSPADM

## 2025-03-30 RX ORDER — AMOXICILLIN 250 MG
2 CAPSULE ORAL 2 TIMES DAILY PRN
Status: DISCONTINUED | OUTPATIENT
Start: 2025-03-30 | End: 2025-04-03 | Stop reason: HOSPADM

## 2025-03-30 RX ORDER — POLYETHYLENE GLYCOL 3350 17 G/17G
17 POWDER, FOR SOLUTION ORAL DAILY PRN
Status: DISCONTINUED | OUTPATIENT
Start: 2025-03-30 | End: 2025-04-03 | Stop reason: HOSPADM

## 2025-03-30 RX ORDER — HYDRALAZINE HYDROCHLORIDE 10 MG/1
10 TABLET, FILM COATED ORAL 2 TIMES DAILY PRN
Status: DISCONTINUED | OUTPATIENT
Start: 2025-03-30 | End: 2025-04-03 | Stop reason: HOSPADM

## 2025-03-30 RX ORDER — MIDODRINE HYDROCHLORIDE 5 MG/1
5 TABLET ORAL
Status: DISCONTINUED | OUTPATIENT
Start: 2025-03-30 | End: 2025-03-30

## 2025-03-30 RX ORDER — FUROSEMIDE 10 MG/ML
20 INJECTION INTRAMUSCULAR; INTRAVENOUS ONCE
Status: DISCONTINUED | OUTPATIENT
Start: 2025-03-30 | End: 2025-03-30

## 2025-03-30 RX ORDER — BACLOFEN 10 MG/1
5 TABLET ORAL 2 TIMES DAILY
Status: DISCONTINUED | OUTPATIENT
Start: 2025-03-30 | End: 2025-03-30

## 2025-03-30 RX ORDER — BACLOFEN 10 MG/1
5 TABLET ORAL 2 TIMES DAILY
Status: DISCONTINUED | OUTPATIENT
Start: 2025-03-30 | End: 2025-04-03 | Stop reason: HOSPADM

## 2025-03-30 RX ORDER — MIDODRINE HYDROCHLORIDE 5 MG/1
5 TABLET ORAL ONCE
Status: COMPLETED | OUTPATIENT
Start: 2025-03-30 | End: 2025-03-30

## 2025-03-30 RX ORDER — ACETAMINOPHEN 160 MG/5ML
650 SOLUTION ORAL EVERY 4 HOURS PRN
Status: DISCONTINUED | OUTPATIENT
Start: 2025-03-30 | End: 2025-04-03 | Stop reason: HOSPADM

## 2025-03-30 RX ORDER — BISACODYL 10 MG
10 SUPPOSITORY, RECTAL RECTAL DAILY PRN
Status: DISCONTINUED | OUTPATIENT
Start: 2025-03-30 | End: 2025-04-03 | Stop reason: HOSPADM

## 2025-03-30 RX ORDER — NICOTINE POLACRILEX 4 MG
15 LOZENGE BUCCAL
Status: DISCONTINUED | OUTPATIENT
Start: 2025-03-30 | End: 2025-04-03 | Stop reason: HOSPADM

## 2025-03-30 RX ORDER — SODIUM CHLORIDE 9 MG/ML
40 INJECTION, SOLUTION INTRAVENOUS AS NEEDED
Status: DISCONTINUED | OUTPATIENT
Start: 2025-03-30 | End: 2025-04-03 | Stop reason: HOSPADM

## 2025-03-30 RX ORDER — BACLOFEN 10 MG/1
10 TABLET ORAL NIGHTLY
Status: DISCONTINUED | OUTPATIENT
Start: 2025-03-30 | End: 2025-03-30

## 2025-03-30 RX ORDER — SODIUM CHLORIDE 0.9 % (FLUSH) 0.9 %
10 SYRINGE (ML) INJECTION EVERY 12 HOURS SCHEDULED
Status: DISCONTINUED | OUTPATIENT
Start: 2025-03-30 | End: 2025-04-03 | Stop reason: HOSPADM

## 2025-03-30 RX ORDER — MIDODRINE HYDROCHLORIDE 10 MG/1
10 TABLET ORAL
Status: DISCONTINUED | OUTPATIENT
Start: 2025-03-30 | End: 2025-04-03 | Stop reason: HOSPADM

## 2025-03-30 RX ORDER — ASPIRIN 81 MG/1
81 TABLET, CHEWABLE ORAL DAILY
Status: DISCONTINUED | OUTPATIENT
Start: 2025-03-31 | End: 2025-04-03 | Stop reason: HOSPADM

## 2025-03-30 RX ORDER — DEXTROSE MONOHYDRATE 25 G/50ML
25 INJECTION, SOLUTION INTRAVENOUS
Status: DISCONTINUED | OUTPATIENT
Start: 2025-03-30 | End: 2025-04-03 | Stop reason: HOSPADM

## 2025-03-30 RX ORDER — AMLODIPINE BESYLATE 10 MG/1
10 TABLET ORAL DAILY
Status: DISCONTINUED | OUTPATIENT
Start: 2025-03-31 | End: 2025-04-01

## 2025-03-30 RX ORDER — DIVALPROEX SODIUM 125 MG/1
125 CAPSULE, COATED PELLETS ORAL 2 TIMES DAILY
Status: DISCONTINUED | OUTPATIENT
Start: 2025-03-30 | End: 2025-04-03 | Stop reason: HOSPADM

## 2025-03-30 RX ORDER — ONDANSETRON 4 MG/1
4 TABLET, ORALLY DISINTEGRATING ORAL EVERY 6 HOURS PRN
Status: DISCONTINUED | OUTPATIENT
Start: 2025-03-30 | End: 2025-04-03 | Stop reason: HOSPADM

## 2025-03-30 RX ADMIN — BACLOFEN 5 MG: 10 TABLET ORAL at 17:42

## 2025-03-30 RX ADMIN — ATORVASTATIN CALCIUM 10 MG: 10 TABLET, FILM COATED ORAL at 21:53

## 2025-03-30 RX ADMIN — BACLOFEN 10 MG: 10 TABLET ORAL at 21:53

## 2025-03-30 RX ADMIN — TRAZODONE HYDROCHLORIDE 50 MG: 50 TABLET ORAL at 21:53

## 2025-03-30 RX ADMIN — Medication 10 ML: at 21:54

## 2025-03-30 RX ADMIN — MIDODRINE HYDROCHLORIDE 5 MG: 5 TABLET ORAL at 10:57

## 2025-03-30 RX ADMIN — DIVALPROEX SODIUM 125 MG: 125 CAPSULE ORAL at 21:53

## 2025-03-30 RX ADMIN — IPRATROPIUM BROMIDE AND ALBUTEROL SULFATE 3 ML: 2.5; .5 SOLUTION RESPIRATORY (INHALATION) at 19:41

## 2025-03-30 RX ADMIN — MIDODRINE HYDROCHLORIDE 10 MG: 10 TABLET ORAL at 17:42

## 2025-03-30 RX ADMIN — IPRATROPIUM BROMIDE AND ALBUTEROL SULFATE 3 ML: 2.5; .5 SOLUTION RESPIRATORY (INHALATION) at 12:09

## 2025-03-30 RX ADMIN — PIPERACILLIN AND TAZOBACTAM 3.38 G: 3; .375 INJECTION, POWDER, LYOPHILIZED, FOR SOLUTION INTRAVENOUS at 17:42

## 2025-03-30 RX ADMIN — PIPERACILLIN AND TAZOBACTAM 3.38 G: 3; .375 INJECTION, POWDER, LYOPHILIZED, FOR SOLUTION INTRAVENOUS at 10:57

## 2025-03-30 NOTE — ED PROVIDER NOTES
Subjective   History of Present Illness  This is a pleasantly demented 84-year-old male brought to the emergency department today by EMS from the [facility] where he is a resident for evaluation of hypoxia, shortness of breath, and decline in mental status over the past week.    The patient himself engage in conversation but he is alert only to person and has underlying dementia.  No one accompanies him at this point.  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    There is a mention that patient has congestive heart failure and is been requiring increasing amounts of oxygen but during his last hospital admission I do not see that he was on oxygen and his echo at that time showed preserved ejection fraction but he did have elevated right ventricular systolic pressure.    He has A-fib but has been elected not to anticoagulate him because previous hemorrhagic conversion of stroke.  I see no mention of ischemic heart disease.  The patient himself is able to tell me he is retired from [employer] and does not have any complaints but I asked specific things he does say he has a bit of a headache and he does feel short of breath but he is unsure about his bowel movements and eating.    EMS transported the patient on 6 L oxygen but we have been able to wean him down to 4 L here.  I tried my best with a major history and review of systems but this is about as good as I can get.            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 Calculated left ventricular EF = 56.3%    Left ventricular wall thickness is consistent with hypertrophy.    Left ventricular diastolic function was normal.    The left atrial cavity is moderately dilated.    Left atrial volume is severely increased.    There is calcification of the aortic valve.    Estimated right ventricular systolic pressure from tricuspid regurgitation is mildly elevated (35-45 mmHg). Calculated right ventricular systolic pressure from tricuspid regurgitation is 41 mmHg.  Allergies   Allergen Reactions    Codeine Mental Status Change     hyper       Past Surgical History:   Procedure Laterality Date    CATARACT EXTRACTION, BILATERAL      HERNIA REPAIR         Family History   Problem Relation Age of Onset    Cerebral aneurysm Mother     Cancer Father     Aneurysm Sister     Colon cancer Sister     Atrial fibrillation Brother     Heart attack Brother     Cancer Brother        Social History     Socioeconomic History    Marital status:    Tobacco Use    Smoking status: Former     Current packs/day: 1.00     Types: Cigarettes    Smokeless tobacco: Never    Tobacco comments:     QUIT 25YRS AGO    Vaping Use    Vaping status: Never Used   Substance and Sexual Activity    Alcohol use: Not Currently     Alcohol/week: 1.0 standard drink of alcohol     Types: 1 Cans of beer per week     Comment: 1-2 DRINKS A MONTH    Drug use: Never    Sexual activity: Defer           Objective   Physical Exam  Vitals and nursing note reviewed. Exam conducted with a chaperone present.   Constitutional:       Comments: This is a pleasant older man in no acute distress he does have mild increased work of breathing.   He is alert to person and get a and we engage in conversation I get him to laugh and smile but he can answer occasional simple questions simply.   HENT:      Head: Normocephalic and atraumatic.      Right Ear: External ear normal.      Left Ear: External ear normal.      Nose: Nose normal.      Mouth/Throat:      Mouth: Mucous membranes are moist.      Pharynx: Oropharynx is clear.   Eyes:      Extraocular Movements: Extraocular movements intact.      Conjunctiva/sclera: Conjunctivae normal.      Pupils: Pupils are equal, round, and reactive to light.   Neck:      Vascular: No carotid bruit.   Cardiovascular:      Pulses: Normal pulses.      Comments: Heart is irregular distant I do not hear any murmurs.  Pulmonary:      Comments: Occasional rhonchorous lung sound bilaterally.  Abdominal:      Comments: Is a modestly distended abdomen.  Positive bowel sounds little bit of hypertympanitic tone but no masses knees does not seem particularly tender and his flanks are without tenderness.   Musculoskeletal:      Cervical back: Normal range of motion and neck supple. No rigidity or tenderness.      Comments: He has compression stockings on he has equal pulses in all 4 extremities.  I pulled his stockings down a bit no lymphangitic streaking really no edema.   Lymphadenopathy:      Cervical: No cervical adenopathy.   Skin:     General: Skin is warm and dry.      Capillary Refill: Capillary refill takes less than 2 seconds.   Neurological:      Mental Status: He is alert.      Comments: Face is slightly asymmetric probably a little bit of the left facial droop and just take his old is he has some left hemiplegia as well and I suspect this is old as well from previous stroke tongue is midline vision hearing and speech are preserved.         Critical Care    Performed by: Cricket Chakraborty MD  Authorized by: Cricket Chakraborty MD    Critical care provider statement:     Critical care time (minutes):  45    Critical care was  necessary to treat or prevent imminent or life-threatening deterioration of the following conditions:  Circulatory failure, cardiac failure, renal failure and respiratory failure    Critical care was time spent personally by me on the following activities:  Development of treatment plan with patient or surrogate, discussions with consultants, examination of patient, obtaining history from patient or surrogate, review of old charts, re-evaluation of patient's condition, pulse oximetry, ordering and review of radiographic studies, ordering and review of laboratory studies and ordering and performing treatments and interventions             ED Course                   Recent Results (from the past 24 hours)   ECG 12 Lead Dyspnea    Collection Time: 03/30/25  7:28 AM   Result Value Ref Range    QT Interval 478 ms    QTC Interval 457 ms   Respiratory Panel PCR w/COVID-19(SARS-CoV-2) JOHN/MICHAEL/ROLANDO/PAD/COR/CHET In-House, NP Swab in UTM/VTM, 2 HR TAT - Swab, Nasopharynx    Collection Time: 03/30/25  7:33 AM    Specimen: Nasopharynx; Swab   Result Value Ref Range    ADENOVIRUS, PCR Not Detected Not Detected    Coronavirus 229E Not Detected Not Detected    Coronavirus HKU1 Not Detected Not Detected    Coronavirus NL63 Not Detected Not Detected    Coronavirus OC43 Not Detected Not Detected    COVID19 Not Detected Not Detected - Ref. Range    Human Metapneumovirus Not Detected Not Detected    Human Rhinovirus/Enterovirus Not Detected Not Detected    Influenza A PCR Not Detected Not Detected    Influenza B PCR Not Detected Not Detected    Parainfluenza Virus 1 Not Detected Not Detected    Parainfluenza Virus 2 Not Detected Not Detected    Parainfluenza Virus 3 Not Detected Not Detected    Parainfluenza Virus 4 Not Detected Not Detected    RSV, PCR Not Detected Not Detected    Bordetella pertussis pcr Not Detected Not Detected    Bordetella parapertussis PCR Not Detected Not Detected    Chlamydophila pneumoniae PCR Not Detected Not  Detected    Mycoplasma pneumo by PCR Not Detected Not Detected   Comprehensive Metabolic Panel    Collection Time: 03/30/25  7:47 AM    Specimen: Blood   Result Value Ref Range    Glucose 137 (H) 65 - 99 mg/dL    BUN 19 8 - 23 mg/dL    Creatinine 1.46 (H) 0.76 - 1.27 mg/dL    Sodium 138 136 - 145 mmol/L    Potassium 4.9 3.5 - 5.2 mmol/L    Chloride 100 98 - 107 mmol/L    CO2 24.0 22.0 - 29.0 mmol/L    Calcium 9.4 8.6 - 10.5 mg/dL    Total Protein 7.2 6.0 - 8.5 g/dL    Albumin 4.3 3.5 - 5.2 g/dL    ALT (SGPT) 10 1 - 41 U/L    AST (SGOT) 16 1 - 40 U/L    Alkaline Phosphatase 59 39 - 117 U/L    Total Bilirubin 0.5 0.0 - 1.2 mg/dL    Globulin 2.9 gm/dL    A/G Ratio 1.5 g/dL    BUN/Creatinine Ratio 13.0 7.0 - 25.0    Anion Gap 14.0 5.0 - 15.0 mmol/L    eGFR 47.1 (L) >60.0 mL/min/1.73   BNP    Collection Time: 03/30/25  7:47 AM    Specimen: Blood   Result Value Ref Range    proBNP 2,297.0 (H) 0.0 - 1,800.0 pg/mL   High Sensitivity Troponin T    Collection Time: 03/30/25  7:47 AM    Specimen: Blood   Result Value Ref Range    HS Troponin T 76 (C) <22 ng/L   Green Top (Gel)    Collection Time: 03/30/25  7:47 AM   Result Value Ref Range    Extra Tube Hold for add-ons.    Lavender Top    Collection Time: 03/30/25  7:47 AM   Result Value Ref Range    Extra Tube hold for add-on    Gold Top - SST    Collection Time: 03/30/25  7:47 AM   Result Value Ref Range    Extra Tube Hold for add-ons.    Gray Top    Collection Time: 03/30/25  7:47 AM   Result Value Ref Range    Extra Tube Hold for add-ons.    Light Blue Top    Collection Time: 03/30/25  7:47 AM   Result Value Ref Range    Extra Tube Hold for add-ons.    CBC Auto Differential    Collection Time: 03/30/25  7:47 AM    Specimen: Blood   Result Value Ref Range    WBC 8.65 3.40 - 10.80 10*3/mm3    RBC 3.06 (L) 4.14 - 5.80 10*6/mm3    Hemoglobin 10.0 (L) 13.0 - 17.7 g/dL    Hematocrit 31.7 (L) 37.5 - 51.0 %    .6 (H) 79.0 - 97.0 fL    MCH 32.7 26.6 - 33.0 pg    MCHC 31.5  31.5 - 35.7 g/dL    RDW 14.5 12.3 - 15.4 %    RDW-SD 54.6 (H) 37.0 - 54.0 fl    MPV 10.7 6.0 - 12.0 fL    Platelets 164 140 - 450 10*3/mm3    Neutrophil % 82.7 (H) 42.7 - 76.0 %    Lymphocyte % 5.9 (L) 19.6 - 45.3 %    Monocyte % 7.6 5.0 - 12.0 %    Eosinophil % 2.3 0.3 - 6.2 %    Basophil % 0.6 0.0 - 1.5 %    Immature Grans % 0.9 (H) 0.0 - 0.5 %    Neutrophils, Absolute 7.15 (H) 1.70 - 7.00 10*3/mm3    Lymphocytes, Absolute 0.51 (L) 0.70 - 3.10 10*3/mm3    Monocytes, Absolute 0.66 0.10 - 0.90 10*3/mm3    Eosinophils, Absolute 0.20 0.00 - 0.40 10*3/mm3    Basophils, Absolute 0.05 0.00 - 0.20 10*3/mm3    Immature Grans, Absolute 0.08 (H) 0.00 - 0.05 10*3/mm3    nRBC 0.0 0.0 - 0.2 /100 WBC   Procalcitonin    Collection Time: 03/30/25  7:47 AM    Specimen: Blood   Result Value Ref Range    Procalcitonin 0.09 0.00 - 0.25 ng/mL   TSH    Collection Time: 03/30/25  7:47 AM    Specimen: Blood   Result Value Ref Range    TSH 3.880 0.270 - 4.200 uIU/mL   Magnesium    Collection Time: 03/30/25  7:47 AM    Specimen: Blood   Result Value Ref Range    Magnesium 1.6 1.6 - 2.4 mg/dL   D-dimer, Quantitative    Collection Time: 03/30/25  7:47 AM    Specimen: Blood   Result Value Ref Range    D-Dimer, Quantitative 0.79 0.00 - 0.84 MCGFEU/mL   Valproic Acid Level, Total    Collection Time: 03/30/25  7:47 AM    Specimen: Blood   Result Value Ref Range    Valproic Acid 29.9 (L) 50.0 - 125.0 mcg/mL   ECG 12 Lead Dyspnea    Collection Time: 03/30/25  8:33 AM   Result Value Ref Range    QT Interval 498 ms    QTC Interval 467 ms   High Sensitivity Troponin T 1Hr    Collection Time: 03/30/25  8:56 AM    Specimen: Blood   Result Value Ref Range    HS Troponin T 69 (C) <22 ng/L    Troponin T Numeric Delta -7 ng/L    Troponin T % Delta -9 Abnormal if >/= 20%   Urinalysis With Microscopic If Indicated (No Culture) - Urine, Clean Catch    Collection Time: 03/30/25 10:05 AM    Specimen: Urine, Clean Catch   Result Value Ref Range    Color, UA  Yellow Yellow, Straw    Appearance, UA Clear Clear    pH, UA <=5.0 5.0 - 8.0    Specific Gravity, UA 1.021 1.001 - 1.030    Glucose, UA Negative Negative    Ketones, UA Trace (A) Negative    Bilirubin, UA Negative Negative    Blood, UA Negative Negative    Protein, UA Trace (A) Negative    Leuk Esterase, UA Small (1+) (A) Negative    Nitrite, UA Negative Negative    Urobilinogen, UA 1.0 E.U./dL 0.2 - 1.0 E.U./dL   Urinalysis, Microscopic Only - Urine, Clean Catch    Collection Time: 03/30/25 10:05 AM    Specimen: Urine, Clean Catch   Result Value Ref Range    RBC, UA 0-2 None Seen, 0-2 /HPF    WBC, UA 3-5 (A) None Seen, 0-2 /HPF    Bacteria, UA None Seen None Seen, Trace /HPF    Squamous Epithelial Cells, UA 0-2 None Seen, 0-2 /HPF    Hyaline Casts, UA 0-6 0 - 6 /LPF    Methodology Automated Microscopy      Note: In addition to lab results from this visit, the labs listed above may include labs taken at another facility or during a different encounter within the last 24 hours. Please correlate lab times with ED admission and discharge times for further clarification of the services performed during this visit.    CT Head Without Contrast   Final Result   1.No evidence for acute intracranial abnormality.   2.Nonspecific white matter changes are noted with associated diffuse volume loss. These findings are likely related to chronic small vessel ischemic changes and/or age-related changes.   3.Multifocal remote infarcts are again noted.               Electronically Signed: Berlin Pelayo MD     3/30/2025 9:29 AM EDT     Workstation ID: [id number]      CT Abdomen Pelvis Without Contrast   Final Result   Impression:   1.Cholelithiasis with gallbladder wall thickening and pericholecystic edema. Gallstones are visualized within the cystic duct. Findings are concerning for acute cholecystitis in the correct clinical setting.   2.Hepatomegaly with hepatic steatosis.   3.Small bilateral pleural effusions with compressive  atelectasis.   4.No evidence of small bowel obstruction.            Electronically Signed: Diego Penn     3/30/2025 9:35 AM EDT     Workstation ID: [id number]      XR Chest 1 View   Final Result   Impression:   Mild right upper lobe airspace disease could be secondary to pneumonia.               Electronically Signed: Eddie Lugo MD     3/30/2025 7:52 AM EDT     Workstation ID: [id number]        Vitals:    03/30/25 1300 03/30/25 1359 03/30/25 1413 03/30/25 1456   BP: (!) 88/67  102/71    BP Location:   Left arm    Patient Position:   Lying    Pulse: 59  63    Resp:   18    Temp:  99.2 °F (37.3 °C) 98.3 °F (36.8 °C)    TempSrc:  Oral Oral    SpO2: 93%  94%    Weight:    82.2 kg (181 lb 3.5 oz)   Height:         Medications   sodium chloride 0.9 % flush 10 mL (has no administration in time range)   amLODIPine (NORVASC) tablet 10 mg ( Oral Dose Auto Held 4/8/25 0900)   aspirin chewable tablet 81 mg (has no administration in time range)   atorvastatin (LIPITOR) tablet 10 mg (has no administration in time range)   Divalproex Sodium (DEPAKOTE SPRINKLE) capsule 125 mg (has no administration in time range)   hydrALAZINE (APRESOLINE) tablet 10 mg ( Oral Held by provider 3/30/25 1455)   metoprolol tartrate (LOPRESSOR) half tablet 37.5 mg ( Oral Dose Auto Held 4/7/25 2100)   sertraline (ZOLOFT) tablet 25 mg (has no administration in time range)   traZODone (DESYREL) tablet 50 mg (has no administration in time range)   sodium chloride 0.9 % flush 10 mL (has no administration in time range)   sodium chloride 0.9 % flush 10 mL (has no administration in time range)   sodium chloride 0.9 % infusion 40 mL (has no administration in time range)   Potassium Replacement - Follow Nurse / BPA Driven Protocol (has no administration in time range)   Magnesium Low Dose Replacement - Follow Nurse / BPA Driven Protocol (has no administration in time range)   Phosphorus Replacement - Follow Nurse / BPA Driven Protocol (has no administration  in time range)   Calcium Replacement - Follow Nurse / BPA Driven Protocol (has no administration in time range)   acetaminophen (TYLENOL) tablet 650 mg (has no administration in time range)     Or   acetaminophen (TYLENOL) 160 MG/5ML oral solution 650 mg (has no administration in time range)     Or   acetaminophen (TYLENOL) suppository 650 mg (has no administration in time range)   sennosides-docusate (PERICOLACE) 8.6-50 MG per tablet 2 tablet (has no administration in time range)     And   polyethylene glycol (MIRALAX) packet 17 g (has no administration in time range)     And   bisacodyl (DULCOLAX) EC tablet 5 mg (has no administration in time range)     And   bisacodyl (DULCOLAX) suppository 10 mg (has no administration in time range)   ondansetron ODT (ZOFRAN-ODT) disintegrating tablet 4 mg (has no administration in time range)     Or   ondansetron (ZOFRAN) injection 4 mg (has no administration in time range)   piperacillin-tazobactam (ZOSYN) 3.375 g IVPB in 100 mL NS MBP (CD) (has no administration in time range)   ipratropium-albuterol (DUO-NEB) nebulizer solution 3 mL (3 mL Nebulization Given 3/30/25 1209)   albuterol (PROVENTIL) nebulizer solution 0.083% 2.5 mg/3mL (has no administration in time range)   dextrose (GLUTOSE) oral gel 15 g (has no administration in time range)   dextrose (D50W) (25 g/50 mL) IV injection 25 g (has no administration in time range)   glucagon (GLUCAGEN) injection 1 mg (has no administration in time range)   Insulin Lispro (humaLOG) injection 2-7 Units ( Subcutaneous Not Given 3/30/25 1434)   Pharmacy Consult - MT (has no administration in time range)   baclofen (LIORESAL) tablet 5 mg (has no administration in time range)   midodrine (PROAMATINE) tablet 10 mg (has no administration in time range)   baclofen (LIORESAL) tablet 10 mg (has no administration in time range)   midodrine (PROAMATINE) tablet 5 mg (5 mg Oral Given 3/30/25 1057)   piperacillin-tazobactam (ZOSYN) 3.375 g IVPB  in 100 mL NS MBP (CD) (0 g Intravenous Stopped 3/30/25 1206)     ECG/EMG Results (last 24 hours)       Procedure Component Value Units Date/Time    ECG 12 Lead Dyspnea [[id number]] Collected: 03/30/25 0728     Updated: 03/30/25 0729     QT Interval 478 ms      QTC Interval 457 ms     Narrative:      Test Reason : Dyspnea  Blood Pressure :   */*   mmHG  Vent. Rate :  55 BPM     Atrial Rate :  51 BPM     P-R Int :   * ms          QRS Dur :  76 ms      QT Int : 478 ms       P-R-T Axes :   *  69  30 degrees    QTcB Int : 457 ms    Atrial fibrillation with slow ventricular response  Abnormal ECG  When compared with ECG of 01-Feb-2025 13:04,  Nonspecific T wave abnormality, worse in Inferior leads  T wave inversion no longer evident in Lateral leads    Referred By: ASHLEY           Confirmed By:           ECG 12 Lead Dyspnea   Final Result   Test Reason : Dyspnea   Blood Pressure :   */*   mmHG   Vent. Rate :  53 BPM     Atrial Rate :  48 BPM      P-R Int :   * ms          QRS Dur :  74 ms       QT Int : 498 ms       P-R-T Axes :   *  63  48 degrees     QTcB Int : 467 ms      Atrial fibrillation with slow ventricular response   Nonspecific ST abnormality   Abnormal ECG   When compared with ECG of 30-Mar-2025 07:28,   No significant change was found   Confirmed by GWEN FARIAS MD ([id number]) on 3/30/2025 10:03:39 AM      Referred By: ASHLEY           Confirmed By: GWEN FARIAS MD      ECG 12 Lead Dyspnea   Final Result   Test Reason : Dyspnea   Blood Pressure :   */*   mmHG   Vent. Rate :  55 BPM     Atrial Rate :  51 BPM      P-R Int :   * ms          QRS Dur :  76 ms       QT Int : 478 ms       P-R-T Axes :   *  69  30 degrees     QTcB Int : 457 ms      Atrial fibrillation with slow ventricular response   Abnormal ECG   When compared with ECG of 01-Feb-2025 13:04,      T wave inversion no longer evident in Lateral leads   some baseline artifact makes interpretation less acurate but in general no   sig change   Confirmed by  DINORA STEPHENS, GWEN ([id number]) on 3/30/2025 7:36:10 AM      Referred By: EDMD           Confirmed By: GWEN FARIAS MD                                              Medical Decision Making      I have reviewed all available studies at the bedside with the patient and his wife who is now at the bedside provides a lot of additional history.  He has not been on oxygen in the past but really needed the started on it this week as he was getting low at the nursing facility.  She was told that he had congestive heart failure.  His mentation has waxed and waned and he is actually not too bad right now but a little bit different than his baseline.    His blood pressures remain borderline.  He does have congestive heart failure.  Difficult situation he may benefit from diuresis but I do not think his blood pressure would tolerate it.  Also likely has pneumonia in the right upper lobe.  He also has worsening renal function.    He had minimal abdominal pain and I reviewed his CT personally interpreted it I do not think he has acute cholecystitis and I disagree with radiology.  Dr. Ribeiro, on-call surgery is here and I have had him evaluate the CT and the patient as well and he agrees no role for surgical intervention.    I have started the patient on IV antibiotics covering for pneumonia and on the off case he would have inflammatory gallbladder problems to cover him for that as well.    Long discussion with the patient his wife at the bedside.  He is a DNR/DNI.  Talked about aggressiveness of his care.  Wife would like treated what we can treat but given his hypotension, worsening heart function, worsening renal function she understands his prognosis is guarded.  She may be amenable to more palliative options if he does not improve with conservative care.  I have started the patient on oral midodrine to help support his blood pressure.    Head CT stable multiple stigmata of old stroke but nothing acute or changed.    I have consulted  [facility]. [facility] and he and his colleagues will admit the patient.    All are agreeable with the plan        Problems Addressed:  Acute on chronic congestive heart failure, unspecified heart failure type: complicated acute illness or injury with systemic symptoms that poses a threat to life or bodily functions  Confusion: complicated acute illness or injury with systemic symptoms that poses a threat to life or bodily functions  Hypotension, unspecified hypotension type: complicated acute illness or injury with systemic symptoms that poses a threat to life or bodily functions  Hypoxia: complicated acute illness or injury with systemic symptoms that poses a threat to life or bodily functions  Pneumonia of right upper lobe due to infectious organism: complicated acute illness or injury with systemic symptoms that poses a threat to life or bodily functions  Renal insufficiency: complicated acute illness or injury with systemic symptoms that poses a threat to life or bodily functions    Amount and/or Complexity of Data Reviewed  Labs: ordered.  Radiology: ordered.  ECG/medicine tests: ordered.    Risk  Prescription drug management.  Decision regarding hospitalization.        Final diagnoses:   Acute on chronic congestive heart failure, unspecified heart failure type   Pneumonia of right upper lobe due to infectious organism   Hypotension, unspecified hypotension type   Renal insufficiency   Hypoxia   Confusion   Calculus of gallbladder without cholecystitis without obstruction       ED Disposition  ED Disposition       ED Disposition   Decision to Admit    Condition   --    Comment   Level of Care: Telemetry [5]   Diagnosis: Acute on chronic diastolic CHF (congestive heart failure) [9703240]   Isolate for COVID?: No [0]   Certification: I Certify That Inpatient Hospital Services Are Medically Necessary For Greater Than 2 Midnights                 No follow-up provider specified.       Medication List      No  changes were made to your prescriptions during this visit.            Cricket Chakraborty MD  03/30/25 1897

## 2025-03-30 NOTE — CONSULTS
Patient Name:  Dirk Miles  YOB: 1940  [id number]       Patient Care Team:  Milady Mera MD as PCP - General (Internal Medicine)      General Surgery Consult Note     Date of Consultation: 03/30/25    Referring Physician : Cricket Chakraborty MD    Reason for Consult : Gallstones    Subjective     I have been asked to see  Dirk Miles , a 84 y.o. male in consultation for gallstones.  He has a known past medical history of atrial fibrillation not on chronic anticoagulation, prior stroke, congestive heart failure, hypertension, hyperlipidemia, and dementia.  He was transferred from his nursing home today for worsening shortness of breath, dyspnea on exertion and weight gain.  Due to his dementia he is a poor historian so his wife is at the bedside to assist with this history.  By report he has had worsening dyspnea and shortness of breath.  He has gained approximately 9 pounds over the past several days and she notes increased swelling in his abdomen.  He denies any abdominal pain.  He has had no nausea or vomiting.  No change in his bowel habits.  Due to his shortness of breath he presented to the emergency department.  Subsequent evaluation including CT scan showed bilateral pleural effusions as well as gallstones with question of cholecystitis versus some pericholecystic fluid.  We have been asked to see him for any surgical intervention.  Of note the patient is DNR/DNI.      Allergy:   Allergies   Allergen Reactions    Codeine Mental Status Change     hyper       Medications:  midodrine, 5 mg, Oral, Once  piperacillin-tazobactam, 3.375 g, Intravenous, Once         No current facility-administered medications on file prior to encounter.     Current Outpatient Medications on File Prior to Encounter   Medication Sig    acetaminophen (TYLENOL) 325 MG tablet Take 2 tablets by mouth 2 (Two) Times a Day. Scheduled + PRN dose if needed    amLODIPine (NORVASC) 5 MG tablet Take 1  tablet by mouth Daily.    aspirin 81 MG chewable tablet Chew 1 tablet Daily.    atorvastatin (LIPITOR) 10 MG tablet Take 1 tablet by mouth Every Night.    baclofen (LIORESAL) 10 MG tablet Take 1 tablet by mouth Every Night.    Baclofen (LIORESAL) 5 MG tablet Take 1 tablet by mouth 2 (Two) Times a Day.    Cholecalciferol (Vitamin D3) 50 MCG (2000 UT) tablet Take 1 tablet by mouth Daily.    Divalproex Sodium (DEPAKOTE SPRINKLE) 125 MG capsule Take 1 capsule by mouth 2 (Two) Times a Day.    furosemide (LASIX) 20 MG tablet Take 1 tablet by mouth Daily As Needed (soa, edema, weight gain > 3 pounds). ON TUES AND SAT (Patient taking differently: Take 1 tablet by mouth Daily.)    hydrALAZINE (APRESOLINE) 10 MG tablet Take 1 tablet by mouth 2 (Two) Times a Day As Needed (for SBP over 170).    metFORMIN ER (GLUCOPHAGE-XR) 750 MG 24 hr tablet Take 1 tablet by mouth 2 (Two) Times a Day.    metoprolol tartrate (LOPRESSOR) 25 MG tablet Take 1.5 tablets by mouth 2 (Two) Times a Day.    ondansetron ODT (ZOFRAN-ODT) 4 MG disintegrating tablet Place 1 tablet on the tongue Every 12 (Twelve) Hours As Needed for Nausea or Vomiting.    polyethylene glycol (MIRALAX) 17 g packet Take 17 g by mouth 3 (Three) Times a Week. Mon- Wed- Fri    Polyvinyl Alcohol-Povidone PF (ARTIFICIAL TEARS) 1.4-0.6 % ophthalmic solution Administer 1 drop to both eyes 2 (Two) Times a Day.    sertraline (ZOLOFT) 25 MG tablet Take 1 tablet by mouth Daily.    traZODone (DESYREL) 50 MG tablet Take 1 tablet by mouth Every Night.    vitamin B-12 (CYANOCOBALAMIN) 1000 MCG tablet Take 1 tablet by mouth Daily.       PMHx:   Past Medical History:   Diagnosis Date    A-fib     Acute CVA (cerebrovascular accident) 12/1/2020    Arthritis     B12 deficiency     Coronary artery disease     COVID-19     Diabetes mellitus     DNR (do not resuscitate)     Dysphagia     Falls     GERD (gastroesophageal reflux disease)     Hemiplegia     Hyperlipidemia     Hypertension     Insomnia  "    Pressure sore on buttocks     Stenosis of left carotid artery 1/10/2021    Stroke     LEFT SIDE DEFICIT       Past Surgical History:  Past Surgical History:   Procedure Laterality Date    CATARACT EXTRACTION, BILATERAL      HERNIA REPAIR           Family History: Noncontributory     Social History: Pt lives in [place] in a nursing home.    Tobacco use: Denies     EtOH use : Denies    Illicit drug use: Denies      Review of Systems   Review of systems could not be obtained due to   patient confusion.            Objective     Physical Exam:      Vital Signs  /73   Pulse 59   Temp 98.4 °F (36.9 °C) (Oral)   Resp 18   Ht 180.3 cm (71\")   Wt 82.1 kg (181 lb)   SpO2 98%   BMI 25.24 kg/m²   No intake or output data in the 24 hours ending 03/30/25 1040      Physical Exam:    Head: Normocephalic, atraumatic.   Eyes: Pupils equal, round, react to light and accommodation.   Mouth: Oral mucosa without lesions,   Neck: No masses, lymphadenopathy or carotid bruits bilaterally   CV: Rhythm irregular and rate regular , no  murmurs, rubs or gallops  Lungs: Mild rhonchi to auscultation bilaterally   Abdomen: Bowel sounds positive  , soft, obese, nontender.  Groin : No obvious hernias bilaterally   Extremities:  No cyanosis, clubbing bilaterally   Lymphatics: No abnormal lymphadenopathy appreciated   Neurologic: No gross deficits       Results Review: I have personally reviewed all of the recent lab and imaging results available at this time.  Laboratory exam reveals a proBNP of 2297, comprehensive metabolic panel shows a creatinine 1.46 with a glucose of 137.  Transaminases are normal albumin and alkaline phosphatase are normal.  White count 8.6 with a hemoglobin of 10.0 and a platelet count of 164.  His D-dimer is 0.79 his procalcitonin 0.09.    CT scan of the abdomen pelvis was personally viewed by me as well as the final dictated and edited report.  There are bilateral pleural effusions.  The gallbladder appears " normal with gallstones within it, however there may be a mild amount of pericholecystic fluid.  There is no evidence of bowel obstruction.  No other free air or free fluid.  No significant abdominal wall hernias of note, though there may be a slight fatty left spermatic cord.           Assessment and Plan:    Problem List Items Addressed This Visit    None  Visit Diagnoses         Acute on chronic congestive heart failure, unspecified heart failure type    -  Primary-I suspect he has an acute CHF exacerbation, and this is causing a mild congestive hepatopathy which results in the pericholecystic fluid seen.  He has no clinical evidence of acute cholecystitis, and would be a very poor surgical candidate in either event.  I think medical management of his congestive heart failure, and possible pneumonia are appropriate.  No need for surgical intervention at this time, though I will peripherally follow this patient with you.      Relevant Medications    midodrine (PROAMATINE) tablet 5 mg (Start on 3/30/2025 10:41 AM)      Pneumonia of right upper lobe due to infectious organism        Relevant Medications    piperacillin-tazobactam (ZOSYN) 3.375 g IVPB in 100 mL NS MBP (CD) (Start on 3/30/2025 10:41 AM)      Hypotension, unspecified hypotension type          Renal insufficiency          Hypoxia          Confusion                 There are no hospital problems to display for this patient.           I discussed the patient's findings and my recommendations with the patient and/or family, as well as the primary team     Ever Ribeiro MD  03/30/25  10:40 EDT        Dictated using Dragon Dictation

## 2025-03-30 NOTE — LETTER
EMS Transport Request  For use at [facility], [place], [place], [place], and [place] only   Patient Name: Dirk Miles : 1940   Weight:84.4 kg (186 lb) Pick-up Location: [facility] BLS/ALS: BLS/ALS: BLS   Insurance: MEDICARE Auth End Date:    Pre-Cert #: D/C Summary complete:    Destination: Other [facility]   Contact Precautions: None   Equipment (O2, Fluids, etc.): O2, settings 2L/NC   Arrive By Date/Time: 4/3/25 Stretcher/WC: Stretcher   CM Requesting: Tomasa Reed RN Ext: [phone]   Notes/Medical Necessity: Impaired functional mobility, balance, gait and endurance     ______________________________________________________________________    *Only 2 patient bags OR 1 carry-on size bag are permitted.  Wheelchairs and walkers CANNOT transported with the patient. Acknowledge: Yes

## 2025-03-30 NOTE — ED NOTES
Dirk Miles    Nursing Report ED to Floor:  Mental status: AOX1  Ambulatory status: BEDBOUND  Oxygen Therapy:  4L NC  Cardiac Rhythm: SINUS NADJA  Admitted from: [facility]  Safety Concerns:  FALL RISK  Precautions: N/A  Social Issues: N/A  ED Room #:  08    ED Nurse Phone Extension - [phone] or may call [phone].      HPI:   Chief Complaint   Patient presents with    Shortness of Breath    Altered Mental Status       Past Medical History:  Past Medical History:   Diagnosis Date    A-fib     Acute CVA (cerebrovascular accident) 12/1/2020    Arthritis     B12 deficiency     Coronary artery disease     COVID-19     Diabetes mellitus     DNR (do not resuscitate)     Dysphagia     Falls     GERD (gastroesophageal reflux disease)     Hemiplegia     Hyperlipidemia     Hypertension     Insomnia     Pressure sore on buttocks     Stenosis of left carotid artery 1/10/2021    Stroke     LEFT SIDE DEFICIT        Past Surgical History:  Past Surgical History:   Procedure Laterality Date    CATARACT EXTRACTION, BILATERAL      HERNIA REPAIR          Admitting Doctor:   No admitting provider for patient encounter.    Consulting Provider(s):  Consults       No orders found from 3/1/2025 to 3/31/2025.             Admitting Diagnosis:   The primary encounter diagnosis was Acute on chronic congestive heart failure, unspecified heart failure type. Diagnoses of Pneumonia of right upper lobe due to infectious organism, Hypotension, unspecified hypotension type, Renal insufficiency, Hypoxia, and Confusion were also pertinent to this visit.    Most Recent Vitals:   Vitals:    03/30/25 0942 03/30/25 0948 03/30/25 1002 03/30/25 1020   BP: (!) 89/63 (!) 89/63 91/61 108/73   BP Location:  Left arm     Patient Position:  Sitting     Pulse: 56 (!) 44 53 59   Resp:  18     Temp:       TempSrc:       SpO2: 96% 97% 93% 98%   Weight:       Height:           Active LDAs/IV Access:   Lines, Drains & Airways       Active LDAs       Name Placement date  Placement time Site Days    Peripheral IV 03/30/25 0953 Right Antecubital 03/30/25  0953  Antecubital  less than 1                    Labs (abnormal labs have a star):   Labs Reviewed   COMPREHENSIVE METABOLIC PANEL - Abnormal; Notable for the following components:       Result Value    Glucose 137 (*)     Creatinine 1.46 (*)     eGFR 47.1 (*)     All other components within normal limits    Narrative:     GFR Categories in Chronic Kidney Disease (CKD)      GFR Category          GFR (mL/min/1.73)    Interpretation  G1                     90 or greater         Normal or high (1)  G2                      60-89                Mild decrease (1)  G3a                   45-59                Mild to moderate decrease  G3b                   30-44                Moderate to severe decrease  G4                    15-29                Severe decrease  G5                    14 or less           Kidney failure          (1)In the absence of evidence of kidney disease, neither GFR category G1 or G2 fulfill the criteria for CKD.    eGFR calculation 2021 CKD-EPI creatinine equation, which does not include race as a factor   BNP (IN-HOUSE) - Abnormal; Notable for the following components:    proBNP 2,297.0 (*)     All other components within normal limits    Narrative:     This assay is used as an aid in the diagnosis of individuals suspected of having heart failure. It can be used as an aid in the diagnosis of acute decompensated heart failure (ADHF) in patients presenting with signs and symptoms of ADHF to the emergency department (ED). In addition, NT-proBNP of <300 pg/mL indicates ADHF is not likely.    Age Range Result Interpretation  NT-proBNP Concentration (pg/mL:      <50             Positive            >450                   Gray                 300-450                    Negative             <300    50-75           Positive            >900                  Gray                300-900                  Negative             <300      >75             Positive            >1800                  Gray                300-1800                  Negative            <300   TROPONIN - Abnormal; Notable for the following components:    HS Troponin T 76 (*)     All other components within normal limits    Narrative:     High Sensitive Troponin T Reference Range:  <14.0 ng/L- Negative Female for AMI  <22.0 ng/L- Negative Male for AMI  >=14 - Abnormal Female indicating possible myocardial injury.  >=22 - Abnormal Male indicating possible myocardial injury.   Clinicians would have to utilize clinical acumen, EKG, Troponin, and serial changes to determine if it is an Acute Myocardial Infarction or myocardial injury due to an underlying chronic condition.        CBC WITH AUTO DIFFERENTIAL - Abnormal; Notable for the following components:    RBC 3.06 (*)     Hemoglobin 10.0 (*)     Hematocrit 31.7 (*)     .6 (*)     RDW-SD 54.6 (*)     Neutrophil % 82.7 (*)     Lymphocyte % 5.9 (*)     Immature Grans % 0.9 (*)     Neutrophils, Absolute 7.15 (*)     Lymphocytes, Absolute 0.51 (*)     Immature Grans, Absolute 0.08 (*)     All other components within normal limits   URINALYSIS W/ MICROSCOPIC IF INDICATED (NO CULTURE) - Abnormal; Notable for the following components:    Ketones, UA Trace (*)     Protein, UA Trace (*)     Leuk Esterase, UA Small (1+) (*)     All other components within normal limits   VALPROIC ACID LEVEL, TOTAL - Abnormal; Notable for the following components:    Valproic Acid 29.9 (*)     All other components within normal limits    Narrative:     Therapeutic Ranges for Valproic Acid    Epilepsy:       mcg/ml  Bipolar/Shreya  up to 125 mcg/ml     HIGH SENSITIVITIY TROPONIN T 1HR - Abnormal; Notable for the following components:    HS Troponin T 69 (*)     All other components within normal limits    Narrative:     High Sensitive Troponin T Reference Range:  <14.0 ng/L- Negative Female for AMI  <22.0 ng/L- Negative Male for  "AMI  >=14 - Abnormal Female indicating possible myocardial injury.  >=22 - Abnormal Male indicating possible myocardial injury.   Clinicians would have to utilize clinical acumen, EKG, Troponin, and serial changes to determine if it is an Acute Myocardial Infarction or myocardial injury due to an underlying chronic condition.        URINALYSIS, MICROSCOPIC ONLY - Abnormal; Notable for the following components:    WBC, UA 3-5 (*)     All other components within normal limits   RESPIRATORY PANEL PCR W/ COVID-19 (SARS-COV-2), NP SWAB IN UTM/VTP, 2 HR TAT - Normal    Narrative:     In the setting of a positive respiratory panel with a viral infection PLUS a negative procalcitonin without other underlying concern for bacterial infection, consider observing off antibiotics or discontinuation of antibiotics and continue supportive care. If the respiratory panel is positive for atypical bacterial infection (Bordetella pertussis, Chlamydophila pneumoniae, or Mycoplasma pneumoniae), consider antibiotic de-escalation to target atypical bacterial infection.   PROCALCITONIN - Normal    Narrative:     As a Marker for Sepsis (Non-Neonates):    1. <0.5 ng/mL represents a low risk of severe sepsis and/or septic shock.  2. >2 ng/mL represents a high risk of severe sepsis and/or septic shock.    As a Marker for Lower Respiratory Tract Infections that require antibiotic therapy:    PCT on Admission    Antibiotic Therapy       6-12 Hrs later    >0.5                Strongly Recommended  >0.25 - <0.5        Recommended   0.1 - 0.25          Discouraged              Remeasure/reassess PCT  <0.1                Strongly Discouraged     Remeasure/reassess PCT    As 28 day mortality risk marker: \"Change in Procalcitonin Result\" (>80% or <=80%) if Day 0 (or Day 1) and Day 4 values are available. Refer to http://www.[employer]-pct-calculator.com    Change in PCT <=80%  A decrease of PCT levels below or equal to 80% defines a positive change in PCT " "test result representing a higher risk for 28-day all-cause mortality of patients diagnosed with severe sepsis for septic shock.    Change in PCT >80%  A decrease of PCT levels of more than 80% defines a negative change in PCT result representing a lower risk for 28-day all-cause mortality of patients diagnosed with severe sepsis or septic shock.      TSH - Normal   MAGNESIUM - Normal   D-DIMER, QUANTITATIVE - Normal    Narrative:     According to the assay 's published package insert, a normal (<0.50 MCGFEU/mL) D-dimer result in conjunction with a non-high clinical probability assessment, excludes deep vein thrombosis (DVT) and pulmonary embolism (PE) with high sensitivity.    D-dimer values increase with age and this can make VTE exclusion of an older population difficult. To address this, the [employer] College [employer], based on best available evidence and recent guidelines, recommends that clinicians use age-adjusted D-dimer thresholds in patients greater than 50 years of age with: a) a low probability of PE who do not meet all Pulmonary Embolism Rule Out Criteria, or b) in those with intermediate probability of PE.   The formula for an age-adjusted D-dimer cut-off is \"age/100\".  For example, a 60 year old patient would have an age-adjusted cut-off of 0.60 MCGFEU/mL and an 80 year old 0.80 MCGFEU/mL.   RAINBOW DRAW    Narrative:     The following orders were created for panel order [place] Draw.  Procedure                               Abnormality         Status                     ---------                               -----------         ------                     Green Top (Gel)[[id number]]                                  Final result               Lavender Top[[id number]]                                     Final result               Gold Top - SST[[id number]]                                   Final result               Marinelli Top[[id number]]                                         Final result   "             Light Blue Top[[id number]]                                   Final result                 Please view results for these tests on the individual orders.   CBC AND DIFFERENTIAL    Narrative:     The following orders were created for panel order CBC & Differential.  Procedure                               Abnormality         Status                     ---------                               -----------         ------                     CBC Auto Differential[[id number]]        Abnormal            Final result                 Please view results for these tests on the individual orders.   GREEN TOP   LAVENDER TOP   GOLD TOP - SST   GRAY TOP   LIGHT BLUE TOP       Meds Given in ED:   Medications   sodium chloride 0.9 % flush 10 mL (has no administration in time range)   midodrine (PROAMATINE) tablet 5 mg (has no administration in time range)   piperacillin-tazobactam (ZOSYN) 3.375 g IVPB in 100 mL NS MBP (CD) (has no administration in time range)           Last NIH score:                                                          Dysphagia screening results:  Patient Factors Component (Dysphagia:Stroke or Rule-out)  Best Eye Response: 4-->(E4) spontaneous (03/30/25 0722)  Best Motor Response: 6-->(M6) obeys commands (03/30/25 0722)  Best Verbal Response: 4-->(V4) confused (03/30/25 0722)  Alli Coma Scale Score: 14 (03/30/25 0722)     Alli Coma Scale:  No data recorded     CIWA:        Restraint Type:            Isolation Status:  No active isolations

## 2025-03-30 NOTE — H&P
[facility] Medicine Services  HISTORY AND PHYSICAL    Patient Name: Dirk Miles  : 1940  MRN: [id number]  Primary Care Physician: Milady Mera MD  Date of admission: 3/30/2025      Subjective   Subjective     Chief Complaint:  Shortness of breath    HPI:  Dirk Miles is a 84 y.o. 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 This AM his SBP was 171 per facility records and EMS vitals show SBP in the 120's, in the ED it has been 90's      Personal History     Past Medical History:   Diagnosis Date    A-fib     Acute CVA (cerebrovascular accident) 2020    Arthritis     B12 deficiency     Coronary artery disease     COVID-19     Diabetes mellitus     DNR (do not resuscitate)     Dysphagia     Falls     GERD (gastroesophageal reflux disease)     Hemiplegia     Hyperlipidemia     Hypertension     Insomnia     Pressure sore on buttocks     Stenosis of left carotid artery 1/10/2021    Stroke     LEFT SIDE DEFICIT           Past Surgical History:   Procedure Laterality Date    CATARACT EXTRACTION, BILATERAL      HERNIA REPAIR         Family History: family history includes Aneurysm in his sister; Atrial fibrillation in his brother; Cancer in his brother and father; Cerebral aneurysm in his mother; Colon cancer in his sister; Heart attack in his brother.     Social History:  reports that he has quit smoking. His smoking use included cigarettes. He has never used  smokeless tobacco. He reports that he does not currently use alcohol after a past usage of about 1.0 standard drink of alcohol per week. He reports that he does not use drugs.  Social History     Social History Narrative    Not on file       Medications:  Available home medication information reviewed.  Baclofen, Divalproex Sodium, Polyvinyl Alcohol-Povidone PF, Vitamin D3, acetaminophen, amLODIPine, aspirin, atorvastatin, baclofen, furosemide, hydrALAZINE, metFORMIN ER, metoprolol tartrate, ondansetron ODT, polyethylene glycol, sertraline, traZODone, and vitamin B-12    Allergies   Allergen Reactions    Codeine Mental Status Change     hyper       Objective   Objective     Vital Signs:   Temp:  [98.4 °F (36.9 °C)] 98.4 °F (36.9 °C)  Heart Rate:  [41-60] 55  Resp:  [18-22] 18  BP: ()/(61-75) 91/66  Flow (L/min) (Oxygen Therapy):  [4] 4       Physical Exam   Constitutional: Awake, alert, laying on ED stretcher, appears mildly ill  HENT: RT ptosis  Respiratory: Tachypnea, accessory muscle use (using abdominal muscles during expiration), clear but diminished bilaterally   Cardiovascular: RRR, palpable radial pulse  Gastrointestinal: Abd soft, distended, BS present, NTTP  Musculoskeletal: Moderate BL LE edema  Psychiatric: Appropriate affect, cooperative  Neurologic: Speech clear    Result Review:  I have personally reviewed the results from the time of this admission to 3/30/2025 11:20 EDT and agree with these findings:  [x]  Laboratory list / accordion  []  Microbiology  [x]  Radiology  []  EKG/Telemetry   []  Cardiology/Vascular   []  Pathology  [x]  Old records  []  Other:  Most notable findings include: elevated proBNP, pleural effusions seen on CT a/p      LAB RESULTS:      Lab 03/30/25  0747   WBC 8.65   HEMOGLOBIN 10.0*   HEMATOCRIT 31.7*   PLATELETS 164   NEUTROS ABS 7.15*   IMMATURE GRANS (ABS) 0.08*   LYMPHS ABS 0.51*   MONOS ABS 0.66   EOS ABS 0.20   .6*   PROCALCITONIN 0.09   D DIMER QUANT 0.79          Lab 03/30/25  0747   SODIUM 138   POTASSIUM 4.9   CHLORIDE 100   CO2 24.0   ANION GAP 14.0   BUN 19   CREATININE 1.46*   EGFR 47.1*   GLUCOSE 137*   CALCIUM 9.4   MAGNESIUM 1.6   TSH 3.880         Lab 03/30/25  0747   TOTAL PROTEIN 7.2   ALBUMIN 4.3   GLOBULIN 2.9   ALT (SGPT) 10   AST (SGOT) 16   BILIRUBIN 0.5   ALK PHOS 59         Lab 03/30/25  0856 03/30/25  0747   PROBNP  --  2,297.0*   HSTROP T 69* 76*                 UA          2/1/2025    14:25 3/30/2025    10:05   Urinalysis   Squamous Epithelial Cells, UA  0-2    Specific Gravity, UA 1.039  1.021    Ketones, UA Negative  Trace    Blood, UA Negative  Negative    Leukocytes, UA Negative  Small (1+)    Nitrite, UA Negative  Negative    RBC, UA  0-2    WBC, UA  3-5    Bacteria, UA  None Seen        Microbiology Results (last 10 days)       Procedure Component Value - Date/Time    Respiratory Panel PCR w/COVID-19(SARS-CoV-2) JOHN/MICHAEL/ROLANDO/PAD/COR/CHET In-House, NP Swab in UTM/VTM, 2 HR TAT - Swab, Nasopharynx [[id number]]  (Normal) Collected: 03/30/25 0733    Lab Status: Final result Specimen: Swab from Nasopharynx Updated: 03/30/25 0838     ADENOVIRUS, PCR Not Detected     Coronavirus 229E Not Detected     Coronavirus HKU1 Not Detected     Coronavirus NL63 Not Detected     Coronavirus OC43 Not Detected     COVID19 Not Detected     Human Metapneumovirus Not Detected     Human Rhinovirus/Enterovirus Not Detected     Influenza A PCR Not Detected     Influenza B PCR Not Detected     Parainfluenza Virus 1 Not Detected     Parainfluenza Virus 2 Not Detected     Parainfluenza Virus 3 Not Detected     Parainfluenza Virus 4 Not Detected     RSV, PCR Not Detected     Bordetella pertussis pcr Not Detected     Bordetella parapertussis PCR Not Detected     Chlamydophila pneumoniae PCR Not Detected     Mycoplasma pneumo by PCR Not Detected    Narrative:      In the setting of a positive respiratory panel with a viral infection PLUS a negative procalcitonin without other  underlying concern for bacterial infection, consider observing off antibiotics or discontinuation of antibiotics and continue supportive care. If the respiratory panel is positive for atypical bacterial infection (Bordetella pertussis, Chlamydophila pneumoniae, or Mycoplasma pneumoniae), consider antibiotic de-escalation to target atypical bacterial infection.            CT Abdomen Pelvis Without Contrast  Result Date: 3/30/2025  CT ABDOMEN PELVIS WO CONTRAST Date of Exam: 3/30/2025 9:07 AM EDT Indication: abd distension, eval for obstruction. Comparison: CT pelvis dated 2/1/2025 Technique: Axial CT images were obtained of the abdomen and pelvis without the administration of contrast. Reconstructed coronal and sagittal images were also obtained. Automated exposure control and iterative construction methods were used. Findings: The heart is enlarged. There is no pericardial effusion. There are small bilateral pleural effusions with compressive atelectasis. The liver is enlarged measuring 19.5 cm in craniocaudal dimension. There is decreased density of the liver compatible with hepatic steatosis. The gallbladder is present with mild gallbladder wall thickening and pericholecystic edema. There are gallstones  within the gallbladder neck extending into the cystic duct. Findings are concerning for acute cholecystitis in the correct clinical setting. The spleen is normal in size. There is a low-density left adrenal nodule compatible with an adenoma. The pancreas appears within normal limits for age. There is a duodenal diverticulum near the ampulla. The kidneys are symmetric in size. There is no hydronephrosis. The urinary bladder is collapsed which limits evaluation. The prostate is normal in size. There is a small sliding-type hiatal hernia. The small bowel is normal in caliber without small bowel obstruction. There is no acute colitis or diverticulitis. There is no free fluid or free air. The aorta is normal in caliber  without aneurysm formation. There is aortoiliac atherosclerotic calcification. There is no abdominal or pelvic lymphadenopathy. Degenerative disc disease of the thoracolumbar spine.     Impression: Impression: 1.Cholelithiasis with gallbladder wall thickening and pericholecystic edema. Gallstones are visualized within the cystic duct. Findings are concerning for acute cholecystitis in the correct clinical setting. 2.Hepatomegaly with hepatic steatosis. 3.Small bilateral pleural effusions with compressive atelectasis. 4.No evidence of small bowel obstruction. Electronically Signed: Diego Penn  3/30/2025 9:35 AM EDT  Workstation ID: [id number]    CT Head Without Contrast  Result Date: 3/30/2025  CT HEAD WO CONTRAST Date of Exam: 3/30/2025 9:07 AM EDT Indication: ams. Comparison: 2/1/2025 Technique: Axial CT images were obtained of the head without contrast administration.  Automated exposure control and iterative construction methods were used. FINDINGS: There is no evidence for acute intracranial hemorrhage. No definitive acute focal ischemia is identified. Nonspecific white matter changes are noted likely related to chronic small vessel ischemic changes and age-related changes. Associated diffuse volume loss is observed. Multifocal areas of remote infarction are again noted bilaterally. There is no evidence for abnormal cerebral edema. There is no mass effect or midline shift. The ventricular system is nondilated. The basal cisterns are patent. The skull is intact. Changes of chronic sinusitis are noted.     Impression: 1.No evidence for acute intracranial abnormality. 2.Nonspecific white matter changes are noted with associated diffuse volume loss. These findings are likely related to chronic small vessel ischemic changes and/or age-related changes. 3.Multifocal remote infarcts are again noted. Electronically Signed: Berlin Pelayo MD  3/30/2025 9:29 AM EDT  Workstation ID: [id number]    XR Chest 1 View  Result  Date: 3/30/2025  XR CHEST 1 VW Date of Exam: 3/30/2025 7:43 AM EDT Indication: SOA triage protocol Comparison: Chest radiograph 2/1/2025 Findings: The heart is enlarged. The pulmonary vascular markings are normal. There is mild airspace disease in the lateral aspect of the right upper lobe which could be secondary to pneumonia. The left lung is clear. The osseous structures are normal.     Impression: Impression: Mild right upper lobe airspace disease could be secondary to pneumonia. Electronically Signed: Eddie Lugo MD  3/30/2025 7:52 AM EDT  Workstation ID: [id number]      Results for orders placed during the hospital encounter of 02/01/25    Adult Transthoracic Echo Complete W/ Cont if Necessary Per Protocol (With Agitated Saline)    Interpretation Summary    Left ventricular systolic function is normal. Calculated left ventricular EF = 56.3%    Left ventricular wall thickness is consistent with hypertrophy.    Left ventricular diastolic function was normal.    The left atrial cavity is moderately dilated.    Left atrial volume is severely increased.    There is calcification of the aortic valve.    Estimated right ventricular systolic pressure from tricuspid regurgitation is mildly elevated (35-45 mmHg). Calculated right ventricular systolic pressure from tricuspid regurgitation is 41 mmHg.      Assessment & Plan   Assessment & Plan       Acute on chronic diastolic heart failure    History of CVA (cerebrovascular accident)    Hypotension    Hypoxia    Dementia    Acute on chronic diastolic CHF (congestive heart failure)    Summary: This is an 83 y/o male w/ thrombotic stroke 12/2020 w/ hemorrhagic conversion same stay found after having a fall and head injury while on eliquis, dementia, Afib, DM2, currently a long term resident at SNF w/ baseline mobility limited to WC; he presented for 1 wk of SOB, O2 requirement, weight gain, edema; in the ED SBP was in the 90's (had been normal in the AM at his facility), he  was placed on 4 L/min (no charted hypoxia), proBNP was 2297, D-dimer 0.79, CXR was poor quality study but rads questioned RUL PNA, CT a/p showed GB edema w/ cystic duct stones    Assessment/Plan    Acute/Chronic HFpEF w/ hypoxia  -limited TTE to reassess EF (echo 2/2025 did not show significant valve abnormalities)  -current BP prohibits active diuresis, if we can improve pressures will plan for IV diuresis    Hx HTN now hypotensive  -TTE as noted  -poss PNA and/or intraabd infxn, cont empiric abx  -albumin 4.3  -avoiding IVF bolus as he is clinically volume overloaded  -holding amlodipine, metoprolol, hydralazine, po lasix  -receiving PO midodrine in the ED, cont for now and monitor BP  -if BP not improving, wife would consider fixed dose IV dobutamine but would not want to escalate to ICU    Poss RUL Pneumonia  GB wall thickening w/ cystic duct stones  -agree w/ GS eval of poor surgical candidate and clinical presentation not c/w cholecystitis  -reports of poss pocketing food at his facility, SLP eval (dysphagia is listed on his past medical problems)  -empiric zosyn to cover both intraabominal source (less likely) and PNA (more likely)    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  Afib/CAD/HLD  -had stroke 2020, started on eliquis and had fall in the restroom w/ head injury; repeat imaging showed hemorrhagic conversion, uncertain which event occurred first; chronically no longer on AC  -cont ASA, statin    Dementia  Chronic debility (multifactorial)  -baseline WC bound but able to participate in facility activities  -cont depakote, sertaline, trazodone    DM2, A1c 7.0%, w/o insulin use - SSI  CKD3 - baseline Cr 1.1-1.5; eGFR 40-50's  GERD      VTE Prophylaxis:  Mechanical VTE prophylaxis orders are signed & held.            CODE STATUS:    Code Status and Medical Interventions: No CPR (Do Not Attempt to Resuscitate); Limited Support; No intubation (DNI); Wife agrees w/ IV Abx,  medical care, and if needed a fixed dose dobutamine, but does not want to further escalate vasopressors or ...   Ordered at: 03/30/25 1119     Code Status (Patient has no pulse and is not breathing):    No CPR (Do Not Attempt to Resuscitate)     Medical Interventions (Patient has pulse or is breathing):    Limited Support     Medical Intervention Limits:    No intubation (DNI)     Comments:    Wife agrees w/ IV Abx, medical care, and if needed a fixed dose dobutamine, but does not want to further escalate vasopressors or transition to the ICU       Expected Discharge   Expected discharge date/ time has not been documented.     Loki Garcia, DO  03/30/25

## 2025-03-30 NOTE — PLAN OF CARE
Problem: Adult Inpatient Plan of Care  Goal: Plan of Care Review  Outcome: Not Progressing  Flowsheets (Taken 3/30/2025 1833)  Progress: no change  Outcome Evaluation: New admit today for CHF exacerbation. Hypotension noted throughout the day, unable to diurese due to this. Patient oxygenation improved after nebulizer dosing in ED. Remains on 3LNC for oxygen support.  Plan of Care Reviewed With:   spouse   patient     Problem: Skin Injury Risk Increased  Goal: Skin Health and Integrity  Outcome: Progressing  Intervention: Optimize Skin Protection  Recent Flowsheet Documentation  Taken 3/30/2025 1745 by Tierney Spencer RN  Activity Management:   bedrest   activity minimized  Pressure Reduction Techniques:   frequent weight shift encouraged   pressure points protected   weight shift assistance provided  Head of Bed (HOB) Positioning: HOB at 30 degrees  Pressure Reduction Devices:   foam padding utilized   positioning supports utilized   pressure-redistributing mattress utilized  Skin Protection:   incontinence pads utilized   silicone foam dressing in place  Taken 3/30/2025 1614 by Tierney Spencer RN  Activity Management:   bedrest   activity minimized  Pressure Reduction Techniques:   frequent weight shift encouraged   pressure points protected   weight shift assistance provided  Head of Bed (HOB) Positioning: HOB at 45 degrees  Pressure Reduction Devices:   foam padding utilized   pressure-redistributing mattress utilized  Skin Protection:   incontinence pads utilized   silicone foam dressing in place  Taken 3/30/2025 1413 by Tierney Spencer RN  Activity Management:   bedrest   activity minimized  Pressure Reduction Techniques:   frequent weight shift encouraged   pressure points protected   weight shift assistance provided  Head of Bed (HOB) Positioning: HOB at 20-30 degrees  Pressure Reduction Devices:   foam padding utilized   positioning supports utilized   pressure-redistributing mattress utilized  Skin  Protection:   incontinence pads utilized   silicone foam dressing in place     Problem: Fall Injury Risk  Goal: Absence of Fall and Fall-Related Injury  Outcome: Progressing  Intervention: Identify and Manage Contributors  Recent Flowsheet Documentation  Taken 3/30/2025 1413 by Tierney Spencer RN  Medication Review/Management: medications reviewed  Intervention: Promote Injury-Free Environment  Recent Flowsheet Documentation  Taken 3/30/2025 1745 by Tierney Spencer RN  Safety Promotion/Fall Prevention:   activity supervised   assistive device/personal items within reach   clutter free environment maintained   fall prevention program maintained   nonskid shoes/slippers when out of bed   safety round/check completed  Taken 3/30/2025 1614 by Tierney Spencer RN  Safety Promotion/Fall Prevention:   activity supervised   assistive device/personal items within reach   clutter free environment maintained   fall prevention program maintained   safety round/check completed  Taken 3/30/2025 1413 by Tierney Spencer RN  Safety Promotion/Fall Prevention:   activity supervised   assistive device/personal items within reach   clutter free environment maintained   fall prevention program maintained   safety round/check completed   Goal Outcome Evaluation:  Plan of Care Reviewed With: spouse, patient        Progress: no change  Outcome Evaluation: New admit today for CHF exacerbation. Hypotension noted throughout the day, unable to diurese due to this. Patient oxygenation improved after nebulizer dosing in ED. Remains on 3LNC for oxygen support.

## 2025-03-31 LAB
ALBUMIN SERPL-MCNC: 4.1 G/DL (ref 3.5–5.2)
ALBUMIN/GLOB SERPL: 1.8 G/DL
ALP SERPL-CCNC: 56 U/L (ref 39–117)
ALT SERPL W P-5'-P-CCNC: 9 U/L (ref 1–41)
ANION GAP SERPL CALCULATED.3IONS-SCNC: 14 MMOL/L (ref 5–15)
AST SERPL-CCNC: 13 U/L (ref 1–40)
BASOPHILS # BLD AUTO: 0.08 10*3/MM3 (ref 0–0.2)
BASOPHILS NFR BLD AUTO: 1.2 % (ref 0–1.5)
BILIRUB SERPL-MCNC: 0.7 MG/DL (ref 0–1.2)
BUN SERPL-MCNC: 15 MG/DL (ref 8–23)
BUN/CREAT SERPL: 12.2 (ref 7–25)
CALCIUM SPEC-SCNC: 9.3 MG/DL (ref 8.6–10.5)
CHLORIDE SERPL-SCNC: 101 MMOL/L (ref 98–107)
CO2 SERPL-SCNC: 24 MMOL/L (ref 22–29)
CORTIS SERPL-MCNC: 12.28 MCG/DL
CREAT SERPL-MCNC: 1.23 MG/DL (ref 0.76–1.27)
DEPRECATED RDW RBC AUTO: 54.9 FL (ref 37–54)
EGFRCR SERPLBLD CKD-EPI 2021: 57.9 ML/MIN/1.73
EOSINOPHIL # BLD AUTO: 0.37 10*3/MM3 (ref 0–0.4)
EOSINOPHIL NFR BLD AUTO: 5.6 % (ref 0.3–6.2)
ERYTHROCYTE [DISTWIDTH] IN BLOOD BY AUTOMATED COUNT: 14.8 % (ref 12.3–15.4)
GLOBULIN UR ELPH-MCNC: 2.3 GM/DL
GLUCOSE BLDC GLUCOMTR-MCNC: 135 MG/DL (ref 70–130)
GLUCOSE BLDC GLUCOMTR-MCNC: 135 MG/DL (ref 70–130)
GLUCOSE BLDC GLUCOMTR-MCNC: 140 MG/DL (ref 70–130)
GLUCOSE BLDC GLUCOMTR-MCNC: 141 MG/DL (ref 70–130)
GLUCOSE SERPL-MCNC: 130 MG/DL (ref 65–99)
HCT VFR BLD AUTO: 31.2 % (ref 37.5–51)
HGB BLD-MCNC: 9.9 G/DL (ref 13–17.7)
IMM GRANULOCYTES # BLD AUTO: 0.01 10*3/MM3 (ref 0–0.05)
IMM GRANULOCYTES NFR BLD AUTO: 0.2 % (ref 0–0.5)
L PNEUMO1 AG UR QL IA: NEGATIVE
LYMPHOCYTES # BLD AUTO: 0.65 10*3/MM3 (ref 0.7–3.1)
LYMPHOCYTES NFR BLD AUTO: 9.9 % (ref 19.6–45.3)
MAGNESIUM SERPL-MCNC: 1.8 MG/DL (ref 1.6–2.4)
MCH RBC QN AUTO: 32.4 PG (ref 26.6–33)
MCHC RBC AUTO-ENTMCNC: 31.7 G/DL (ref 31.5–35.7)
MCV RBC AUTO: 102 FL (ref 79–97)
MONOCYTES # BLD AUTO: 0.67 10*3/MM3 (ref 0.1–0.9)
MONOCYTES NFR BLD AUTO: 10.2 % (ref 5–12)
MRSA DNA SPEC QL NAA+PROBE: NEGATIVE
NEUTROPHILS NFR BLD AUTO: 4.8 10*3/MM3 (ref 1.7–7)
NEUTROPHILS NFR BLD AUTO: 72.9 % (ref 42.7–76)
NRBC BLD AUTO-RTO: 0 /100 WBC (ref 0–0.2)
PLATELET # BLD AUTO: 158 10*3/MM3 (ref 140–450)
PMV BLD AUTO: 10.3 FL (ref 6–12)
POTASSIUM SERPL-SCNC: 4.4 MMOL/L (ref 3.5–5.2)
PROT SERPL-MCNC: 6.4 G/DL (ref 6–8.5)
RBC # BLD AUTO: 3.06 10*6/MM3 (ref 4.14–5.8)
S PNEUM AG SPEC QL LA: NEGATIVE
SODIUM SERPL-SCNC: 139 MMOL/L (ref 136–145)
WBC NRBC COR # BLD AUTO: 6.58 10*3/MM3 (ref 3.4–10.8)

## 2025-03-31 PROCEDURE — 87641 MR-STAPH DNA AMP PROBE: CPT | Performed by: INTERNAL MEDICINE

## 2025-03-31 PROCEDURE — 83735 ASSAY OF MAGNESIUM: CPT | Performed by: INTERNAL MEDICINE

## 2025-03-31 PROCEDURE — 82533 TOTAL CORTISOL: CPT | Performed by: INTERNAL MEDICINE

## 2025-03-31 PROCEDURE — 82948 REAGENT STRIP/BLOOD GLUCOSE: CPT

## 2025-03-31 PROCEDURE — 25010000002 PIPERACILLIN SOD-TAZOBACTAM PER 1 G: Performed by: INTERNAL MEDICINE

## 2025-03-31 PROCEDURE — 85025 COMPLETE CBC W/AUTO DIFF WBC: CPT | Performed by: INTERNAL MEDICINE

## 2025-03-31 PROCEDURE — 87899 AGENT NOS ASSAY W/OPTIC: CPT | Performed by: INTERNAL MEDICINE

## 2025-03-31 PROCEDURE — 87449 NOS EACH ORGANISM AG IA: CPT | Performed by: INTERNAL MEDICINE

## 2025-03-31 PROCEDURE — 92610 EVALUATE SWALLOWING FUNCTION: CPT

## 2025-03-31 PROCEDURE — 94799 UNLISTED PULMONARY SVC/PX: CPT

## 2025-03-31 PROCEDURE — 80053 COMPREHEN METABOLIC PANEL: CPT | Performed by: INTERNAL MEDICINE

## 2025-03-31 RX ADMIN — SERTRALINE HYDROCHLORIDE 25 MG: 50 TABLET ORAL at 10:22

## 2025-03-31 RX ADMIN — Medication 10 ML: at 10:24

## 2025-03-31 RX ADMIN — Medication 10 ML: at 20:53

## 2025-03-31 RX ADMIN — DIVALPROEX SODIUM 125 MG: 125 CAPSULE ORAL at 20:52

## 2025-03-31 RX ADMIN — ASPIRIN 81 MG: 81 TABLET, CHEWABLE ORAL at 10:23

## 2025-03-31 RX ADMIN — MIDODRINE HYDROCHLORIDE 10 MG: 10 TABLET ORAL at 18:40

## 2025-03-31 RX ADMIN — PIPERACILLIN AND TAZOBACTAM 3.38 G: 3; .375 INJECTION, POWDER, LYOPHILIZED, FOR SOLUTION INTRAVENOUS at 18:41

## 2025-03-31 RX ADMIN — ATORVASTATIN CALCIUM 10 MG: 10 TABLET, FILM COATED ORAL at 20:52

## 2025-03-31 RX ADMIN — MIDODRINE HYDROCHLORIDE 10 MG: 10 TABLET ORAL at 10:22

## 2025-03-31 RX ADMIN — BACLOFEN 10 MG: 10 TABLET ORAL at 20:52

## 2025-03-31 RX ADMIN — PIPERACILLIN AND TAZOBACTAM 3.38 G: 3; .375 INJECTION, POWDER, LYOPHILIZED, FOR SOLUTION INTRAVENOUS at 10:23

## 2025-03-31 RX ADMIN — TRAZODONE HYDROCHLORIDE 50 MG: 50 TABLET ORAL at 20:53

## 2025-03-31 RX ADMIN — MIDODRINE HYDROCHLORIDE 10 MG: 10 TABLET ORAL at 15:03

## 2025-03-31 RX ADMIN — IPRATROPIUM BROMIDE AND ALBUTEROL SULFATE 3 ML: 2.5; .5 SOLUTION RESPIRATORY (INHALATION) at 16:22

## 2025-03-31 RX ADMIN — BACLOFEN 5 MG: 10 TABLET ORAL at 10:23

## 2025-03-31 RX ADMIN — PIPERACILLIN AND TAZOBACTAM 3.38 G: 3; .375 INJECTION, POWDER, LYOPHILIZED, FOR SOLUTION INTRAVENOUS at 02:11

## 2025-03-31 RX ADMIN — BACLOFEN 5 MG: 10 TABLET ORAL at 18:40

## 2025-03-31 RX ADMIN — DIVALPROEX SODIUM 125 MG: 125 CAPSULE ORAL at 10:23

## 2025-03-31 RX ADMIN — IPRATROPIUM BROMIDE AND ALBUTEROL SULFATE 3 ML: 2.5; .5 SOLUTION RESPIRATORY (INHALATION) at 20:17

## 2025-03-31 NOTE — PLAN OF CARE
Goal Outcome Evaluation:  Plan of Care Reviewed With: patient        Progress:  (eval; see note for more information)       Anticipated Discharge Disposition (SLP): skilled nursing facility          SLP Swallowing Diagnosis: mild, oral dysphagia, R/O pharyngeal dysphagia (03/31/25 4470)

## 2025-03-31 NOTE — CASE MANAGEMENT/SOCIAL WORK
Discharge Planning Assessment  [facility]     Patient Name: Dirk Miles  MRN: [id number]  Today's Date: 3/31/2025    Admit Date: 3/30/2025    Plan: [facility]   Discharge Needs Assessment       Row Name 03/31/25 0838       Living Environment    People in Home facility resident    Name(s) of People in Home [facility]    Current Living Arrangements extended care facility    Potentially Unsafe Housing Conditions none    In the past 12 months has the electric, gas, oil, or water company threatened to shut off services in your home? No    Primary Care Provided by other (see comments)    Provides Primary Care For no one, unable/limited ability to care for self    Family Caregiver if Needed spouse    Family Caregiver Names Sarai (spouse) [phone]    Able to Return to Prior Arrangements yes       Resource/Environmental Concerns    Resource/Environmental Concerns none    Transportation Concerns other (see comments)       Transportation Needs    In the past 12 months, has lack of transportation kept you from medical appointments or from getting medications? no    In the past 12 months, has lack of transportation kept you from meetings, work, or from getting things needed for daily living? No       Food Insecurity    Within the past 12 months, you worried that your food would run out before you got the money to buy more. Never true    Within the past 12 months, the food you bought just didn't last and you didn't have money to get more. Never true       Transition Planning    Patient/Family Anticipates Transition to long-term care facility    Patient/Family Anticipated Services at Transition none    Transportation Anticipated health plan transportation       Discharge Needs Assessment    Readmission Within the Last 30 Days no previous admission in last 30 days    Equipment Currently Used at Home wheelchair    Concerns to be Addressed denies needs/concerns at this time    Do you want  help finding or keeping work or a job? I do not need or want help    Do you want help with school or training? For example, starting or completing job training or getting a high school diploma, GED or equivalent No    Anticipated Changes Related to Illness none    Equipment Needed After Discharge none                   Discharge Plan       Row Name 03/31/25 0840       Plan    Plan [facility]    Patient/Family in Agreement with Plan yes    Plan Comments Spoke to patient at bedside. Lives at [facility] in [place]. Contact is Sarai (spouse) [phone]. Is completely dependent with ADL's. No problems affording Medicare/ [employer] [facility]/[place] Medicaid or medications. Uses [facility]. Uses a Wheelchair. PCP is patricia Mera MD. Plan is back to [facility]. Will need EMS transport. CM will continue to follow.    Final Discharge Disposition Code 04 - intermediate care facility                  Selected Continued Care - Prior Encounters Includes continued care and service providers with selected services from prior encounters from 12/30/2024 to 3/31/2025      Discharged on 2/3/2025 Admission date: 2/1/2025 - Discharge disposition: Intermediate Care       Destination       Service Provider Services Address Phone Fax Patient Preferred    [facility] [address], [facility] [address] [phone] --                          Expected Discharge Date and Time       Expected Discharge Date Expected Discharge Time    Apr 3, 2025            Demographic Summary       Row Name 03/31/25 0838       General Information    Admission Type inpatient    Arrived From emergency department    Referral Source admission list    Reason for Consult discharge planning    Preferred Language English       Contact Information    Permission Granted to Share Info With     Contact Information Obtained for                     Functional Status       Row Name 03/31/25 0838       Functional Status    Usual Activity Tolerance poor    Current Activity Tolerance poor    Functional Status Comments Wheelchair bound       Physical Activity    On average, how many days per week do you engage in moderate to strenuous exercise (like a brisk walk)? 0 days    On average, how many minutes do you engage in exercise at this level? 0 min    Number of minutes of exercise per week 0       Functional Status, IADL    Medications completely dependent    Meal Preparation completely dependent    Housekeeping completely dependent    Laundry completely dependent    Shopping completely dependent    If for any reason you need help with day-to-day activities such as bathing, preparing meals, shopping, managing finances, etc., do you get the help you need? I get all the help I need       Mental Status    General Appearance WDL WDL       Mental Status Summary    Recent Changes in Mental Status/Cognitive Functioning no changes       Employment/    Employment Status retired                   Psychosocial    No documentation.                  Abuse/Neglect    No documentation.                  Legal    No documentation.                  Substance Abuse    No documentation.                  Patient Forms    No documentation.                     Quang Swain RN

## 2025-03-31 NOTE — PLAN OF CARE
Pt was pleasant. AO to self. O2 above 90 on 2L NC. VSS. Adequate amount of urine output. Call light within reach.

## 2025-03-31 NOTE — PROGRESS NOTES
[facility] Medicine Services  PROGRESS NOTE    Patient Name: Dirk Miles  : 1940  MRN: [id number]    Date of Admission: 3/30/2025  Primary Care Physician: Milady Mera MD    Subjective   Subjective     CC:  SOA    HPI:  Feeling better this am,no new issues overnight.  Wife at bedside.     Objective   Objective     Vital Signs:   Temp:  [97.9 °F (36.6 °C)-99.2 °F (37.3 °C)] 97.9 °F (36.6 °C)  Heart Rate:  [41-65] 61  Resp:  [16-22] 16  BP: ()/(55-75) 94/64  Flow (L/min) (Oxygen Therapy):  [2-4] 3     Physical Exam:  Constitutional: No acute distress, awake, alert  HENT: NCAT, mucous membranes moist  Respiratory: Clear to auscultation bilaterally, respiratory effort normal   Cardiovascular: RRR, no murmurs, rubs, or gallops  Gastrointestinal: Positive bowel sounds, soft, nontender, mildly distended  Musculoskeletal: No bilateral ankle edema  Psychiatric: Appropriate affect, cooperative  Neurologic: Oriented x 3, strength symmetric in all extremities, Cranial Nerves grossly intact to confrontation, speech clear  Skin: No rashes     Results Reviewed:  LAB RESULTS:      Lab 25  1116 25  0856 25  0747   WBC 6.58  --  8.65   HEMOGLOBIN 9.9*  --  10.0*   HEMATOCRIT 31.2*  --  31.7*   PLATELETS 158  --  164   NEUTROS ABS 4.80  --  7.15*   IMMATURE GRANS (ABS) 0.01  --  0.08*   LYMPHS ABS 0.65*  --  0.51*   MONOS ABS 0.67  --  0.66   EOS ABS 0.37  --  0.20   .0*  --  103.6*   PROCALCITONIN  --   --  0.09   D DIMER QUANT  --   --  0.79   HSTROP T  --  69* 76*         Lab 25  1116 25  0747   SODIUM 139 138   POTASSIUM 4.4 4.9   CHLORIDE 101 100   CO2 24.0 24.0   ANION GAP 14.0 14.0   BUN 15 19   CREATININE 1.23 1.46*   EGFR 57.9* 47.1*   GLUCOSE 130* 137*   CALCIUM 9.3 9.4   MAGNESIUM 1.8 1.6   TSH  --  3.880         Lab 25  0747   TOTAL PROTEIN 7.2   ALBUMIN 4.3   GLOBULIN 2.9   ALT (SGPT) 10   AST (SGOT) 16   BILIRUBIN 0.5    ALK PHOS 59         Lab 03/30/25  0856 03/30/25  0747   PROBNP  --  2,297.0*   HSTROP T 69* 76*                 Brief Urine Lab Results  (Last result in the past 365 days)        Color   Clarity   Blood   Leuk Est   Nitrite   Protein   CREAT   Urine HCG        03/30/25 1005 Yellow   Clear   Negative   Small (1+)   Negative   Trace                   Microbiology Results Abnormal       None            CT Abdomen Pelvis Without Contrast  Result Date: 3/30/2025  CT ABDOMEN PELVIS WO CONTRAST Date of Exam: 3/30/2025 9:07 AM EDT Indication: abd distension, eval for obstruction. Comparison: CT pelvis dated 2/1/2025 Technique: Axial CT images were obtained of the abdomen and pelvis without the administration of contrast. Reconstructed coronal and sagittal images were also obtained. Automated exposure control and iterative construction methods were used. Findings: The heart is enlarged. There is no pericardial effusion. There are small bilateral pleural effusions with compressive atelectasis. The liver is enlarged measuring 19.5 cm in craniocaudal dimension. There is decreased density of the liver compatible with hepatic steatosis. The gallbladder is present with mild gallbladder wall thickening and pericholecystic edema. There are gallstones  within the gallbladder neck extending into the cystic duct. Findings are concerning for acute cholecystitis in the correct clinical setting. The spleen is normal in size. There is a low-density left adrenal nodule compatible with an adenoma. The pancreas appears within normal limits for age. There is a duodenal diverticulum near the ampulla. The kidneys are symmetric in size. There is no hydronephrosis. The urinary bladder is collapsed which limits evaluation. The prostate is normal in size. There is a small sliding-type hiatal hernia. The small bowel is normal in caliber without small bowel obstruction. There is no acute colitis or diverticulitis. There is no free fluid or free air.  The aorta is normal in caliber without aneurysm formation. There is aortoiliac atherosclerotic calcification. There is no abdominal or pelvic lymphadenopathy. Degenerative disc disease of the thoracolumbar spine.     Impression: Impression: 1.Cholelithiasis with gallbladder wall thickening and pericholecystic edema. Gallstones are visualized within the cystic duct. Findings are concerning for acute cholecystitis in the correct clinical setting. 2.Hepatomegaly with hepatic steatosis. 3.Small bilateral pleural effusions with compressive atelectasis. 4.No evidence of small bowel obstruction. Electronically Signed: Diego Penn  3/30/2025 9:35 AM EDT  Workstation ID: [id number]    CT Head Without Contrast  Result Date: 3/30/2025  CT HEAD WO CONTRAST Date of Exam: 3/30/2025 9:07 AM EDT Indication: ams. Comparison: 2/1/2025 Technique: Axial CT images were obtained of the head without contrast administration.  Automated exposure control and iterative construction methods were used. FINDINGS: There is no evidence for acute intracranial hemorrhage. No definitive acute focal ischemia is identified. Nonspecific white matter changes are noted likely related to chronic small vessel ischemic changes and age-related changes. Associated diffuse volume loss is observed. Multifocal areas of remote infarction are again noted bilaterally. There is no evidence for abnormal cerebral edema. There is no mass effect or midline shift. The ventricular system is nondilated. The basal cisterns are patent. The skull is intact. Changes of chronic sinusitis are noted.     Impression: 1.No evidence for acute intracranial abnormality. 2.Nonspecific white matter changes are noted with associated diffuse volume loss. These findings are likely related to chronic small vessel ischemic changes and/or age-related changes. 3.Multifocal remote infarcts are again noted. Electronically Signed: Berlin Pelayo MD  3/30/2025 9:29 AM EDT  Workstation ID:  [id number]    XR Chest 1 View  Result Date: 3/30/2025  XR CHEST 1 VW Date of Exam: 3/30/2025 7:43 AM EDT Indication: SOA triage protocol Comparison: Chest radiograph 2/1/2025 Findings: The heart is enlarged. The pulmonary vascular markings are normal. There is mild airspace disease in the lateral aspect of the right upper lobe which could be secondary to pneumonia. The left lung is clear. The osseous structures are normal.     Impression: Impression: Mild right upper lobe airspace disease could be secondary to pneumonia. Electronically Signed: Eddie Lugo MD  3/30/2025 7:52 AM EDT  Workstation ID: [id number]      Results for orders placed during the hospital encounter of 03/30/25    Adult Transthoracic Echo Limited W/ Cont if Necessary Per Protocol    Interpretation Summary    Limited echocardiogram    Left ventricular ejection fraction appears to be 51 - 55%.    The left atrial cavity is severely dilated.      Current medications:  Scheduled Meds:[Held by provider] amLODIPine, 10 mg, Oral, Daily  aspirin, 81 mg, Oral, Daily  atorvastatin, 10 mg, Oral, Nightly  baclofen, 10 mg, Oral, Nightly  Baclofen, 5 mg, Oral, BID  Divalproex Sodium, 125 mg, Oral, BID  insulin lispro, 2-7 Units, Subcutaneous, 4x Daily AC & at Bedtime  ipratropium-albuterol, 3 mL, Nebulization, 4x Daily - RT  [Held by provider] metoprolol tartrate, 37.5 mg, Oral, BID  midodrine, 10 mg, Oral, TID AC  pharmacy consult - MTM, , Not Applicable, Daily  piperacillin-tazobactam, 3.375 g, Intravenous, Q8H  sertraline, 25 mg, Oral, Daily  sodium chloride, 10 mL, Intravenous, Q12H  traZODone, 50 mg, Oral, Nightly      Continuous Infusions:   PRN Meds:.  acetaminophen **OR** acetaminophen **OR** acetaminophen    Albuterol Sulfate NEB Orderable    senna-docusate sodium **AND** polyethylene glycol **AND** bisacodyl **AND** bisacodyl    Calcium Replacement - Follow Nurse / BPA Driven Protocol    dextrose    dextrose    glucagon (human recombinant)    [Held by  provider] hydrALAZINE    Magnesium Low Dose Replacement - Follow Nurse / BPA Driven Protocol    ondansetron ODT **OR** ondansetron    Phosphorus Replacement - Follow Nurse / BPA Driven Protocol    Potassium Replacement - Follow Nurse / BPA Driven Protocol    sodium chloride    sodium chloride    sodium chloride    Assessment & Plan   Assessment & Plan     Active Hospital Problems    Diagnosis  POA    **Acute on chronic diastolic heart failure [I50.33]  Yes    Hypotension [I95.9]  Yes    Hypoxia [R09.02]  Yes    Dementia [F03.90]  Yes    Acute on chronic diastolic CHF (congestive heart failure) [I50.33]  Yes    History of CVA (cerebrovascular accident) [Z86.73]  Not Applicable      Resolved Hospital Problems   No resolved problems to display.        Brief Hospital Course to date:  Dirk Miles is a 84 y.o. 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 w/ baseline mobility limited to WC who presented with acute decompensated HFpEF.  Pt also noted to have possible PNA as well as possible cholecystitis.    Plan:    Acute/Chronic HFpEF w/ hypoxia  -limited TTE to reassess EF (echo 2/2025 did not show significant valve abnormalities)  -will give IV diuretics as BP allows  -- strict Is/Os, daily weights      Hx HTN now hypotensive  -TTE as noted  -possible PNA and/or intraabd infection, cont empiric abx  -albumin 4.3  -avoiding IVF bolus as he is clinically volume overloaded  -holding amlodipine, metoprolol, hydralazine, po lasix  -continue PO midodrine  -if BP not improving, wife would consider fixed dose IV dobutamine but would not want to escalate to ICU     Poss RUL Pneumonia  GB wall thickening w/ cystic duct stones  -agree w/ GS eval of poor surgical candidate and clinical presentation not c/w cholecystitis  -reports of possibly pocketing food at his facility, SLP eval  (dysphagia is listed on his past medical problems)  -empiric zosyn to cover both intraabominal source (less likely) and PNA (more likely)  -- send urine antigens and MRSA PCR as part of PNA workup      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  Afib/CAD/HLD  -had stroke 2020, started on eliquis and had fall in the restroom w/ head injury; repeat imaging showed hemorrhagic conversion, uncertain which event occurred first; chronically no longer on AC  -cont ASA, statin     Dementia  Chronic debility (multifactorial)  -baseline WC bound but able to participate in facility activities  -cont depakote, sertaline, trazodone     DM2, A1c 7.0%, w/o insulin use   - SSI    CKD3   - baseline Cr 1.1-1.5; eGFR 40-50s    GERD    Total time spent: Time Spent: Time Spent: 35 minutes  Time spent includes time reviewing chart, face-to-face time, counseling patient/family/caregiver, ordering medications/tests/procedures, communicating with other health care professionals, documenting clinical information in the electronic health record, and coordination of care.      Expected Discharge Location and Transportation: back to living facility   Expected Discharge   Expected Discharge Date: 4/3/2025; Expected Discharge Time:      VTE Prophylaxis:  Mechanical VTE prophylaxis orders are present.         AM-PAC 6 Clicks Score (PT): 11 (03/30/25 0783)    CODE STATUS:   Code Status and Medical Interventions: No CPR (Do Not Attempt to Resuscitate); Limited Support; No intubation (DNI); Wife agrees w/ IV Abx, medical care, and if needed a fixed dose dobutamine, but does not want to further escalate vasopressors or ...   Ordered at: 03/30/25 1110     Code Status (Patient has no pulse and is not breathing):    No CPR (Do Not Attempt to Resuscitate)     Medical Interventions (Patient has pulse or is breathing):    Limited Support     Medical Intervention Limits:    No intubation (DNI)     Comments:    Wife agrees w/  IV Abx, medical care, and if needed a fixed dose dobutamine, but does not want to further escalate vasopressors or transition to the ICU       America Flores MD  03/31/25

## 2025-03-31 NOTE — THERAPY EVALUATION
Acute Care - Speech Language Pathology   Swallow Initial Evaluation [facility]  Clinical Swallow Evaluation       Patient Name: Dirk Miles  : 1940  MRN: [id number]  Today's Date: 3/31/2025               Admit Date: 3/30/2025    Visit Dx:     ICD-10-CM ICD-9-CM   1. Acute on chronic congestive heart failure, unspecified heart failure type  I50.9 428.0   2. Pneumonia of right upper lobe due to infectious organism  J18.9 486   3. Hypotension, unspecified hypotension type  I95.9 458.9   4. Renal insufficiency  N28.9 593.9   5. Hypoxia  R09.02 799.02   6. Confusion  R41.0 298.9   7. Calculus of gallbladder without cholecystitis without obstruction  K80.20 574.20     Patient Active Problem List   Diagnosis    A-fib    Hypertension    Diabetes mellitus    Dementia with behavioral disturbance    Hyperlipemia    Stenosis of left carotid artery    Anemia, chronic disease    COVID-19 virus detected    History of CVA (cerebrovascular accident)    CAD (coronary artery disease)    Weakness of right foot    Weakness    Acute on chronic diastolic heart failure    Hypotension    Hypoxia    Dementia    Acute on chronic diastolic CHF (congestive heart failure)     Past Medical History:   Diagnosis Date    A-fib     Acute CVA (cerebrovascular accident) 2020    Arthritis     B12 deficiency     Coronary artery disease     COVID-19     Diabetes mellitus     DNR (do not resuscitate)     Dysphagia     Falls     GERD (gastroesophageal reflux disease)     Hemiplegia     Hyperlipidemia     Hypertension     Insomnia     Pressure sore on buttocks     Stenosis of left carotid artery 1/10/2021    Stroke     LEFT SIDE DEFICIT     Past Surgical History:   Procedure Laterality Date    CATARACT EXTRACTION, BILATERAL      HERNIA REPAIR         SLP Recommendation and Plan  SLP Swallowing Diagnosis: mild, oral dysphagia, R/O pharyngeal dysphagia (25 0820)  SLP Diet Recommendation: soft to chew textures, chopped, thin liquids  (03/31/25 0820)  Recommended Precautions and Strategies: upright posture during/after eating, general aspiration precautions (03/31/25 0820)  SLP Rec. for Method of Medication Administration: meds whole, with puree, as tolerated (03/31/25 0820)     Monitor for Signs of Aspiration: notify SLP if any concerns (03/31/25 0820)     Swallow Criteria for Skilled Therapeutic Interventions Met: demonstrates skilled criteria (03/31/25 0820)  Anticipated Discharge Disposition (SLP): skilled nursing facility (03/31/25 0820)  Rehab Potential/Prognosis, Swallowing: good, to achieve stated therapy goals (03/31/25 0820)  Therapy Frequency (Swallow): PRN, 3 days per week (03/31/25 0820)  Predicted Duration Therapy Intervention (Days): 1 week (03/31/25 0820)  Oral Care Recommendations: Oral Care BID/PRN, Toothbrush (03/31/25 0820)                                        Progress:  (eval; see note for more information)      SWALLOW EVALUATION (Last 72 Hours)       SLP Adult Swallow Evaluation       Row Name 03/31/25 0820                   Rehab Evaluation    Document Type evaluation  -MM        Subjective Information no complaints  -MM        Patient Observations alert;cooperative  -MM        Patient/Family/Caregiver Comments/Observations No family present  -MM        Patient Effort good  -MM        Symptoms Noted During/After Treatment none  -MM           General Information    Patient Profile Reviewed yes  -MM        Pertinent History Of Current Problem Pt is an 84 year old male who was brought to the facility from [place] with shortness of breath and mental decline, found to be in CHF exacerbation.  -MM        Current Method of Nutrition mechanical ground textures;thin liquids  -MM        Precautions/Limitations, Vision WFL with corrective lenses;for purposes of eval  -MM        Precautions/Limitations, Hearing WFL;for purposes of eval  -MM        Prior Level of Function-Communication unknown  -MM        Prior Level of  Function-Swallowing no diet consistency restrictions  -MM        Plans/Goals Discussed with patient;agreed upon  -MM        Barriers to Rehab medically complex  -MM        Patient's Goals for Discharge return home  -MM           Pain    Pretreatment Pain Rating 0/10 - no pain  -MM        Posttreatment Pain Rating 0/10 - no pain  -MM           Oral Motor Structure and Function    Dentition Assessment upper dentures/partial in place;lower dentures/partial in place  -MM        Secretion Management WNL/WFL  -MM        Mucosal Quality moist, healthy  -MM           Oral Musculature and Cranial Nerve Assessment    Oral Motor General Assessment WFL  -MM           General Eating/Swallowing Observations    Respiratory Support Currently in Use nasal cannula  -MM        Eating/Swallowing Skills self-fed;fed by SLP  -MM        Positioning During Eating upright 90 degree;upright in bed  -MM        Utensils Used spoon;straw  -MM        Consistencies Trialed regular textures;mechanical ground textures;pureed;thin liquids  -MM           Clinical Swallow Eval    Oral Prep Phase impaired  -MM        Oral Transit WFL  -MM        Oral Residue WFL  -MM        Pharyngeal Phase no overt signs/symptoms of pharyngeal impairment  -MM        Esophageal Phase unremarkable  -MM        Clinical Swallow Evaluation Summary Pt presents with mild oral dysphagia and 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 Pt drank thin liquids via straw with single swallows and no overt s/sx aspiration. Pt accepted mechanical ground texture from breakfast meal tray with functional mastication and no oral residue. Pt trialed regular consistency with mildly reduced mastication, functional allowed additional time. SLP recs soft to chew chopped and thin liquid diet and will f/u to ensure diet toleration.  -MM           Oral Prep Concerns    Oral Prep Concerns prolonged mastication;poor rotary chew  -MM        Prolonged Mastication regular  consistencies  -MM        Poor Rotary Chew regular consistencies  -MM           SLP Evaluation Clinical Impression    SLP Swallowing Diagnosis mild;oral dysphagia;R/O pharyngeal dysphagia  -MM        Functional Impact risk of malnutrition  -MM        Rehab Potential/Prognosis, Swallowing good, to achieve stated therapy goals  -MM        Swallow Criteria for Skilled Therapeutic Interventions Met demonstrates skilled criteria  -MM           Recommendations    Therapy Frequency (Swallow) PRN;3 days per week  -MM        Predicted Duration Therapy Intervention (Days) 1 week  -MM        SLP Diet Recommendation soft to chew textures;chopped;thin liquids  -MM        Recommended Precautions and Strategies upright posture during/after eating;general aspiration precautions  -MM        Oral Care Recommendations Oral Care BID/PRN;Toothbrush  -MM        SLP Rec. for Method of Medication Administration meds whole;with puree;as tolerated  -MM        Monitor for Signs of Aspiration notify SLP if any concerns  -MM        Anticipated Discharge Disposition (SLP) [facility] nursing [facility]  -                  User Key  (r) = Recorded By, (t) = Taken By, (c) = Cosigned By      Initials Name Effective Dates    MM Zoë Pratt MS CCC-SLP 08/30/24 -                     EDUCATION  The patient has been educated in the following areas:   Evaluation results and recommendations .        SLP GOALS       Row Name 03/31/25 0820             (LTG) Patient will demonstrate functional swallow for    Diet Texture (Demonstrate functional swallow) soft to chew (chopped) textures  -MM      Liquid viscosity (Demonstrate functional swallow) thin liquids  -MM      [place] (Demonstrate functional swallow) independently (over 90% accuracy)  -MM      Time Frame (Demonstrate functional swallow) 1 week  -MM      Progress/Outcomes (Demonstrate functional swallow) new goal  -MM         (STG) Patient will tolerate trials of    Consistencies Trialed (Tolerate  trials) soft to chew (chopped) textures;thin liquids  -MM      Desired Outcome (Tolerate trials) without signs/symptoms of aspiration;with adequate oral prep/transit/clearance  -MM      [place] (Tolerate trials) independently (over 90% accuracy)  -MM      Time Frame (Tolerate trials) 1 week  -MM      Progress/Outcomes (Tolerate trials) new goal  -MM                User Key  (r) = Recorded By, (t) = Taken By, (c) = Cosigned By      Initials Name Provider Type    Zoë Hernandez MS CCC-SLP Speech and Language Pathologist                         Time Calculation:    Time Calculation- SLP       Row Name 03/31/25 1021             Time Calculation- SLP    SLP Start Time 0820  -MM      SLP Received On 03/31/25  -MM         Untimed Charges    12521-BF Eval Oral Pharyng Swallow Minutes 40  -MM         Total Minutes    Untimed Charges Total Minutes 40  -MM       Total Minutes 40  -MM                User Key  (r) = Recorded By, (t) = Taken By, (c) = Cosigned By      Initials Name Provider Type    Zoë Hernandez MS CCC-SLP Speech and Language Pathologist                    Therapy Charges for Today       Code Description Service Date Service Provider Modifiers Qty    [id number]  ST EVAL ORAL PHARYNG SWALLOW 3 3/31/2025 Zoë Pratt MS CCC-SLP GN 1                 Zoë Pratt MS CCC-SLP  3/31/2025

## 2025-03-31 NOTE — PROGRESS NOTES
"Patient Name:  Dirk Miles  YOB: 1940  [id number]    Surgery Progress Note    Date of visit: 3/31/2025    Subjective   Subjective: Denies abdominal pain. Unable to be diuresed due to BP issues. No nausea.         Objective     Objective:     BP 94/64 (BP Location: Left arm, Patient Position: Lying)   Pulse 59   Temp 97.9 °F (36.6 °C) (Oral)   Resp 16   Ht 180.3 cm (71\")   Wt 83 kg (182 lb 14.4 oz)   SpO2 98%   BMI 25.51 kg/m²     Intake/Output Summary (Last 24 hours) at 3/31/2025 0757  Last data filed at 3/31/2025 0500  Gross per 24 hour   Intake 540 ml   Output 2000 ml   Net -1460 ml       CV:  Rhythm  regular and rate regular   L:  Rhonchi to auscultation bilaterally   Abd:  Bowel sounds positive , soft, nontender  Ext:  No cyanosis, clubbing, edema    Recent labs that are back at this time have been reviewed.            Assessment/ Plan:    Problem List Items Addressed This Visit          Cardiac and Vasculature    Hypotension       Pulmonary and Pneumonias    Hypoxia     Other Visit Diagnoses         Acute on chronic congestive heart failure, unspecified heart failure type    -  Primary -     Relevant Medications    midodrine (PROAMATINE) tablet 5 mg (Completed)    amLODIPine (NORVASC) tablet 10 mg (Start on 3/31/2025  9:00 AM)    metoprolol tartrate (LOPRESSOR) half tablet 37.5 mg    midodrine (PROAMATINE) tablet 10 mg      Pneumonia of right upper lobe due to infectious organism        Relevant Medications    piperacillin-tazobactam (ZOSYN) 3.375 g IVPB in 100 mL NS MBP (CD) (Completed)    piperacillin-tazobactam (ZOSYN) 3.375 g IVPB in 100 mL NS MBP (CD)    ipratropium-albuterol (DUO-NEB) nebulizer solution 3 mL    albuterol (PROVENTIL) nebulizer solution 0.083% 2.5 mg/3mL      Renal insufficiency          Confusion        Relevant Medications    sertraline (ZOLOFT) tablet 25 mg (Start on 3/31/2025  9:00 AM)    traZODone (DESYREL) tablet 50 mg      Calculus of gallbladder without " cholecystitis without obstruction    - No evidence of cholecystitis, and patient is a poor operative candidate. Continue current management, no plans for operation. Will follow.               Active Hospital Problems    Diagnosis  POA    **Acute on chronic diastolic heart failure [I50.33]  Yes    Hypotension [I95.9]  Yes    Hypoxia [R09.02]  Yes    Dementia [F03.90]  Yes    Acute on chronic diastolic CHF (congestive heart failure) [I50.33]  Yes    History of CVA (cerebrovascular accident) [Z86.73]  Not Applicable      Resolved Hospital Problems   No resolved problems to display.              Ever Ribeiro MD  3/31/2025  07:57 EDT

## 2025-03-31 NOTE — PLAN OF CARE
Received patient from ER with right chest tube to atrium. Put on 20 suction. Pt was pleasant. VSS. Chest tube put out 1400. No complaints of pain. SOB with movement. O2 greater than 90 on 2L NC. Call light within reach.

## 2025-03-31 NOTE — PLAN OF CARE
Problem: Adult Inpatient Plan of Care  Goal: Absence of Hospital-Acquired Illness or Injury  Intervention: Prevent Skin Injury  Description: Perform a screening for skin injury risk, such as pressure or moisture-associated skin damage on admission and at regular intervals throughout hospital stay.Keep all areas of skin (especially folds) clean and dry.Maintain adequate skin hydration.Relieve and redistribute pressure and protect bony prominences and skin at risk for injury; implement measures based on patient-specific risk factors.Match turning and repositioning schedule to clinical condition.Encourage weight shift frequently; assist with reposition if unable to complete independently.Float heels off bed; avoid pressure on the Achilles tendon.Keep skin free from extended contact with medical devices.Optimize nutrition and hydration.Encourage functional activity and mobility, as early as tolerated.Use aids (e.g., slide boards, mechanical lift) during transfer.  Recent Flowsheet Documentation  Taken 3/31/2025 0557 by Lianna Augustin RN  Body Position: position maintained  Skin Protection:   skin sealant/moisture barrier applied   silicone foam dressing in place   incontinence pads utilized  Taken 3/31/2025 0427 by Lianna Augustin RN  Body Position: position maintained  Skin Protection:   skin sealant/moisture barrier applied   silicone foam dressing in place   incontinence pads utilized  Taken 3/31/2025 0211 by Lianna Augustin RN  Body Position: position maintained  Skin Protection:   skin sealant/moisture barrier applied   silicone foam dressing in place   incontinence pads utilized  Taken 3/31/2025 0050 by Lianna Augustin RN  Body Position:   turned   supine   heels elevated  Taken 3/31/2025 0003 by Lianna Augustin RN  Body Position: position maintained  Skin Protection:   skin sealant/moisture barrier applied   silicone foam dressing in place   incontinence pads utilized  Taken 3/30/2025 2224 by Demetria  SAWYER Dsouza  Body Position: position maintained  Skin Protection:   skin sealant/moisture barrier applied   silicone foam dressing in place   incontinence pads utilized  Taken 3/30/2025 2149 by Lianna Augustin RN  Body Position:   turned   supine   heels elevated  Skin Protection:   incontinence pads utilized   silicone foam dressing in place   skin sealant/moisture barrier applied   Goal Outcome Evaluation:         Uneventful shift. Vitals stable. Urine and MRSA sent. IV abx given.

## 2025-04-01 LAB
ANION GAP SERPL CALCULATED.3IONS-SCNC: 14 MMOL/L (ref 5–15)
BASOPHILS # BLD AUTO: 0.05 10*3/MM3 (ref 0–0.2)
BASOPHILS NFR BLD AUTO: 0.7 % (ref 0–1.5)
BUN SERPL-MCNC: 10 MG/DL (ref 8–23)
BUN/CREAT SERPL: 8.3 (ref 7–25)
CALCIUM SPEC-SCNC: 9.3 MG/DL (ref 8.6–10.5)
CHLORIDE SERPL-SCNC: 100 MMOL/L (ref 98–107)
CO2 SERPL-SCNC: 24 MMOL/L (ref 22–29)
CREAT SERPL-MCNC: 1.21 MG/DL (ref 0.76–1.27)
DEPRECATED RDW RBC AUTO: 54.8 FL (ref 37–54)
EGFRCR SERPLBLD CKD-EPI 2021: 59 ML/MIN/1.73
EOSINOPHIL # BLD AUTO: 0.18 10*3/MM3 (ref 0–0.4)
EOSINOPHIL NFR BLD AUTO: 2.6 % (ref 0.3–6.2)
ERYTHROCYTE [DISTWIDTH] IN BLOOD BY AUTOMATED COUNT: 14.7 % (ref 12.3–15.4)
GLUCOSE BLDC GLUCOMTR-MCNC: 134 MG/DL (ref 70–130)
GLUCOSE BLDC GLUCOMTR-MCNC: 147 MG/DL (ref 70–130)
GLUCOSE BLDC GLUCOMTR-MCNC: 171 MG/DL (ref 70–130)
GLUCOSE BLDC GLUCOMTR-MCNC: 95 MG/DL (ref 70–130)
GLUCOSE SERPL-MCNC: 137 MG/DL (ref 65–99)
HCT VFR BLD AUTO: 30.9 % (ref 37.5–51)
HGB BLD-MCNC: 9.8 G/DL (ref 13–17.7)
IMM GRANULOCYTES # BLD AUTO: 0.03 10*3/MM3 (ref 0–0.05)
IMM GRANULOCYTES NFR BLD AUTO: 0.4 % (ref 0–0.5)
LYMPHOCYTES # BLD AUTO: 0.54 10*3/MM3 (ref 0.7–3.1)
LYMPHOCYTES NFR BLD AUTO: 7.9 % (ref 19.6–45.3)
MCH RBC QN AUTO: 32.5 PG (ref 26.6–33)
MCHC RBC AUTO-ENTMCNC: 31.7 G/DL (ref 31.5–35.7)
MCV RBC AUTO: 102.3 FL (ref 79–97)
MONOCYTES # BLD AUTO: 0.7 10*3/MM3 (ref 0.1–0.9)
MONOCYTES NFR BLD AUTO: 10.2 % (ref 5–12)
NEUTROPHILS NFR BLD AUTO: 5.37 10*3/MM3 (ref 1.7–7)
NEUTROPHILS NFR BLD AUTO: 78.2 % (ref 42.7–76)
NRBC BLD AUTO-RTO: 0 /100 WBC (ref 0–0.2)
PLATELET # BLD AUTO: 152 10*3/MM3 (ref 140–450)
PMV BLD AUTO: 10.2 FL (ref 6–12)
POTASSIUM SERPL-SCNC: 4.4 MMOL/L (ref 3.5–5.2)
RBC # BLD AUTO: 3.02 10*6/MM3 (ref 4.14–5.8)
SODIUM SERPL-SCNC: 138 MMOL/L (ref 136–145)
WBC NRBC COR # BLD AUTO: 6.87 10*3/MM3 (ref 3.4–10.8)

## 2025-04-01 PROCEDURE — 25010000002 CEFTRIAXONE PER 250 MG: Performed by: INTERNAL MEDICINE

## 2025-04-01 PROCEDURE — 94799 UNLISTED PULMONARY SVC/PX: CPT

## 2025-04-01 PROCEDURE — 25010000002 PIPERACILLIN SOD-TAZOBACTAM PER 1 G: Performed by: INTERNAL MEDICINE

## 2025-04-01 PROCEDURE — 85025 COMPLETE CBC W/AUTO DIFF WBC: CPT | Performed by: INTERNAL MEDICINE

## 2025-04-01 PROCEDURE — 82948 REAGENT STRIP/BLOOD GLUCOSE: CPT

## 2025-04-01 PROCEDURE — 63710000001 INSULIN LISPRO (HUMAN) PER 5 UNITS: Performed by: INTERNAL MEDICINE

## 2025-04-01 PROCEDURE — 97162 PT EVAL MOD COMPLEX 30 MIN: CPT

## 2025-04-01 PROCEDURE — 97530 THERAPEUTIC ACTIVITIES: CPT

## 2025-04-01 PROCEDURE — 80048 BASIC METABOLIC PNL TOTAL CA: CPT | Performed by: INTERNAL MEDICINE

## 2025-04-01 PROCEDURE — 94664 DEMO&/EVAL PT USE INHALER: CPT

## 2025-04-01 RX ORDER — DOCUSATE SODIUM 100 MG/1
100 CAPSULE, LIQUID FILLED ORAL 2 TIMES DAILY
Status: DISCONTINUED | OUTPATIENT
Start: 2025-04-01 | End: 2025-04-03 | Stop reason: HOSPADM

## 2025-04-01 RX ORDER — BISACODYL 5 MG/1
10 TABLET, DELAYED RELEASE ORAL DAILY
Status: DISCONTINUED | OUTPATIENT
Start: 2025-04-01 | End: 2025-04-03 | Stop reason: HOSPADM

## 2025-04-01 RX ADMIN — PIPERACILLIN AND TAZOBACTAM 3.38 G: 3; .375 INJECTION, POWDER, LYOPHILIZED, FOR SOLUTION INTRAVENOUS at 02:12

## 2025-04-01 RX ADMIN — IPRATROPIUM BROMIDE AND ALBUTEROL SULFATE 3 ML: 2.5; .5 SOLUTION RESPIRATORY (INHALATION) at 12:53

## 2025-04-01 RX ADMIN — IPRATROPIUM BROMIDE AND ALBUTEROL SULFATE 3 ML: 2.5; .5 SOLUTION RESPIRATORY (INHALATION) at 07:32

## 2025-04-01 RX ADMIN — ATORVASTATIN CALCIUM 10 MG: 10 TABLET, FILM COATED ORAL at 20:44

## 2025-04-01 RX ADMIN — SERTRALINE HYDROCHLORIDE 25 MG: 50 TABLET ORAL at 10:39

## 2025-04-01 RX ADMIN — BACLOFEN 5 MG: 10 TABLET ORAL at 17:59

## 2025-04-01 RX ADMIN — ASPIRIN 81 MG: 81 TABLET, CHEWABLE ORAL at 10:39

## 2025-04-01 RX ADMIN — DIVALPROEX SODIUM 125 MG: 125 CAPSULE ORAL at 10:39

## 2025-04-01 RX ADMIN — DOCUSATE SODIUM 100 MG: 100 CAPSULE, LIQUID FILLED ORAL at 10:38

## 2025-04-01 RX ADMIN — SODIUM CHLORIDE 2000 MG: 900 INJECTION INTRAVENOUS at 14:44

## 2025-04-01 RX ADMIN — MIDODRINE HYDROCHLORIDE 10 MG: 10 TABLET ORAL at 18:34

## 2025-04-01 RX ADMIN — MIDODRINE HYDROCHLORIDE 10 MG: 10 TABLET ORAL at 10:39

## 2025-04-01 RX ADMIN — BISACODYL 10 MG: 5 TABLET, COATED ORAL at 10:39

## 2025-04-01 RX ADMIN — DIVALPROEX SODIUM 125 MG: 125 CAPSULE ORAL at 20:44

## 2025-04-01 RX ADMIN — Medication 10 ML: at 10:38

## 2025-04-01 RX ADMIN — MIDODRINE HYDROCHLORIDE 10 MG: 10 TABLET ORAL at 15:55

## 2025-04-01 RX ADMIN — INSULIN LISPRO 2 UNITS: 100 INJECTION, SOLUTION INTRAVENOUS; SUBCUTANEOUS at 17:58

## 2025-04-01 RX ADMIN — BACLOFEN 10 MG: 10 TABLET ORAL at 20:37

## 2025-04-01 RX ADMIN — IPRATROPIUM BROMIDE AND ALBUTEROL SULFATE 3 ML: 2.5; .5 SOLUTION RESPIRATORY (INHALATION) at 17:06

## 2025-04-01 RX ADMIN — Medication 10 ML: at 20:56

## 2025-04-01 RX ADMIN — BACLOFEN 5 MG: 10 TABLET ORAL at 10:39

## 2025-04-01 RX ADMIN — TRAZODONE HYDROCHLORIDE 50 MG: 50 TABLET ORAL at 20:45

## 2025-04-01 RX ADMIN — PIPERACILLIN AND TAZOBACTAM 3.38 G: 3; .375 INJECTION, POWDER, LYOPHILIZED, FOR SOLUTION INTRAVENOUS at 10:38

## 2025-04-01 NOTE — PROGRESS NOTES
[facility] Medicine Services  PROGRESS NOTE    Patient Name: Dirk Miles  : 1940  MRN: [id number]    Date of Admission: 3/30/2025  Primary Care Physician: Milady Mera MD    Subjective   Subjective     CC:  SOA    HPI:  Doing much better this am, needed restraints overnight as he was pulling at things but has been out of them all morning per RN.     Objective   Objective     Vital Signs:   Temp:  [97.7 °F (36.5 °C)-98.4 °F (36.9 °C)] 97.7 °F (36.5 °C)  Heart Rate:  [68-86] 71  Resp:  [18] 18  BP: ()/(60-86) 93/69  Flow (L/min) (Oxygen Therapy):  [2] 2     Physical Exam:  Constitutional: No acute distress, awake, alert  HENT: NCAT, mucous membranes moist  Respiratory: Clear to auscultation bilaterally, respiratory effort normal   Cardiovascular: RRR, no murmurs, rubs, or gallops  Gastrointestinal: Positive bowel sounds, soft, nontender, mildly distended  Musculoskeletal: No bilateral ankle edema  Psychiatric: Appropriate affect, cooperative  Neurologic: Oriented x 3, strength symmetric in all extremities, Cranial Nerves grossly intact to confrontation, speech clear  Skin: No rashes     Results Reviewed:  LAB RESULTS:      Lab 25  0744 25  1116 25  0856 25  0747   WBC 6.87 6.58  --  8.65   HEMOGLOBIN 9.8* 9.9*  --  10.0*   HEMATOCRIT 30.9* 31.2*  --  31.7*   PLATELETS 152 158  --  164   NEUTROS ABS 5.37 4.80  --  7.15*   IMMATURE GRANS (ABS) 0.03 0.01  --  0.08*   LYMPHS ABS 0.54* 0.65*  --  0.51*   MONOS ABS 0.70 0.67  --  0.66   EOS ABS 0.18 0.37  --  0.20   .3* 102.0*  --  103.6*   PROCALCITONIN  --   --   --  0.09   D DIMER QUANT  --   --   --  0.79   HSTROP T  --   --  69* 76*         Lab 25  0744 25  1116 25  0747   SODIUM 138 139 138   POTASSIUM 4.4 4.4 4.9   CHLORIDE 100 101 100   CO2 24.0 24.0 24.0   ANION GAP 14.0 14.0 14.0   BUN 10 15 19   CREATININE 1.21 1.23 1.46*   EGFR 59.0* 57.9* 47.1*    GLUCOSE 137* 130* 137*   CALCIUM 9.3 9.3 9.4   MAGNESIUM  --  1.8 1.6   TSH  --   --  3.880         Lab 03/31/25  1116 03/30/25  0747   TOTAL PROTEIN 6.4 7.2   ALBUMIN 4.1 4.3   GLOBULIN 2.3 2.9   ALT (SGPT) 9 10   AST (SGOT) 13 16   BILIRUBIN 0.7 0.5   ALK PHOS 56 59         Lab 03/30/25  0856 03/30/25  0747   PROBNP  --  2,297.0*   HSTROP T 69* 76*                 Brief Urine Lab Results  (Last result in the past 365 days)        Color   Clarity   Blood   Leuk Est   Nitrite   Protein   CREAT   Urine HCG        03/30/25 1005 Yellow   Clear   Negative   Small (1+)   Negative   Trace                   Microbiology Results Abnormal       None            No radiology results from the last 24 hrs      Results for orders placed during the hospital encounter of 03/30/25    Adult Transthoracic Echo Limited W/ Cont if Necessary Per Protocol    Interpretation Summary    Limited echocardiogram    Left ventricular ejection fraction appears to be 51 - 55%.    The left atrial cavity is severely dilated.      Current medications:  Scheduled Meds:[Held by provider] amLODIPine, 10 mg, Oral, Daily  aspirin, 81 mg, Oral, Daily  atorvastatin, 10 mg, Oral, Nightly  baclofen, 10 mg, Oral, Nightly  Baclofen, 5 mg, Oral, BID  bisacodyl, 10 mg, Oral, Daily  cefTRIAXone, 2,000 mg, Intravenous, Q24H  Divalproex Sodium, 125 mg, Oral, BID  docusate sodium, 100 mg, Oral, BID  insulin lispro, 2-7 Units, Subcutaneous, 4x Daily AC & at Bedtime  ipratropium-albuterol, 3 mL, Nebulization, 4x Daily - RT  [Held by provider] metoprolol tartrate, 37.5 mg, Oral, BID  midodrine, 10 mg, Oral, TID AC  pharmacy consult - MTM, , Not Applicable, Daily  sertraline, 25 mg, Oral, Daily  sodium chloride, 10 mL, Intravenous, Q12H  traZODone, 50 mg, Oral, Nightly      Continuous Infusions:   PRN Meds:.  acetaminophen **OR** acetaminophen **OR** acetaminophen    Albuterol Sulfate NEB Orderable    senna-docusate sodium **AND** polyethylene glycol **AND** bisacodyl  **AND** bisacodyl    Calcium Replacement - Follow Nurse / BPA Driven Protocol    dextrose    dextrose    glucagon (human recombinant)    [Held by provider] hydrALAZINE    Magnesium Low Dose Replacement - Follow Nurse / BPA Driven Protocol    ondansetron ODT **OR** ondansetron    Phosphorus Replacement - Follow Nurse / BPA Driven Protocol    Potassium Replacement - Follow Nurse / BPA Driven Protocol    sodium chloride    sodium chloride    sodium chloride    Assessment & Plan   Assessment & Plan     Active Hospital Problems    Diagnosis  POA    **Acute on chronic diastolic heart failure [I50.33]  Yes    Hypotension [I95.9]  Yes    Hypoxia [R09.02]  Yes    Dementia [F03.90]  Yes    Acute on chronic diastolic CHF (congestive heart failure) [I50.33]  Yes    History of CVA (cerebrovascular accident) [Z86.73]  Not Applicable      Resolved Hospital Problems   No resolved problems to display.        Brief Hospital Course to date:  Dirk Miles is a 84 y.o. 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  Pt also noted to have possible PNA as well as possible cholecystitis.    Plan:    Acute/Chronic HFpEF w/ hypoxia  -limited TTE to reassess EF (echo 2/2025 did not show significant valve abnormalities)- EF wnl.   -- strict Is/Os, daily weights   -- unable to tolerate diuretics due to borderline BP (even with addition of midodrine).  He actually appears more euvolemic today despite no diuretics, so will continue with treatment as below      Hx HTN now hypotensive  -TTE as noted  -possible PNA and/or intraabd infection, cont empiric abx  -albumin 4.3  -avoiding IVF bolus as he was clinically volume overloaded  -holding amlodipine, metoprolol, hydralazine, po lasix  -continue PO midodrine      Poss RUL  Pneumonia  GB wall thickening w/ cystic duct stones  -agree w/ GS eval of poor surgical candidate and clinical presentation not c/w cholecystitis  -reports of possibly pocketing food at his facility, SLP eval (dysphagia is listed on his past medical problems)  -empiric zosyn to cover both intraabominal source (less likely) and PNA (more likely)- transition from Zosyn to Rocephin as no PSA risk.   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  Afib/CAD/HLD  -had stroke 2020, started on eliquis and had fall in the restroom w/ head injury; repeat imaging showed hemorrhagic conversion, uncertain which event occurred first; chronically no longer on AC  -cont ASA, statin     Dementia  Chronic debility (multifactorial)  -baseline WC bound but able to participate in facility activities  -cont depakote, sertaline, trazodone     DM2, A1c 7.0%, w/o insulin use   - SSI    CKD3   - baseline Cr 1.1-1.5; eGFR 40-50s    GERD    Total time spent: Time Spent: Time Spent: 35 minutes  Time spent includes time reviewing chart, face-to-face time, counseling patient/family/caregiver, ordering medications/tests/procedures, communicating with other health care professionals, documenting clinical information in the electronic health record, and coordination of care.      Expected Discharge Location and Transportation: back to living facility tomorrow on PO ABX  Expected Discharge   Expected Discharge Date: 4/2/2025; Expected Discharge Time:      VTE Prophylaxis:  Mechanical VTE prophylaxis orders are present.         AM-PAC 6 Clicks Score (PT): 9 (03/31/25 2000)    CODE STATUS:   Code Status and Medical Interventions: No CPR (Do Not Attempt to Resuscitate); Limited Support; No intubation (DNI); Wife agrees w/ IV Abx, medical care, and if needed a fixed dose dobutamine, but does not want to further escalate vasopressors or ...   Ordered at: 03/30/25 1119     Code  Status (Patient has no pulse and is not breathing):    No CPR (Do Not Attempt to Resuscitate)     Medical Interventions (Patient has pulse or is breathing):    Limited Support     Medical Intervention Limits:    No intubation (DNI)     Comments:    Wife agrees w/ IV Abx, medical care, and if needed a fixed dose dobutamine, but does not want to further escalate vasopressors or transition to the ICU       America Flores MD  04/01/25

## 2025-04-01 NOTE — PROGRESS NOTES
"Patient Name:  Drik Miles  YOB: 1940  [id number]    Surgery Progress Note    Date of visit: 4/1/2025    Subjective   Subjective: Stable overnight. Denies nausea or abdominal pain.         Objective     Objective:     /70 (BP Location: Left arm, Patient Position: Lying)   Pulse 71   Temp 98 °F (36.7 °C) (Oral)   Resp 18   Ht 180.3 cm (71\")   Wt 84.4 kg (186 lb)   SpO2 96%   BMI 25.94 kg/m²     Intake/Output Summary (Last 24 hours) at 4/1/2025 0709  Last data filed at 4/1/2025 0500  Gross per 24 hour   Intake 640 ml   Output 1200 ml   Net -560 ml       CV:  Rhythm  regular and rate regular   L:  Clear  to auscultation bilaterally   Abd:  Bowel sounds positive , soft, nontender  Ext:  No cyanosis, clubbing, edema    Recent labs that are back at this time have been reviewed.            Assessment/ Plan:    Problem List Items Addressed This Visit          Cardiac and Vasculature    Hypotension       Pulmonary and Pneumonias    Hypoxia     Other Visit Diagnoses         Acute on chronic congestive heart failure, unspecified heart failure type    -  Primary    Relevant Medications    midodrine (PROAMATINE) tablet 5 mg (Completed)    amLODIPine (NORVASC) tablet 10 mg    metoprolol tartrate (LOPRESSOR) half tablet 37.5 mg    midodrine (PROAMATINE) tablet 10 mg      Pneumonia of right upper lobe due to infectious organism        Relevant Medications    piperacillin-tazobactam (ZOSYN) 3.375 g IVPB in 100 mL NS MBP (CD) (Completed)    piperacillin-tazobactam (ZOSYN) 3.375 g IVPB in 100 mL NS MBP (CD)    ipratropium-albuterol (DUO-NEB) nebulizer solution 3 mL    albuterol (PROVENTIL) nebulizer solution 0.083% 2.5 mg/3mL      Renal insufficiency          Confusion        Relevant Medications    sertraline (ZOLOFT) tablet 25 mg    traZODone (DESYREL) tablet 50 mg      Calculus of gallbladder without cholecystitis without obstruction    - Stable. No evidence of cholecystitis. Will sign off. Please " call with questions.             Active Hospital Problems    Diagnosis  POA    **Acute on chronic diastolic heart failure [I50.33]  Yes    Hypotension [I95.9]  Yes    Hypoxia [R09.02]  Yes    Dementia [F03.90]  Yes    Acute on chronic diastolic CHF (congestive heart failure) [I50.33]  Yes    History of CVA (cerebrovascular accident) [Z86.73]  Not Applicable      Resolved Hospital Problems   No resolved problems to display.              Ever Ribeiro MD  4/1/2025  07:09 EDT

## 2025-04-01 NOTE — PROGRESS NOTES
Nutrition Services    Patient Name:  Dirk Miles  YOB: 1940  MRN: [id number]  Admit Date:  3/30/2025    Patient screened per nutrition protocol for reported chewing and/or swallowing difficulties. Chart reviewed. Patient busy with provider at time of visit. RDN will follow to complete full assessment, if appropriate, as able.    Electronically signed by:  Romy Burr MS,RD,LD  04/01/25 17:17 EDT

## 2025-04-01 NOTE — PLAN OF CARE
Goal Outcome Evaluation:  Plan of Care Reviewed With: patient, spouse        Progress: no change  Outcome Evaluation: Patient performed stand pivot t/f from bed to chair with max assist x2, limited by weakness and hypotension. Denied dizziness with mobility until after t/f to chair. Demonstrated baseline L sided weakness/impaired motor control and inattention. Patient currently below functional baseline, demonstrating decreased functional mobility status, impaired balance, decreased endurance, impaired motor control, and decreased strength. Will address these deficits to promote return to PLOF. Recommend d/c back to facility.    Anticipated Discharge Disposition (PT): extended care facility

## 2025-04-01 NOTE — THERAPY EVALUATION
Patient Name: Dirk Miles  : 1940    MRN: [id number]                              Today's Date: 2025       Admit Date: 3/30/2025    Visit Dx:     ICD-10-CM ICD-9-CM   1. Acute on chronic congestive heart failure, unspecified heart failure type  I50.9 428.0   2. Pneumonia of right upper lobe due to infectious organism  J18.9 486   3. Hypotension, unspecified hypotension type  I95.9 458.9   4. Renal insufficiency  N28.9 593.9   5. Hypoxia  R09.02 799.02   6. Confusion  R41.0 298.9   7. Calculus of gallbladder without cholecystitis without obstruction  K80.20 574.20     Patient Active Problem List   Diagnosis    A-fib    Hypertension    Diabetes mellitus    Dementia with behavioral disturbance    Hyperlipemia    Stenosis of left carotid artery    Anemia, chronic disease    COVID-19 virus detected    History of CVA (cerebrovascular accident)    CAD (coronary artery disease)    Weakness of right foot    Weakness    Acute on chronic diastolic heart failure    Hypotension    Hypoxia    Dementia    Acute on chronic diastolic CHF (congestive heart failure)     Past Medical History:   Diagnosis Date    A-fib     Acute CVA (cerebrovascular accident) 2020    Arthritis     B12 deficiency     Coronary artery disease     COVID-19     Diabetes mellitus     DNR (do not resuscitate)     Dysphagia     Falls     GERD (gastroesophageal reflux disease)     Hemiplegia     Hyperlipidemia     Hypertension     Insomnia     Pressure sore on buttocks     Stenosis of left carotid artery 1/10/2021    Stroke     LEFT SIDE DEFICIT     Past Surgical History:   Procedure Laterality Date    CATARACT EXTRACTION, BILATERAL      HERNIA REPAIR        General Information       Row Name 25 1309          Physical Therapy Time and Intention    Document Type evaluation  -LR     Mode of Treatment physical therapy;individual therapy  -LR       Row Name 25 9438          General Information    Patient Profile Reviewed yes  -LR      Prior Level of Function max assist:;transfer  max assist t/f to w/c, independent with manual propulsion of w/c  -LR     Existing Precautions/Restrictions fall;other (see comments)  dementia, hypotension, L sided hemiplegia, inattention, blind L eye  -LR     Barriers to Rehab visual deficit;cognitive status;previous functional deficit;medically complex  -LR       Row Name 04/01/25 1305          Living Environment    Current Living Arrangements extended care facility  -LR     People in Home facility resident  -LR       Row Name 04/01/25 1305          Home Main Entrance    Number of Stairs, Main Entrance none  -LR       Row Name 04/01/25 1305          Stairs Within Home, Primary    Number of Stairs, Within Home, Primary none  -LR       Row Name 04/01/25 1305          Cognition    Orientation Status (Cognition) oriented to;person;place;disoriented to;time;verbal cues/prompts needed for orientation;other (see comments)  could state current place with choices  -LR       Row Name 04/01/25 1305          Safety Issues/Impairments Affecting Functional Mobility    Safety Issues Affecting Function (Mobility) ability to follow commands;awareness of need for assistance;insight into deficits/self-awareness;judgment;sequencing abilities;safety precautions follow-through/compliance;safety precaution awareness;problem-solving  -LR     Impairments Affecting Function (Mobility) balance;cognition;endurance/activity tolerance;strength;postural/trunk control;range of motion (ROM);motor control;motor planning;sensation/sensory awareness;grasp  -LR               User Key  (r) = Recorded By, (t) = Taken By, (c) = Cosigned By      Initials Name Provider Type    LR Marizol Vargas, PT Physical Therapist                   Mobility       Row Name 04/01/25 1305          Bed Mobility    Bed Mobility supine-sit  -LR     Supine-Sit [place] (Bed Mobility) verbal cues;minimum assist (75% patient effort)  -LR     Assistive Device (Bed  Mobility) head of bed elevated;other (see comments);repositioning sheet  -LR     Comment, (Bed Mobility) Verbal cues to move LEs towards EOB and to pull trunk into sitting. Min assist to move LEs towards EOB. Good activation of trunk muscles to assist with sitting up. Verbal cues and assist to scoot hips out to get feet on floor. Denied dizziness upon sitting up. BP hypotensive supine, similar sitting EOB.  -LR       Row Name 04/01/25 1309          Transfers    Comment, (Transfers) Verbal cues to push up from bed to stand. Patient crossed ankles on first attempt to stand, with posterior lean. Returned to sitting. Feet repositioned and able to successfully stand and pivot t/f chair. Stood once more with mod assist for pericare.  -LR       Row Name 04/01/25 1309          Bed-Chair Transfer    Bed-Chair [place] (Transfers) verbal cues;maximum assist (25% patient effort);2 person assist  -LR     Assistive Device (Bed-Chair Transfers) other (see comments)  B UE support  -LR       Row Name 04/01/25 0036          Sit-Stand Transfer    Sit-Stand [place] (Transfers) verbal cues;moderate assist (50% patient effort);1 person to manage equipment  -LR     Assistive Device (Sit-Stand Transfers) other (see comments)  B UE support  -LR       Row Name 04/01/25 1300          Gait/Stairs (Locomotion)    [place] Level (Gait) not tested  -LR     Patient was able to Ambulate no, other medical factors prevent ambulation  -LR     Reason Patient was unable to Ambulate Non-Ambulatory at Baseline  -LR     [place] Level (Stairs) not tested  -LR     Comment, (Gait/Stairs) See above.  -LR               User Key  (r) = Recorded By, (t) = Taken By, (c) = Cosigned By      Initials Name Provider Type    LR Marizol Vargas, PT Physical Therapist                   Obj/Interventions       Row Name 04/01/25 1306          Range of Motion Comprehensive    General Range of Motion lower extremity range of motion deficits  identified;upper extremity range of motion deficits identified  -LR       Row Name 04/01/25 1305          Strength Comprehensive (MMT)    General Manual Muscle Testing (MMT) Assessment upper extremity strength deficits identified;lower extremity strength deficits identified  -LR       Row Name 04/01/25 1305          Motor Skills    Motor Skills coordination  -LR     Coordination gross motor deficit;left;lower extremity;upper extremity;moderate impairment  -LR       Row Name 04/01/25 1305          Balance    Balance Assessment sitting static balance;sitting dynamic balance;standing static balance;standing dynamic balance  -LR     Static Sitting Balance minimal assist  -LR     Dynamic Sitting Balance moderate assist  -LR     Position, Sitting Balance supported;sitting edge of bed  -LR     Static Standing Balance moderate assist;1 person to manage equipment  -LR     Dynamic Standing Balance maximum assist;2-person assist  -LR     Position/Device Used, Standing Balance supported;other (see comments)  B UE support  -LR       Row Name 04/01/25 1305          Sensory Assessment (Somatosensory)    Sensory Assessment (Somatosensory) other (see comments)  difficult to assess d/t patient's cognitive status, patient denied numbness/tingling in B feet/hands, reported light touch equal and intact upon assessment  -LR       Row Name 04/01/25 1305          General Lower Extremity Assessment (Range of Motion)    Lower Extremity: Range of Motion RLE ROM WFL  -LR     Comment: Lower Extremity ROM L LE PROM WFL, stiffness noted throughout L LE  -LR       Row Name 04/01/25 1305          General Upper Extremity Assessment (Range of Motion)    Upper Extremity: Range of Motion RUE ROM WFL  -LR     Comment: Upper Extremity ROM L shoulder AROM impaired 60%, keeps L elbow flexed  -LR       Row Name 04/01/25 1305          Upper Extremity (Manual Muscle Testing)    Upper Extremity: Manual Muscle Testing (MMT) other (see comments)  -      Comment, MMT: Upper Extremity R UE functionally 4/5, L UE functionally 2/5  -LR       Row Name 04/01/25 1305          Lower Extremity (Manual Muscle Testing)    Lower Extremity: Manual Muscle Testing (MMT) other (see comments)  -LR     Comment, MMT: Lower Extremity R LE 4+/5, L LE functionally 4-/5, independent with SLR  -LR               User Key  (r) = Recorded By, (t) = Taken By, (c) = Cosigned By      Initials Name Provider Type    LR Marizol Vargas, PT Physical Therapist                   Goals/Plan       Row Name 04/01/25 1305          Bed Mobility Goal 1 (PT)    Activity/Assistive Device (Bed Mobility Goal 1, PT) sit to supine/supine to sit  -LR     [place] Level/Cues Needed (Bed Mobility Goal 1, PT) contact guard required  -LR     Time Frame (Bed Mobility Goal 1, PT) short term goal (STG);3 days  -LR     Progress/Outcomes (Bed Mobility Goal 1, PT) goal ongoing  -LR       Row Name 04/01/25 1305          Transfer Goal 1 (PT)    Activity/Assistive Device (Transfer Goal 1, PT) sit-to-stand/stand-to-sit;bed-to-chair/chair-to-bed  -LR     [place] Level/Cues Needed (Transfer Goal 1, PT) moderate assist (50-74% patient effort)  -LR     Time Frame (Transfer Goal 1, PT) long term goal (LTG);5 days  -LR     Progress/Outcome (Transfer Goal 1, PT) goal ongoing  -LR       Row Name 04/01/25 1305          Therapy Assessment/Plan (PT)    Planned Therapy Interventions (PT) balance training;bed mobility training;home exercise program;patient/family education;transfer training;strengthening;ROM (range of motion);neuromuscular re-education;motor coordination training  -LR               User Key  (r) = Recorded By, (t) = Taken By, (c) = Cosigned By      Initials Name Provider Type    Marizol Sanford, PT Physical Therapist                   Clinical Impression       Row Name 04/01/25 1305          Pain    Pretreatment Pain Rating 0/10 - no pain  -LR     Posttreatment Pain Rating 0/10 - no pain  -LR        Row Name 04/01/25 1306          Plan of Care Review    Plan of Care Reviewed With patient;spouse  -LR     Progress no change  -LR     Outcome Evaluation Patient performed stand pivot t/f from bed to chair with max assist x2, limited by weakness and hypotension. Denied dizziness with mobility until after t/f to chair. Demonstrated baseline L sided weakness/impaired motor control and inattention. Patient currently below functional baseline, demonstrating decreased functional mobility status, impaired balance, decreased endurance, impaired motor control, and decreased strength. Will address these deficits to promote return to PLOF. Recommend d/c back to facility.  -LR       Row Name 04/01/25 1306          Therapy Assessment/Plan (PT)    Patient/Family Therapy Goals Statement (PT) go home  -LR     Rehab Potential (PT) fair  -LR     Criteria for Skilled Interventions Met (PT) yes;meets criteria;skilled treatment is necessary  -LR     Therapy Frequency (PT) daily  -LR     Predicted Duration of Therapy Intervention (PT) 5 days  -LR       Row Name 04/01/25 1305          Vital Signs    Pre Systolic BP Rehab 87  -LR     Pre Treatment Diastolic BP 65  -LR     Intra Systolic BP Rehab 85  -LR     Intra Treatment Diastolic BP 64  -LR     Post Systolic BP Rehab 95  -LR     Post Treatment Diastolic BP 69  -LR     Pretreatment Heart Rate (beats/min) 73  -LR     Pre SpO2 (%) 99  -LR     O2 Delivery Pre Treatment supplemental O2  -LR     Post SpO2 (%) 99  -LR     O2 Delivery Post Treatment supplemental O2  -LR     Pre Patient Position Supine  -LR     Intra Patient Position Sitting  -LR     Post Patient Position Sitting  -LR       Row Name 04/01/25 1308          Positioning and Restraints    Pre-Treatment Position in bed  -LR     Post Treatment Position chair  -LR     In Chair notified nsg;reclined;sitting;call light within reach;encouraged to call for assist;exit alarm on;with family/caregiver;legs elevated;waffle cushion  -LR                User Key  (r) = Recorded By, (t) = Taken By, (c) = Cosigned By      Initials Name Provider Type    Marizol Sanford, PT Physical Therapist                   Outcome Measures       Row Name 04/01/25 1305 04/01/25 0800       How much help from another person do you currently need...    Turning from your back to your side while in flat bed without using bedrails? 3  -LR 2  -MURPHY    Moving from lying on back to sitting on the side of a flat bed without bedrails? 2  -LR 2  -MURPHY    Moving to and from a bed to a chair (including a wheelchair)? 2  -LR 1  -MURPHY    Standing up from a chair using your arms (e.g., wheelchair, bedside chair)? 2  -LR 2  -MURPHY    Climbing 3-5 steps with a railing? 1  -LR 1  -MURPHY    To walk in hospital room? 1  -LR 1  -MURPHY    AM-PAC 6 Clicks Score (PT) 11  -LR 9  -MURPHY    Highest Level of Mobility Goal 4 --> Transfer to chair/commode  -LR 3 --> Sit at edge of bed  -MURPHY      Row Name 04/01/25 1305          Functional Assessment    Outcome Measure Options AM-PAC 6 Clicks Basic Mobility (PT)  -LR               User Key  (r) = Recorded By, (t) = Taken By, (c) = Cosigned By      Initials Name Provider Type    Marizol Sanford, PT Physical Therapist    Vikram German RN Registered Nurse                                 Physical Therapy Education       Title: PT OT SLP Therapies (In Progress)       Topic: Physical Therapy (Done)       Point: Mobility training (Done)       Learning Progress Summary            Patient Acceptance, E,D, VU,NR by LR at 4/1/2025 1305    Comment: Educated on safety with mobility, benefits of mobility, correct supine to sit t/f technique, correct sit<->stand t/f technique, correct bed to chair t/f technique, and progression of POC.                      Point: Home exercise program (Done)       Learning Progress Summary            Patient Acceptance, E,D, VU,NR by LR at 4/1/2025 1305    Comment: Educated on safety with mobility, benefits of mobility, correct  supine to sit t/f technique, correct sit<->stand t/f technique, correct bed to chair t/f technique, and progression of POC.                      Point: Body mechanics (Done)       Learning Progress Summary            Patient Acceptance, E,D, VU,NR by LR at 4/1/2025 1305    Comment: Educated on safety with mobility, benefits of mobility, correct supine to sit t/f technique, correct sit<->stand t/f technique, correct bed to chair t/f technique, and progression of POC.                      Point: Precautions (Done)       Learning Progress Summary            Patient Acceptance, E,D, VU,NR by LR at 4/1/2025 1305    Comment: Educated on safety with mobility, benefits of mobility, correct supine to sit t/f technique, correct sit<->stand t/f technique, correct bed to chair t/f technique, and progression of POC.                                      User Key       Initials Effective Dates Name Provider Type Discipline    LR 02/03/23 -  Marizol Vargas, PT Physical Therapist PT                  PT Recommendation and Plan  Planned Therapy Interventions (PT): balance training, bed mobility training, home exercise program, patient/family education, transfer training, strengthening, ROM (range of motion), neuromuscular re-education, motor coordination training  Progress: no change  Outcome Evaluation: Patient performed stand pivot t/f from bed to chair with max assist x2, limited by weakness and hypotension. Denied dizziness with mobility until after t/f to chair. Demonstrated baseline L sided weakness/impaired motor control and inattention. Patient currently below functional baseline, demonstrating decreased functional mobility status, impaired balance, decreased endurance, impaired motor control, and decreased strength. Will address these deficits to promote return to PLOF. Recommend d/c back to facility.     Time Calculation:   PT Evaluation Complexity  History, PT Evaluation Complexity: 3 or more personal factors and/or  comorbidities  Examination of Body Systems (PT Eval Complexity): total of 3 or more elements  Clinical Presentation (PT Evaluation Complexity): evolving  Clinical Decision Making (PT Evaluation Complexity): moderate complexity  Overall Complexity (PT Evaluation Complexity): moderate complexity     PT Charges       Row Name 04/01/25 1305             Time Calculation    Start Time 1305  -LR      PT Received On 04/01/25  -LR      PT Goal Re-Cert Due Date 04/11/25  -LR         Timed Charges    97529 - PT Therapeutic Activity Minutes 15  -LR         Untimed Charges    PT Eval/Re-eval Minutes 50  -LR         Total Minutes    Timed Charges Total Minutes 15  -LR      Untimed Charges Total Minutes 50  -LR       Total Minutes 65  -LR                User Key  (r) = Recorded By, (t) = Taken By, (c) = Cosigned By      Initials Name Provider Type    LR Marizol Vargas, PT Physical Therapist                  Therapy Charges for Today       Code Description Service Date Service Provider Modifiers Qty    17906666635 HC PT THERAPEUTIC ACT EA 15 MIN 4/1/2025 Marizol Vargas, PT GP 1    26834252183 HC PT EVAL MOD COMPLEXITY 4 4/1/2025 Marizol Vargas, PT GP 1    73145758100 HC PT THER SUPP EA 15 MIN 4/1/2025 Marizol Vargas, PT GP 4            PT G-Codes  Outcome Measure Options: AM-PAC 6 Clicks Basic Mobility (PT)  AM-PAC 6 Clicks Score (PT): 11  PT Discharge Summary  Anticipated Discharge Disposition (PT): extended care facility    Marizol Vargas, PT  4/1/2025

## 2025-04-02 LAB
ANION GAP SERPL CALCULATED.3IONS-SCNC: 13 MMOL/L (ref 5–15)
BASOPHILS # BLD AUTO: 0.08 10*3/MM3 (ref 0–0.2)
BASOPHILS NFR BLD AUTO: 1.3 % (ref 0–1.5)
BUN SERPL-MCNC: 7 MG/DL (ref 8–23)
BUN/CREAT SERPL: 6.7 (ref 7–25)
CALCIUM SPEC-SCNC: 9.6 MG/DL (ref 8.6–10.5)
CHLORIDE SERPL-SCNC: 105 MMOL/L (ref 98–107)
CO2 SERPL-SCNC: 25 MMOL/L (ref 22–29)
CREAT SERPL-MCNC: 1.05 MG/DL (ref 0.76–1.27)
DEPRECATED RDW RBC AUTO: 56.1 FL (ref 37–54)
EGFRCR SERPLBLD CKD-EPI 2021: 70 ML/MIN/1.73
EOSINOPHIL # BLD AUTO: 0.48 10*3/MM3 (ref 0–0.4)
EOSINOPHIL NFR BLD AUTO: 8.1 % (ref 0.3–6.2)
ERYTHROCYTE [DISTWIDTH] IN BLOOD BY AUTOMATED COUNT: 14.8 % (ref 12.3–15.4)
GLUCOSE BLDC GLUCOMTR-MCNC: 120 MG/DL (ref 70–130)
GLUCOSE BLDC GLUCOMTR-MCNC: 120 MG/DL (ref 70–130)
GLUCOSE BLDC GLUCOMTR-MCNC: 148 MG/DL (ref 70–130)
GLUCOSE BLDC GLUCOMTR-MCNC: 153 MG/DL (ref 70–130)
GLUCOSE SERPL-MCNC: 109 MG/DL (ref 65–99)
HCT VFR BLD AUTO: 35.4 % (ref 37.5–51)
HGB BLD-MCNC: 11 G/DL (ref 13–17.7)
IMM GRANULOCYTES # BLD AUTO: 0.03 10*3/MM3 (ref 0–0.05)
IMM GRANULOCYTES NFR BLD AUTO: 0.5 % (ref 0–0.5)
LYMPHOCYTES # BLD AUTO: 0.74 10*3/MM3 (ref 0.7–3.1)
LYMPHOCYTES NFR BLD AUTO: 12.4 % (ref 19.6–45.3)
MCH RBC QN AUTO: 31.9 PG (ref 26.6–33)
MCHC RBC AUTO-ENTMCNC: 31.1 G/DL (ref 31.5–35.7)
MCV RBC AUTO: 102.6 FL (ref 79–97)
MONOCYTES # BLD AUTO: 0.75 10*3/MM3 (ref 0.1–0.9)
MONOCYTES NFR BLD AUTO: 12.6 % (ref 5–12)
NEUTROPHILS NFR BLD AUTO: 3.88 10*3/MM3 (ref 1.7–7)
NEUTROPHILS NFR BLD AUTO: 65.1 % (ref 42.7–76)
NRBC BLD AUTO-RTO: 0 /100 WBC (ref 0–0.2)
PLATELET # BLD AUTO: 180 10*3/MM3 (ref 140–450)
PMV BLD AUTO: 10.2 FL (ref 6–12)
POTASSIUM SERPL-SCNC: 4.4 MMOL/L (ref 3.5–5.2)
RBC # BLD AUTO: 3.45 10*6/MM3 (ref 4.14–5.8)
SODIUM SERPL-SCNC: 143 MMOL/L (ref 136–145)
WBC NRBC COR # BLD AUTO: 5.96 10*3/MM3 (ref 3.4–10.8)

## 2025-04-02 PROCEDURE — 94664 DEMO&/EVAL PT USE INHALER: CPT

## 2025-04-02 PROCEDURE — 25010000002 CEFTRIAXONE PER 250 MG: Performed by: INTERNAL MEDICINE

## 2025-04-02 PROCEDURE — 63710000001 INSULIN LISPRO (HUMAN) PER 5 UNITS: Performed by: INTERNAL MEDICINE

## 2025-04-02 PROCEDURE — 92526 ORAL FUNCTION THERAPY: CPT

## 2025-04-02 PROCEDURE — 94799 UNLISTED PULMONARY SVC/PX: CPT

## 2025-04-02 PROCEDURE — 80048 BASIC METABOLIC PNL TOTAL CA: CPT | Performed by: INTERNAL MEDICINE

## 2025-04-02 PROCEDURE — 85025 COMPLETE CBC W/AUTO DIFF WBC: CPT | Performed by: INTERNAL MEDICINE

## 2025-04-02 PROCEDURE — 82948 REAGENT STRIP/BLOOD GLUCOSE: CPT

## 2025-04-02 RX ADMIN — SODIUM CHLORIDE 2000 MG: 900 INJECTION INTRAVENOUS at 16:04

## 2025-04-02 RX ADMIN — DIVALPROEX SODIUM 125 MG: 125 CAPSULE ORAL at 21:07

## 2025-04-02 RX ADMIN — IPRATROPIUM BROMIDE AND ALBUTEROL SULFATE 3 ML: 2.5; .5 SOLUTION RESPIRATORY (INHALATION) at 19:39

## 2025-04-02 RX ADMIN — BACLOFEN 10 MG: 10 TABLET ORAL at 21:07

## 2025-04-02 RX ADMIN — Medication 10 ML: at 08:41

## 2025-04-02 RX ADMIN — INSULIN LISPRO 2 UNITS: 100 INJECTION, SOLUTION INTRAVENOUS; SUBCUTANEOUS at 17:00

## 2025-04-02 RX ADMIN — BACLOFEN 5 MG: 10 TABLET ORAL at 17:00

## 2025-04-02 RX ADMIN — IPRATROPIUM BROMIDE AND ALBUTEROL SULFATE 3 ML: 2.5; .5 SOLUTION RESPIRATORY (INHALATION) at 07:07

## 2025-04-02 RX ADMIN — BACLOFEN 5 MG: 10 TABLET ORAL at 08:40

## 2025-04-02 RX ADMIN — BISACODYL 10 MG: 5 TABLET, COATED ORAL at 08:40

## 2025-04-02 RX ADMIN — Medication 10 ML: at 20:46

## 2025-04-02 RX ADMIN — TRAZODONE HYDROCHLORIDE 50 MG: 50 TABLET ORAL at 21:07

## 2025-04-02 RX ADMIN — ASPIRIN 81 MG: 81 TABLET, CHEWABLE ORAL at 08:40

## 2025-04-02 RX ADMIN — IPRATROPIUM BROMIDE AND ALBUTEROL SULFATE 3 ML: 2.5; .5 SOLUTION RESPIRATORY (INHALATION) at 16:30

## 2025-04-02 RX ADMIN — IPRATROPIUM BROMIDE AND ALBUTEROL SULFATE 3 ML: 2.5; .5 SOLUTION RESPIRATORY (INHALATION) at 12:10

## 2025-04-02 RX ADMIN — DIVALPROEX SODIUM 125 MG: 125 CAPSULE ORAL at 08:40

## 2025-04-02 RX ADMIN — SERTRALINE HYDROCHLORIDE 25 MG: 50 TABLET ORAL at 08:39

## 2025-04-02 RX ADMIN — DOCUSATE SODIUM 100 MG: 100 CAPSULE, LIQUID FILLED ORAL at 08:40

## 2025-04-02 RX ADMIN — ATORVASTATIN CALCIUM 10 MG: 10 TABLET, FILM COATED ORAL at 21:07

## 2025-04-02 NOTE — PROGRESS NOTES
"    [facility]  PROGRESS NOTE    Patient Name: Dirk Miles  : 1940  MRN: [id number]    Date of Admission: 3/30/2025  Primary Care Physician: Milady Mera MD    Subjective   Subjective     CC:  SOA    HPI:  No new issues but was put in soft restraints overnight because he was \"pulling at things\". Wife upset because she wasn't called.     Objective   Objective     Vital Signs:   Temp:  [97.6 °F (36.4 °C)-98.7 °F (37.1 °C)] 98.3 °F (36.8 °C)  Heart Rate:  [61-75] 71  Resp:  [17-18] 17  BP: ()/(58-86) 141/75  Flow (L/min) (Oxygen Therapy):  [2] 2     Physical Exam:  Constitutional: No acute distress, awake, alert  HENT: NCAT, mucous membranes moist  Respiratory: Clear to auscultation bilaterally, respiratory effort normal   Cardiovascular: RRR, no murmurs, rubs, or gallops  Gastrointestinal: Positive bowel sounds, soft, nontender, mildly distended  Musculoskeletal: No bilateral ankle edema  Psychiatric: Appropriate affect, cooperative  Neurologic: Oriented x 3, strength symmetric in all extremities, Cranial Nerves grossly intact to confrontation, speech clear  Skin: No rashes     Results Reviewed:  LAB RESULTS:      Lab 25  0745 25  0744 25  1116 25  0856 25  0747   WBC 5.96 6.87 6.58  --  8.65   HEMOGLOBIN 11.0* 9.8* 9.9*  --  10.0*   HEMATOCRIT 35.4* 30.9* 31.2*  --  31.7*   PLATELETS 180 152 158  --  164   NEUTROS ABS 3.88 5.37 4.80  --  7.15*   IMMATURE GRANS (ABS) 0.03 0.03 0.01  --  0.08*   LYMPHS ABS 0.74 0.54* 0.65*  --  0.51*   MONOS ABS 0.75 0.70 0.67  --  0.66   EOS ABS 0.48* 0.18 0.37  --  0.20   .6* 102.3* 102.0*  --  103.6*   PROCALCITONIN  --   --   --   --  0.09   D DIMER QUANT  --   --   --   --  0.79   HSTROP T  --   --   --  69* 76*         Lab 25  0745 25  0744 25  1116 25  0747   SODIUM 143 138 139 138   POTASSIUM 4.4 4.4 4.4 4.9   CHLORIDE 105 100 101 100   CO2 " 25.0 24.0 24.0 24.0   ANION GAP 13.0 14.0 14.0 14.0   BUN 7* 10 15 19   CREATININE 1.05 1.21 1.23 1.46*   EGFR 70.0 59.0* 57.9* 47.1*   GLUCOSE 109* 137* 130* 137*   CALCIUM 9.6 9.3 9.3 9.4   MAGNESIUM  --   --  1.8 1.6   TSH  --   --   --  3.880         Lab 03/31/25  1116 03/30/25  0747   TOTAL PROTEIN 6.4 7.2   ALBUMIN 4.1 4.3   GLOBULIN 2.3 2.9   ALT (SGPT) 9 10   AST (SGOT) 13 16   BILIRUBIN 0.7 0.5   ALK PHOS 56 59         Lab 03/30/25  0856 03/30/25  0747   PROBNP  --  2,297.0*   HSTROP T 69* 76*                 Brief Urine Lab Results  (Last result in the past 365 days)        Color   Clarity   Blood   Leuk Est   Nitrite   Protein   CREAT   Urine HCG        03/30/25 1005 Yellow   Clear   Negative   Small (1+)   Negative   Trace                   Microbiology Results Abnormal       None            No radiology results from the last 24 hrs      Results for orders placed during the hospital encounter of 03/30/25    Adult Transthoracic Echo Limited W/ Cont if Necessary Per Protocol    Interpretation Summary    Limited echocardiogram    Left ventricular ejection fraction appears to be 51 - 55%.    The left atrial cavity is severely dilated.      Current medications:  Scheduled Meds:aspirin, 81 mg, Oral, Daily  atorvastatin, 10 mg, Oral, Nightly  baclofen, 10 mg, Oral, Nightly  Baclofen, 5 mg, Oral, BID  bisacodyl, 10 mg, Oral, Daily  cefTRIAXone, 2,000 mg, Intravenous, Q24H  Divalproex Sodium, 125 mg, Oral, BID  docusate sodium, 100 mg, Oral, BID  insulin lispro, 2-7 Units, Subcutaneous, 4x Daily AC & at Bedtime  ipratropium-albuterol, 3 mL, Nebulization, 4x Daily - RT  [Held by provider] metoprolol tartrate, 37.5 mg, Oral, BID  midodrine, 10 mg, Oral, TID AC  pharmacy consult - MTM, , Not Applicable, Daily  sertraline, 25 mg, Oral, Daily  sodium chloride, 10 mL, Intravenous, Q12H  traZODone, 50 mg, Oral, Nightly      Continuous Infusions:   PRN Meds:.  acetaminophen **OR** acetaminophen **OR** acetaminophen     Albuterol Sulfate NEB Orderable    senna-docusate sodium **AND** polyethylene glycol **AND** bisacodyl **AND** bisacodyl    Calcium Replacement - Follow Nurse / BPA Driven Protocol    dextrose    dextrose    glucagon (human recombinant)    [Held by provider] hydrALAZINE    Magnesium Low Dose Replacement - Follow Nurse / BPA Driven Protocol    ondansetron ODT **OR** ondansetron    Phosphorus Replacement - Follow Nurse / BPA Driven Protocol    Potassium Replacement - Follow Nurse / BPA Driven Protocol    sodium chloride    sodium chloride    sodium chloride    Assessment & Plan   Assessment & Plan     Active Hospital Problems    Diagnosis  POA    **Acute on chronic diastolic heart failure [I50.33]  Yes    Hypotension [I95.9]  Yes    Hypoxia [R09.02]  Yes    Dementia [F03.90]  Yes    Acute on chronic diastolic CHF (congestive heart failure) [I50.33]  Yes    History of CVA (cerebrovascular accident) [Z86.73]  Not Applicable      Resolved Hospital Problems   No resolved problems to display.        Brief Hospital Course to date:  Dirk Miles is a 84 y.o. 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  Pt also noted to have possible PNA as well as possible cholecystitis.    Plan:    Acute/Chronic HFpEF w/ hypoxia  -limited TTE to reassess EF (echo 2/2025 did not show significant valve abnormalities)- EF wnl.   -- strict Is/Os, daily weights   -- unable to tolerate diuretics due to borderline BP (even with addition of midodrine).  He actually appears more euvolemic today despite no diuretics, so will continue with treatment as below      Hx HTN now hypotensive  -TTE as noted  -possible PNA and/or intraabd infection, cont empiric abx  -albumin 4.3  -avoiding IVF bolus as he was clinically volume  "overloaded  -holding amlodipine, metoprolol, hydralazine, po lasix  -continue PO midodrine      Poss RUL Pneumonia  GB wall thickening w/ cystic duct stones  -agree w/ GS eval of poor surgical candidate and clinical presentation not c/w cholecystitis  -reports of possibly pocketing food at his facility, SLP eval (dysphagia is listed on his past medical problems)  -empiric zosyn to cover both intraabominal source (less likely) and PNA (more likely)- transitioned from Zosyn to Rocephin as no PSA risk.   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 Will remove telemetry so he will not \"pull at things\" and wife asks to be called if there are any issues.      DM2, A1c 7.0%, w/o insulin use   - SSI    CKD3   - baseline Cr 1.1-1.5; eGFR 40-50s    GERD    Total time spent: Time Spent: Time Spent: 35 minutes  Time spent includes time reviewing chart, face-to-face time, counseling patient/family/caregiver, ordering medications/tests/procedures, communicating with other health care professionals, documenting clinical information in the electronic health record, and coordination of care.      Expected Discharge Location and Transportation: back to living facility tomorrow on PO ABX   Expected Discharge   Expected Discharge Date: 4/3/2025; Expected Discharge Time:      VTE Prophylaxis:  Mechanical VTE prophylaxis orders are present.         AM-PAC 6 Clicks Score (PT): 12 (04/01/25 2000)    CODE STATUS:   Code Status and Medical " Interventions: No CPR (Do Not Attempt to Resuscitate); Limited Support; No intubation (DNI); Wife agrees w/ IV Abx, medical care, and if needed a fixed dose dobutamine, but does not want to further escalate vasopressors or ...   Ordered at: 03/30/25 1119     Code Status (Patient has no pulse and is not breathing):    No CPR (Do Not Attempt to Resuscitate)     Medical Interventions (Patient has pulse or is breathing):    Limited Support     Medical Intervention Limits:    No intubation (DNI)     Comments:    Wife agrees w/ IV Abx, medical care, and if needed a fixed dose dobutamine, but does not want to further escalate vasopressors or transition to the ICU       America Flores MD  04/02/25

## 2025-04-02 NOTE — THERAPY DISCHARGE NOTE
Acute Care - Speech Language Pathology   Swallow Treatment Note/Discharge      [place]     Patient Name: Dirk Miles  : 1940  MRN: [id number]  Today's Date: 2025               Admit Date: 3/30/2025    Visit Dx:    ICD-10-CM ICD-9-CM   1. Acute on chronic congestive heart failure, unspecified heart failure type  I50.9 428.0   2. Pneumonia of right upper lobe due to infectious organism  J18.9 486   3. Hypotension, unspecified hypotension type  I95.9 458.9   4. Renal insufficiency  N28.9 593.9   5. Hypoxia  R09.02 799.02   6. Confusion  R41.0 298.9   7. Calculus of gallbladder without cholecystitis without obstruction  K80.20 574.20     Patient Active Problem List   Diagnosis    A-fib    Hypertension    Diabetes mellitus    Dementia with behavioral disturbance    Hyperlipemia    Stenosis of left carotid artery    Anemia, chronic disease    COVID-19 virus detected    History of CVA (cerebrovascular accident)    CAD (coronary artery disease)    Weakness of right foot    Weakness    Acute on chronic diastolic heart failure    Hypotension    Hypoxia    Dementia    Acute on chronic diastolic CHF (congestive heart failure)     Past Medical History:   Diagnosis Date    A-fib     Acute CVA (cerebrovascular accident) 2020    Arthritis     B12 deficiency     Coronary artery disease     COVID-19     Diabetes mellitus     DNR (do not resuscitate)     Dysphagia     Falls     GERD (gastroesophageal reflux disease)     Hemiplegia     Hyperlipidemia     Hypertension     Insomnia     Pressure sore on buttocks     Stenosis of left carotid artery 1/10/2021    Stroke     LEFT SIDE DEFICIT     Past Surgical History:   Procedure Laterality Date    CATARACT EXTRACTION, BILATERAL      HERNIA REPAIR         SLP Recommendation and Plan  SLP Swallowing Diagnosis: mild, oral dysphagia, R/O pharyngeal dysphagia (25 0850)  SLP Diet Recommendation: soft to chew textures, chopped, thin liquids (25 0850)      Monitor for Signs of Aspiration: notify SLP if any concerns (04/02/25 0850)     Swallow Criteria for Skilled Therapeutic Interventions Met: demonstrates skilled criteria (04/02/25 0850)  Anticipated Discharge Disposition (SLP): [facility] nursing facility (04/02/25 0850)  Rehab Potential/Prognosis, Swallowing: good, to achieve stated therapy goals (04/02/25 0850)           Daily Summary of Progress (SLP): prepare for discharge (04/02/25 0850)     Anticipated Discharge Disposition (SLP): [facility] nursing [facility] (04/02/25 0850)                 Treatment Assessment (SLP): continued, toleration of diet, no clinical signs of, aspiration (04/02/25 0850)     Plan for Continued Treatment (SLP): treatment no longer indicated as all goals met (04/02/25 0850)         SWALLOW EVALUATION (Last 72 Hours)       SLP Adult Swallow Evaluation       Row Name 04/02/25 0850 03/31/25 0820                Rehab Evaluation    Document Type -- evaluation  -MM       Subjective Information -- no complaints  -MM       Patient Observations -- alert;cooperative  -MM       Patient/Family/Caregiver Comments/Observations -- No family present  -MM       Patient Effort -- good  -MM       Symptoms Noted During/After Treatment -- none  -MM          General Information    Patient Profile Reviewed -- yes  -MM       Pertinent History Of Current Problem -- Pt is an 84 year old male who was brought to the facility from [place] with shortness of breath and mental decline, found to be in CHF exacerbation.  -MM       Current Method of Nutrition -- mechanical ground textures;thin liquids  -MM       Precautions/Limitations, Vision -- WFL with corrective lenses;for purposes of eval  -MM       Precautions/Limitations, Hearing -- WFL;for purposes of eval  -MM       Prior Level of Function-Communication -- unknown  -MM       Prior Level of Function-Swallowing -- no diet consistency restrictions  -MM       Plans/Goals Discussed with -- patient;agreed upon  -MM        Barriers to Rehab -- medically complex  -MM       Patient's Goals for Discharge -- return home  -MM          Pain    Pretreatment Pain Rating -- 0/10 - no pain  -MM       Posttreatment Pain Rating -- 0/10 - no pain  -MM          Oral Motor Structure and Function    Dentition Assessment -- upper dentures/partial in place;lower dentures/partial in place  -MM       Secretion Management -- WNL/WFL  -MM       Mucosal Quality -- moist, healthy  -MM          Oral Musculature and Cranial Nerve Assessment    Oral Motor General Assessment -- WFL  -MM          General Eating/Swallowing Observations    Respiratory Support Currently in Use -- nasal cannula  -MM       Eating/Swallowing Skills -- self-fed;fed by SLP  -MM       Positioning During Eating -- upright 90 degree;upright in bed  -MM       Utensils Used -- spoon;straw  -MM       Consistencies Trialed -- regular textures;mechanical ground textures;pureed;thin liquids  -MM          Clinical Swallow Eval    Oral Prep Phase -- impaired  -MM       Oral Transit -- WFL  -MM       Oral Residue -- WFL  -MM       Pharyngeal Phase -- no overt signs/symptoms of pharyngeal impairment  -MM       Esophageal Phase -- unremarkable  -MM       Clinical Swallow Evaluation Summary -- Pt presents with mild oral dysphagia and 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 Pt drank thin liquids via straw with single swallows and no overt s/sx aspiration. Pt accepted mechanical ground texture from breakfast meal tray with functional mastication and no oral residue. Pt trialed regular consistency with mildly reduced mastication, functional allowed additional time. SLP recs soft to chew chopped and thin liquid diet and will f/u to ensure diet toleration.  -MM          Oral Prep Concerns    Oral Prep Concerns -- prolonged mastication;poor rotary chew  -MM       Prolonged Mastication -- regular consistencies  -MM       Poor Rotary Chew -- regular consistencies  -MM          SLP  Evaluation Clinical Impression    SLP Swallowing Diagnosis mild;oral dysphagia;R/O pharyngeal dysphagia  - mild;oral dysphagia;R/O pharyngeal dysphagia  -MM       Functional Impact risk of malnutrition  -CH risk of malnutrition  -MM       Rehab Potential/Prognosis, Swallowing good, to achieve stated therapy goals  -CH good, to achieve stated therapy goals  -MM       Swallow Criteria for Skilled Therapeutic Interventions Met demonstrates skilled criteria  -CH demonstrates skilled criteria  -MM          SLP Treatment Clinical Impressions    Treatment Assessment (SLP) continued;toleration of diet;no clinical signs of;aspiration  - --       Daily Summary of Progress (SLP) prepare for discharge  - --       Barriers to Overall Progress (SLP) Cognitive status  - --       Plan for Continued Treatment (SLP) treatment no longer indicated as all goals met  - --       Care Plan Review evaluation/treatment results reviewed;care plan/treatment goals reviewed  - --       Care Plan Review, Other Participant(s) spouse  - --          Recommendations    Therapy Frequency (Swallow) -- PRN;3 days per week  -MM       Predicted Duration Therapy Intervention (Days) -- 1 week  -MM       SLP Diet Recommendation soft to chew textures;chopped;thin liquids  - soft to chew textures;chopped;thin liquids  -       Recommended Precautions and Strategies upright posture during/after eating;general aspiration precautions  - upright posture during/after eating;general aspiration precautions  -       Oral Care Recommendations Oral Care BID/PRN;Toothbrush  - Oral Care BID/PRN;Toothbrush  -MM       SLP Rec. for Method of Medication Administration meds whole;with puree;as tolerated  - meds whole;with puree;as tolerated  -MM       Monitor for Signs of Aspiration notify SLP if any concerns  - notify SLP if any concerns  -MM       Anticipated Discharge Disposition (SLP) skilled nursing facility  - skilled nursing facility  -                  User Key  (r) = Recorded By, (t) = Taken By, (c) = Cosigned By      Initials Name Effective Dates    Tami Apodaca MS CCC-SLP 01/20/25 -     MM Zoë Pratt MS CCC-SLP 08/30/24 -                     EDUCATION  The patient has been educated in the following areas:   Dysphagia (Swallowing Impairment) Oral Care/Hydration Modified Diet Instruction.         SLP GOALS       Row Name 04/02/25 0850 03/31/25 0820          (LTG) Patient will demonstrate functional swallow for    Diet Texture (Demonstrate functional swallow) soft to chew (chopped) textures  -CH soft to chew (chopped) textures  -MM     Liquid viscosity (Demonstrate functional swallow) thin liquids  -CH thin liquids  -MM     Stevens (Demonstrate functional swallow) independently (over 90% accuracy)  -CH independently (over 90% accuracy)  -MM     Time Frame (Demonstrate functional swallow) 1 week  -CH 1 week  -MM     Progress/Outcomes (Demonstrate functional swallow) goal met  -CH new goal  -MM     Comment (Demonstrate functional swallow) no s/s of aspiration  -CH --        (STG) Patient will tolerate trials of    Consistencies Trialed (Tolerate trials) soft to chew (chopped) textures;thin liquids  -CH soft to chew (chopped) textures;thin liquids  -MM     Desired Outcome (Tolerate trials) without signs/symptoms of aspiration;with adequate oral prep/transit/clearance  -CH without signs/symptoms of aspiration;with adequate oral prep/transit/clearance  -MM     Stevens (Tolerate trials) independently (over 90% accuracy)  -CH independently (over 90% accuracy)  -MM     Time Frame (Tolerate trials) 1 week  -CH 1 week  -MM     Progress/Outcomes (Tolerate trials) goal met  - new goal  -MM     Comment (Tolerate trials) no s/s of aspiration  -CH --               User Key  (r) = Recorded By, (t) = Taken By, (c) = Cosigned By      Initials Name Provider Type    Tami Apodaca MS CCC-SLP Speech and Language Pathologist    HAZEL Pratt  MS Zoë CCC-SLP Speech and Language Pathologist                           Time Calculation:    Time Calculation- SLP       Row Name 04/02/25 1136             Time Calculation- SLP    SLP Start Time 0850  -CH      SLP Received On 04/02/25  -CH         Untimed Charges    25043-QZ Treatment Swallow Minutes 39  -CH         Total Minutes    Untimed Charges Total Minutes 39  -CH       Total Minutes 39  -CH                User Key  (r) = Recorded By, (t) = Taken By, (c) = Cosigned By      Initials Name Provider Type    Tami Apodaca MS CCC-SLP Speech and Language Pathologist                    Therapy Charges for Today       Code Description Service Date Service Provider Modifiers Qty    [id number]  ST TREATMENT SWALLOW 3 4/2/2025 Tami Calderon MS CCC-SLP GN 1                 SLP Discharge Summary  Anticipated Discharge Disposition (SLP): skilled nursing facility    Tami Calderon MS CCC-ANUSHA  4/2/2025

## 2025-04-02 NOTE — PLAN OF CARE
Goal Outcome Evaluation:                   Anticipated Discharge Disposition (SLP): skilled nursing facility          SLP Swallowing Diagnosis: mild, oral dysphagia, R/O pharyngeal dysphagia (04/02/25 0850)  Treatment Assessment (SLP): continued, toleration of diet, no clinical signs of, aspiration (04/02/25 0850)     Plan for Continued Treatment (SLP): treatment no longer indicated as all goals met (04/02/25 0850)

## 2025-04-02 NOTE — PROGRESS NOTES
"Nutrition Services    Patient Name:  Dirk Miles  YOB: 1940  MRN: [id number]  Admit Date:  3/30/2025    Patient identified to be at nutrition risk per nurse admission screen based on indicator of \"difficulty chewing/swallowing\". SLP signed off at this time and does not currently meet screen criteria for nutrition risk. RD will continue to follow per protocol.     Electronically signed by:  Romy Burr MS,SYLVESTER,LD  04/02/25 12:52 EDT  "

## 2025-04-02 NOTE — CASE MANAGEMENT/SOCIAL WORK
Continued Stay Note  [facility]     Patient Name: Dirk Miles  MRN: [id number]  Today's Date: 4/2/2025    Admit Date: 3/30/2025    Plan: LTC   Discharge Plan       Row Name 04/02/25 1411       Plan    Plan LTC    Patient/Family in Agreement with Plan yes    Plan Comments Discussed in MDR. Mr. Miles is not being discharged today. He may possibly be discharged tomorrow, to go back to [facility].  will continue to follow and arrange EMS transporation if he's discharged.    Final Discharge Disposition Code 04 - intermediate care facility                   Discharge Codes    No documentation.                 Expected Discharge Date and Time       Expected Discharge Date Expected Discharge Time    Apr 3, 2025               Tomasa Reed RN

## 2025-04-02 NOTE — PLAN OF CARE
Goal Outcome Evaluation:   V/S stable. He is alert to himself and disoriented to time, place and situation. He is on soft restrain in BL UL. He is currently on bed with call light on reach.

## 2025-04-03 ENCOUNTER — APPOINTMENT (OUTPATIENT)
Facility: HOSPITAL | Age: 85
End: 2025-04-03
Payer: MEDICARE

## 2025-04-03 VITALS
BODY MASS INDEX: 26.04 KG/M2 | RESPIRATION RATE: 18 BRPM | HEART RATE: 70 BPM | SYSTOLIC BLOOD PRESSURE: 110 MMHG | OXYGEN SATURATION: 95 % | DIASTOLIC BLOOD PRESSURE: 70 MMHG | HEIGHT: 71 IN | WEIGHT: 186 LBS | TEMPERATURE: 97.8 F

## 2025-04-03 LAB
GLUCOSE BLDC GLUCOMTR-MCNC: 112 MG/DL (ref 70–130)
GLUCOSE BLDC GLUCOMTR-MCNC: 148 MG/DL (ref 70–130)

## 2025-04-03 PROCEDURE — 25010000002 CEFTRIAXONE PER 250 MG: Performed by: INTERNAL MEDICINE

## 2025-04-03 PROCEDURE — 82948 REAGENT STRIP/BLOOD GLUCOSE: CPT

## 2025-04-03 RX ORDER — IPRATROPIUM BROMIDE AND ALBUTEROL SULFATE 2.5; .5 MG/3ML; MG/3ML
3 SOLUTION RESPIRATORY (INHALATION) EVERY 6 HOURS PRN
Status: DISCONTINUED | OUTPATIENT
Start: 2025-04-03 | End: 2025-04-03 | Stop reason: HOSPADM

## 2025-04-03 RX ORDER — CEFDINIR 300 MG/1
300 CAPSULE ORAL 2 TIMES DAILY
Qty: 6 CAPSULE | Refills: 0 | Status: SHIPPED | OUTPATIENT
Start: 2025-04-03 | End: 2025-04-06

## 2025-04-03 RX ORDER — MIDODRINE HYDROCHLORIDE 10 MG/1
10 TABLET ORAL
Qty: 90 TABLET | Refills: 0 | Status: SHIPPED | OUTPATIENT
Start: 2025-04-03

## 2025-04-03 RX ADMIN — SERTRALINE HYDROCHLORIDE 25 MG: 50 TABLET ORAL at 08:44

## 2025-04-03 RX ADMIN — BACLOFEN 5 MG: 10 TABLET ORAL at 08:48

## 2025-04-03 RX ADMIN — BISACODYL 5 MG: 5 TABLET, COATED ORAL at 08:43

## 2025-04-03 RX ADMIN — DOCUSATE SODIUM 100 MG: 100 CAPSULE, LIQUID FILLED ORAL at 08:45

## 2025-04-03 RX ADMIN — Medication 10 ML: at 08:48

## 2025-04-03 RX ADMIN — DIVALPROEX SODIUM 125 MG: 125 CAPSULE ORAL at 08:44

## 2025-04-03 RX ADMIN — MIDODRINE HYDROCHLORIDE 10 MG: 10 TABLET ORAL at 12:00

## 2025-04-03 RX ADMIN — MIDODRINE HYDROCHLORIDE 10 MG: 10 TABLET ORAL at 08:45

## 2025-04-03 RX ADMIN — BISACODYL 10 MG: 5 TABLET, COATED ORAL at 08:47

## 2025-04-03 RX ADMIN — ASPIRIN 81 MG: 81 TABLET, CHEWABLE ORAL at 08:45

## 2025-04-03 RX ADMIN — BACLOFEN 10 MG: 10 TABLET ORAL at 08:44

## 2025-04-03 RX ADMIN — SODIUM CHLORIDE 2000 MG: 900 INJECTION INTRAVENOUS at 14:17

## 2025-04-03 NOTE — PLAN OF CARE
Goal Outcome Evaluation:              Outcome Evaluation: Patient A&O x2, 2L NC, non tele. Patient slept well overnight, no need for restraints. No complaints of pain. Bed in lowest position, call light in hand, bed alarm set.

## 2025-04-03 NOTE — CASE MANAGEMENT/SOCIAL WORK
Case Management Discharge Note      Final Note: Mr. Miles is being discharged today, 4/3/25. He will be going back to [facility]. Nurse to call report to [phone]. Facility will get the discharge summary out of [facility]. [facility] ambulance will transport him at 1515 today. Wife was made aware of transport time and was agreeable.         Selected Continued Care - Admitted Since 3/30/2025       Destination    No services have been selected for the patient.                Durable Medical Equipment    No services have been selected for the patient.                Dialysis/Infusion    No services have been selected for the patient.                Home Medical Care    No services have been selected for the patient.                Therapy    No services have been selected for the patient.                Community Resources    No services have been selected for the patient.                Community & DME    No services have been selected for the patient.                    Selected Continued Care - Prior Encounters Includes continued care and service providers with selected services from prior encounters from 12/30/2024 to 4/3/2025      Discharged on 2/3/2025 Admission date: 2/1/2025 - Discharge disposition: Intermediate Care       Destination       Service Provider Services Address Phone Fax Patient Preferred    [facility] [address] [facility] [address] [phone] --                               Final Discharge Disposition Code: 04 - intermediate care facility

## 2025-04-03 NOTE — DISCHARGE SUMMARY
[facility]  DISCHARGE SUMMARY    Patient Name: Dirk Miles  : 1940  MRN: [id number]    Date of Admission: 3/30/2025  7:16 AM  Date of Discharge:  4/3/2025  Primary Care Physician: Milady Mera MD    Consults       No orders found from 3/1/2025 to 3/31/2025.            Hospital Course     Presenting Problem: PNA    Active Hospital Problems    Diagnosis  POA    **Acute on chronic diastolic heart failure [I50.33]  Yes    Hypotension [I95.9]  Yes    Hypoxia [R09.02]  Yes    Dementia [F03.90]  Yes    Acute on chronic diastolic CHF (congestive heart failure) [I50.33]  Yes    History of CVA (cerebrovascular accident) [Z86.73]  Not Applicable      Resolved Hospital Problems   No resolved problems to display.          Hospital Course:  Dirk Miles is a 84 y.o. 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  Pt also noted to have possible PNA as well as possible cholecystitis.        Acute/Chronic HFpEF w/ hypoxia  -limited TTE to reassess EF (echo 2025 did not show significant valve abnormalities)- EF wnl.   -- unable to tolerate diuretics due to borderline BP (even with addition of midodrine).  He actually appears euvolemic now despite no diuretics, so will continue with treatment as below      Hx HTN, but hypotensive on admission  -TTE as noted  -possible PNA and/or intraabd infection, cont empiric abx  -albumin 4.3  -no IVF bolus as he was clinically volume overloaded  -held amlodipine, metoprolol, hydralazine, po lasix. Resume upon d/c monitor closely  -continue PO midodrine        Poss RUL Pneumonia  GB wall thickening w/ cystic duct stones  -agree w/ GS eval of poor surgical candidate and clinical presentation not c/w  cholecystitis  -reports of possibly pocketing food at his facility, SLP haresh (dysphagia is listed on his past medical problems)  -empiric zosyn to cover both intraabominal source (less likely) and PNA (more likely)- transitioned from Zosyn to Rocephin as no PSA risk. 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  Afib/CAD/HLD  -had stroke 2020, started on eliquis and had fall in the restroom w/ head injury; repeat imaging showed hemorrhagic conversion, uncertain which event occurred first; chronically no longer on AC  -cont ASA, statin     Dementia  Chronic debility (multifactorial)  -baseline WC bound but able to participate in facility activities  -cont depakote, sertaline, trazodone    DM2, A1c 7.0%, w/o insulin use   - resume home meds upon d/c     CKD3   - baseline Cr 1.1-1.5; eGFR 40-50s     GERD         Discharge Follow Up Recommendations for outpatient labs/diagnostics:   Follow up with PCP within one week     Day of Discharge     HPI:   Doing well this am, no new issues overnight.     Review of Systems  Gen- No fevers, chills  CV- No chest pain, palpitations  Resp- No cough, dyspnea  GI- No N/V/D, abd pain     Vital Signs:   Temp:  [97.8 °F (36.6 °C)-99.4 °F (37.4 °C)] 97.8 °F (36.6 °C)  Heart Rate:  [66-81] 72  Resp:  [16-18] 18  BP: (100-138)/(68-74) 110/74  Flow (L/min) (Oxygen Therapy):  [2] 2      Physical Exam:  Constitutional: No acute distress, awake, alert  HENT: NCAT, mucous membranes moist  Respiratory: Clear to auscultation bilaterally, respiratory effort normal   Cardiovascular: RRR, no murmurs, rubs, or gallops  Gastrointestinal: Positive bowel sounds, soft, nontender, mildly distended  Musculoskeletal: No bilateral ankle edema  Psychiatric: Appropriate affect, cooperative  Neurologic: Oriented x 3, strength symmetric in all extremities, Cranial Nerves grossly  intact to confrontation, speech clear  Skin: No rashes     Pertinent  and/or Most Recent Results     LAB RESULTS:      Lab 04/02/25  0745 04/01/25  0744 03/31/25  1116 03/30/25  0747   WBC 5.96 6.87 6.58 8.65   HEMOGLOBIN 11.0* 9.8* 9.9* 10.0*   HEMATOCRIT 35.4* 30.9* 31.2* 31.7*   PLATELETS 180 152 158 164   NEUTROS ABS 3.88 5.37 4.80 7.15*   IMMATURE GRANS (ABS) 0.03 0.03 0.01 0.08*   LYMPHS ABS 0.74 0.54* 0.65* 0.51*   MONOS ABS 0.75 0.70 0.67 0.66   EOS ABS 0.48* 0.18 0.37 0.20   .6* 102.3* 102.0* 103.6*   PROCALCITONIN  --   --   --  0.09   D DIMER QUANT  --   --   --  0.79         Lab 04/02/25  0745 04/01/25  0744 03/31/25  1116 03/30/25  0747   SODIUM 143 138 139 138   POTASSIUM 4.4 4.4 4.4 4.9   CHLORIDE 105 100 101 100   CO2 25.0 24.0 24.0 24.0   ANION GAP 13.0 14.0 14.0 14.0   BUN 7* 10 15 19   CREATININE 1.05 1.21 1.23 1.46*   EGFR 70.0 59.0* 57.9* 47.1*   GLUCOSE 109* 137* 130* 137*   CALCIUM 9.6 9.3 9.3 9.4   MAGNESIUM  --   --  1.8 1.6   TSH  --   --   --  3.880         Lab 03/31/25  1116 03/30/25  0747   TOTAL PROTEIN 6.4 7.2   ALBUMIN 4.1 4.3   GLOBULIN 2.3 2.9   ALT (SGPT) 9 10   AST (SGOT) 13 16   BILIRUBIN 0.7 0.5   ALK PHOS 56 59         Lab 03/30/25  0856 03/30/25  0747   PROBNP  --  2,297.0*   HSTROP T 69* 76*                 Brief Urine Lab Results  (Last result in the past 365 days)        Color   Clarity   Blood   Leuk Est   Nitrite   Protein   CREAT   Urine HCG        03/30/25 1005 Yellow   Clear   Negative   Small (1+)   Negative   Trace                 Microbiology Results (last 10 days)       Procedure Component Value - Date/Time    MRSA Screen, PCR (Inpatient) - Swab, Nares [[id number]]  (Normal) Collected: 03/31/25 0513    Lab Status: Final result Specimen: Swab from Nares Updated: 03/31/25 0739     MRSA PCR Negative    Narrative:      The negative predictive value of this diagnostic test is high and should only be used to consider de-escalating anti-MRSA therapy. A positive  result may indicate colonization with MRSA and must be correlated clinically.  MRSA Negative    S. Pneumo Ag Urine or CSF - Urine, Urine, Clean Catch [[id number]]  (Normal) Collected: 03/31/25 0512    Lab Status: Final result Specimen: Urine, Clean Catch Updated: 03/31/25 0939     Strep Pneumo Ag Negative    Legionella Antigen, Urine - Urine, Urine, Clean Catch [[id number]]  (Normal) Collected: 03/31/25 0512    Lab Status: Final result Specimen: Urine, Clean Catch Updated: 03/31/25 0939     LEGIONELLA ANTIGEN, URINE Negative    Respiratory Panel PCR w/COVID-19(SARS-CoV-2) JOHN/MICHAEL/ROLANDO/PAD/COR/CHET In-House, NP Swab in UTM/VTM, 2 HR TAT - Swab, Nasopharynx [[id number]]  (Normal) Collected: 03/30/25 0733    Lab Status: Final result Specimen: Swab from Nasopharynx Updated: 03/30/25 0838     ADENOVIRUS, PCR Not Detected     Coronavirus 229E Not Detected     Coronavirus HKU1 Not Detected     Coronavirus NL63 Not Detected     Coronavirus OC43 Not Detected     COVID19 Not Detected     Human Metapneumovirus Not Detected     Human Rhinovirus/Enterovirus Not Detected     Influenza A PCR Not Detected     Influenza B PCR Not Detected     Parainfluenza Virus 1 Not Detected     Parainfluenza Virus 2 Not Detected     Parainfluenza Virus 3 Not Detected     Parainfluenza Virus 4 Not Detected     RSV, PCR Not Detected     Bordetella pertussis pcr Not Detected     Bordetella parapertussis PCR Not Detected     Chlamydophila pneumoniae PCR Not Detected     Mycoplasma pneumo by PCR Not Detected    Narrative:      In the setting of a positive respiratory panel with a viral infection PLUS a negative procalcitonin without other underlying concern for bacterial infection, consider observing off antibiotics or discontinuation of antibiotics and continue supportive care. If the respiratory panel is positive for atypical bacterial infection (Bordetella pertussis, Chlamydophila pneumoniae, or Mycoplasma pneumoniae), consider antibiotic de-escalation  to target atypical bacterial infection.            CT Abdomen Pelvis Without Contrast  Result Date: 3/30/2025  CT ABDOMEN PELVIS WO CONTRAST Date of Exam: 3/30/2025 9:07 AM EDT Indication: abd distension, eval for obstruction. Comparison: CT pelvis dated 2/1/2025 Technique: Axial CT images were obtained of the abdomen and pelvis without the administration of contrast. Reconstructed coronal and sagittal images were also obtained. Automated exposure control and iterative construction methods were used. Findings: The heart is enlarged. There is no pericardial effusion. There are small bilateral pleural effusions with compressive atelectasis. The liver is enlarged measuring 19.5 cm in craniocaudal dimension. There is decreased density of the liver compatible with hepatic steatosis. The gallbladder is present with mild gallbladder wall thickening and pericholecystic edema. There are gallstones  within the gallbladder neck extending into the cystic duct. Findings are concerning for acute cholecystitis in the correct clinical setting. The spleen is normal in size. There is a low-density left adrenal nodule compatible with an adenoma. The pancreas appears within normal limits for age. There is a duodenal diverticulum near the ampulla. The kidneys are symmetric in size. There is no hydronephrosis. The urinary bladder is collapsed which limits evaluation. The prostate is normal in size. There is a small sliding-type hiatal hernia. The small bowel is normal in caliber without small bowel obstruction. There is no acute colitis or diverticulitis. There is no free fluid or free air. The aorta is normal in caliber without aneurysm formation. There is aortoiliac atherosclerotic calcification. There is no abdominal or pelvic lymphadenopathy. Degenerative disc disease of the thoracolumbar spine.     Impression: 1.Cholelithiasis with gallbladder wall thickening and pericholecystic edema. Gallstones are visualized within the cystic  duct. Findings are concerning for acute cholecystitis in the correct clinical setting. 2.Hepatomegaly with hepatic steatosis. 3.Small bilateral pleural effusions with compressive atelectasis. 4.No evidence of small bowel obstruction. Electronically Signed: Diego Hastingselor  3/30/2025 9:35 AM EDT  Workstation ID: [id number]    CT Head Without Contrast  Result Date: 3/30/2025  CT HEAD WO CONTRAST Date of Exam: 3/30/2025 9:07 AM EDT Indication: ams. Comparison: 2/1/2025 Technique: Axial CT images were obtained of the head without contrast administration.  Automated exposure control and iterative construction methods were used. FINDINGS: There is no evidence for acute intracranial hemorrhage. No definitive acute focal ischemia is identified. Nonspecific white matter changes are noted likely related to chronic small vessel ischemic changes and age-related changes. Associated diffuse volume loss is observed. Multifocal areas of remote infarction are again noted bilaterally. There is no evidence for abnormal cerebral edema. There is no mass effect or midline shift. The ventricular system is nondilated. The basal cisterns are patent. The skull is intact. Changes of chronic sinusitis are noted.     1.No evidence for acute intracranial abnormality. 2.Nonspecific white matter changes are noted with associated diffuse volume loss. These findings are likely related to chronic small vessel ischemic changes and/or age-related changes. 3.Multifocal remote infarcts are again noted. Electronically Signed: Berlin Pelayo MD  3/30/2025 9:29 AM EDT  Workstation ID: [id number]    XR Chest 1 View  Result Date: 3/30/2025  XR CHEST 1 VW Date of Exam: 3/30/2025 7:43 AM EDT Indication: SOA triage protocol Comparison: Chest radiograph 2/1/2025 Findings: The heart is enlarged. The pulmonary vascular markings are normal. There is mild airspace disease in the lateral aspect of the right upper lobe which could be secondary to pneumonia. The left lung  is clear. The osseous structures are normal.     Impression: Mild right upper lobe airspace disease could be secondary to pneumonia. Electronically Signed: Eddie Lugo MD  3/30/2025 7:52 AM EDT  Workstation ID: [id number]      Results for orders placed during the hospital encounter of 12/01/20    Duplex Carotid Ultrasound CAR    Interpretation Summary  · Left internal carotid artery stenosis of >70%.  · Left vertebral artery is bidirectional.  · Right internal carotid artery stenosis of 50-69%.      Results for orders placed during the hospital encounter of 12/01/20    Duplex Carotid Ultrasound CAR    Interpretation Summary  · Left internal carotid artery stenosis of >70%.  · Left vertebral artery is bidirectional.  · Right internal carotid artery stenosis of 50-69%.      Results for orders placed during the hospital encounter of 03/30/25    Adult Transthoracic Echo Limited W/ Cont if Necessary Per Protocol    Interpretation Summary    Limited echocardiogram    Left ventricular ejection fraction appears to be 51 - 55%.    The left atrial cavity is severely dilated.      Plan for Follow-up of Pending Labs/Results:     Discharge Details        Discharge Medications        New Medications        Instructions Start Date   cefdinir 300 MG capsule  Commonly known as: OMNICEF   300 mg, Oral, 2 Times Daily      midodrine 10 MG tablet  Commonly known as: PROAMATINE   10 mg, Oral, 3 Times Daily Before Meals             Changes to Medications        Instructions Start Date   furosemide 20 MG tablet  Commonly known as: LASIX  What changed:   when to take this  additional instructions   20 mg, Oral, Daily PRN, ON TUES AND SAT              Continue These Medications        Instructions Start Date   acetaminophen 325 MG tablet  Commonly known as: TYLENOL   650 mg, 2 Times Daily      aspirin 81 MG chewable tablet   81 mg, Daily      atorvastatin 10 MG tablet  Commonly known as: LIPITOR   10 mg, Nightly      Baclofen 5 MG  tablet  Commonly known as: LIORESAL   5 mg, 2 Times Daily      baclofen 10 MG tablet  Commonly known as: LIORESAL   10 mg, Nightly      Divalproex Sodium 125 MG capsule  Commonly known as: DEPAKOTE SPRINKLE   125 mg, 2 Times Daily      hydrALAZINE 10 MG tablet  Commonly known as: APRESOLINE   10 mg, 2 Times Daily PRN      metFORMIN  MG 24 hr tablet  Commonly known as: GLUCOPHAGE-XR   750 mg, 2 Times Daily      metoprolol tartrate 25 MG tablet  Commonly known as: LOPRESSOR   37.5 mg, 2 Times Daily      ondansetron ODT 4 MG disintegrating tablet  Commonly known as: ZOFRAN-ODT   4 mg, Every 12 Hours PRN      polyethylene glycol 17 g packet  Commonly known as: MIRALAX   17 g, 3 Times Weekly      Polyvinyl Alcohol-Povidone PF 1.4-0.6 % ophthalmic solution  Commonly known as: ARTIFICIAL TEARS   1 drop, 2 Times Daily      sertraline 25 MG tablet  Commonly known as: ZOLOFT   25 mg, Daily      traZODone 50 MG tablet  Commonly known as: DESYREL   50 mg, Nightly      vitamin B-12 1000 MCG tablet  Commonly known as: CYANOCOBALAMIN   1,000 mcg, Daily      Vitamin D3 50 MCG (2000 UT) tablet   2,000 Units, Daily               Allergies   Allergen Reactions    Codeine Mental Status Change     hyper         Discharge Disposition:  Long Term Care (DC - External)    Diet:  Hospital:  Diet Order   Procedures    Diet: Regular/House, Fluid Restriction (240 mL/tray); 1500 mL/day; Texture: Soft to Chew (NDD 3); Soft to Chew: Chopped Meat; Fluid Consistency: Thin (IDDSI 0)       Diet Instructions       Diet: Regular/House Diet, Fluid Restriction (240 mL/tray) Diets; 1500 mL/day; Soft to Chew (NDD 3); Chopped Meat; Thin (IDDSI 0)      Discharge Diet:  Regular/House Diet  Fluid Restriction (240 mL/tray) Diets       Fluid Restriction Diet (240 mL/tray): 1500 mL/day    Texture: Soft to Chew (NDD 3)    Soft to Chew: Chopped Meat    Fluid Consistency: Thin (IDDSI 0)             Activity:      Restrictions or Other Recommendations:          CODE STATUS:    Code Status and Medical Interventions: No CPR (Do Not Attempt to Resuscitate); Limited Support; No intubation (DNI); Wife agrees w/ IV Abx, medical care, and if needed a fixed dose dobutamine, but does not want to further escalate vasopressors or ...   Ordered at: 03/30/25 1119     Code Status (Patient has no pulse and is not breathing):    No CPR (Do Not Attempt to Resuscitate)     Medical Interventions (Patient has pulse or is breathing):    Limited Support     Medical Intervention Limits:    No intubation (DNI)     Comments:    Wife agrees w/ IV Abx, medical care, and if needed a fixed dose dobutamine, but does not want to further escalate vasopressors or transition to the ICU       Future Appointments   Date Time Provider Department Center   4/3/2025  3:15 PM MED 13 [facility] None       Additional Instructions for the Follow-ups that You Need to Schedule       Discharge Follow-up with PCP   As directed       Currently Documented PCP:    Milady Mera MD    PCP Phone Number:    [phone]     Follow Up Details: in one week with PCP                      America Flores MD  04/03/25      Time Spent on Discharge:  I spent  35  minutes on this discharge activity which included: face-to-face encounter with the patient, reviewing the data in the system, coordination of the care with the nursing staff as well as consultants, documentation, and entering orders.          I have personally evaluated and examined the patient. The Attending was available to me as a supervising provider if needed.